# Patient Record
Sex: FEMALE | Race: WHITE | NOT HISPANIC OR LATINO
[De-identification: names, ages, dates, MRNs, and addresses within clinical notes are randomized per-mention and may not be internally consistent; named-entity substitution may affect disease eponyms.]

---

## 2017-01-03 ENCOUNTER — APPOINTMENT (OUTPATIENT)
Dept: UROGYNECOLOGY | Facility: CLINIC | Age: 77
End: 2017-01-03

## 2017-01-03 RX ORDER — LEVOTHYROXINE SODIUM 0.03 MG/1
25 TABLET ORAL
Qty: 30 | Refills: 0 | Status: ACTIVE | COMMUNITY
Start: 2016-08-19

## 2017-01-05 ENCOUNTER — APPOINTMENT (OUTPATIENT)
Dept: UROGYNECOLOGY | Facility: CLINIC | Age: 77
End: 2017-01-05

## 2017-01-06 ENCOUNTER — APPOINTMENT (OUTPATIENT)
Dept: UROGYNECOLOGY | Facility: CLINIC | Age: 77
End: 2017-01-06

## 2017-02-23 ENCOUNTER — APPOINTMENT (OUTPATIENT)
Dept: UROGYNECOLOGY | Facility: CLINIC | Age: 77
End: 2017-02-23

## 2017-02-23 LAB
BILIRUB UR QL STRIP: NORMAL
CLARITY UR: CLEAR
COLLECTION METHOD: NORMAL
GLUCOSE UR-MCNC: NORMAL
HCG UR QL: 0.2 EU/DL
HGB UR QL STRIP.AUTO: NORMAL
KETONES UR-MCNC: NORMAL
LEUKOCYTE ESTERASE UR QL STRIP: NORMAL
NITRITE UR QL STRIP: NORMAL
PH UR STRIP: 5.5
PROT UR STRIP-MCNC: NORMAL
SP GR UR STRIP: 1.01

## 2017-02-24 ENCOUNTER — RESULT REVIEW (OUTPATIENT)
Age: 77
End: 2017-02-24

## 2017-02-24 ENCOUNTER — MESSAGE (OUTPATIENT)
Age: 77
End: 2017-02-24

## 2017-02-27 ENCOUNTER — RESULT REVIEW (OUTPATIENT)
Age: 77
End: 2017-02-27

## 2017-02-27 LAB
APPEARANCE: CLEAR
BACTERIA UR CULT: ABNORMAL
BACTERIA: NEGATIVE
BILIRUBIN URINE: NEGATIVE
BLOOD URINE: NEGATIVE
COLOR: YELLOW
GLUCOSE QUALITATIVE U: NORMAL MG/DL
KETONES URINE: NEGATIVE
LEUKOCYTE ESTERASE URINE: ABNORMAL
MICROSCOPIC-UA: NORMAL
NITRITE URINE: NEGATIVE
PH URINE: 6
PROTEIN URINE: NEGATIVE MG/DL
RED BLOOD CELLS URINE: 0 /HPF
SPECIFIC GRAVITY URINE: 1.01
SQUAMOUS EPITHELIAL CELLS: 0 /HPF
UROBILINOGEN URINE: NORMAL MG/DL
WHITE BLOOD CELLS URINE: 3 /HPF

## 2017-03-06 ENCOUNTER — MEDICATION RENEWAL (OUTPATIENT)
Age: 77
End: 2017-03-06

## 2017-04-24 ENCOUNTER — APPOINTMENT (OUTPATIENT)
Dept: UROGYNECOLOGY | Facility: CLINIC | Age: 77
End: 2017-04-24

## 2017-05-01 ENCOUNTER — APPOINTMENT (OUTPATIENT)
Dept: UROGYNECOLOGY | Facility: CLINIC | Age: 77
End: 2017-05-01

## 2017-05-01 LAB
BILIRUB UR QL STRIP: NORMAL
CLARITY UR: NORMAL
COLLECTION METHOD: NORMAL
GLUCOSE UR-MCNC: NORMAL
HCG UR QL: 0.2 EU/DL
HGB UR QL STRIP.AUTO: NORMAL
KETONES UR-MCNC: NORMAL
LEUKOCYTE ESTERASE UR QL STRIP: NORMAL
NITRITE UR QL STRIP: NORMAL
PH UR STRIP: 5.5
PROT UR STRIP-MCNC: NORMAL
SP GR UR STRIP: 1.02

## 2017-05-03 ENCOUNTER — RESULT REVIEW (OUTPATIENT)
Age: 77
End: 2017-05-03

## 2017-05-03 LAB
APPEARANCE: CLEAR
BACTERIA UR CULT: NORMAL
BACTERIA: NEGATIVE
BILIRUBIN URINE: NEGATIVE
BLOOD URINE: NEGATIVE
COLOR: YELLOW
GLUCOSE QUALITATIVE U: NORMAL MG/DL
HYALINE CASTS: 0 /LPF
KETONES URINE: ABNORMAL
LEUKOCYTE ESTERASE URINE: NEGATIVE
MICROSCOPIC-UA: NORMAL
NITRITE URINE: NEGATIVE
PH URINE: 6
PROTEIN URINE: NEGATIVE MG/DL
RED BLOOD CELLS URINE: 4 /HPF
SPECIFIC GRAVITY URINE: 1.02
SQUAMOUS EPITHELIAL CELLS: 0 /HPF
UROBILINOGEN URINE: 1 MG/DL
WHITE BLOOD CELLS URINE: 1 /HPF

## 2017-05-09 ENCOUNTER — APPOINTMENT (OUTPATIENT)
Dept: UROGYNECOLOGY | Facility: CLINIC | Age: 77
End: 2017-05-09

## 2017-06-19 ENCOUNTER — EMERGENCY (EMERGENCY)
Facility: HOSPITAL | Age: 77
LOS: 1 days | Discharge: HOME | End: 2017-06-19
Attending: EMERGENCY MEDICINE | Admitting: EMERGENCY MEDICINE
Payer: MEDICARE

## 2017-06-19 ENCOUNTER — APPOINTMENT (OUTPATIENT)
Dept: UROGYNECOLOGY | Facility: CLINIC | Age: 77
End: 2017-06-19

## 2017-06-19 VITALS
HEART RATE: 64 BPM | SYSTOLIC BLOOD PRESSURE: 110 MMHG | DIASTOLIC BLOOD PRESSURE: 62 MMHG | OXYGEN SATURATION: 98 % | RESPIRATION RATE: 16 BRPM | TEMPERATURE: 98 F

## 2017-06-19 VITALS
DIASTOLIC BLOOD PRESSURE: 68 MMHG | TEMPERATURE: 98 F | OXYGEN SATURATION: 99 % | RESPIRATION RATE: 16 BRPM | HEART RATE: 71 BPM | SYSTOLIC BLOOD PRESSURE: 102 MMHG

## 2017-06-19 LAB
ALBUMIN SERPL ELPH-MCNC: 4.2 G/DL — SIGNIFICANT CHANGE UP (ref 3.3–5)
ALP SERPL-CCNC: 62 U/L — SIGNIFICANT CHANGE UP (ref 40–120)
ALT FLD-CCNC: 12 U/L RC — SIGNIFICANT CHANGE UP (ref 10–45)
ANION GAP SERPL CALC-SCNC: 15 MMOL/L — SIGNIFICANT CHANGE UP (ref 5–17)
APPEARANCE UR: ABNORMAL
APTT BLD: 30.7 SEC — SIGNIFICANT CHANGE UP (ref 27.5–37.4)
AST SERPL-CCNC: 18 U/L — SIGNIFICANT CHANGE UP (ref 10–40)
BASOPHILS # BLD AUTO: 0 K/UL — SIGNIFICANT CHANGE UP (ref 0–0.2)
BASOPHILS NFR BLD AUTO: 0.3 % — SIGNIFICANT CHANGE UP (ref 0–2)
BILIRUB SERPL-MCNC: 0.4 MG/DL — SIGNIFICANT CHANGE UP (ref 0.2–1.2)
BILIRUB UR-MCNC: NEGATIVE — SIGNIFICANT CHANGE UP
BUN SERPL-MCNC: 20 MG/DL — SIGNIFICANT CHANGE UP (ref 7–23)
CALCIUM SERPL-MCNC: 9.8 MG/DL — SIGNIFICANT CHANGE UP (ref 8.4–10.5)
CHLORIDE SERPL-SCNC: 96 MMOL/L — SIGNIFICANT CHANGE UP (ref 96–108)
CO2 SERPL-SCNC: 25 MMOL/L — SIGNIFICANT CHANGE UP (ref 22–31)
COLOR SPEC: ABNORMAL
CREAT SERPL-MCNC: 1.38 MG/DL — HIGH (ref 0.5–1.3)
DIFF PNL FLD: NEGATIVE — SIGNIFICANT CHANGE UP
EOSINOPHIL # BLD AUTO: 0.1 K/UL — SIGNIFICANT CHANGE UP (ref 0–0.5)
EOSINOPHIL NFR BLD AUTO: 1.3 % — SIGNIFICANT CHANGE UP (ref 0–6)
GAS PNL BLDV: SIGNIFICANT CHANGE UP
GLUCOSE SERPL-MCNC: 94 MG/DL — SIGNIFICANT CHANGE UP (ref 70–99)
GLUCOSE UR QL: NEGATIVE — SIGNIFICANT CHANGE UP
HCT VFR BLD CALC: 39 % — SIGNIFICANT CHANGE UP (ref 34.5–45)
HGB BLD-MCNC: 13 G/DL — SIGNIFICANT CHANGE UP (ref 11.5–15.5)
INR BLD: 1.02 RATIO — SIGNIFICANT CHANGE UP (ref 0.88–1.16)
KETONES UR-MCNC: ABNORMAL
LEUKOCYTE ESTERASE UR-ACNC: ABNORMAL
LYMPHOCYTES # BLD AUTO: 2.7 K/UL — SIGNIFICANT CHANGE UP (ref 1–3.3)
LYMPHOCYTES # BLD AUTO: 26.6 % — SIGNIFICANT CHANGE UP (ref 13–44)
MCHC RBC-ENTMCNC: 31.7 PG — SIGNIFICANT CHANGE UP (ref 27–34)
MCHC RBC-ENTMCNC: 33.3 GM/DL — SIGNIFICANT CHANGE UP (ref 32–36)
MCV RBC AUTO: 95.1 FL — SIGNIFICANT CHANGE UP (ref 80–100)
MONOCYTES # BLD AUTO: 0.8 K/UL — SIGNIFICANT CHANGE UP (ref 0–0.9)
MONOCYTES NFR BLD AUTO: 8.3 % — SIGNIFICANT CHANGE UP (ref 2–14)
NEUTROPHILS # BLD AUTO: 6.4 K/UL — SIGNIFICANT CHANGE UP (ref 1.8–7.4)
NEUTROPHILS NFR BLD AUTO: 63.5 % — SIGNIFICANT CHANGE UP (ref 43–77)
NITRITE UR-MCNC: NEGATIVE — SIGNIFICANT CHANGE UP
PH UR: 5.5 — SIGNIFICANT CHANGE UP (ref 5–8)
PLATELET # BLD AUTO: 254 K/UL — SIGNIFICANT CHANGE UP (ref 150–400)
POTASSIUM SERPL-MCNC: 4.2 MMOL/L — SIGNIFICANT CHANGE UP (ref 3.5–5.3)
POTASSIUM SERPL-SCNC: 4.2 MMOL/L — SIGNIFICANT CHANGE UP (ref 3.5–5.3)
PROT SERPL-MCNC: 7.3 G/DL — SIGNIFICANT CHANGE UP (ref 6–8.3)
PROT UR-MCNC: NEGATIVE — SIGNIFICANT CHANGE UP
PROTHROM AB SERPL-ACNC: 11 SEC — SIGNIFICANT CHANGE UP (ref 9.8–12.7)
RBC # BLD: 4.1 M/UL — SIGNIFICANT CHANGE UP (ref 3.8–5.2)
RBC # FLD: 11.5 % — SIGNIFICANT CHANGE UP (ref 10.3–14.5)
RBC CASTS # UR COMP ASSIST: SIGNIFICANT CHANGE UP /HPF (ref 0–2)
SODIUM SERPL-SCNC: 136 MMOL/L — SIGNIFICANT CHANGE UP (ref 135–145)
SP GR SPEC: 1.01 — SIGNIFICANT CHANGE UP (ref 1.01–1.02)
UROBILINOGEN FLD QL: NEGATIVE — SIGNIFICANT CHANGE UP
WBC # BLD: 10.1 K/UL — SIGNIFICANT CHANGE UP (ref 3.8–10.5)
WBC # FLD AUTO: 10.1 K/UL — SIGNIFICANT CHANGE UP (ref 3.8–10.5)
WBC UR QL: SIGNIFICANT CHANGE UP /HPF (ref 0–5)

## 2017-06-19 PROCEDURE — 82803 BLOOD GASES ANY COMBINATION: CPT

## 2017-06-19 PROCEDURE — 87086 URINE CULTURE/COLONY COUNT: CPT

## 2017-06-19 PROCEDURE — 80053 COMPREHEN METABOLIC PANEL: CPT

## 2017-06-19 PROCEDURE — 96374 THER/PROPH/DIAG INJ IV PUSH: CPT

## 2017-06-19 PROCEDURE — 85610 PROTHROMBIN TIME: CPT

## 2017-06-19 PROCEDURE — 82330 ASSAY OF CALCIUM: CPT

## 2017-06-19 PROCEDURE — 84132 ASSAY OF SERUM POTASSIUM: CPT

## 2017-06-19 PROCEDURE — 83605 ASSAY OF LACTIC ACID: CPT

## 2017-06-19 PROCEDURE — 82435 ASSAY OF BLOOD CHLORIDE: CPT

## 2017-06-19 PROCEDURE — 99284 EMERGENCY DEPT VISIT MOD MDM: CPT | Mod: GC

## 2017-06-19 PROCEDURE — 85014 HEMATOCRIT: CPT

## 2017-06-19 PROCEDURE — 84295 ASSAY OF SERUM SODIUM: CPT

## 2017-06-19 PROCEDURE — 81001 URINALYSIS AUTO W/SCOPE: CPT

## 2017-06-19 PROCEDURE — 85027 COMPLETE CBC AUTOMATED: CPT

## 2017-06-19 PROCEDURE — 74176 CT ABD & PELVIS W/O CONTRAST: CPT

## 2017-06-19 PROCEDURE — 99284 EMERGENCY DEPT VISIT MOD MDM: CPT | Mod: 25

## 2017-06-19 PROCEDURE — 74176 CT ABD & PELVIS W/O CONTRAST: CPT | Mod: 26

## 2017-06-19 PROCEDURE — 82947 ASSAY GLUCOSE BLOOD QUANT: CPT

## 2017-06-19 PROCEDURE — 85730 THROMBOPLASTIN TIME PARTIAL: CPT

## 2017-06-19 RX ORDER — LAMOTRIGINE 200 MG/1
200 TABLET ORAL
Qty: 90 | Refills: 0 | Status: DISCONTINUED | COMMUNITY
Start: 2016-03-07

## 2017-06-19 RX ORDER — SODIUM CHLORIDE 9 MG/ML
1000 INJECTION INTRAMUSCULAR; INTRAVENOUS; SUBCUTANEOUS ONCE
Qty: 0 | Refills: 0 | Status: COMPLETED | OUTPATIENT
Start: 2017-06-19 | End: 2017-06-19

## 2017-06-19 RX ORDER — CITALOPRAM HYDROBROMIDE 40 MG/1
40 TABLET, FILM COATED ORAL
Qty: 90 | Refills: 0 | Status: DISCONTINUED | COMMUNITY
Start: 2016-09-13

## 2017-06-19 RX ORDER — AZTREONAM 2 G
1 VIAL (EA) INJECTION
Qty: 20 | Refills: 0 | OUTPATIENT
Start: 2017-06-19 | End: 2017-06-29

## 2017-06-19 RX ORDER — ACETAMINOPHEN 500 MG
1000 TABLET ORAL ONCE
Qty: 0 | Refills: 0 | Status: COMPLETED | OUTPATIENT
Start: 2017-06-19 | End: 2017-06-19

## 2017-06-19 RX ORDER — MELOXICAM 15 MG/1
15 TABLET ORAL
Qty: 30 | Refills: 0 | Status: DISCONTINUED | COMMUNITY
Start: 2017-04-08

## 2017-06-19 RX ADMIN — Medication 1 TABLET(S): at 18:29

## 2017-06-19 RX ADMIN — Medication 1000 MILLIGRAM(S): at 17:40

## 2017-06-19 RX ADMIN — SODIUM CHLORIDE 1000 MILLILITER(S): 9 INJECTION INTRAMUSCULAR; INTRAVENOUS; SUBCUTANEOUS at 17:40

## 2017-06-19 RX ADMIN — Medication 400 MILLIGRAM(S): at 14:30

## 2017-06-19 NOTE — ED ADULT NURSE REASSESSMENT NOTE - NS ED NURSE REASSESS COMMENT FT1
Patient is resting comfortably in green. Patient states her pain is slightly relieved. Patient had to be straight cathed because she says she is "unable to urinate on command". Awaiting results, pending dispo.

## 2017-06-19 NOTE — ED PROVIDER NOTE - ATTENDING CONTRIBUTION TO CARE
****ATTENDING**** 76yo f hx UTI presents with R flank pain. States she was seen at urologist office today and found to have blood in her urine and sent in for eval for kidney stone. Pt denies any abd pain, n/v. No fever, chills. ON exam, Patient is awake,alert,oriented x 3. Patient is well appearing and in no acute distress. Patient's chest is clear to ausculation, +s1s2. Abdomen is soft nd/nt +BS. No cvat, no rash. Extremity with no swelling or calf tenderness.   Check Labs, UA, CT to eval for UTI and kidney stone.

## 2017-06-19 NOTE — ED ADULT NURSE NOTE - CHPI ED SYMPTOMS NEG
no nausea/no blood in stool/no abdominal distension/no vomiting/no hematuria/no chills/no fever/no diarrhea

## 2017-06-19 NOTE — ED PROVIDER NOTE - PLAN OF CARE
Stay well hydrated. Take antibiotic as prescribed. Stay well hydrated. Take antibiotic as prescribed. Follow-up with your urologist within 1-2 days. Return to an ER for worsening pain, persistent vomiting, fever or any other concerns.

## 2017-06-19 NOTE — ED PROVIDER NOTE - OBJECTIVE STATEMENT
77yF h/o UTI sent in from urologist office for possible kidney stone. As per patient, urine was tested in the office and showed signs of a stone. +urinary frequency and urgency. No prior kidney stone. Reports 4 days of R flank pain. Denies nausea/vomiting, fever, chills, hematuria.

## 2017-06-19 NOTE — ED ADULT NURSE NOTE - OBJECTIVE STATEMENT
77F comes to ED after seeing her urologist c/o urinary burning, frequency and right flank pain. Patient states she started experiencing pain on Weds. Patient states she has to urinate every 5-10 minutes. She has PMH pessary device for uterine prolapse, and depression. Patient denies SOB/chest pain/N/V/D/dizziness/fever/chills/abdominal pain/hematuria/edema. She states she has intermittent episodes of sweating. She is a&ox4 with patent airway and appears comfortable. On exam, patient has right CVA tenderness and no abdominal pain with palpation. Patient in bed with bedrails up. Will continue to monitor.

## 2017-06-19 NOTE — ED PROVIDER NOTE - MEDICAL DECISION MAKING DETAILS
****ATTENDING**** 78yo f hx UTI presents with R flank pain. States she was seen at urologist office today and found to have blood in her urine and sent in for eval for kidney stone. Pt denies any abd pain, n/v. No fever, chills. ON exam, Patient is awake,alert,oriented x 3. Patient is well appearing and in no acute distress. Patient's chest is clear to ausculation, +s1s2. Abdomen is soft nd/nt +BS. No cvat, no rash. Extremity with no swelling or calf tenderness.   Check Labs, UA, CT to eval for UTI and kidney stone.

## 2017-06-19 NOTE — ED PROVIDER NOTE - CARE PLAN
Principal Discharge DX:	Pyelonephritis  Instructions for follow-up, activity and diet:	Stay well hydrated. Take antibiotic as prescribed. Principal Discharge DX:	Pyelonephritis  Instructions for follow-up, activity and diet:	Stay well hydrated. Take antibiotic as prescribed. Follow-up with your urologist within 1-2 days. Return to an ER for worsening pain, persistent vomiting, fever or any other concerns.

## 2017-06-20 LAB
CULTURE RESULTS: NO GROWTH — SIGNIFICANT CHANGE UP
SPECIMEN SOURCE: SIGNIFICANT CHANGE UP

## 2017-06-21 ENCOUNTER — MESSAGE (OUTPATIENT)
Age: 77
End: 2017-06-21

## 2017-06-21 NOTE — ED POST DISCHARGE NOTE - ADDITIONAL DOCUMENTATION
pt called back, dw pt urine culture negative, can dc anbx and fu with pcp, pt agrees feeling well JOCELYN Estrada

## 2017-07-05 ENCOUNTER — APPOINTMENT (OUTPATIENT)
Dept: UROGYNECOLOGY | Facility: CLINIC | Age: 77
End: 2017-07-05

## 2017-09-13 ENCOUNTER — APPOINTMENT (OUTPATIENT)
Dept: UROGYNECOLOGY | Facility: CLINIC | Age: 77
End: 2017-09-13
Payer: MEDICARE

## 2017-09-13 LAB
BILIRUB UR QL STRIP: NORMAL
CLARITY UR: CLEAR
COLLECTION METHOD: NORMAL
GLUCOSE UR-MCNC: NORMAL
HCG UR QL: 0.2 EU/DL
HGB UR QL STRIP.AUTO: NORMAL
KETONES UR-MCNC: NORMAL
LEUKOCYTE ESTERASE UR QL STRIP: NORMAL
NITRITE UR QL STRIP: NORMAL
PH UR STRIP: 6
PROT UR STRIP-MCNC: NORMAL
SP GR UR STRIP: 1.01

## 2017-09-13 PROCEDURE — 81003 URINALYSIS AUTO W/O SCOPE: CPT | Mod: QW

## 2017-09-13 PROCEDURE — 51701 INSERT BLADDER CATHETER: CPT

## 2017-09-13 PROCEDURE — 99213 OFFICE O/P EST LOW 20 MIN: CPT | Mod: 25

## 2017-09-13 RX ORDER — AZITHROMYCIN 250 MG/1
250 TABLET, FILM COATED ORAL
Qty: 6 | Refills: 0 | Status: DISCONTINUED | COMMUNITY
Start: 2017-08-09

## 2017-09-14 ENCOUNTER — RESULT REVIEW (OUTPATIENT)
Age: 77
End: 2017-09-14

## 2017-09-14 LAB
APPEARANCE: CLEAR
BACTERIA: NEGATIVE
BILIRUBIN URINE: NEGATIVE
BLOOD URINE: NEGATIVE
COLOR: ABNORMAL
GLUCOSE QUALITATIVE U: NORMAL MG/DL
KETONES URINE: NEGATIVE
LEUKOCYTE ESTERASE URINE: NEGATIVE
MICROSCOPIC-UA: NORMAL
NITRITE URINE: NEGATIVE
PH URINE: 6
PROTEIN URINE: NEGATIVE MG/DL
RED BLOOD CELLS URINE: 0 /HPF
SPECIFIC GRAVITY URINE: 1.01
SQUAMOUS EPITHELIAL CELLS: 0 /HPF
UROBILINOGEN URINE: NORMAL MG/DL
WHITE BLOOD CELLS URINE: 0 /HPF

## 2017-09-15 ENCOUNTER — RESULT REVIEW (OUTPATIENT)
Age: 77
End: 2017-09-15

## 2017-09-15 LAB — BACTERIA UR CULT: NORMAL

## 2017-11-08 ENCOUNTER — APPOINTMENT (OUTPATIENT)
Dept: UROGYNECOLOGY | Facility: CLINIC | Age: 77
End: 2017-11-08
Payer: MEDICARE

## 2017-11-08 PROCEDURE — 99213 OFFICE O/P EST LOW 20 MIN: CPT

## 2017-12-18 ENCOUNTER — RESULT CHARGE (OUTPATIENT)
Age: 77
End: 2017-12-18

## 2017-12-18 ENCOUNTER — APPOINTMENT (OUTPATIENT)
Dept: UROGYNECOLOGY | Facility: CLINIC | Age: 77
End: 2017-12-18
Payer: MEDICARE

## 2017-12-18 LAB
BILIRUB UR QL STRIP: NORMAL
CLARITY UR: CLEAR
COLLECTION METHOD: NORMAL
GLUCOSE UR-MCNC: NORMAL
HCG UR QL: NORMAL EU/DL
HGB UR QL STRIP.AUTO: NORMAL
KETONES UR-MCNC: NORMAL
LEUKOCYTE ESTERASE UR QL STRIP: NORMAL
NITRITE UR QL STRIP: NORMAL
PH UR STRIP: 5.5
PROT UR STRIP-MCNC: NORMAL
SP GR UR STRIP: 1.01

## 2017-12-18 PROCEDURE — 99213 OFFICE O/P EST LOW 20 MIN: CPT

## 2017-12-18 RX ORDER — CLOTRIMAZOLE AND BETAMETHASONE DIPROPIONATE 10; .5 MG/G; MG/G
1-0.05 CREAM TOPICAL
Qty: 1 | Refills: 3 | Status: ACTIVE | COMMUNITY
Start: 2017-12-18 | End: 1900-01-01

## 2017-12-19 ENCOUNTER — RESULT REVIEW (OUTPATIENT)
Age: 77
End: 2017-12-19

## 2017-12-19 LAB
APPEARANCE: CLEAR
BACTERIA: NEGATIVE
BILIRUBIN URINE: NEGATIVE
BLOOD URINE: NEGATIVE
COLOR: ABNORMAL
GLUCOSE QUALITATIVE U: NEGATIVE
KETONES URINE: NEGATIVE
LEUKOCYTE ESTERASE URINE: NEGATIVE
MICROSCOPIC-UA: NORMAL
NITRITE URINE: NORMAL
PH URINE: 6
PROTEIN URINE: NEGATIVE
RED BLOOD CELLS URINE: 1 /HPF
SPECIFIC GRAVITY URINE: 1.01
SQUAMOUS EPITHELIAL CELLS: 0 /HPF
UROBILINOGEN URINE: NEGATIVE
WHITE BLOOD CELLS URINE: 0 /HPF

## 2017-12-20 ENCOUNTER — RESULT REVIEW (OUTPATIENT)
Age: 77
End: 2017-12-20

## 2017-12-20 LAB — BACTERIA UR CULT: NORMAL

## 2017-12-22 ENCOUNTER — APPOINTMENT (OUTPATIENT)
Dept: UROGYNECOLOGY | Facility: CLINIC | Age: 77
End: 2017-12-22
Payer: MEDICARE

## 2017-12-22 PROCEDURE — 99213 OFFICE O/P EST LOW 20 MIN: CPT

## 2018-02-07 ENCOUNTER — RESULT REVIEW (OUTPATIENT)
Age: 78
End: 2018-02-07

## 2018-02-08 ENCOUNTER — APPOINTMENT (OUTPATIENT)
Dept: UROGYNECOLOGY | Facility: CLINIC | Age: 78
End: 2018-02-08

## 2018-02-23 ENCOUNTER — APPOINTMENT (OUTPATIENT)
Dept: UROGYNECOLOGY | Facility: CLINIC | Age: 78
End: 2018-02-23
Payer: MEDICARE

## 2018-02-23 PROCEDURE — 99213 OFFICE O/P EST LOW 20 MIN: CPT

## 2018-04-11 ENCOUNTER — APPOINTMENT (OUTPATIENT)
Dept: UROGYNECOLOGY | Facility: CLINIC | Age: 78
End: 2018-04-11
Payer: MEDICARE

## 2018-04-11 PROCEDURE — 99213 OFFICE O/P EST LOW 20 MIN: CPT

## 2018-05-21 NOTE — ED ADULT TRIAGE NOTE - MEANS OF ARRIVAL
Bill For Surgical Tray: no Electrodesiccation And Curettage Text: The wound bed was treated with electrodesiccation and curettage after the biopsy was performed. Post-Care Instructions: I reviewed with the patient in detail post-care instructions. Patient is to keep the biopsy site dry overnight, and then apply bacitracin twice daily until healed. Patient may apply hydrogen peroxide soaks to remove any crusting. Dressing: bandage Notification Instructions: Patient will be notified of biopsy results. However, patient instructed to call the office if not contacted within 2 weeks. ambulatory Electrodesiccation Text: The wound bed was treated with electrodesiccation after the biopsy was performed. Hemostasis: Aluminum Chloride Detail Level: Detailed Wound Care: Bacitracin Biopsy Type: H and E Anesthesia Type: 1% lidocaine with epinephrine Billing Type: Third-Party Bill Biopsy Method: double edge Personna blade Type Of Destruction Used: Curettage Cryotherapy Text: The wound bed was treated with cryotherapy after the biopsy was performed. Additional Anesthesia Volume In Cc (Will Not Render If 0): 0 Anesthesia Volume In Cc: 0.5 Consent: Written consent was obtained and risks were reviewed including but not limited to scarring, infection, bleeding, scabbing, incomplete removal, nerve damage and allergy to anesthesia. Silver Nitrate Text: The wound bed was treated with silver nitrate after the biopsy was performed. Was A Bandage Applied: Yes Body Location Override (Optional - Billing Will Still Be Based On Selected Body Map Location If Applicable): left nasal supra tip Body Location Override (Optional - Billing Will Still Be Based On Selected Body Map Location If Applicable): right central malar cheek Size Of Lesion In Cm: 1 Size Of Lesion In Cm: 0.8 Body Location Override (Optional - Billing Will Still Be Based On Selected Body Map Location If Applicable): right superior lateral buccal cheek

## 2018-06-13 ENCOUNTER — APPOINTMENT (OUTPATIENT)
Dept: UROGYNECOLOGY | Facility: CLINIC | Age: 78
End: 2018-06-13
Payer: MEDICARE

## 2018-06-13 LAB
BILIRUB UR QL STRIP: NORMAL
CLARITY UR: NORMAL
COLLECTION METHOD: NORMAL
GLUCOSE UR-MCNC: NORMAL
HCG UR QL: 0.2 EU/DL
HGB UR QL STRIP.AUTO: NORMAL
KETONES UR-MCNC: NORMAL
LEUKOCYTE ESTERASE UR QL STRIP: NORMAL
NITRITE UR QL STRIP: POSITIVE
PH UR STRIP: 5.5
PROT UR STRIP-MCNC: NORMAL
SP GR UR STRIP: 1.01

## 2018-06-13 PROCEDURE — 81003 URINALYSIS AUTO W/O SCOPE: CPT | Mod: QW

## 2018-06-13 PROCEDURE — 99213 OFFICE O/P EST LOW 20 MIN: CPT | Mod: 25

## 2018-06-13 PROCEDURE — 51701 INSERT BLADDER CATHETER: CPT

## 2018-06-14 ENCOUNTER — RESULT REVIEW (OUTPATIENT)
Age: 78
End: 2018-06-14

## 2018-06-18 ENCOUNTER — RESULT REVIEW (OUTPATIENT)
Age: 78
End: 2018-06-18

## 2018-06-18 LAB
APPEARANCE: CLEAR
BACTERIA UR CULT: ABNORMAL
BACTERIA: ABNORMAL
BILIRUBIN URINE: NEGATIVE
BLOOD URINE: ABNORMAL
COLOR: YELLOW
GLUCOSE QUALITATIVE U: NEGATIVE MG/DL
KETONES URINE: NEGATIVE
LEUKOCYTE ESTERASE URINE: NEGATIVE
MICROSCOPIC-UA: NORMAL
NITRITE URINE: POSITIVE
PH URINE: 5.5
PROTEIN URINE: NEGATIVE MG/DL
RED BLOOD CELLS URINE: 1 /HPF
SPECIFIC GRAVITY URINE: 1.02
SQUAMOUS EPITHELIAL CELLS: 0 /HPF
UROBILINOGEN URINE: NEGATIVE MG/DL
WHITE BLOOD CELLS URINE: 2 /HPF

## 2018-07-27 ENCOUNTER — MEDICATION RENEWAL (OUTPATIENT)
Age: 78
End: 2018-07-27

## 2018-08-13 ENCOUNTER — APPOINTMENT (OUTPATIENT)
Dept: UROGYNECOLOGY | Facility: CLINIC | Age: 78
End: 2018-08-13
Payer: MEDICARE

## 2018-08-13 PROBLEM — N39.0 URINARY TRACT INFECTION, SITE NOT SPECIFIED: Chronic | Status: ACTIVE | Noted: 2017-06-19

## 2018-08-13 PROCEDURE — 99213 OFFICE O/P EST LOW 20 MIN: CPT

## 2018-09-04 ENCOUNTER — EMERGENCY (EMERGENCY)
Facility: HOSPITAL | Age: 78
LOS: 1 days | Discharge: HOME | End: 2018-09-04
Attending: EMERGENCY MEDICINE | Admitting: EMERGENCY MEDICINE
Payer: MEDICARE

## 2018-09-04 VITALS
DIASTOLIC BLOOD PRESSURE: 65 MMHG | HEART RATE: 72 BPM | WEIGHT: 115.08 LBS | HEIGHT: 64 IN | TEMPERATURE: 98 F | SYSTOLIC BLOOD PRESSURE: 114 MMHG | OXYGEN SATURATION: 96 % | RESPIRATION RATE: 16 BRPM

## 2018-09-04 VITALS
RESPIRATION RATE: 16 BRPM | HEART RATE: 65 BPM | OXYGEN SATURATION: 97 % | TEMPERATURE: 98 F | DIASTOLIC BLOOD PRESSURE: 69 MMHG | SYSTOLIC BLOOD PRESSURE: 113 MMHG

## 2018-09-04 PROCEDURE — 70450 CT HEAD/BRAIN W/O DYE: CPT

## 2018-09-04 PROCEDURE — 99284 EMERGENCY DEPT VISIT MOD MDM: CPT | Mod: 25

## 2018-09-04 PROCEDURE — 99285 EMERGENCY DEPT VISIT HI MDM: CPT

## 2018-09-04 PROCEDURE — 70450 CT HEAD/BRAIN W/O DYE: CPT | Mod: 26

## 2018-09-04 NOTE — ED ADULT NURSE NOTE - NSIMPLEMENTINTERV_GEN_ALL_ED
Implemented All Fall Risk Interventions:  [place] to call system. Call bell, personal items and telephone within reach. Instruct patient to call for assistance. Room bathroom lighting operational. Non-slip footwear when patient is off stretcher. Physically safe environment: no spills, clutter or unnecessary equipment. Stretcher in lowest position, wheels locked, appropriate side rails in place. Provide visual cue, wrist band, yellow gown, etc. Monitor gait and stability. Monitor for mental status changes and reorient to person, place, and time. Review medications for side effects contributing to fall risk. Reinforce activity limits and safety measures with patient and family.

## 2018-09-04 NOTE — ED PROVIDER NOTE - OBJECTIVE STATEMENT
77 y/o pt w/ PMHx depression presents to the ED c/o head injury s/p fall x yesterday. Pt states that when she fell she hit the R side of her head on the wooden floor, denies LOC. States she was able to get up on her own and ambulate normally. Also reports bruises to R hip and R knee, but denies pain to those areas. Pt endorses nausea and fatigue today. Pt denies blood thinner use, neck pain, vomiting or any other complaints at this time.

## 2018-10-15 ENCOUNTER — APPOINTMENT (OUTPATIENT)
Dept: UROGYNECOLOGY | Facility: CLINIC | Age: 78
End: 2018-10-15
Payer: MEDICARE

## 2018-10-15 PROBLEM — E03.9 HYPOTHYROIDISM, UNSPECIFIED: Chronic | Status: ACTIVE | Noted: 2018-09-04

## 2018-10-15 PROBLEM — F32.9 MAJOR DEPRESSIVE DISORDER, SINGLE EPISODE, UNSPECIFIED: Chronic | Status: ACTIVE | Noted: 2018-09-04

## 2018-10-15 PROCEDURE — 99213 OFFICE O/P EST LOW 20 MIN: CPT

## 2018-10-15 RX ORDER — TRIAMCINOLONE ACETONIDE 0.25 MG/G
0.03 OINTMENT TOPICAL
Qty: 15 | Refills: 0 | Status: DISCONTINUED | COMMUNITY
Start: 2018-10-09

## 2018-12-10 ENCOUNTER — APPOINTMENT (OUTPATIENT)
Dept: UROGYNECOLOGY | Facility: CLINIC | Age: 78
End: 2018-12-10
Payer: MEDICARE

## 2018-12-10 PROCEDURE — 99213 OFFICE O/P EST LOW 20 MIN: CPT

## 2019-01-18 ENCOUNTER — APPOINTMENT (OUTPATIENT)
Dept: UROGYNECOLOGY | Facility: CLINIC | Age: 79
End: 2019-01-18

## 2019-03-11 ENCOUNTER — APPOINTMENT (OUTPATIENT)
Dept: UROGYNECOLOGY | Facility: CLINIC | Age: 79
End: 2019-03-11

## 2019-03-13 ENCOUNTER — RX RENEWAL (OUTPATIENT)
Age: 79
End: 2019-03-13

## 2019-04-19 ENCOUNTER — APPOINTMENT (OUTPATIENT)
Dept: UROGYNECOLOGY | Facility: CLINIC | Age: 79
End: 2019-04-19
Payer: MEDICARE

## 2019-04-19 PROCEDURE — 99213 OFFICE O/P EST LOW 20 MIN: CPT

## 2019-04-19 RX ORDER — DONEPEZIL HYDROCHLORIDE 5 MG/1
5 TABLET ORAL
Qty: 30 | Refills: 0 | Status: ACTIVE | COMMUNITY
Start: 2019-02-04

## 2019-04-22 NOTE — DISCUSSION/SUMMARY
[FreeTextEntry1] : Pessary left out for healing.  F/U pelvic prolapse in 2 weeks for pessary reinsertion.  IF pt have any problems/concern to call office.  PT agrees.

## 2019-04-22 NOTE — PROCEDURE
[Good Fit] : fits well [Discharge] : there is vaginal discharge [Pessary Out] : removed [H2O] : H2O [Mild] : mild bleeding [Resolved w/pressure] : was resolved by applying pressure [Infection] : no evidence of infection [Erythema] : no erythema [FreeTextEntry1] : Cube # 4 [FreeTextEntry8] : Reinforced daily pericare.

## 2019-04-22 NOTE — PHYSICAL EXAM
[No Acute Distress] : in no acute distress [Well Nourished] : ~L well nourished [Oriented x3] : oriented to person, place, and time [Good Hygeine] : demonstrates good hygeine [Normal Mood/Affect] : mood and affect are normal [Soft, Nontender] : the abdomen was soft and nontender [Warm and Dry] : was warm and dry to touch [Vulvar Atrophy] : vulvar atrophy [Normal Gait] : gait was normal [Atrophy] : atrophy [Cystocele] : a cystocele [Discharge] : a  ~M vaginal discharge was present [Tess] : yellow [Scant] : scant [No Bleeding] : there was no active vaginal bleeding [de-identified] : Mild irritation on the right vaginal wall posteriorly rubbing from pessary.  Small bleeding with removal, able to stop with pressure.

## 2019-04-22 NOTE — HISTORY OF PRESENT ILLNESS
[FreeTextEntry1] : PT doing fine with pessary.  Denies any pain, discomfort.   Voiding and moving BM without problems.  Recent UTI and was in the [place] for bleeding, unsure if it was rectal.  PT was discharged home yesterday and took a dose of Monurol that was Rx from the hospital.  Noticed some staining possibly from pessary and was told to come to the office today.

## 2019-05-03 ENCOUNTER — APPOINTMENT (OUTPATIENT)
Dept: UROGYNECOLOGY | Facility: CLINIC | Age: 79
End: 2019-05-03
Payer: MEDICARE

## 2019-05-03 PROCEDURE — 51701 INSERT BLADDER CATHETER: CPT

## 2019-05-03 PROCEDURE — 99213 OFFICE O/P EST LOW 20 MIN: CPT | Mod: 25

## 2019-05-03 PROCEDURE — 81003 URINALYSIS AUTO W/O SCOPE: CPT | Mod: QW

## 2019-05-10 NOTE — PHYSICAL EXAM
[No Acute Distress] : in no acute distress [Oriented x3] : oriented to person, place, and time [Good Hygeine] : demonstrates good hygeine [Normal Mood/Affect] : mood and affect are normal [Soft, Nontender] : the abdomen was soft and nontender [Normal Gait] : gait was normal [Warm and Dry] : was warm and dry to touch [Vulvar Atrophy] : vulvar atrophy [Atrophy] : atrophy [Uterine Prolapse] : uterine prolapse [Cystocele] : a cystocele [Discharge] : a  ~M vaginal discharge was present [Scant] : scant [Clear] : clear [No Bleeding] : there was no active vaginal bleeding [FreeTextEntry4] : irritation site healed

## 2019-05-10 NOTE — DISCUSSION/SUMMARY
[FreeTextEntry1] : Urine dip negative.  Inserted Cube # 3.  F/U in 6-8 weeks for pelvic prolapse.  IF pt have any problems/concern to call office.  PT aware and agrees.

## 2019-05-10 NOTE — HISTORY OF PRESENT ILLNESS
[FreeTextEntry1] : PT had pessary left out since last visit due to irritation.  PT returns to the office to have pessary reinserted.  Urinating but have some frequency and some dysuria.  Denies any problem with BM.\par Denies any fever, chills, back pain, or blood in urine.

## 2019-06-03 ENCOUNTER — APPOINTMENT (OUTPATIENT)
Dept: UROGYNECOLOGY | Facility: CLINIC | Age: 79
End: 2019-06-03
Payer: MEDICARE

## 2019-06-03 LAB
BILIRUB UR QL STRIP: NORMAL
CLARITY UR: CLEAR
COLLECTION METHOD: NORMAL
GLUCOSE UR-MCNC: NORMAL
HCG UR QL: 0.2 EU/DL
HGB UR QL STRIP.AUTO: NORMAL
KETONES UR-MCNC: NORMAL
LEUKOCYTE ESTERASE UR QL STRIP: NORMAL
NITRITE UR QL STRIP: NORMAL
PH UR STRIP: 5.5
PROT UR STRIP-MCNC: NORMAL
SP GR UR STRIP: 1.02

## 2019-06-03 PROCEDURE — 51701 INSERT BLADDER CATHETER: CPT

## 2019-06-03 PROCEDURE — 99213 OFFICE O/P EST LOW 20 MIN: CPT | Mod: 25

## 2019-06-03 PROCEDURE — 81003 URINALYSIS AUTO W/O SCOPE: CPT | Mod: QW

## 2019-06-03 NOTE — PROCEDURE
[Straight Catheterization] : insertion of a straight catheter [Urinary Frequency] : urinary frequency [Patient] : the patient [___ Fr Straight Tip] : a [unfilled] in [place] straight tip catheter [Clear] : clear [No Complications] : no complications [Tolerated Well] : the patient tolerated the procedure well [Refit] : refit needed [Discharge] : there is vaginal discharge [Pessary Inserted] : inserted [Pessary Washed] : washed [H2O] : H2O [None] : no bleeding [Medication Review] : Medicaiton Review: Patient verbalizes understanding of risks and benefits [Erythema] : no erythema [Good Fit] : fit is not good [FreeTextEntry9] : Urethra caruncle noted.  Urethra cleared, catheter inserted.  No CVAT. [Infection] : no evidence of infection [de-identified] : Cube # 3 [de-identified] : Too big [FreeTextEntry1] : Had the Cube # 4 [FreeTextEntry8] : Reinforced daily pericare. [FreeTextEntry3] : vaginal lubrication - Continue use of Replens

## 2019-06-03 NOTE — DISCUSSION/SUMMARY
[FreeTextEntry1] : UA CS sent.   eRX Phenazopyridine sent.  Follow up in 2 months for pelvic prolapse.  IF pt have any problems/concern to call office.  PT aware and agrees.

## 2019-06-03 NOTE — PHYSICAL EXAM
[No Acute Distress] : in no acute distress [Good Hygeine] : demonstrates good hygeine [Oriented x3] : oriented to person, place, and time [Normal Mood/Affect] : mood and affect are normal [Soft, Nontender] : the abdomen was soft and nontender [Warm and Dry] : was warm and dry to touch [Normal Gait] : gait was normal [Vulvar Atrophy] : vulvar atrophy [Atrophy] : atrophy [Cystocele] : a cystocele [Uterine Prolapse] : uterine prolapse [Discharge] : a  ~M vaginal discharge was present [Scant] : scant [Clear] : clear [No Bleeding] : there was no active vaginal bleeding [FreeTextEntry4] : irritation site healed

## 2019-06-04 ENCOUNTER — RESULT REVIEW (OUTPATIENT)
Age: 79
End: 2019-06-04

## 2019-06-05 ENCOUNTER — RESULT REVIEW (OUTPATIENT)
Age: 79
End: 2019-06-05

## 2019-06-05 LAB
APPEARANCE: CLEAR
BACTERIA UR CULT: NORMAL
BACTERIA: NEGATIVE
BILIRUBIN URINE: NEGATIVE
BLOOD URINE: NEGATIVE
COLOR: YELLOW
GLUCOSE QUALITATIVE U: NEGATIVE
HYALINE CASTS: 0 /LPF
KETONES URINE: NEGATIVE
LEUKOCYTE ESTERASE URINE: NEGATIVE
MICROSCOPIC-UA: NORMAL
NITRITE URINE: NEGATIVE
PH URINE: 6
PROTEIN URINE: NORMAL
RED BLOOD CELLS URINE: 4 /HPF
SPECIFIC GRAVITY URINE: 1.03
SQUAMOUS EPITHELIAL CELLS: 0 /HPF
UROBILINOGEN URINE: NORMAL
WHITE BLOOD CELLS URINE: 0 /HPF

## 2019-07-18 ENCOUNTER — APPOINTMENT (OUTPATIENT)
Dept: UROGYNECOLOGY | Facility: CLINIC | Age: 79
End: 2019-07-18

## 2019-08-02 ENCOUNTER — APPOINTMENT (OUTPATIENT)
Dept: UROGYNECOLOGY | Facility: CLINIC | Age: 79
End: 2019-08-02
Payer: MEDICARE

## 2019-08-02 LAB
BILIRUB UR QL STRIP: NORMAL
CLARITY UR: CLEAR
COLLECTION METHOD: NORMAL
GLUCOSE UR-MCNC: NORMAL
HCG UR QL: 0.2 EU/DL
HGB UR QL STRIP.AUTO: NORMAL
KETONES UR-MCNC: NORMAL
LEUKOCYTE ESTERASE UR QL STRIP: NORMAL
NITRITE UR QL STRIP: POSITIVE
PH UR STRIP: 5
PROT UR STRIP-MCNC: NORMAL
SP GR UR STRIP: 1.01

## 2019-08-02 PROCEDURE — 51701 INSERT BLADDER CATHETER: CPT

## 2019-08-02 PROCEDURE — 81003 URINALYSIS AUTO W/O SCOPE: CPT | Mod: QW

## 2019-08-02 PROCEDURE — 99213 OFFICE O/P EST LOW 20 MIN: CPT | Mod: 25

## 2019-08-05 ENCOUNTER — MEDICATION RENEWAL (OUTPATIENT)
Age: 79
End: 2019-08-05

## 2019-08-05 LAB
APPEARANCE: CLEAR
BACTERIA UR CULT: ABNORMAL
BACTERIA: ABNORMAL
BILIRUBIN URINE: NEGATIVE
BLOOD URINE: ABNORMAL
COLOR: YELLOW
GLUCOSE QUALITATIVE U: NEGATIVE
HYALINE CASTS: 0 /LPF
KETONES URINE: NEGATIVE
LEUKOCYTE ESTERASE URINE: ABNORMAL
MICROSCOPIC-UA: NORMAL
NITRITE URINE: POSITIVE
PH URINE: 5.5
PROTEIN URINE: NEGATIVE
RED BLOOD CELLS URINE: 1 /HPF
SPECIFIC GRAVITY URINE: 1.02
SQUAMOUS EPITHELIAL CELLS: 1 /HPF
UROBILINOGEN URINE: NORMAL
WHITE BLOOD CELLS URINE: 6 /HPF

## 2019-08-07 ENCOUNTER — MEDICATION RENEWAL (OUTPATIENT)
Age: 79
End: 2019-08-07

## 2019-09-11 ENCOUNTER — MESSAGE (OUTPATIENT)
Age: 79
End: 2019-09-11

## 2019-09-11 ENCOUNTER — APPOINTMENT (OUTPATIENT)
Dept: UROGYNECOLOGY | Facility: CLINIC | Age: 79
End: 2019-09-11
Payer: MEDICARE

## 2019-09-11 PROCEDURE — 99213 OFFICE O/P EST LOW 20 MIN: CPT

## 2019-09-11 RX ORDER — AZITHROMYCIN 1 G/1
1 POWDER, FOR SUSPENSION ORAL
Qty: 1 | Refills: 0 | Status: DISCONTINUED | COMMUNITY
Start: 2019-08-08

## 2019-09-11 RX ORDER — METHENAMINE MANDELATE 1000 MG/1
1 TABLET, FILM COATED ORAL
Qty: 90 | Refills: 1 | Status: ACTIVE | COMMUNITY
Start: 2019-09-11 | End: 1900-01-01

## 2019-09-12 NOTE — PHYSICAL EXAM
[No Acute Distress] : in no acute distress [Good Hygeine] : demonstrates good hygeine [Oriented x3] : oriented to person, place, and time [Soft, Nontender] : the abdomen was soft and nontender [Normal Mood/Affect] : mood and affect are normal [Warm and Dry] : was warm and dry to touch [Normal Gait] : gait was normal [Vulvar Atrophy] : vulvar atrophy [Atrophy] : atrophy [Cystocele] : a cystocele [Uterine Prolapse] : uterine prolapse [Discharge] : a  ~M vaginal discharge was present [Scant] : scant [Clear] : clear [No Bleeding] : there was no active vaginal bleeding [FreeTextEntry4] : irritation site healed

## 2019-09-12 NOTE — DISCUSSION/SUMMARY
[FreeTextEntry1] : Unable to do urine dip in office as pt took Urelle.  CS sent. eRX Macrobid sent.  PT will also start Methenamine after treatment of abx. \par Follow up in 2 months for pelvic prolapse.  IF pt have any problems/concern to call office.  PT aware and agrees.

## 2019-09-12 NOTE — HISTORY OF PRESENT ILLNESS
[FreeTextEntry1] : Pt doing fine with pessary.  Denies any pain, discomfort, or vaginal bleeding. Urinating but have some frequency and some dysuria.  Denies any problem with BM.\par Denies any fever, chills, back pain, or blood in urine.  PT took Urelle.

## 2019-09-12 NOTE — PROCEDURE
[Straight Catheterization] : insertion of a straight catheter [Urinary Frequency] : urinary frequency [Patient] : the patient [___ Fr Straight Tip] : a [unfilled] in [place] straight tip catheter [Culture] : culture [Other: ___] : [unfilled] [No Complications] : no complications [Tolerated Well] : the patient tolerated the procedure well [Refit] : refit needed [Discharge] : there is vaginal discharge [Pessary Inserted] : inserted [Pessary Washed] : washed [H2O] : H2O [None] : no bleeding [Medication Review] : Medicaiton Review: Patient verbalizes understanding of risks and benefits [Good Fit] : fit is not good [Erythema] : no erythema [Infection] : no evidence of infection [FreeTextEntry9] : Urethra caruncle noted.  Urethra cleared, catheter inserted.  No CVAT. [FreeTextEntry1] : Had the Cube # 4 [de-identified] : Too big [de-identified] : Cube # 3 [FreeTextEntry3] : vaginal lubrication - Continue use of Replens [FreeTextEntry8] : Reinforced daily pericare.

## 2019-09-13 ENCOUNTER — RESULT REVIEW (OUTPATIENT)
Age: 79
End: 2019-09-13

## 2019-09-13 LAB — BACTERIA UR CULT: NORMAL

## 2019-09-23 ENCOUNTER — APPOINTMENT (OUTPATIENT)
Dept: UROGYNECOLOGY | Facility: CLINIC | Age: 79
End: 2019-09-23

## 2019-10-02 ENCOUNTER — MESSAGE (OUTPATIENT)
Age: 79
End: 2019-10-02

## 2019-10-04 ENCOUNTER — APPOINTMENT (OUTPATIENT)
Dept: UROGYNECOLOGY | Facility: CLINIC | Age: 79
End: 2019-10-04
Payer: MEDICARE

## 2019-10-04 ENCOUNTER — RESULT CHARGE (OUTPATIENT)
Age: 79
End: 2019-10-04

## 2019-10-04 LAB
BILIRUB UR QL STRIP: NORMAL
CLARITY UR: NORMAL
COLLECTION METHOD: NORMAL
GLUCOSE UR-MCNC: NORMAL
HCG UR QL: 0.2 EU/DL
HGB UR QL STRIP.AUTO: NORMAL
KETONES UR-MCNC: NORMAL
LEUKOCYTE ESTERASE UR QL STRIP: NORMAL
NITRITE UR QL STRIP: NORMAL
PH UR STRIP: 6
PROT UR STRIP-MCNC: NORMAL
SP GR UR STRIP: 1.25

## 2019-10-04 PROCEDURE — 81003 URINALYSIS AUTO W/O SCOPE: CPT | Mod: NC,QW

## 2019-10-04 PROCEDURE — 99213 OFFICE O/P EST LOW 20 MIN: CPT | Mod: 25

## 2019-10-04 PROCEDURE — 51701 INSERT BLADDER CATHETER: CPT

## 2019-10-05 NOTE — DISCUSSION/SUMMARY
[FreeTextEntry1] : UA CS sent.  PT stated she will wait for results before treatment.  She may take Phenazopyridine.  \par Pelvic US ordered.\par Follow up in 2 weeks for pelvic prolapse/pessary reinsertion.  IF pt have any problems/concern to call office.  PT aware and agrees.

## 2019-10-05 NOTE — PHYSICAL EXAM
[Good Hygeine] : demonstrates good hygeine [No Acute Distress] : in no acute distress [Oriented x3] : oriented to person, place, and time [Normal Mood/Affect] : mood and affect are normal [Soft, Nontender] : the abdomen was soft and nontender [Normal Gait] : gait was normal [Warm and Dry] : was warm and dry to touch [Vulvar Atrophy] : vulvar atrophy [Atrophy] : atrophy [Uterine Prolapse] : uterine prolapse [Cystocele] : a cystocele [Clear] : clear [Discharge] : a  ~M vaginal discharge was present [Blood-Tinged] : blood-tinged [Scant] : there was scant vaginal bleeding [de-identified] : Bleeding noted with removal of pessary posteriorly. [FreeTextEntry4] : Bleeding noted with removal of pessary.  Able to applied pressure to stop.

## 2019-10-05 NOTE — PROCEDURE
[Straight Catheterization] : insertion of a straight catheter [Urinary Frequency] : urinary frequency [Patient] : the patient [___ Fr Straight Tip] : a [unfilled] in [place] straight tip catheter [Urinalysis] : urinalysis [Culture] : culture [No Complications] : no complications [Tolerated Well] : the patient tolerated the procedure well [Discharge] : there is vaginal discharge [H2O] : H2O [Medication Review] : Medicaiton Review: Patient verbalizes understanding of risks and benefits [Other: ___] : [unfilled] [Cloudy] : cloudy [Pessary Out] : removed [Mild] : mild bleeding [Resolved w/pressure] : was resolved by applying pressure [Erythema] : no erythema [Infection] : no evidence of infection [FreeTextEntry9] : Urethra caruncle noted.  Urethra cleared, catheter inserted.  No CVAT. [de-identified] : PVR 50mL [FreeTextEntry3] : vaginal lubrication - Continue use of Replens [FreeTextEntry1] : Had the Cube # 3 [FreeTextEntry8] : Reinforced daily pericare.

## 2019-10-05 NOTE — HISTORY OF PRESENT ILLNESS
[FreeTextEntry1] : PT some bleeding with pessary intermittently.  Urinating but have some frequency and some dysuria.  Denies any problem with BM.\par Denies any fever, chills, back pain.  She is taking Uribel.

## 2019-10-07 LAB — BACTERIA UR CULT: NORMAL

## 2019-10-18 ENCOUNTER — RESULT REVIEW (OUTPATIENT)
Age: 79
End: 2019-10-18

## 2019-10-18 ENCOUNTER — APPOINTMENT (OUTPATIENT)
Dept: UROGYNECOLOGY | Facility: CLINIC | Age: 79
End: 2019-10-18
Payer: MEDICARE

## 2019-10-18 LAB
APPEARANCE: ABNORMAL
BACTERIA: NEGATIVE
BILIRUBIN URINE: NEGATIVE
BLOOD URINE: NEGATIVE
COLOR: ABNORMAL
GLUCOSE QUALITATIVE U: NEGATIVE
HYALINE CASTS: 0 /LPF
KETONES URINE: NEGATIVE
LEUKOCYTE ESTERASE URINE: NEGATIVE
MICROSCOPIC-UA: NORMAL
NITRITE URINE: NEGATIVE
PH URINE: 6
PROTEIN URINE: NEGATIVE
RED BLOOD CELLS URINE: 4 /HPF
SPECIFIC GRAVITY URINE: 1.02
SQUAMOUS EPITHELIAL CELLS: 0 /HPF
UROBILINOGEN URINE: NORMAL
WHITE BLOOD CELLS URINE: 0 /HPF

## 2019-10-18 PROCEDURE — 99213 OFFICE O/P EST LOW 20 MIN: CPT

## 2019-10-18 NOTE — PHYSICAL EXAM
[Good Hygeine] : demonstrates good hygeine [No Acute Distress] : in no acute distress [Oriented x3] : oriented to person, place, and time [Normal Mood/Affect] : mood and affect are normal [Soft, Nontender] : the abdomen was soft and nontender [Normal Gait] : gait was normal [Warm and Dry] : was warm and dry to touch [Vulvar Atrophy] : vulvar atrophy [Cystocele] : a cystocele [Atrophy] : atrophy [Discharge] : a  ~M vaginal discharge was present [Uterine Prolapse] : uterine prolapse [Clear] : clear [No Bleeding] : there was no active vaginal bleeding [Scant] : scant [FreeTextEntry4] : irritation site healed

## 2019-10-18 NOTE — HISTORY OF PRESENT ILLNESS
[FreeTextEntry1] : Pt had pessary left out.  Denies any pain, discomfort, or vaginal bleeding.  Urinating and moving BM without problems.\par

## 2019-10-18 NOTE — PROCEDURE
[Refit] : refit needed [Discharge] : there is vaginal discharge [Pessary Inserted] : inserted [Pessary Washed] : washed [None] : no bleeding [H2O] : H2O [Medication Review] : Medicaiton Review: Patient verbalizes understanding of risks and benefits [Good Fit] : fit is not good [Erythema] : no erythema [FreeTextEntry1] : Had the Cube # 3 [Infection] : no evidence of infection [de-identified] : Cube # 2 [de-identified] : Too big [FreeTextEntry3] : vaginal lubrication - Continue use of Replens [FreeTextEntry8] : Reinforced daily pericare.

## 2019-10-18 NOTE — DISCUSSION/SUMMARY
[FreeTextEntry1] : Fitted with Cube # 2.  Discussed MH w/u with pt. \par PT will make appt with Dr. Freeman for cysto.  She will have CTU and BUN/crea done.  \par Follow up in 2 months for pelvic prolapse.  IF pt have any problems/concern to call office.  PT aware and agrees.

## 2019-10-21 ENCOUNTER — APPOINTMENT (OUTPATIENT)
Dept: UROGYNECOLOGY | Facility: CLINIC | Age: 79
End: 2019-10-21
Payer: MEDICARE

## 2019-10-21 PROCEDURE — 99213 OFFICE O/P EST LOW 20 MIN: CPT

## 2019-10-21 NOTE — PHYSICAL EXAM
[No Acute Distress] : in no acute distress [Good Hygeine] : demonstrates good hygeine [Oriented x3] : oriented to person, place, and time [Normal Mood/Affect] : mood and affect are normal [Soft, Nontender] : the abdomen was soft and nontender [Warm and Dry] : was warm and dry to touch [Normal Gait] : gait was normal [Vulvar Atrophy] : vulvar atrophy [Atrophy] : atrophy [Cystocele] : a cystocele [Discharge] : a  ~M vaginal discharge was present [Uterine Prolapse] : uterine prolapse [Scant] : scant [Clear] : clear [No Bleeding] : there was no active vaginal bleeding [FreeTextEntry4] : irritation site healed

## 2019-10-21 NOTE — HISTORY OF PRESENT ILLNESS
[FreeTextEntry1] : Pt was fitted with cube # 2 a few days ago and it fell out as she was constipated.  Denies any pain, discomfort, or vaginal bleeding.  PT can feel the prolapse out again.  She would like to go back to the same original size - Cube # 3.\par

## 2019-10-21 NOTE — DISCUSSION/SUMMARY
[FreeTextEntry1] : Fitted back with Cube # 3.  Discussed MH w/u with pt. PT having her CTU done tomorrow.\par PT have appt with Dr. Freeman for cysto on 11/22/2019.  She will also get her BUN/crea done.  \par Follow up in 2 months for pelvic prolapse.  IF pt have any problems/concern to call office.  PT aware and agrees.

## 2019-10-21 NOTE — PROCEDURE
[Refit] : refit needed [Discharge] : there is vaginal discharge [Pessary Inserted] : inserted [Pessary Washed] : washed [H2O] : H2O [None] : no bleeding [Medication Review] : Medicaiton Review: Patient verbalizes understanding of risks and benefits [Good Fit] : fit is not good [FreeTextEntry1] : Had the Cube # 2 for only a few days [Infection] : no evidence of infection [Erythema] : no erythema [de-identified] : fell out [de-identified] : went back to Cube # 3 [FreeTextEntry8] : Reinforced daily pericare. [FreeTextEntry3] : vaginal lubrication - Continue use of Replens

## 2019-10-24 ENCOUNTER — MESSAGE (OUTPATIENT)
Age: 79
End: 2019-10-24

## 2019-10-29 ENCOUNTER — MESSAGE (OUTPATIENT)
Age: 79
End: 2019-10-29

## 2019-11-13 ENCOUNTER — RESULT REVIEW (OUTPATIENT)
Age: 79
End: 2019-11-13

## 2019-11-14 ENCOUNTER — APPOINTMENT (OUTPATIENT)
Dept: UROGYNECOLOGY | Facility: CLINIC | Age: 79
End: 2019-11-14

## 2019-11-22 ENCOUNTER — MESSAGE (OUTPATIENT)
Age: 79
End: 2019-11-22

## 2019-11-22 ENCOUNTER — APPOINTMENT (OUTPATIENT)
Dept: UROGYNECOLOGY | Facility: CLINIC | Age: 79
End: 2019-11-22

## 2019-12-06 ENCOUNTER — APPOINTMENT (OUTPATIENT)
Dept: UROGYNECOLOGY | Facility: CLINIC | Age: 79
End: 2019-12-06

## 2019-12-16 ENCOUNTER — APPOINTMENT (OUTPATIENT)
Dept: UROGYNECOLOGY | Facility: CLINIC | Age: 79
End: 2019-12-16
Payer: MEDICARE

## 2019-12-16 PROCEDURE — 81003 URINALYSIS AUTO W/O SCOPE: CPT | Mod: NC,QW

## 2019-12-16 PROCEDURE — 99213 OFFICE O/P EST LOW 20 MIN: CPT | Mod: 25

## 2019-12-16 NOTE — HISTORY OF PRESENT ILLNESS
[FreeTextEntry1] : PT had pessary left out since last visit.  PT feels the vaginal prolapse descend and wants to have pessary reinserted.  PT unsure if she have another UTI.  Wants to have urine check.  Denies any fever, chills, back pain, or blood in urine.\par

## 2019-12-16 NOTE — PHYSICAL EXAM
[No Acute Distress] : in no acute distress [Good Hygeine] : demonstrates good hygeine [Oriented x3] : oriented to person, place, and time [Normal Mood/Affect] : mood and affect are normal [Soft, Nontender] : the abdomen was soft and nontender [Warm and Dry] : was warm and dry to touch [Normal Gait] : gait was normal [Vulvar Atrophy] : vulvar atrophy [Atrophy] : atrophy [Cystocele] : a cystocele [Uterine Prolapse] : uterine prolapse [Discharge] : a  ~M vaginal discharge was present [Scant] : scant [Clear] : clear [No Bleeding] : there was no active vaginal bleeding [de-identified] : Vaginal bulge visible at the vaginal opening [FreeTextEntry4] : irritation site healed

## 2019-12-16 NOTE — DISCUSSION/SUMMARY
[FreeTextEntry1] : Fitted with Cube # 4.  Discussed MH w/u with pt and pt declined cysto.  She is aware of the risk and benefits and the possibility of cancer.  PT declined to finish up the MH w/u.\par Follow up in 2 months for pelvic prolapse.  IF pt have any problems/concern to call office.  PT aware and agrees.

## 2019-12-16 NOTE — PROCEDURE
[Urinary Frequency] : urinary frequency [Straight Catheterization] : insertion of a straight catheter [___ Fr Straight Tip] : a [unfilled] in [place] straight tip catheter [Patient] : the patient [No Complications] : no complications [Clear] : clear [Tolerated Well] : the patient tolerated the procedure well [Good Fit] : fits well [Discharge] : there is vaginal discharge [Pessary Inserted] : inserted [Pessary Washed] : washed [None] : no bleeding [H2O] : H2O [Medication Review] : Medicaiton Review: Patient verbalizes understanding of risks and benefits [Erythema] : no erythema [Infection] : no evidence of infection [de-identified] : fell out [FreeTextEntry1] : placed Cube # 4 [FreeTextEntry9] : Urethra cleared, catheter passed.  No CVAT. [FreeTextEntry3] : vaginal lubrication - Continue use of Replens [FreeTextEntry8] : Reinforced daily pericare.

## 2020-02-04 ENCOUNTER — APPOINTMENT (OUTPATIENT)
Dept: UROGYNECOLOGY | Facility: CLINIC | Age: 80
End: 2020-02-04
Payer: MEDICARE

## 2020-02-04 PROCEDURE — 99213 OFFICE O/P EST LOW 20 MIN: CPT

## 2020-02-04 NOTE — PHYSICAL EXAM
[No Acute Distress] : in no acute distress [Good Hygeine] : demonstrates good hygeine [Oriented x3] : oriented to person, place, and time [Soft, Nontender] : the abdomen was soft and nontender [Normal Mood/Affect] : mood and affect are normal [Warm and Dry] : was warm and dry to touch [Normal Gait] : gait was normal [Vulvar Atrophy] : vulvar atrophy [Atrophy] : atrophy [Cystocele] : a cystocele [Uterine Prolapse] : uterine prolapse [Discharge] : a  ~M vaginal discharge was present [Clear] : clear [Scant] : scant [No Bleeding] : there was no active vaginal bleeding [de-identified] : Vaginal bulge visible at the vaginal opening [FreeTextEntry4] : irritation site healed

## 2020-02-04 NOTE — PROCEDURE
[Good Fit] : fits well [Discharge] : there is vaginal discharge [Pessary Inserted] : inserted [Pessary Washed] : washed [H2O] : H2O [None] : no bleeding [Medication Review] : Medicaiton Review: Patient verbalizes understanding of risks and benefits [Erythema] : no erythema [Infection] : no evidence of infection [FreeTextEntry1] :  Cube # 4 [FreeTextEntry3] : vaginal lubrication - Continue use of Replens [FreeTextEntry8] : Reinforced daily pericare.

## 2020-02-04 NOTE — HISTORY OF PRESENT ILLNESS
[FreeTextEntry1] : PT happy pessary is supporting her prolapse.  Denies any pain, discomfort, or vaginal bleeding.  Voiding and moving BM without problems.

## 2020-02-04 NOTE — DISCUSSION/SUMMARY
[FreeTextEntry1] : Again, discussed MH w/u with pt and pt declined cysto.  She is aware of the risk and benefits and the possibility of cancer.  PT declined to finish up the MH w/u.  Dr. Freeman is aware.\par Follow up in 2 months for pelvic prolapse.  IF pt have any problems/concern to call office.  PT aware and agrees.

## 2020-03-23 ENCOUNTER — RX RENEWAL (OUTPATIENT)
Age: 80
End: 2020-03-23

## 2020-04-06 ENCOUNTER — APPOINTMENT (OUTPATIENT)
Dept: UROGYNECOLOGY | Facility: CLINIC | Age: 80
End: 2020-04-06

## 2020-05-12 ENCOUNTER — APPOINTMENT (OUTPATIENT)
Dept: UROGYNECOLOGY | Facility: CLINIC | Age: 80
End: 2020-05-12
Payer: MEDICARE

## 2020-05-12 VITALS — TEMPERATURE: 208.58 F

## 2020-05-12 LAB
BILIRUB UR QL STRIP: NORMAL
CLARITY UR: CLEAR
COLLECTION METHOD: NORMAL
GLUCOSE UR-MCNC: NORMAL
HCG UR QL: 0.2 EU/DL
HGB UR QL STRIP.AUTO: NORMAL
KETONES UR-MCNC: NORMAL
LEUKOCYTE ESTERASE UR QL STRIP: NORMAL
NITRITE UR QL STRIP: NORMAL
PH UR STRIP: 5.5
PROT UR STRIP-MCNC: NORMAL
SP GR UR STRIP: 1.03

## 2020-05-12 PROCEDURE — 51701 INSERT BLADDER CATHETER: CPT

## 2020-05-12 PROCEDURE — 99213 OFFICE O/P EST LOW 20 MIN: CPT | Mod: 25

## 2020-05-12 PROCEDURE — 81003 URINALYSIS AUTO W/O SCOPE: CPT | Mod: QW

## 2020-05-12 RX ORDER — AMOXICILLIN AND CLAVULANATE POTASSIUM 875; 125 MG/1; MG/1
875-125 TABLET, COATED ORAL
Qty: 14 | Refills: 0 | Status: DISCONTINUED | COMMUNITY
Start: 2019-11-15

## 2020-05-12 RX ORDER — ALBUTEROL SULFATE 90 UG/1
108 (90 BASE) AEROSOL, METERED RESPIRATORY (INHALATION)
Qty: 18 | Refills: 0 | Status: DISCONTINUED | COMMUNITY
Start: 2020-04-04

## 2020-05-12 RX ORDER — CIPROFLOXACIN HYDROCHLORIDE 250 MG/1
250 TABLET, FILM COATED ORAL
Qty: 14 | Refills: 0 | Status: DISCONTINUED | COMMUNITY
Start: 2020-02-25

## 2020-05-12 NOTE — PHYSICAL EXAM
[Well Nourished] : ~L well nourished [No Acute Distress] : in no acute distress [Oriented x3] : oriented to person, place, and time [Good Hygeine] : demonstrates good hygeine [Normal Mood/Affect] : mood and affect are normal [Soft, Nontender] : the abdomen was soft and nontender [Warm and Dry] : was warm and dry to touch [Normal Gait] : gait was normal [Atrophy] : atrophy [Vulvar Atrophy] : vulvar atrophy [Cystocele] : a cystocele [Discharge] : a  ~M vaginal discharge was present [Uterine Prolapse] : uterine prolapse [Tess] : yellow [Scant] : scant [No Bleeding] : there was no active vaginal bleeding [de-identified] : Vaginal staining with removal of pessary.  Stops with applied pressure.

## 2020-05-12 NOTE — HISTORY OF PRESENT ILLNESS
[FreeTextEntry1] : Pt presents to the office with c/o staining from pessary and some urinary frequency.  Denies any pain or discomfort.  Pt haven't been to the office since February due to Covid crisis.  Had recent UTI 4/3/2020 and treated with Bactrim DS via telephonically and been doing well except for the urinary frequency.  Denies any fever, chills, back pain, or blood in urine.

## 2020-05-12 NOTE — PROCEDURE
[Pessary Out] : removed [H2O] : H2O [Straight Catheterization] : insertion of a straight catheter [Urinary Frequency] : urinary frequency [___ Fr Straight Tip] : a [unfilled] in Turkish straight tip catheter [Patient] : the patient [No Complications] : no complications [Clear] : clear [Tolerated Well] : the patient tolerated the procedure well [de-identified] : PVR 90mL [FreeTextEntry9] : Urethra cleared, catheter passed.  No CVAT. [FreeTextEntry1] : Cube # 4 [FreeTextEntry8] : Reinforced daily pericare.

## 2020-05-12 NOTE — DISCUSSION/SUMMARY
[FreeTextEntry1] : Urine dip negative except for trace blood.  Pt had this in the past.  Last UA RBCs of 4.  Declined to have cysto done.  Had CT done back 10/2019 hx of renal cyst.  PT aware of the risk of not completing MH w/u.  PT continues to decline.\par Pessary left out for healing.  F/U in 2 weeks for reinsertion.  IF pt have any problems/concern to call office.  PT aware and agrees.

## 2020-05-29 ENCOUNTER — APPOINTMENT (OUTPATIENT)
Dept: UROGYNECOLOGY | Facility: CLINIC | Age: 80
End: 2020-05-29
Payer: MEDICARE

## 2020-05-29 PROCEDURE — 99213 OFFICE O/P EST LOW 20 MIN: CPT

## 2020-05-29 NOTE — PHYSICAL EXAM
[No Acute Distress] : in no acute distress [Good Hygeine] : demonstrates good hygeine [Well Nourished] : ~L well nourished [Normal Mood/Affect] : mood and affect are normal [Oriented x3] : oriented to person, place, and time [Warm and Dry] : was warm and dry to touch [Soft, Nontender] : the abdomen was soft and nontender [Normal Gait] : gait was normal [Vulvar Atrophy] : vulvar atrophy [Atrophy] : atrophy [Uterine Prolapse] : uterine prolapse [Cystocele] : a cystocele [Discharge] : a  ~M vaginal discharge was present [Scant] : scant [Tess] : yellow [No Bleeding] : there was no active vaginal bleeding [de-identified] : Vaginal staining with removal of pessary.  Stops with applied pressure.

## 2020-05-29 NOTE — HISTORY OF PRESENT ILLNESS
[FreeTextEntry1] : Pt had pessary left out.  PT was doing fine and now feels uncomfortable as prolapse have descended.  Pt would like to have pessary reinserted.  She was urinating and moving BM without problems.

## 2020-05-29 NOTE — DISCUSSION/SUMMARY
[FreeTextEntry1] : F/U in 1 month for pelvic prolapse/pessary check.  IF pt have any problems/concern to call office.  PT aware and agrees.

## 2020-05-29 NOTE — PROCEDURE
[Pessary Inserted] : inserted [Pessary Washed] : washed [H2O] : H2O [FreeTextEntry1] : Cube # 4 [FreeTextEntry8] : Reinforced daily pericare.

## 2020-06-16 ENCOUNTER — APPOINTMENT (OUTPATIENT)
Dept: UROGYNECOLOGY | Facility: CLINIC | Age: 80
End: 2020-06-16
Payer: MEDICARE

## 2020-06-16 PROCEDURE — 99213 OFFICE O/P EST LOW 20 MIN: CPT

## 2020-06-16 NOTE — DISCUSSION/SUMMARY
[FreeTextEntry1] : F/U in 2 month for pelvic prolapse/pessary check.  IF pt have any problems/concern to call office.  PT aware and agrees.

## 2020-06-16 NOTE — PHYSICAL EXAM
[No Acute Distress] : in no acute distress [Well Nourished] : ~L well nourished [Good Hygeine] : demonstrates good hygeine [Normal Mood/Affect] : mood and affect are normal [Oriented x3] : oriented to person, place, and time [Soft, Nontender] : the abdomen was soft and nontender [Warm and Dry] : was warm and dry to touch [Vulvar Atrophy] : vulvar atrophy [Normal Gait] : gait was normal [Atrophy] : atrophy [Cystocele] : a cystocele [Uterine Prolapse] : uterine prolapse [Discharge] : a  ~M vaginal discharge was present [Scant] : scant [Tess] : yellow [No Bleeding] : there was no active vaginal bleeding

## 2020-06-19 ENCOUNTER — EMERGENCY (EMERGENCY)
Facility: HOSPITAL | Age: 80
LOS: 1 days | Discharge: HOME | End: 2020-06-19
Attending: EMERGENCY MEDICINE | Admitting: EMERGENCY MEDICINE
Payer: MEDICARE

## 2020-06-19 VITALS
HEART RATE: 74 BPM | DIASTOLIC BLOOD PRESSURE: 74 MMHG | TEMPERATURE: 99 F | WEIGHT: 110.01 LBS | RESPIRATION RATE: 14 BRPM | SYSTOLIC BLOOD PRESSURE: 134 MMHG | OXYGEN SATURATION: 96 % | HEIGHT: 65 IN

## 2020-06-19 LAB
ALBUMIN SERPL ELPH-MCNC: 3.8 G/DL — SIGNIFICANT CHANGE UP (ref 3.3–5)
ALP SERPL-CCNC: 72 U/L — SIGNIFICANT CHANGE UP (ref 40–120)
ALT FLD-CCNC: 16 U/L — SIGNIFICANT CHANGE UP (ref 12–78)
ANION GAP SERPL CALC-SCNC: 7 MMOL/L — SIGNIFICANT CHANGE UP (ref 5–17)
APTT BLD: 31.4 SEC — SIGNIFICANT CHANGE UP (ref 28.5–37)
AST SERPL-CCNC: 18 U/L — SIGNIFICANT CHANGE UP (ref 15–37)
BASOPHILS # BLD AUTO: 0.03 K/UL — SIGNIFICANT CHANGE UP (ref 0–0.2)
BASOPHILS NFR BLD AUTO: 0.3 % — SIGNIFICANT CHANGE UP (ref 0–2)
BILIRUB SERPL-MCNC: 0.5 MG/DL — SIGNIFICANT CHANGE UP (ref 0.2–1.2)
BUN SERPL-MCNC: 17 MG/DL — SIGNIFICANT CHANGE UP (ref 7–23)
CALCIUM SERPL-MCNC: 8.9 MG/DL — SIGNIFICANT CHANGE UP (ref 8.5–10.1)
CHLORIDE SERPL-SCNC: 104 MMOL/L — SIGNIFICANT CHANGE UP (ref 96–108)
CO2 SERPL-SCNC: 27 MMOL/L — SIGNIFICANT CHANGE UP (ref 22–31)
CREAT SERPL-MCNC: 1.4 MG/DL — HIGH (ref 0.5–1.3)
EOSINOPHIL # BLD AUTO: 0.06 K/UL — SIGNIFICANT CHANGE UP (ref 0–0.5)
EOSINOPHIL NFR BLD AUTO: 0.5 % — SIGNIFICANT CHANGE UP (ref 0–6)
GLUCOSE SERPL-MCNC: 114 MG/DL — HIGH (ref 70–99)
HCT VFR BLD CALC: 35.2 % — SIGNIFICANT CHANGE UP (ref 34.5–45)
HGB BLD-MCNC: 11.4 G/DL — LOW (ref 11.5–15.5)
IMM GRANULOCYTES NFR BLD AUTO: 0.5 % — SIGNIFICANT CHANGE UP (ref 0–1.5)
INR BLD: 1.02 RATIO — SIGNIFICANT CHANGE UP (ref 0.88–1.16)
LYMPHOCYTES # BLD AUTO: 1.75 K/UL — SIGNIFICANT CHANGE UP (ref 1–3.3)
LYMPHOCYTES # BLD AUTO: 15.6 % — SIGNIFICANT CHANGE UP (ref 13–44)
MCHC RBC-ENTMCNC: 30.1 PG — SIGNIFICANT CHANGE UP (ref 27–34)
MCHC RBC-ENTMCNC: 32.4 GM/DL — SIGNIFICANT CHANGE UP (ref 32–36)
MCV RBC AUTO: 92.9 FL — SIGNIFICANT CHANGE UP (ref 80–100)
MONOCYTES # BLD AUTO: 0.82 K/UL — SIGNIFICANT CHANGE UP (ref 0–0.9)
MONOCYTES NFR BLD AUTO: 7.3 % — SIGNIFICANT CHANGE UP (ref 2–14)
NEUTROPHILS # BLD AUTO: 8.47 K/UL — HIGH (ref 1.8–7.4)
NEUTROPHILS NFR BLD AUTO: 75.8 % — SIGNIFICANT CHANGE UP (ref 43–77)
NRBC # BLD: 0 /100 WBCS — SIGNIFICANT CHANGE UP (ref 0–0)
PLATELET # BLD AUTO: 274 K/UL — SIGNIFICANT CHANGE UP (ref 150–400)
POTASSIUM SERPL-MCNC: 4.9 MMOL/L — SIGNIFICANT CHANGE UP (ref 3.5–5.3)
POTASSIUM SERPL-SCNC: 4.9 MMOL/L — SIGNIFICANT CHANGE UP (ref 3.5–5.3)
PROT SERPL-MCNC: 7.2 G/DL — SIGNIFICANT CHANGE UP (ref 6–8.3)
PROTHROM AB SERPL-ACNC: 11.4 SEC — SIGNIFICANT CHANGE UP (ref 10–12.9)
RBC # BLD: 3.79 M/UL — LOW (ref 3.8–5.2)
RBC # FLD: 13.6 % — SIGNIFICANT CHANGE UP (ref 10.3–14.5)
SODIUM SERPL-SCNC: 138 MMOL/L — SIGNIFICANT CHANGE UP (ref 135–145)
TROPONIN I SERPL-MCNC: <.015 NG/ML — SIGNIFICANT CHANGE UP (ref 0.01–0.04)
WBC # BLD: 11.19 K/UL — HIGH (ref 3.8–10.5)
WBC # FLD AUTO: 11.19 K/UL — HIGH (ref 3.8–10.5)

## 2020-06-19 PROCEDURE — 85027 COMPLETE CBC AUTOMATED: CPT

## 2020-06-19 PROCEDURE — 93005 ELECTROCARDIOGRAM TRACING: CPT

## 2020-06-19 PROCEDURE — 84484 ASSAY OF TROPONIN QUANT: CPT

## 2020-06-19 PROCEDURE — 93010 ELECTROCARDIOGRAM REPORT: CPT

## 2020-06-19 PROCEDURE — 71045 X-RAY EXAM CHEST 1 VIEW: CPT | Mod: 26

## 2020-06-19 PROCEDURE — 70450 CT HEAD/BRAIN W/O DYE: CPT

## 2020-06-19 PROCEDURE — 72125 CT NECK SPINE W/O DYE: CPT

## 2020-06-19 PROCEDURE — 70450 CT HEAD/BRAIN W/O DYE: CPT | Mod: 26

## 2020-06-19 PROCEDURE — 12001 RPR S/N/AX/GEN/TRNK 2.5CM/<: CPT

## 2020-06-19 PROCEDURE — 36415 COLL VENOUS BLD VENIPUNCTURE: CPT

## 2020-06-19 PROCEDURE — 73630 X-RAY EXAM OF FOOT: CPT

## 2020-06-19 PROCEDURE — 73630 X-RAY EXAM OF FOOT: CPT | Mod: 26,LT

## 2020-06-19 PROCEDURE — 99284 EMERGENCY DEPT VISIT MOD MDM: CPT | Mod: 25

## 2020-06-19 PROCEDURE — 85610 PROTHROMBIN TIME: CPT

## 2020-06-19 PROCEDURE — 71045 X-RAY EXAM CHEST 1 VIEW: CPT

## 2020-06-19 PROCEDURE — 72125 CT NECK SPINE W/O DYE: CPT | Mod: 26

## 2020-06-19 PROCEDURE — 85730 THROMBOPLASTIN TIME PARTIAL: CPT

## 2020-06-19 PROCEDURE — 80053 COMPREHEN METABOLIC PANEL: CPT

## 2020-06-19 NOTE — ED PROVIDER NOTE - PROGRESS NOTE DETAILS
pt notes improvement of symptoms. pt ambulating in ED. Work up WNL. pt advised to follow up with PCP and neuro acutely. one staple placed. advised removal in 1 week. pt given copy of results, results reviewed. discussed return precautions. pt notes improvement of symptoms. pt ambulating in ED. Pt notes having walker at home and feeling comfortable for discharge. pt was offered further observation, social consult. pt reports she would not like aid or discharge to rehab/facility. Work up WNL. pt advised to follow up with PCP and neuro acutely. one staple placed. advised removal in 1 week. pt given copy of results, results reviewed. discussed return precautions.

## 2020-06-19 NOTE — ED PROVIDER NOTE - CLINICAL SUMMARY MEDICAL DECISION MAKING FREE TEXT BOX
BIBA due to fall. c/o dizziness. hx of vertigo. (+) head injury. denies blood thinner use, cp, sob. plan includes CT head r/o intracranial bleed, EKG/troponin r/o CAD, CXr r/o pneumonia, ct cervical r/o fx, xray foot r/o fx, re-assess

## 2020-06-19 NOTE — ED ADULT NURSE REASSESSMENT NOTE - NS ED NURSE REASSESS COMMENT FT1
pt states she is feeling better - pt with able to ambulate with assistance - pt states she will ambulate with walker at home - pt assisted to waiting room via wheelchair in stable condition -

## 2020-06-19 NOTE — ED PROVIDER NOTE - ATTENDING CONTRIBUTION TO CARE
80 f who has hx of vertigo got up out of bed quickly became dizzy an fell to floor striking her left temopral area no loc no neck or back pain no cp no sob  sx better when laying still worse on standing so she came to er for evaluation  on eval:  elderly w female anxous worried about a concussion nad  heent small .5 cm lac to temporal area on left no bleeding eomi pupils each 4 mm no nystagmus mm dry no g f r  neck supple  cor rrr  chest cta  abd soft flat  ext normal arthritic hands knees no weakness  neuro nihss=0 gcs=15 anxious  skin see heent  plan ct labs ambulate orthostatic vs in elderly f treat sx if unable to amublate abnl ct or other willcontinue close wound

## 2020-06-19 NOTE — ED PROVIDER NOTE - PATIENT PORTAL LINK FT
You can access the FollowMyHealth Patient Portal offered by [facility] by registering at the following website: [email or web address]. By joining [facility]’s FollowMyHealth portal, you will also be able to view your health information using other applications (apps) compatible with our system.

## 2020-06-19 NOTE — ED PROVIDER NOTE - OBJECTIVE STATEMENT
Pt advocate notified for Solomon Nash from [place] notified and pt advocate to call back. 79 y/o female with PMHx Depression BIBA from home due to fall. pt reports she was sitting in which she became dizzy, explained as room spinning. pt reports hx of vertigo, uncertain of exact cause. pt notes she tried to stand up and grabbed the railing but fell back and hit her head. pt notes the dizziness improved, mild persistent dizziness currently but explained as lightheadedness, pt is a poor historian. pt c/o headache currently 7/10, no headache prior to head injury.  pt denies LOC, cp, sob, fever, chills, numbness/weakness, seizure, neck stiffness, photophobia, N/V, or any other complaints.

## 2020-06-19 NOTE — ED ADULT NURSE NOTE - NSIMPLEMENTINTERV_GEN_ALL_ED
Implemented All Fall Risk Interventions:  [place] to call system. Call bell, personal items and telephone within reach. Instruct patient to call for assistance. Room bathroom lighting operational. Non-slip footwear when patient is off stretcher. Physically safe environment: no spills, clutter or unnecessary equipment. Stretcher in lowest position, wheels locked, appropriate side rails in place. Provide visual cue, wrist band, yellow gown, etc. Monitor gait and stability. Monitor for mental status changes and reorient to person, place, and time. Review medications for side effects contributing to fall risk. Reinforce activity limits and safety measures with patient and family.

## 2020-06-19 NOTE — ED PROVIDER NOTE - PHYSICAL EXAMINATION
Constitutional: Awake, Alert, non-toxic. NAD. Well appearing, well nourished.   HEAD: Normocephalic, (+) small parietal hematoma  EYES: PERRL, EOM intact, conjunctiva and sclera are clear bilaterally. No raccoon eyes.   ENT: No walsh sign. No rhinorrhea, normal pharynx, patent, no tonsillar exudate or enlargement, mucous membranes pink/moist, no erythema, no drooling or stridor.   NECK: Supple, non-tender; no cervical LAD, no JVD, no goiter.  BACK: No midline or paraspinal TTP of cervical/thoracic/lumbar spine, FROM. No ecchymosis or hematomas.   CARDIOVASCULAR: Normal S1, S2; regular rate and rhythm.  RESPIRATORY: Normal respiratory effort; (+) right sided rales, no wheezes or rhonchi. Speaking in full sentences. No accessory muscle use.   ABDOMEN: Soft; non-tender, non-distended.   EXTREMITIES: Full passive and active ROM in all extremities; (+) left foot 5th digit TTP, no swelling or ecchymosis; distal pulses palpable and symmetric, no edema, no crepitus or step off  SKIN: Warm, dry; good skin turgor, no apparent lesions or rashes, no ecchymosis, brisk capillary refill.  NEURO: A&O x3. Sensory and motor functions are grossly intact. Speech is normal. Appearance and judgement seem appropriate for gender and age. No neurological deficits. Neurovascular sensation intact motor function 5/5 of upper and lower extremities, CN II-XII grossly intact, no ataxia, absent romberg and pronator drift, intact cerebellar function. Speech clear, without articulation or word-finding difficulties. Eyes- PERRL bilaterally. EOMs in tact. No nystagmus. No facial droop. (+) mild tremor b/l UE and LE Constitutional: Awake, Alert, non-toxic. NAD. Well appearing, well nourished.   HEAD: Normocephalic, (+) small parietal hematoma (+) left temporal 0.5 cm laceration, no active blood loss   EYES: PERRL, EOM intact, conjunctiva and sclera are clear bilaterally. No raccoon eyes.   ENT: No walsh sign. No rhinorrhea, normal pharynx, patent, no tonsillar exudate or enlargement, mucous membranes pink/moist, no erythema, no drooling or stridor.   NECK: Supple, non-tender; no cervical LAD, no JVD, no goiter.  BACK: No midline or paraspinal TTP of cervical/thoracic/lumbar spine, FROM. No ecchymosis or hematomas.   CARDIOVASCULAR: Normal S1, S2; regular rate and rhythm.  RESPIRATORY: Normal respiratory effort; (+) right sided rales, no wheezes or rhonchi. Speaking in full sentences. No accessory muscle use.   ABDOMEN: Soft; non-tender, non-distended.   EXTREMITIES: Full passive and active ROM in all extremities; (+) left foot 5th digit TTP, no swelling or ecchymosis; distal pulses palpable and symmetric, no edema, no crepitus or step off  SKIN: Warm, dry; good skin turgor, no apparent lesions or rashes, no ecchymosis, brisk capillary refill.  NEURO: A&O x3. Sensory and motor functions are grossly intact. Speech is normal. Appearance and judgement seem appropriate for gender and age. No neurological deficits. Neurovascular sensation intact motor function 5/5 of upper and lower extremities, CN II-XII grossly intact, no ataxia, absent romberg and pronator drift, intact cerebellar function. Speech clear, without articulation or word-finding difficulties. Eyes- PERRL bilaterally. EOMs in tact. No nystagmus. No facial droop. (+) mild tremor b/l UE and LE

## 2020-06-19 NOTE — ED ADULT NURSE NOTE - OBJECTIVE STATEMENT
pt BIBA from home - states she became extremely dizzy and fell over hitting her head against the desk next to window- denies LOC-

## 2020-06-19 NOTE — ED PROVIDER NOTE - EKG #1 DATE/TIME
Called and left patient a voicemail to call the office back to make a 3 month follow up with  or Ophelia (np)   19-Jun-2020 16:59

## 2020-06-19 NOTE — ED PROVIDER NOTE - CARE PROVIDER_API CALL
Angelina Venegas  NEUROLOGY  [address] [place] [address]  Phone: [phone]  Fax: [phone]  Follow Up Time: 1-3 Days

## 2020-06-23 NOTE — ED POST DISCHARGE NOTE - DETAILS
spoke to pt. pt advised on fx. pt advised she needs to return to ed for splint/ rx for boot or follow up with podiatrist. gave pt number for dr hughes. pt advised she "is going to do nothing" advised on risk of fx not healing. advised pt to get cam boot

## 2020-07-23 NOTE — ED POST DISCHARGE NOTE - REASON FOR FOLLOW-UP
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 Cultures now with Enterococcus, E cloacae, and GBS. Afebrile and with resolved leukocytosis which was likely a leukemoid reaction to steroids.   · Patient has tolerated cephalosporins in past. Recommend switching aztreonam to cefepime 2 gm IV every 12 hours as it is unclear when her surgical intervention will occur.    · Continue vancomycin.   · Patient likely to require IV antibiotics. Please discuss PICC vs subclavian catheter for IV antibiotics with nephrology.      Radiology Follow-up

## 2020-08-12 ENCOUNTER — INPATIENT (INPATIENT)
Facility: HOSPITAL | Age: 80
LOS: 5 days | Discharge: NURSING, REHAB OR LONG-TERM CARE | DRG: 86 | End: 2020-08-18
Attending: HOSPITALIST | Admitting: NEUROLOGICAL SURGERY
Payer: MEDICARE

## 2020-08-12 ENCOUNTER — EMERGENCY (EMERGENCY)
Facility: HOSPITAL | Age: 80
LOS: 1 days | End: 2020-08-12
Attending: EMERGENCY MEDICINE
Payer: MEDICARE

## 2020-08-12 VITALS
HEART RATE: 66 BPM | RESPIRATION RATE: 19 BRPM | HEIGHT: 64 IN | WEIGHT: 110.01 LBS | DIASTOLIC BLOOD PRESSURE: 64 MMHG | SYSTOLIC BLOOD PRESSURE: 112 MMHG | OXYGEN SATURATION: 97 % | TEMPERATURE: 98 F

## 2020-08-12 VITALS
SYSTOLIC BLOOD PRESSURE: 125 MMHG | HEART RATE: 66 BPM | TEMPERATURE: 99 F | OXYGEN SATURATION: 94 % | RESPIRATION RATE: 18 BRPM | DIASTOLIC BLOOD PRESSURE: 63 MMHG

## 2020-08-12 VITALS
OXYGEN SATURATION: 95 % | DIASTOLIC BLOOD PRESSURE: 72 MMHG | SYSTOLIC BLOOD PRESSURE: 125 MMHG | HEART RATE: 64 BPM | TEMPERATURE: 99 F | RESPIRATION RATE: 18 BRPM

## 2020-08-12 LAB
ALBUMIN SERPL ELPH-MCNC: 3.8 G/DL — SIGNIFICANT CHANGE UP (ref 3.3–5)
ALBUMIN SERPL ELPH-MCNC: 4.1 G/DL — SIGNIFICANT CHANGE UP (ref 3.3–5.2)
ALP SERPL-CCNC: 58 U/L — SIGNIFICANT CHANGE UP (ref 40–120)
ALP SERPL-CCNC: 60 U/L — SIGNIFICANT CHANGE UP (ref 40–120)
ALT FLD-CCNC: 13 U/L — SIGNIFICANT CHANGE UP (ref 12–78)
ALT FLD-CCNC: 8 U/L — SIGNIFICANT CHANGE UP
ANION GAP SERPL CALC-SCNC: 17 MMOL/L — SIGNIFICANT CHANGE UP (ref 5–17)
ANION GAP SERPL CALC-SCNC: 7 MMOL/L — SIGNIFICANT CHANGE UP (ref 5–17)
APPEARANCE UR: ABNORMAL
APTT BLD: 26.6 SEC — LOW (ref 27.5–35.5)
APTT BLD: 27.6 SEC — SIGNIFICANT CHANGE UP (ref 27.5–35.5)
AST SERPL-CCNC: 17 U/L — SIGNIFICANT CHANGE UP (ref 15–37)
AST SERPL-CCNC: 19 U/L — SIGNIFICANT CHANGE UP
BASOPHILS # BLD AUTO: 0.02 K/UL — SIGNIFICANT CHANGE UP (ref 0–0.2)
BASOPHILS # BLD AUTO: 0.02 K/UL — SIGNIFICANT CHANGE UP (ref 0–0.2)
BASOPHILS NFR BLD AUTO: 0.2 % — SIGNIFICANT CHANGE UP (ref 0–2)
BASOPHILS NFR BLD AUTO: 0.3 % — SIGNIFICANT CHANGE UP (ref 0–2)
BILIRUB SERPL-MCNC: 0.7 MG/DL — SIGNIFICANT CHANGE UP (ref 0.4–2)
BILIRUB SERPL-MCNC: 0.8 MG/DL — SIGNIFICANT CHANGE UP (ref 0.2–1.2)
BILIRUB UR-MCNC: SIGNIFICANT CHANGE UP
BUN SERPL-MCNC: 21 MG/DL — HIGH (ref 8–20)
BUN SERPL-MCNC: 22 MG/DL — SIGNIFICANT CHANGE UP (ref 7–23)
CALCIUM SERPL-MCNC: 8.8 MG/DL — SIGNIFICANT CHANGE UP (ref 8.5–10.1)
CALCIUM SERPL-MCNC: 8.8 MG/DL — SIGNIFICANT CHANGE UP (ref 8.6–10.2)
CHLORIDE SERPL-SCNC: 102 MMOL/L — SIGNIFICANT CHANGE UP (ref 98–107)
CHLORIDE SERPL-SCNC: 110 MMOL/L — HIGH (ref 96–108)
CK SERPL-CCNC: 173 U/L — SIGNIFICANT CHANGE UP (ref 26–192)
CO2 SERPL-SCNC: 22 MMOL/L — SIGNIFICANT CHANGE UP (ref 22–29)
CO2 SERPL-SCNC: 26 MMOL/L — SIGNIFICANT CHANGE UP (ref 22–31)
COLOR SPEC: SIGNIFICANT CHANGE UP
CREAT SERPL-MCNC: 1.12 MG/DL — SIGNIFICANT CHANGE UP (ref 0.5–1.3)
CREAT SERPL-MCNC: 1.3 MG/DL — SIGNIFICANT CHANGE UP (ref 0.5–1.3)
DIFF PNL FLD: SIGNIFICANT CHANGE UP
EOSINOPHIL # BLD AUTO: 0.02 K/UL — SIGNIFICANT CHANGE UP (ref 0–0.5)
EOSINOPHIL # BLD AUTO: 0.09 K/UL — SIGNIFICANT CHANGE UP (ref 0–0.5)
EOSINOPHIL NFR BLD AUTO: 0.3 % — SIGNIFICANT CHANGE UP (ref 0–6)
EOSINOPHIL NFR BLD AUTO: 0.7 % — SIGNIFICANT CHANGE UP (ref 0–6)
ETHANOL SERPL-MCNC: <10 MG/DL — SIGNIFICANT CHANGE UP
GLUCOSE SERPL-MCNC: 105 MG/DL — HIGH (ref 70–99)
GLUCOSE SERPL-MCNC: 125 MG/DL — HIGH (ref 70–99)
GLUCOSE UR QL: SIGNIFICANT CHANGE UP
HCT VFR BLD CALC: 33.7 % — LOW (ref 34.5–45)
HCT VFR BLD CALC: 34.2 % — LOW (ref 34.5–45)
HGB BLD-MCNC: 11 G/DL — LOW (ref 11.5–15.5)
HGB BLD-MCNC: 11.4 G/DL — LOW (ref 11.5–15.5)
IMM GRANULOCYTES NFR BLD AUTO: 0.4 % — SIGNIFICANT CHANGE UP (ref 0–1.5)
IMM GRANULOCYTES NFR BLD AUTO: 0.5 % — SIGNIFICANT CHANGE UP (ref 0–1.5)
INR BLD: 1.1 RATIO — SIGNIFICANT CHANGE UP (ref 0.88–1.16)
INR BLD: 1.1 RATIO — SIGNIFICANT CHANGE UP (ref 0.88–1.16)
KETONES UR-MCNC: SIGNIFICANT CHANGE UP
LEUKOCYTE ESTERASE UR-ACNC: SIGNIFICANT CHANGE UP
LIDOCAIN IGE QN: 13 U/L — LOW (ref 22–51)
LYMPHOCYTES # BLD AUTO: 0.83 K/UL — LOW (ref 1–3.3)
LYMPHOCYTES # BLD AUTO: 0.97 K/UL — LOW (ref 1–3.3)
LYMPHOCYTES # BLD AUTO: 10.8 % — LOW (ref 13–44)
LYMPHOCYTES # BLD AUTO: 7.4 % — LOW (ref 13–44)
MCHC RBC-ENTMCNC: 31.1 PG — SIGNIFICANT CHANGE UP (ref 27–34)
MCHC RBC-ENTMCNC: 31.1 PG — SIGNIFICANT CHANGE UP (ref 27–34)
MCHC RBC-ENTMCNC: 32.6 GM/DL — SIGNIFICANT CHANGE UP (ref 32–36)
MCHC RBC-ENTMCNC: 33.3 GM/DL — SIGNIFICANT CHANGE UP (ref 32–36)
MCV RBC AUTO: 93.4 FL — SIGNIFICANT CHANGE UP (ref 80–100)
MCV RBC AUTO: 95.2 FL — SIGNIFICANT CHANGE UP (ref 80–100)
MONOCYTES # BLD AUTO: 0.33 K/UL — SIGNIFICANT CHANGE UP (ref 0–0.9)
MONOCYTES # BLD AUTO: 0.79 K/UL — SIGNIFICANT CHANGE UP (ref 0–0.9)
MONOCYTES NFR BLD AUTO: 4.3 % — SIGNIFICANT CHANGE UP (ref 2–14)
MONOCYTES NFR BLD AUTO: 6 % — SIGNIFICANT CHANGE UP (ref 2–14)
NEUTROPHILS # BLD AUTO: 11.22 K/UL — HIGH (ref 1.8–7.4)
NEUTROPHILS # BLD AUTO: 6.45 K/UL — SIGNIFICANT CHANGE UP (ref 1.8–7.4)
NEUTROPHILS NFR BLD AUTO: 83.8 % — HIGH (ref 43–77)
NEUTROPHILS NFR BLD AUTO: 85.3 % — HIGH (ref 43–77)
NITRITE UR-MCNC: SIGNIFICANT CHANGE UP
NRBC # BLD: 0 /100 WBCS — SIGNIFICANT CHANGE UP (ref 0–0)
PH UR: SIGNIFICANT CHANGE UP (ref 5–8)
PLATELET # BLD AUTO: 246 K/UL — SIGNIFICANT CHANGE UP (ref 150–400)
PLATELET # BLD AUTO: 265 K/UL — SIGNIFICANT CHANGE UP (ref 150–400)
POTASSIUM SERPL-MCNC: 3.8 MMOL/L — SIGNIFICANT CHANGE UP (ref 3.5–5.3)
POTASSIUM SERPL-MCNC: 4.4 MMOL/L — SIGNIFICANT CHANGE UP (ref 3.5–5.3)
POTASSIUM SERPL-SCNC: 3.8 MMOL/L — SIGNIFICANT CHANGE UP (ref 3.5–5.3)
POTASSIUM SERPL-SCNC: 4.4 MMOL/L — SIGNIFICANT CHANGE UP (ref 3.5–5.3)
PROT SERPL-MCNC: 6.5 G/DL — LOW (ref 6.6–8.7)
PROT SERPL-MCNC: 6.8 G/DL — SIGNIFICANT CHANGE UP (ref 6–8.3)
PROT UR-MCNC: SIGNIFICANT CHANGE UP
PROTHROM AB SERPL-ACNC: 12.7 SEC — SIGNIFICANT CHANGE UP (ref 10.6–13.6)
PROTHROM AB SERPL-ACNC: 12.8 SEC — SIGNIFICANT CHANGE UP (ref 10.6–13.6)
RBC # BLD: 3.54 M/UL — LOW (ref 3.8–5.2)
RBC # BLD: 3.66 M/UL — LOW (ref 3.8–5.2)
RBC # FLD: 13 % — SIGNIFICANT CHANGE UP (ref 10.3–14.5)
RBC # FLD: 13.2 % — SIGNIFICANT CHANGE UP (ref 10.3–14.5)
SARS-COV-2 RNA SPEC QL NAA+PROBE: SIGNIFICANT CHANGE UP
SODIUM SERPL-SCNC: 141 MMOL/L — SIGNIFICANT CHANGE UP (ref 135–145)
SODIUM SERPL-SCNC: 143 MMOL/L — SIGNIFICANT CHANGE UP (ref 135–145)
SP GR SPEC: SIGNIFICANT CHANGE UP (ref 1.01–1.02)
TROPONIN I SERPL-MCNC: <.015 NG/ML — SIGNIFICANT CHANGE UP (ref 0.01–0.04)
UROBILINOGEN FLD QL: SIGNIFICANT CHANGE UP
WBC # BLD: 13.14 K/UL — HIGH (ref 3.8–10.5)
WBC # BLD: 7.69 K/UL — SIGNIFICANT CHANGE UP (ref 3.8–10.5)
WBC # FLD AUTO: 13.14 K/UL — HIGH (ref 3.8–10.5)
WBC # FLD AUTO: 7.69 K/UL — SIGNIFICANT CHANGE UP (ref 3.8–10.5)

## 2020-08-12 PROCEDURE — 71045 X-RAY EXAM CHEST 1 VIEW: CPT

## 2020-08-12 PROCEDURE — 71045 X-RAY EXAM CHEST 1 VIEW: CPT | Mod: 26

## 2020-08-12 PROCEDURE — 71045 X-RAY EXAM CHEST 1 VIEW: CPT | Mod: 26,77

## 2020-08-12 PROCEDURE — 84484 ASSAY OF TROPONIN QUANT: CPT

## 2020-08-12 PROCEDURE — 99291 CRITICAL CARE FIRST HOUR: CPT | Mod: 25

## 2020-08-12 PROCEDURE — 87635 SARS-COV-2 COVID-19 AMP PRB: CPT

## 2020-08-12 PROCEDURE — 80053 COMPREHEN METABOLIC PANEL: CPT

## 2020-08-12 PROCEDURE — 72170 X-RAY EXAM OF PELVIS: CPT | Mod: 26

## 2020-08-12 PROCEDURE — 82550 ASSAY OF CK (CPK): CPT

## 2020-08-12 PROCEDURE — 99291 CRITICAL CARE FIRST HOUR: CPT

## 2020-08-12 PROCEDURE — 70450 CT HEAD/BRAIN W/O DYE: CPT

## 2020-08-12 PROCEDURE — 72125 CT NECK SPINE W/O DYE: CPT

## 2020-08-12 PROCEDURE — 72125 CT NECK SPINE W/O DYE: CPT | Mod: 26

## 2020-08-12 PROCEDURE — 93010 ELECTROCARDIOGRAM REPORT: CPT

## 2020-08-12 PROCEDURE — 70450 CT HEAD/BRAIN W/O DYE: CPT | Mod: 26,77

## 2020-08-12 PROCEDURE — 36415 COLL VENOUS BLD VENIPUNCTURE: CPT

## 2020-08-12 PROCEDURE — 85027 COMPLETE CBC AUTOMATED: CPT

## 2020-08-12 PROCEDURE — 99285 EMERGENCY DEPT VISIT HI MDM: CPT | Mod: CS,GC

## 2020-08-12 PROCEDURE — 85730 THROMBOPLASTIN TIME PARTIAL: CPT

## 2020-08-12 PROCEDURE — 81001 URINALYSIS AUTO W/SCOPE: CPT

## 2020-08-12 PROCEDURE — 93005 ELECTROCARDIOGRAM TRACING: CPT

## 2020-08-12 PROCEDURE — 85610 PROTHROMBIN TIME: CPT

## 2020-08-12 RX ORDER — SODIUM CHLORIDE 9 MG/ML
1000 INJECTION INTRAMUSCULAR; INTRAVENOUS; SUBCUTANEOUS ONCE
Refills: 0 | Status: COMPLETED | OUTPATIENT
Start: 2020-08-12 | End: 2020-08-12

## 2020-08-12 RX ADMIN — SODIUM CHLORIDE 1000 MILLILITER(S): 9 INJECTION INTRAMUSCULAR; INTRAVENOUS; SUBCUTANEOUS at 18:00

## 2020-08-12 NOTE — ED PROVIDER NOTE - OBJECTIVE STATEMENT
81 y/o female with unknown PMHx presents to ED transfer from [facility]. As per EMS, patient was found to have a Subdural Hematoma s/p a fall at home today. Was feeling nauseous, and dizzy, got out of bed and brought herself down to the floor as per patient. Patient presented to [facility] ED and transferred to [facility] ED.     Denies LOC, HA, CP, Back pain, extremity pain, nausea, vomiting

## 2020-08-12 NOTE — ED ADULT NURSE NOTE - OBJECTIVE STATEMENT
pt BIBA transferred from [facility] for + head bleed. Code trauma B called, MD Carrillo at bedside, code team called to bedside. Neuro sx called to bedside for eval. pt AOx4, denies any pain, dizziness, nausea/vomiting at this time. pt admits to bilateral foot numbness. respirations even and unlabored. no slurred speech or facial droop noted. STRANGE x4 without difficulty. See trauma flowsheet.

## 2020-08-12 NOTE — ED PROVIDER NOTE - PHYSICAL EXAMINATION
Gen: Well appearing in NAD  Head: NC/AT  Neck: trachea midline  Cardiac: Regular rate and rhythm   Resp:  No distress  Ext: no deformities  Neuro:  A&O appears non focal  Skin:  Warm and dry as visualized  Psych:  Normal affect and mood

## 2020-08-12 NOTE — ED PROVIDER NOTE - CLINICAL SUMMARY MEDICAL DECISION MAKING FREE TEXT BOX
BIBA from home due to fall. pt reports she was retching this morning but did not vomit. pt reports she laid herself on the ground. reports being on the ground for about 30 minutes. Pt reports he son called the ambulance but uncertain as to who call her son. pt reports frequent falls. Plan includes labs, EKG/troponin r/o CAD, Ct head r/o CVA, Ct cervical r/o fx, CPK r/o rhabdo, CXR, COVID, ua r/o UTI, admission for frequent falls.

## 2020-08-12 NOTE — H&P ADULT - HISTORY OF PRESENT ILLNESS
80 year old female s/p ground level fall while at home with reported retching this AM, negative emesis. Per reports patient was down for about 30 mins before her son called the ambulance and the patient was taken to [facility]. Patient was transferred to [facility] as CT head was concerning for a small subdural collection along the left frontal lobe approximately 7mm with no midline shift nor mass effect. Upon arrival GCS 15, patient with no major complaints.    Primary Survey:    A: Protected, patient conversing  B: CTAB. Symmetrical chest rise  C: 2+ central (femoral) & peripheral pulses (Radial, DP)  D: GCS 15, MAEO, interacting. No nelson disability noted  E: No gross deformities on primary exposure      CXR: Negative for evidence of hemo/pneumothorax  Pelvic XR: no gross acute traumatic injuries

## 2020-08-12 NOTE — ED ADULT NURSE NOTE - ED STAT RN HANDOFF DETAILS
pt handed off to LORENZO Chairez in stable condition. Pt oriented to unit, plan of care explained. RN reeducated pt on call bell system. no apparent distress noted at this time.

## 2020-08-12 NOTE — ED PROVIDER NOTE - PROGRESS NOTE DETAILS
Ct noted subdural. Attempted to contact son with number patient provided [phone] but no answer or voicemail available. contacted transfer center, pending trauma call back. Discussed with Dr. Allred, accepting transfer to [place] ED. NO acute intervention requesting, notes will perform CT at [facility] if indicated.

## 2020-08-12 NOTE — H&P ADULT - NSHPREVIEWOFSYSTEMS_GEN_ALL_CORE
REVIEW OF SYSTEMS      General:	FREQUENT FALLS, DIZZINESS, BALANCE ISSUES.       	  Ophthalmologic: BL IMATURE CATARACTS  	  ENMT:	DENIES    Respiratory and Thorax: DENIES  	  Cardiovascular:	DENIES    Gastrointestinal:	DENIES    Genitourinary:	+ URINARY INCONTINENCE , USES PESSARY, PADS AND INCONTINENCE MEDS     Musculoskeletal:	DENIES    Neurological: FINE TREMOR FULL BODY, WAS TOLD BY PMD IT WAS NOT PARKINSON'S DISEASE 	    Psychiatric: CHRONIC DEPRESSION 	    Hematology/Lymphatics:	DENIES      Endocrine:   DENIES	    Allergic/Immunologic:	DENIES

## 2020-08-12 NOTE — ED PROVIDER NOTE - CARE PLAN
Principal Discharge DX:	Frequent falls  Secondary Diagnosis:	Inability to perform activities of daily living Principal Discharge DX:	Frequent falls  Secondary Diagnosis:	Inability to perform activities of daily living  Secondary Diagnosis:	Subdural hematoma

## 2020-08-12 NOTE — ED ADULT NURSE NOTE - DRUG PRE-SCREENING (DAST -1)
Statement Selected I personally performed the service described in the documentation recorded by the scribe in my presence, and it accurately and completely records my words and actions.

## 2020-08-12 NOTE — ED ADULT NURSE NOTE - NSIMPLEMENTINTERV_GEN_ALL_ED
Implemented All Universal Safety Interventions:  [place] to call system. Call bell, personal items and telephone within reach. Instruct patient to call for assistance. Room bathroom lighting operational. Non-slip footwear when patient is off stretcher. Physically safe environment: no spills, clutter or unnecessary equipment. Stretcher in lowest position, wheels locked, appropriate side rails in place.

## 2020-08-12 NOTE — ED PROVIDER NOTE - ATTENDING CONTRIBUTION TO CARE
81 yo F pmh Depression, Hypothyroidism, Vertigo BIBEMS for being found on floor for presumed fall (though pt states she laid on the floor herself and was down about 30 mins, unclear if pt is reliable vs dementia). pt has had frequent falls prior to this. appears confused, states she hit her head, unk if anticoags, unk downtime. lives alone. son lives in [place].  VSS  pale appearing; elderly/frail; dry MM.   Neck from, no midline ttp or step offs    consider ich, c spine fx, rhabdo, uti.  anticipate admission for frequent falls, inability to perform ADLs, unsafe discharge.

## 2020-08-12 NOTE — ED PROVIDER NOTE - PHYSICAL EXAMINATION
Constitutional: Awake, Alert, non-toxic. NAD. Well appearing, well nourished.   HEAD: Normocephalic, atraumatic.   EYES: PERRL, EOM intact, conjunctiva and sclera are clear bilaterally. No raccoon eyes.   ENT: No walsh sign. No rhinorrhea, normal pharynx, patent, no tonsillar exudate or enlargement, mucous membranes pink/moist, no erythema, no drooling or stridor.   NECK: Supple, non-tender;   BACK: No midline or paraspinal TTP of cervical/thoracic/lumbar spine, FROM. No ecchymosis or hematomas.   CARDIOVASCULAR: Normal S1, S2; regular rate and rhythm.  RESPIRATORY: Normal respiratory effort; breath sounds CTAB, no wheezes, rhonchi, or rales. Speaking in full sentences. No accessory muscle use.   ABDOMEN: Soft; non-tender, non-distended.   EXTREMITIES: Full passive and active ROM in all extremities; non-tender to palpation; distal pulses palpable and symmetric, no edema, no crepitus or step off  SKIN: Warm, dry; good skin turgor, no apparent lesions or rashes, no ecchymosis, brisk capillary refill.  NEURO: A&O x3. Sensory and motor functions are grossly intact. Speech is normal. Appearance and judgement seem appropriate for gender and age. No neurological deficits. Neurovascular sensation intact motor function 5/5 of upper and lower extremities, CN II-XII grossly intact, no limb ataxia, absent pronator drift, intact cerebellar function. Speech clear, without articulation or word-finding difficulties. Eyes- PERRL bilaterally. EOMs in tact. No nystagmus. No facial droop.

## 2020-08-12 NOTE — ED PROVIDER NOTE - OBJECTIVE STATEMENT
81 y/o male with PMHx Depression, Hypothyroidism, Vertigo BIBA from home due to fall. pt reports she was retching this morning but did not vomit. pt reports she laid herself on the ground. reports being on the ground for about 30 minutes. Pt reports he son called the ambulance but uncertain as to who call her son. pt reports frequent falls. pt denies head injury, LOC, hip pain, chest pain, SOB, fever, vomiting, dizziness, or any other complaints.

## 2020-08-12 NOTE — H&P ADULT - ASSESSMENT
80 year old female s/p ground level fall, transferred from [facility] for concerns of a Left-sided SDH.    CT head: small subdural collection along the left frontal lobe approximately 7mm with no midline shift nor mass effect  CT neck: chronic degenerative changes, no acute traumatic injuries  CXR: no pneumothorax, no gross rib fractures  Neurosurgery Evaluation  tertiary exam   pain management IMP: 80 year old female s/p ground level fall, transferred from [facility] for concerns of a Left-sided low density SDH.  f/u ct head w  Stable size and appearance of a left hemispheric subdural hematoma measuring up to 9 mm in thickness without significant mass effect or midline shift.     CT head: [facility] w small subdural collection along the left frontal lobe approximately 7mm with no midline shift no mass effect  CT neck: chronic degenerative changes, no acute traumatic injuries    Neuro exam intact all  baseline full body tremors   hx of frequent falls, states 4 x's last week, and today  has dizziness and balance difficulty    Plan: DISCUSSED WITH DR MOSLEY   ADMIT TO SDU W Q2H NEURO CKS  NO ANTI COAGS, NO ANTIPLATELETS  NO SEDATION, NO NARCOTICS  BED REST   FALL RISK  DISCUSSED WITH DR ROWAN HOSPITALIST  CT HEAD IN AM

## 2020-08-12 NOTE — H&P ADULT - NSHPPHYSICALEXAM_GEN_ALL_CORE
Secondary Survey:    Constitutional: Well-developed well nourished Female in no acute distress  HEENT: Head is normocephalic and atraumatic, maxillofacial structures stable, no blood or discharge from nares or oral cavity, no awlsh sign / racoon eyes, EOMI b/l, pupils 4 mm round and reactive to light b/l, no active drainage or redness  Neck: cervical collar in place, trachea midline  Respiratory: Breath sounds CTA b/l respirations are unlabored, no accessory muscle use, no conversational dyspnea  Cardiovascular: Regular rate & rhythm, +S1, S2  Chest: Chest wall is non-tender to palpation, no subQ emphysema or crepitus palpated  Gastrointestinal: Abdomen soft, non-tender, non-distended, no rebound tenderness / guarding, no ecchymosis or external signs of abdominal trauma  Extremities: moving all extremities spontaneously, no point tenderness or deformity noted to upper or lower extremities b/l  Pelvis: stable  Vascular: 2+ radial, femoral, and DP pulses b/l  Neurological: GCS: 15 (4/5/6). A&O x 3; no gross sensory / motor / coordination deficits  Musculoskeletal: 5/5 strength of upper and lower extremities b/l  Back: no C/T/LS spine tenderness to palpation, no step-offs or signs of external trauma to the back Secondary Survey:    Constitutional: Well-developed well nourished Female in no acute distress  HEENT: Head is normocephalic and atraumatic, maxillofacial structures stable, no blood or discharge from nares or oral cavity, no walsh sign / raccoon eyes, EOMI b/l, pupils 4 mm round and reactive to light b/l, no active drainage or redness  Neck: cervical collar in place, trachea midline  Respiratory: Breath sounds CTA b/l respirations are unlabored, no accessory muscle use, no conversational dyspnea  Cardiovascular: Regular rate & rhythm, +S1, S2  Chest: Chest wall is non-tender to palpation, no subQ emphysema or crepitus palpated, no rib tenderness  Spine: No C/T/L/S spine tenderness   Gastrointestinal: Abdomen soft, non-tender, non-distended, no rebound tenderness / guarding, no ecchymosis or external signs of abdominal trauma  Extremities: moving all extremities spontaneously, no point tenderness or deformity noted to upper or lower extremities b/l  Pelvis: stable  Vascular: 2+ radial, femoral, and DP pulses b/l  Neurological: GCS: 15 (4/5/6). A&O x 2 with partial date ;   per, eomi  cranial nerves 2-12 intact all  gross acuity and visual fields intact,   no  sensory defect    motor: 5/5 all   coordination and fine motor poor with fine tremors   Musculoskeletal: 5/5 strength of upper and lower extremities b/l

## 2020-08-12 NOTE — ED PROVIDER NOTE - CRITICAL CARE PROVIDED
consultation with other physicians/additional history taking/documentation/interpretation of diagnostic studies/telephone consultation with the patient's family/direct patient care (not related to procedure)

## 2020-08-12 NOTE — ED ADULT NURSE NOTE - NSIMPLEMENTINTERV_GEN_ALL_ED
Implemented All Fall Risk Interventions:  [place] to call system. Call bell, personal items and telephone within reach. Instruct patient to call for assistance. Room bathroom lighting operational. Non-slip footwear when patient is off stretcher. Physically safe environment: no spills, clutter or unnecessary equipment. Stretcher in lowest position, wheels locked, appropriate side rails in place. Provide visual cue, wrist band, yellow gown, etc. Monitor gait and stability. Monitor for mental status changes and reorient to person, place, and time. Review medications for side effects contributing to fall risk. Reinforce activity limits and safety measures with patient and family.

## 2020-08-12 NOTE — ED ADULT NURSE NOTE - OBJECTIVE STATEMENT
Received pt in bed alert.  C/O weakness.  Pt was found on floor unwitnessed.  Pt stated she did not fall but doesn't remember.  Pt has hx of vertigo.  Pt also states nausea, denies chest pain and SOB.  Ongoing nursing care and safety maintained.

## 2020-08-13 DIAGNOSIS — S06.5X9A TRAUMATIC SUBDURAL HEMORRHAGE WITH LOSS OF CONSCIOUSNESS OF UNSPECIFIED DURATION, INITIAL ENCOUNTER: ICD-10-CM

## 2020-08-13 DIAGNOSIS — E03.9 HYPOTHYROIDISM, UNSPECIFIED: ICD-10-CM

## 2020-08-13 DIAGNOSIS — F32.89 OTHER SPECIFIED DEPRESSIVE EPISODES: ICD-10-CM

## 2020-08-13 PROBLEM — R42 DIZZINESS AND GIDDINESS: Chronic | Status: ACTIVE | Noted: 2020-06-19

## 2020-08-13 LAB — SARS-COV-2 RNA SPEC QL NAA+PROBE: SIGNIFICANT CHANGE UP

## 2020-08-13 PROCEDURE — 99223 1ST HOSP IP/OBS HIGH 75: CPT

## 2020-08-13 PROCEDURE — 99222 1ST HOSP IP/OBS MODERATE 55: CPT

## 2020-08-13 PROCEDURE — 93010 ELECTROCARDIOGRAM REPORT: CPT

## 2020-08-13 PROCEDURE — 99233 SBSQ HOSP IP/OBS HIGH 50: CPT

## 2020-08-13 PROCEDURE — 70450 CT HEAD/BRAIN W/O DYE: CPT | Mod: 26

## 2020-08-13 RX ORDER — LEVOTHYROXINE SODIUM 125 MCG
25 TABLET ORAL DAILY
Refills: 0 | Status: DISCONTINUED | OUTPATIENT
Start: 2020-08-13 | End: 2020-08-18

## 2020-08-13 RX ORDER — MECLIZINE HCL 12.5 MG
12.5 TABLET ORAL EVERY 6 HOURS
Refills: 0 | Status: DISCONTINUED | OUTPATIENT
Start: 2020-08-13 | End: 2020-08-18

## 2020-08-13 RX ORDER — CITALOPRAM 10 MG/1
10 TABLET, FILM COATED ORAL DAILY
Refills: 0 | Status: DISCONTINUED | OUTPATIENT
Start: 2020-08-13 | End: 2020-08-18

## 2020-08-13 RX ORDER — LAMOTRIGINE 25 MG/1
25 TABLET, ORALLY DISINTEGRATING ORAL ONCE
Refills: 0 | Status: COMPLETED | OUTPATIENT
Start: 2020-08-13 | End: 2020-08-13

## 2020-08-13 RX ORDER — SACCHAROMYCES BOULARDII 250 MG
250 POWDER IN PACKET (EA) ORAL
Refills: 0 | Status: DISCONTINUED | OUTPATIENT
Start: 2020-08-13 | End: 2020-08-18

## 2020-08-13 RX ORDER — NITROFURANTOIN MACROCRYSTAL 50 MG
100 CAPSULE ORAL
Refills: 0 | Status: DISCONTINUED | OUTPATIENT
Start: 2020-08-13 | End: 2020-08-17

## 2020-08-13 RX ADMIN — LAMOTRIGINE 25 MILLIGRAM(S): 25 TABLET, ORALLY DISINTEGRATING ORAL at 01:23

## 2020-08-13 RX ADMIN — CITALOPRAM 10 MILLIGRAM(S): 10 TABLET, FILM COATED ORAL at 01:23

## 2020-08-13 RX ADMIN — Medication 25 MICROGRAM(S): at 01:22

## 2020-08-13 RX ADMIN — Medication 25 MICROGRAM(S): at 06:03

## 2020-08-13 RX ADMIN — Medication 100 MILLIGRAM(S): at 22:15

## 2020-08-13 NOTE — CHART NOTE - NSCHARTNOTEFT_GEN_A_CORE
Neurosurgery Note       80 y/o female transferred from [place] with small, stable left subacute subdural hygroma s/p multiple falls. CT brain repeated today demonstrates a stable scan, no change, no shift, no new acute blood.  Neuro exam unchanged, no decline in mental status.     CTB 8/13/20     FINDINGS:  Mixed attenuation subdural collection is noted along the left frontoparietal convexity measuring up to 9 mm in thickness, previously 9 mm. There is associated mass effect with mild sulcal effacement. There is no midline shift.    No acute transcortical infarction is identified. White matter hypoattenuating foci are noted, compatible with age-indeterminate small vessel disease.    Small hypodense extra axial collection is noted along the left cerebellar convexity, compatible with arachnoid cyst versus CSF hygroma.    Prominence of the ventricles, sulci, and cisterns, compatible with parenchymal volume loss.    The visualized intraorbital contents are normal. The imaged portions of the paranasal sinuses are clear. The mastoid air cells are clear.    IMPRESSION:    Stable left frontoparietal convexity mixed attenuation subdural hematoma, measuring up to 9 mm in thickness..          Plan:    As per discussion last night, patient would be transferred to medicine service today.   Avoid anticoagulation for minimum 2 wks  Avoid narcotics & or sedatives  No acute neurosurgical intervention needed at this time Neurosurgery Note       80 y/o female transferred from [place] with small, stable left subacute subdural hygroma s/p multiple falls. CT brain repeated today demonstrates a stable scan, no change, no shift, no new acute blood.  Neuro exam unchanged, no decline in mental status.     CTB 8/13/20     FINDINGS:  Mixed attenuation subdural collection is noted along the left frontoparietal convexity measuring up to 9 mm in thickness, previously 9 mm. There is associated mass effect with mild sulcal effacement. There is no midline shift.    No acute transcortical infarction is identified. White matter hypoattenuating foci are noted, compatible with age-indeterminate small vessel disease.    Small hypodense extra axial collection is noted along the left cerebellar convexity, compatible with arachnoid cyst versus CSF hygroma.    Prominence of the ventricles, sulci, and cisterns, compatible with parenchymal volume loss.    The visualized intraorbital contents are normal. The imaged portions of the paranasal sinuses are clear. The mastoid air cells are clear.    IMPRESSION:    Stable left frontoparietal convexity mixed attenuation subdural hematoma, measuring up to 9 mm in thickness..          Plan:    As per discussion last night, patient would be transferred to medicine service today. Spoke with Dr Bales, who will see the patient  Avoid anticoagulation for minimum 2 wks  Avoid narcotics & or sedatives  No acute neurosurgical intervention needed at this time Neurosurgery Note       80 y/o female transferred from [place] with small, stable left subacute subdural hygroma s/p multiple falls. CT brain repeated today demonstrates a stable scan, no change, no shift, no new acute blood.  Neuro exam unchanged, no decline in mental status.     CTB 8/13/20     FINDINGS:  Mixed attenuation subdural collection is noted along the left frontoparietal convexity measuring up to 9 mm in thickness, previously 9 mm. There is associated mass effect with mild sulcal effacement. There is no midline shift.    No acute transcortical infarction is identified. White matter hypoattenuating foci are noted, compatible with age-indeterminate small vessel disease.    Small hypodense extra axial collection is noted along the left cerebellar convexity, compatible with arachnoid cyst versus CSF hygroma.    Prominence of the ventricles, sulci, and cisterns, compatible with parenchymal volume loss.    The visualized intraorbital contents are normal. The imaged portions of the paranasal sinuses are clear. The mastoid air cells are clear.    IMPRESSION:    Stable left frontoparietal convexity mixed attenuation subdural hematoma, measuring up to 9 mm in thickness..          Plan:    As per discussion last night, patient would be transferred to medicine service today for syncopal workup.  Spoke with Dr Bales, who will see the patient  Avoid anticoagulation for minimum 2 wks  Avoid narcotics & or sedatives  No acute neurosurgical intervention needed at this time  Neurology consult recommended

## 2020-08-13 NOTE — PROGRESS NOTE ADULT - SUBJECTIVE AND OBJECTIVE BOX
CC: Fall with left SDH   HPI:  80 year old female s/p ground level fall while at home with reported retching this AM, negative emesis. Per reports patient was down for about 30 mins before her son called the ambulance and the patient was taken to [facility]. Patient was transferred to [facility] as CT head was concerning for a small subdural collection along the left frontal lobe approximately 7mm with no midline shift nor mass effect. Upon arrival GCS 15, patient with no major complaints.    Primary Survey:    A: Protected, patient conversing  B: CTAB. Symmetrical chest rise  C: 2+ central (femoral) & peripheral pulses (Radial, DP)  D: GCS 15, MAEO, interacting. No nelson disability noted  E: No gross deformities on primary exposure      CXR: Negative for evidence of hemo/pneumothorax  Pelvic XR: no gross acute traumatic injuries (12 Aug 2020 22:43)    REVIEW OF SYSTEMS:    Patient denied fever, chills, abdominal pain, nausea, vomiting, cough, shortness of breath, chest pain or palpitations    Vital Signs Last 24 Hrs  T(C): 37.1 (13 Aug 2020 11:33), Max: 37.3 (12 Aug 2020 23:15)  T(F): 98.7 (13 Aug 2020 11:33), Max: 99.1 (12 Aug 2020 23:15)  HR: 65 (13 Aug 2020 11:33) (62 - 72)  BP: 128/64 (13 Aug 2020 11:33) (112/64 - 135/73)  BP(mean): 87 (12 Aug 2020 21:07) (87 - 87)  RR: 18 (13 Aug 2020 11:33) (18 - 19)  SpO2: 94% (13 Aug 2020 11:33) (94% - 97%)I&O's Summary    PHYSICAL EXAM:  GENERAL: NAD, well-groomed  HEENT: PERRL, +EOMI, anicteric, no [place]  NECK: Supple, No JVD   CHEST/LUNG: CTA bilaterally; Normal effort  HEART: S1S2 Normal intensity, no murmurs, gallops or rubs noted  ABDOMEN: Soft, BS Normoactive, NT, ND, no HSM noted  EXTREMITIES:  2+ radial and DP pulses noted, no clubbing, cyanosis, or edema noted, FROM x 4  SKIN: No rashes or lesions noted  NEURO: A&Ox3, no focal deficits noted, CN II-XII intact  PSYCH: normal mood and affect; insight/judgement appropriate  LABS:                        11.0   13.14 )-----------( 246      ( 12 Aug 2020 22:47 )             33.7     08-12    141  |  102  |  21.0<H>  ----------------------------<  105<H>  3.8   |  22.0  |  1.12    Ca    8.8      12 Aug 2020 22:47    TPro  6.5<L>  /  Alb  4.1  /  TBili  0.7  /  DBili  x   /  AST  19  /  ALT  8   /  AlkPhos  58  08-12    PT/INR - ( 12 Aug 2020 22:47 )   PT: 12.7 sec;   INR: 1.10 ratio         PTT - ( 12 Aug 2020 22:47 )  PTT:27.6 sec  Urinalysis Basic - ( 12 Aug 2020 21:20 )    Color: Blue / Appearance: Slightly Turbid / SG: See Note / pH: x  Gluc: x / Ketone: See Note  / Bili: See Note / Urobili: See Note   Blood: x / Protein: See Note / Nitrite: See Note   Leuk Esterase: See Note / RBC: 0-2 /HPF / WBC >50   Sq Epi: x / Non Sq Epi: Few / Bacteria: Few      RADIOLOGY & ADDITIONAL TESTS:    MEDICATIONS:  MEDICATIONS  (STANDING):  citalopram 10 milliGRAM(s) Oral daily  levothyroxine 25 MICROGram(s) Oral daily    MEDICATIONS  (PRN):

## 2020-08-13 NOTE — PROGRESS NOTE ADULT - ASSESSMENT
80 yr female with history of frequent falls, transferred from [place] for fall with left frontoparietal cerebral convexity 9mm SDH.

## 2020-08-13 NOTE — PROGRESS NOTE ADULT - ASSESSMENT
80 year old female w/ pmh hypothyroidism, vertigo, ckd3, hx uti, depression s/p ground level fall while at home with reported retching  presenting as transfer for  for assessment and management of small subdural hematoma.    A/  post traumatic LT frontal lobe small subdural hematoma, subacute, POA, stable  gait instability complicated by frequent falls, poss sec to syncope, likely worsened by acute uti, stable  uti w/o hematuria, poa, w/o sepsis, stable  ckd3, stable    P/  9mm lesion w/o mass effect or midline shift on imaging  repeat cth stable, neuro exam stable  no fx on xr pelvis or chest  no antiPLT/ac/dvt ppx until cleared by neurosx - minimum 2wks  neuro checks  meclizine prn  tele, echo  carotid us  tsh in am  orthostatics  macrobid started 8/13 - last day 8/18  uc pending collection  baseline cr 1.3  cs appreciated: trauma sx. neurosx.   cs pending: PT, cardio. neuro    covid neg  dvt ppx, scd.    dispo, pending syncope w/u completion  outpt fu. pmd. neurosx. 80 year old female w/ pmh hypothyroidism, vertigo, ckd3, hx uti, depression s/p ground level fall while at home with reported retching  presenting as transfer for  for assessment and management of small subdural hematoma.    A/  post traumatic LT frontal lobe small subdural hematoma, subacute, POA, stable  bppv causing gait instability complicated by frequent falls, likely worsened by acute uti  uti w/o hematuria, poa, w/o sepsis, stable  ckd3, stable    P/  9mm lesion w/o mass effect or midline shift on imaging  repeat cth stable, neuro exam stable, no intervention planned  no fx on xr pelvis or chest  no antiPLT/ac/dvt ppx until cleared by neurosx - minimum 2wks  neuro checks  pt w/ hx of extensive outpatient w/u w/ pmd + neuro Dr PASCUAL Phan in [place] ([facility] system - [phone]) for dizziness, eventually dx w/ bppv, unable to  script for vestibular therapy from office  trial low dose meclizine prn, titrate dose to effect  lbbb on ekg, asymptomatic, no murmur on exam, if chronicity can be confirmed w/ outpatient providers no intervention if not check echo  tsh in am  orthostatics  macrobid started 8/13 - last day 8/18   pending collection  baseline cr 1.3  cs appreciated: trauma sx. neurosx.   cs pending: PT, cardio. neuro    covid neg  dvt ppx, scd.    dispo, verify w/u for dizziness w/ outpatient neuro and/or pmd - pt seems to be reliable historian, if w/u adequately completed then no indication to repeat inpatient  outpt fu. pmd. neurosx. PT for vestibular therapy

## 2020-08-13 NOTE — CHART NOTE - NSCHARTNOTEFT_GEN_A_CORE
Tertiary Trauma Survey (TTS)    Date of TTS: 08-13-20 @ 04:40                             Admit Date: 08-13-20 @ 00:54      Trauma Activation: B transfer from [place]  Admit GCS: E-4     V-5     M-6     Subjective / 24 hour events: Patient currently with no major complaints, denies any nausea nor emesis. Complaining of tiredness related to the events that have transpired during the day.     Vital Signs Last 24 Hrs  T(C): 37.3 (12 Aug 2020 23:15), Max: 37.3 (12 Aug 2020 23:15)  T(F): 99.1 (12 Aug 2020 23:15), Max: 99.1 (12 Aug 2020 23:15)  HR: 72 (13 Aug 2020 01:35) (66 - 72)  BP: 135/73 (13 Aug 2020 01:35) (125/63 - 135/73)  BP(mean): --  RR: 18 (13 Aug 2020 01:35) (18 - 18)  SpO2: 96% (13 Aug 2020 01:35) (94% - 96%)    Physical Exam:    Neuro:  non focal neurological exam   HEENT:  Normo-cephalic/atraumatic   Pulm/Chest:   CTA b/l  chest wall non tender   Cardiac:  S1S2, sinus rhythm   GI / Abdomen:  Soft, non-tender, non-distended   Musculoskeletal / Extremities: normal active ROM  Integumentary:  Skin intact Warm Dry  Vascular:  2+ palpable distal pulses                          11.0   13.14 )-----------( 246      ( 12 Aug 2020 22:47 )             33.7     08-12    141  |  102  |  21.0<H>  ----------------------------<  105<H>  3.8   |  22.0  |  1.12    Ca    8.8      12 Aug 2020 22:47    TPro  6.5<L>  /  Alb  4.1  /  TBili  0.7  /  DBili  x   /  AST  19  /  ALT  8   /  AlkPhos  58  08-12    PT/INR - ( 12 Aug 2020 22:47 )   PT: 12.7 sec;   INR: 1.10 ratio         PTT - ( 12 Aug 2020 22:47 )  PTT:27.6 sec      List Injuries Identified to Date:  1. small subdural collection along the left frontal lobe    List Operative and Interventional Radiological Procedures: None    Consults (Date):  [X] Neurosurgery 24 hours NeuroSx admission   [  ] Orthopedics  [  ] Plastics  [  ] Urology  [  ] PM&R  [  ] Social Work       80 year old female s/p ground level fall, transferred from [facility] for concerns of a Left-sided low density SDH.  -No additional acute traumatic injuries  -rest of care per primary team Tertiary Trauma Survey (TTS)    Date of TTS: 08-13-20 @ 04:40                             Admit Date: 08-13-20 @ 00:54      Trauma Activation: B transfer from [place]  Admit GCS: E-4     V-5     M-6     Subjective / 24 hour events: Patient currently with no major complaints, denies any nausea nor emesis. Complaining of tiredness related to the events that have transpired during the day.     Vital Signs Last 24 Hrs  T(C): 37.3 (12 Aug 2020 23:15), Max: 37.3 (12 Aug 2020 23:15)  T(F): 99.1 (12 Aug 2020 23:15), Max: 99.1 (12 Aug 2020 23:15)  HR: 72 (13 Aug 2020 01:35) (66 - 72)  BP: 135/73 (13 Aug 2020 01:35) (125/63 - 135/73)  BP(mean): --  RR: 18 (13 Aug 2020 01:35) (18 - 18)  SpO2: 96% (13 Aug 2020 01:35) (94% - 96%)    Physical Exam:    Neuro:  non focal neurological exam   HEENT:  Normo-cephalic/atraumatic   Pulm/Chest:   CTA b/l  chest wall non tender   Cardiac:  S1S2, sinus rhythm   GI / Abdomen:  Soft, non-tender, non-distended   Musculoskeletal / Extremities: normal active ROM  Integumentary:  Skin intact Warm Dry  Vascular:  2+ palpable distal pulses                          11.0   13.14 )-----------( 246      ( 12 Aug 2020 22:47 )             33.7     08-12    141  |  102  |  21.0<H>  ----------------------------<  105<H>  3.8   |  22.0  |  1.12    Ca    8.8      12 Aug 2020 22:47    TPro  6.5<L>  /  Alb  4.1  /  TBili  0.7  /  DBili  x   /  AST  19  /  ALT  8   /  AlkPhos  58  08-12    PT/INR - ( 12 Aug 2020 22:47 )   PT: 12.7 sec;   INR: 1.10 ratio         PTT - ( 12 Aug 2020 22:47 )  PTT:27.6 sec      List Injuries Identified to Date:  1. small subdural collection along the left frontal lobe    List Operative and Interventional Radiological Procedures: None    Consults (Date):  [X] Neurosurgery 24 hours NeuroSx admission   [  ] Orthopedics  [  ] Plastics  [  ] Urology  [  ] PM&R  [  ] Social Work       80 year old female s/p ground level fall, transferred from [facility] for concerns of a Left-sided low density SDH.  -No additional acute traumatic injuries  -rest of care per primary team        I HAVE SEEN AND EXAMINED THE PATIENT WITH THE PA AND RESIDENT.  I AGREE WITH THE PA'S/RESIDENTS  NOTE, ASSESSMENT, PLAN AND PE.     RAYMOND

## 2020-08-13 NOTE — PROGRESS NOTE ADULT - SUBJECTIVE AND OBJECTIVE BOX
***HOSPITALIST TRANSFER ACCEPTANCE NOTE FROM NEUROSURGERY SERVICE***    CC: sdh    Patient seen and examined at the bedside. No acute overnight events. admitted yesterday. Complaining of - today. Denies fever/chills, headache, lightheadedness, dizziness, chest pain, palpitations, shortness of breath, cough, abd pain, nausea/vomiting/diarrhea, muscle pain.      =========================================================================================  T(C): 37 (08-13-20 @ 15:16), Max: 37.3 (08-12-20 @ 23:15)  HR: 64 (08-13-20 @ 15:16) (62 - 72)  BP: 115/57 (08-13-20 @ 15:16) (115/57 - 135/73)  RR: 18 (08-13-20 @ 15:16) (18 - 18)  SpO2: 94% (08-13-20 @ 11:33) (94% - 96%)    PHYSICAL EXAM.    GEN - appears age appropriate. well nourished. pleasant. no distress.   HEENT - NCAT, EOMI, JESS  RESP - CTA BL, no wheeze/stridor/rhonchi/crackles. not on supplemental O2. able to speak in full sentences without distress. no accessory muscle use.  CARDIO - NS1S2, RRR.   ABD - Soft/Non tender/Non distended. Normal BS x4 quadrants. no guarding/rebound tenderness.  Ext - No EDGARDO.  MSK - BL 5/5 strength on upper and lower extremities.   Neuro - AAOx3. no gross neuro deficits noted  Psych - normal affect  Skin - c/d/i. no rashes/lesions      I&O's Summary    Daily     Daily     =========================================================================================  LABS.    08-12    141  |  102  |  21.0<H>  ----------------------------<  105<H>  3.8   |  22.0  |  1.12    Ca    8.8      12 Aug 2020 22:47    TPro  6.5<L>  /  Alb  4.1  /  TBili  0.7  /  DBili  x   /  AST  19  /  ALT  8   /  AlkPhos  58  08-12                          11.0   13.14 )-----------( 246      ( 12 Aug 2020 22:47 )             33.7     LIVER FUNCTIONS - ( 12 Aug 2020 22:47 )  Alb: 4.1 g/dL / Pro: 6.5 g/dL / ALK PHOS: 58 U/L / ALT: 8 U/L / AST: 19 U/L / GGT: x           PT/INR - ( 12 Aug 2020 22:47 )   PT: 12.7 sec;   INR: 1.10 ratio         PTT - ( 12 Aug 2020 22:47 )  PTT:27.6 sec  CARDIAC MARKERS ( 12 Aug 2020 19:14 )  <.015 ng/mL / x     / 173 U/L / x     / x          Urinalysis Basic - ( 12 Aug 2020 21:20 )    Color: Blue / Appearance: Slightly Turbid / SG: See Note / pH: x  Gluc: x / Ketone: See Note  / Bili: See Note / Urobili: See Note   Blood: x / Protein: See Note / Nitrite: See Note   Leuk Esterase: See Note / RBC: 0-2 /HPF / WBC >50   Sq Epi: x / Non Sq Epi: Few / Bacteria: Few        COVID-19 PCR: NotDetec (12 Aug 2020 23:17)    =========================================================================================  IMAGING.     < from: CT Head No Cont (08.13.20 @ 07:03) >  FINDINGS:  Mixed attenuation subdural collection is noted along the left frontoparietal convexity measuring up to 9 mm in thickness, previously 9 mm. There is associated mass effect with mild sulcal effacement. There is no midline shift.    No acute transcortical infarction is identified. White matter hypoattenuating foci are noted, compatible with age-indeterminate small vessel disease.    Small hypodense extra axial collection is noted along the left cerebellar convexity, compatible with arachnoid cyst versus CSF hygroma.    Prominence of the ventricles, sulci, and cisterns, compatible with parenchymal volume loss.    The visualized intraorbital contents are normal. The imaged portions of the paranasalsinuses are clear. The mastoid air cells are clear.    IMPRESSION:    Stable left frontoparietal convexity mixed attenuation subdural hematoma, measuring up to 9 mm in thickness..      < end of copied text >    =========================================================================================    DIET.    Diet, Regular (08-13-20 @ 01:14)      =========================================================================================    HOME MEDS.    Home Medications:  CeleXA: 1 tab(s) orally once a day (04 Sep 2018 13:03)  CeleXA 10 mg oral tablet: 1 tab(s) orally once a day (13 Aug 2020 08:43)  LaMICtal: 1 tab(s) orally once a day (04 Sep 2018 13:03)  LaMICtal 25 mg oral tablet: 1 tab(s) orally 2 times a day (13 Aug 2020 08:43)  levothyroxine: 1 tab(s) orally once a day (04 Sep 2018 13:03)  Synthroid 25 mcg (0.025 mg) oral tablet: 1 tab(s) orally once a day (13 Aug 2020 08:43)      =========================================================================================    HOSPITAL MEDS.    MEDICATIONS  (STANDING):  citalopram 10 milliGRAM(s) Oral daily  levothyroxine 25 MICROGram(s) Oral daily  nitrofurantoin monohydrate/macrocrystals (MACROBID) 100 milliGRAM(s) Oral two times a day with meals  saccharomyces boulardii 250 milliGRAM(s) Oral two times a day    MEDICATIONS  (PRN): ***HOSPITALIST TRANSFER ACCEPTANCE NOTE FROM NEUROSURGERY SERVICE***    CC: sdh    Patient seen and examined at the bedside. No acute overnight events. admitted yesterday. Complaining of mild persistent HA, LT side > RT side today. Denies fever/chills, lightheadedness, chest pain, palpitations, shortness of breath, cough, abd pain, nausea/vomiting/diarrhea, muscle pain.      =========================================================================================  T(C): 37 (08-13-20 @ 15:16), Max: 37.3 (08-12-20 @ 23:15)  HR: 64 (08-13-20 @ 15:16) (62 - 72)  BP: 115/57 (08-13-20 @ 15:16) (115/57 - 135/73)  RR: 18 (08-13-20 @ 15:16) (18 - 18)  SpO2: 94% (08-13-20 @ 11:33) (94% - 96%)    PHYSICAL EXAM.    GEN - appears age appropriate.  frail. pleasant. no distress.   HEENT - NCAT, EOMI, JESS  RESP - CTA BL, no wheeze/stridor/rhonchi/crackles. not on supplemental O2. able to speak in full sentences without distress. no accessory muscle use.  CARDIO - NS1S2, RRR.   ABD - Soft/Non tender/Non distended. Normal BS x4 quadrants. no guarding/rebound tenderness.  Ext - No EDGARDO.  MSK - BL 5/5 strength on upper and lower extremities.   Neuro - AAOx3. no gross neuro deficits noted  Psych - normal affect  Skin - c/d/i. no rashes/lesions      I&O's Summary    Daily     Daily     =========================================================================================  LABS.    08-12    141  |  102  |  21.0<H>  ----------------------------<  105<H>  3.8   |  22.0  |  1.12    Ca    8.8      12 Aug 2020 22:47    TPro  6.5<L>  /  Alb  4.1  /  TBili  0.7  /  DBili  x   /  AST  19  /  ALT  8   /  AlkPhos  58  08-12                          11.0   13.14 )-----------( 246      ( 12 Aug 2020 22:47 )             33.7     LIVER FUNCTIONS - ( 12 Aug 2020 22:47 )  Alb: 4.1 g/dL / Pro: 6.5 g/dL / ALK PHOS: 58 U/L / ALT: 8 U/L / AST: 19 U/L / GGT: x           PT/INR - ( 12 Aug 2020 22:47 )   PT: 12.7 sec;   INR: 1.10 ratio         PTT - ( 12 Aug 2020 22:47 )  PTT:27.6 sec  CARDIAC MARKERS ( 12 Aug 2020 19:14 )  <.015 ng/mL / x     / 173 U/L / x     / x          Urinalysis Basic - ( 12 Aug 2020 21:20 )    Color: Blue / Appearance: Slightly Turbid / SG: See Note / pH: x  Gluc: x / Ketone: See Note  / Bili: See Note / Urobili: See Note   Blood: x / Protein: See Note / Nitrite: See Note   Leuk Esterase: See Note / RBC: 0-2 /HPF / WBC >50   Sq Epi: x / Non Sq Epi: Few / Bacteria: Few        COVID-19 PCR: NotDetec (12 Aug 2020 23:17)    =========================================================================================  IMAGING.     < from: CT Head No Cont (08.13.20 @ 07:03) >  FINDINGS:  Mixed attenuation subdural collection is noted along the left frontoparietal convexity measuring up to 9 mm in thickness, previously 9 mm. There is associated mass effect with mild sulcal effacement. There is no midline shift.    No acute transcortical infarction is identified. White matter hypoattenuating foci are noted, compatible with age-indeterminate small vessel disease.    Small hypodense extra axial collection is noted along the left cerebellar convexity, compatible with arachnoid cyst versus CSF hygroma.    Prominence of the ventricles, sulci, and cisterns, compatible with parenchymal volume loss.    The visualized intraorbital contents are normal. The imaged portions of the paranasalsinuses are clear. The mastoid air cells are clear.    IMPRESSION:    Stable left frontoparietal convexity mixed attenuation subdural hematoma, measuring up to 9 mm in thickness..      < end of copied text >    =========================================================================================    DIET.    Diet, Regular (08-13-20 @ 01:14)      =========================================================================================    HOME MEDS.    Home Medications:  CeleXA: 1 tab(s) orally once a day (04 Sep 2018 13:03)  CeleXA 10 mg oral tablet: 1 tab(s) orally once a day (13 Aug 2020 08:43)  LaMICtal: 1 tab(s) orally once a day (04 Sep 2018 13:03)  LaMICtal 25 mg oral tablet: 1 tab(s) orally 2 times a day (13 Aug 2020 08:43)  levothyroxine: 1 tab(s) orally once a day (04 Sep 2018 13:03)  Synthroid 25 mcg (0.025 mg) oral tablet: 1 tab(s) orally once a day (13 Aug 2020 08:43)      =========================================================================================    HOSPITAL MEDS.    MEDICATIONS  (STANDING):  citalopram 10 milliGRAM(s) Oral daily  levothyroxine 25 MICROGram(s) Oral daily  nitrofurantoin monohydrate/macrocrystals (MACROBID) 100 milliGRAM(s) Oral two times a day with meals  saccharomyces boulardii 250 milliGRAM(s) Oral two times a day    MEDICATIONS  (PRN):

## 2020-08-14 LAB
ANION GAP SERPL CALC-SCNC: 14 MMOL/L — SIGNIFICANT CHANGE UP (ref 5–17)
BASOPHILS # BLD AUTO: 0.02 K/UL — SIGNIFICANT CHANGE UP (ref 0–0.2)
BASOPHILS NFR BLD AUTO: 0.2 % — SIGNIFICANT CHANGE UP (ref 0–2)
BUN SERPL-MCNC: 18 MG/DL — SIGNIFICANT CHANGE UP (ref 8–20)
CALCIUM SERPL-MCNC: 9.3 MG/DL — SIGNIFICANT CHANGE UP (ref 8.6–10.2)
CHLORIDE SERPL-SCNC: 101 MMOL/L — SIGNIFICANT CHANGE UP (ref 98–107)
CO2 SERPL-SCNC: 23 MMOL/L — SIGNIFICANT CHANGE UP (ref 22–29)
CREAT SERPL-MCNC: 1.07 MG/DL — SIGNIFICANT CHANGE UP (ref 0.5–1.3)
EOSINOPHIL # BLD AUTO: 0.14 K/UL — SIGNIFICANT CHANGE UP (ref 0–0.5)
EOSINOPHIL NFR BLD AUTO: 1.7 % — SIGNIFICANT CHANGE UP (ref 0–6)
GLUCOSE SERPL-MCNC: 100 MG/DL — HIGH (ref 70–99)
HCT VFR BLD CALC: 36.6 % — SIGNIFICANT CHANGE UP (ref 34.5–45)
HGB BLD-MCNC: 11.9 G/DL — SIGNIFICANT CHANGE UP (ref 11.5–15.5)
IMM GRANULOCYTES NFR BLD AUTO: 0.4 % — SIGNIFICANT CHANGE UP (ref 0–1.5)
LYMPHOCYTES # BLD AUTO: 2.12 K/UL — SIGNIFICANT CHANGE UP (ref 1–3.3)
LYMPHOCYTES # BLD AUTO: 25.1 % — SIGNIFICANT CHANGE UP (ref 13–44)
MCHC RBC-ENTMCNC: 31 PG — SIGNIFICANT CHANGE UP (ref 27–34)
MCHC RBC-ENTMCNC: 32.5 GM/DL — SIGNIFICANT CHANGE UP (ref 32–36)
MCV RBC AUTO: 95.3 FL — SIGNIFICANT CHANGE UP (ref 80–100)
MONOCYTES # BLD AUTO: 0.72 K/UL — SIGNIFICANT CHANGE UP (ref 0–0.9)
MONOCYTES NFR BLD AUTO: 8.5 % — SIGNIFICANT CHANGE UP (ref 2–14)
NEUTROPHILS # BLD AUTO: 5.4 K/UL — SIGNIFICANT CHANGE UP (ref 1.8–7.4)
NEUTROPHILS NFR BLD AUTO: 64.1 % — SIGNIFICANT CHANGE UP (ref 43–77)
PLATELET # BLD AUTO: 256 K/UL — SIGNIFICANT CHANGE UP (ref 150–400)
POTASSIUM SERPL-MCNC: 3.7 MMOL/L — SIGNIFICANT CHANGE UP (ref 3.5–5.3)
POTASSIUM SERPL-SCNC: 3.7 MMOL/L — SIGNIFICANT CHANGE UP (ref 3.5–5.3)
RBC # BLD: 3.84 M/UL — SIGNIFICANT CHANGE UP (ref 3.8–5.2)
RBC # FLD: 13.2 % — SIGNIFICANT CHANGE UP (ref 10.3–14.5)
SODIUM SERPL-SCNC: 138 MMOL/L — SIGNIFICANT CHANGE UP (ref 135–145)
TSH SERPL-MCNC: 1.23 UIU/ML — SIGNIFICANT CHANGE UP (ref 0.27–4.2)
WBC # BLD: 8.43 K/UL — SIGNIFICANT CHANGE UP (ref 3.8–10.5)
WBC # FLD AUTO: 8.43 K/UL — SIGNIFICANT CHANGE UP (ref 3.8–10.5)

## 2020-08-14 PROCEDURE — 99233 SBSQ HOSP IP/OBS HIGH 50: CPT

## 2020-08-14 RX ORDER — ACETAMINOPHEN 500 MG
650 TABLET ORAL EVERY 6 HOURS
Refills: 0 | Status: DISCONTINUED | OUTPATIENT
Start: 2020-08-14 | End: 2020-08-18

## 2020-08-14 RX ORDER — ACETAMINOPHEN 500 MG
650 TABLET ORAL ONCE
Refills: 0 | Status: COMPLETED | OUTPATIENT
Start: 2020-08-14 | End: 2020-08-14

## 2020-08-14 RX ADMIN — Medication 650 MILLIGRAM(S): at 00:23

## 2020-08-14 RX ADMIN — Medication 250 MILLIGRAM(S): at 05:15

## 2020-08-14 RX ADMIN — Medication 100 MILLIGRAM(S): at 17:09

## 2020-08-14 RX ADMIN — Medication 650 MILLIGRAM(S): at 01:08

## 2020-08-14 RX ADMIN — CITALOPRAM 10 MILLIGRAM(S): 10 TABLET, FILM COATED ORAL at 10:28

## 2020-08-14 RX ADMIN — Medication 100 MILLIGRAM(S): at 08:33

## 2020-08-14 RX ADMIN — Medication 250 MILLIGRAM(S): at 17:09

## 2020-08-14 NOTE — PHYSICAL THERAPY INITIAL EVALUATION ADULT - LEVEL OF INDEPENDENCE: STAIR NEGOTIATION, REHAB EVAL
unable to perform/pt reports feeling nauseous and demonstrated decreased motor control while ambulating.

## 2020-08-14 NOTE — PHYSICAL THERAPY INITIAL EVALUATION ADULT - IMPAIRMENTS FOUND, PT EVAL
poor safety awareness/aerobic capacity/endurance/gross motor/cognitive impairment/gait, locomotion, and balance/muscle strength/posture

## 2020-08-14 NOTE — PROGRESS NOTE ADULT - SUBJECTIVE AND OBJECTIVE BOX
CC: Fall with SDH   HPI:  80 year old female s/p ground level fall while at home with reported retching this AM, negative emesis. Per reports patient was down for about 30 mins before her son called the ambulance and the patient was taken to [facility]. Patient was transferred to [facility] as CT head was concerning for a small subdural collection along the left frontal lobe approximately 7mm with no midline shift nor mass effect. Upon arrival GCS 15, patient with no major complaints.    Primary Survey:    A: Protected, patient conversing  B: CTAB. Symmetrical chest rise  C: 2+ central (femoral) & peripheral pulses (Radial, DP)  D: GCS 15, MAEO, interacting. No nelson disability noted  E: No gross deformities on primary exposure      CXR: Negative for evidence of hemo/pneumothorax  Pelvic XR: no gross acute traumatic injuries (12 Aug 2020 22:43)    REVIEW OF SYSTEMS:    Patient denied fever, chills, abdominal pain, nausea, vomiting, cough, shortness of breath, chest pain or palpitations    Vital Signs Last 24 Hrs  T(C): 36.6 (14 Aug 2020 15:28), Max: 37 (13 Aug 2020 23:53)  T(F): 97.8 (14 Aug 2020 15:28), Max: 98.6 (13 Aug 2020 23:53)  HR: 56 (14 Aug 2020 15:28) (53 - 56)  BP: 142/72 (14 Aug 2020 15:28) (142/72 - 158/72)  BP(mean): --  RR: 16 (14 Aug 2020 15:28) (16 - 17)  SpO2: 93% (14 Aug 2020 15:28) (93% - 95%)I&O's Summary    PHYSICAL EXAM:  GENERAL: NAD, well-groomed  HEENT: PERRL, +EOMI, anicteric, no Alutiiq  NECK: Supple, No JVD   CHEST/LUNG: CTA bilaterally; Normal effort  HEART: S1S2 Normal intensity, no murmurs, gallops or rubs noted  ABDOMEN: Soft, BS Normoactive, NT, ND, no HSM noted  EXTREMITIES:  2+ radial and DP pulses noted, no clubbing, cyanosis, or edema noted, FROM x 4  SKIN: No rashes or lesions noted  NEURO: A&Ox3, no focal deficits noted, CN II-XII intact  PSYCH: normal mood and affect; insight/judgement appropriate  LABS:                        11.9   8.43  )-----------( 256      ( 14 Aug 2020 09:43 )             36.6     08-14    138  |  101  |  18.0  ----------------------------<  100<H>  3.7   |  23.0  |  1.07    Ca    9.3      14 Aug 2020 09:43    TPro  6.5<L>  /  Alb  4.1  /  TBili  0.7  /  DBili  x   /  AST  19  /  ALT  8   /  AlkPhos  58  08-12    PT/INR - ( 12 Aug 2020 22:47 )   PT: 12.7 sec;   INR: 1.10 ratio         PTT - ( 12 Aug 2020 22:47 )  PTT:27.6 sec  Urinalysis Basic - ( 12 Aug 2020 21:20 )    Color: Blue / Appearance: Slightly Turbid / SG: See Note / pH: x  Gluc: x / Ketone: See Note  / Bili: See Note / Urobili: See Note   Blood: x / Protein: See Note / Nitrite: See Note   Leuk Esterase: See Note / RBC: 0-2 /HPF / WBC >50   Sq Epi: x / Non Sq Epi: Few / Bacteria: Few      RADIOLOGY & ADDITIONAL TESTS:    MEDICATIONS:  MEDICATIONS  (STANDING):  citalopram 10 milliGRAM(s) Oral daily  levothyroxine 25 MICROGram(s) Oral daily  nitrofurantoin monohydrate/macrocrystals (MACROBID) 100 milliGRAM(s) Oral two times a day with meals  saccharomyces boulardii 250 milliGRAM(s) Oral two times a day    MEDICATIONS  (PRN):  acetaminophen   Tablet .. 650 milliGRAM(s) Oral every 6 hours PRN Temp greater or equal to 38C (100.4F), Mild Pain (1 - 3)  meclizine 12.5 milliGRAM(s) Oral every 6 hours PRN Dizziness

## 2020-08-14 NOTE — PHYSICAL THERAPY INITIAL EVALUATION ADULT - ADDITIONAL COMMENTS
pt reports she lives alone in an apartment with 10 steps to enter and one handrail. pt reports she has grab bars in bathroom, RW and 3 canes throughout the apartment. Patient reports she was able to drive and independence with all ADLs. pt has one son, who lives in [place].

## 2020-08-14 NOTE — CHART NOTE - NSCHARTNOTEFT_GEN_A_CORE
THAO NOTE: PER [facility] MALI, PT REQUESTING TO SPEAK TO SW DUE TO NEEDING CLOTHS AND SHOES UPON D/C. PT IS ADMITTED. SWER MET WITH PT WHO IS A RETIRED  AND PRESENTED TO BE A&OX4. SW EDUCATED PT ON SW ROLE AND D/C PLANNING ROLE. PT STATED SHE LIVES ALONE IN A RENTAL APT AND VERY INDEPENDENT. PT EXPRESSED TO SWER NEED FOR A SIZE SMALL CLOTH AND A SIZE 7 SHOE SIZE PRIOR TO BEING D/C'D.  SWER INFORMED PT, CLOTHS AND SHOE WILL BE AVAILABLE TO HER PRIOR TO BEING D/C'D. PT STATED UNDERSTANDING! PT NARRATED TO SWER NOT TO CALL SON FOR ANY REASON BECAUSE SHE CAN MAKE NEED KNOWN. PRIMARY SWER WILL FOLLLOWING IN PROVIDING CLOTH AND SHOE WHEN PT IS MEDICALLY STABLE TO BE D/C. SW TO FOLLOW

## 2020-08-14 NOTE — PHYSICAL THERAPY INITIAL EVALUATION ADULT - LEVEL OF INDEPENDENCE: GAIT, REHAB EVAL
moderate assist (50% patients effort)/pt required verbal cues to improve safety awareness to navigate around obstacles and maintain RW close to body, pt veers R and demonstrated decreased postural and motor control. pt reports increase nausea symptoms while ambulating.

## 2020-08-14 NOTE — PHYSICAL THERAPY INITIAL EVALUATION ADULT - BALANCE DISTURBANCE, IDENTIFIED IMPAIRMENT CONTRIBUTE, REHAB EVAL
decreased strength/impaired motor control/decreased sensation/impaired sensory feedback/impaired coordination

## 2020-08-14 NOTE — PROGRESS NOTE ADULT - ASSESSMENT
80 year old female w/ pmh hypothyroidism, vertigo, ckd3, hx uti, depression s/p ground level fall while at home with reported retching  presenting as transfer for  for assessment and management of small subdural hematoma.  post traumatic LT frontal lobe small subdural hematoma, subacute, POA, stable  gait instability complicated by frequent falls, likely worsened by acute uti  uti w/o hematuria, poa, w/o sepsis, stable  ckd3, stable    SDH  9mm lesion w/o mass effect or midline shift on imaging  repeat cth stable, neuro exam stable, no intervention planned  no fx on xr pelvis or chest  no antiPLT/ac/dvt ppx until cleared by neurosx -  trial low dose meclizine prn, titrate dose to effect  LBBB ekg, asymptomatic, no murmur on exam, Will obtain Echo, TSH     UTI  macrobid started 8/13 - last day 8/18 for uti    Depression   Citalopram    Disp: Patient will like to move down to [place] to be with adult children.    PT. Patient refuse rehab and insist on going to home and from home to [place].

## 2020-08-14 NOTE — PHYSICAL THERAPY INITIAL EVALUATION ADULT - TRANSFER SAFETY CONCERNS NOTED: SIT/STAND, REHAB EVAL
decreased step length/decreased balance during turns/decreased proprioception/decreased safety awareness/decreased weight-shifting ability

## 2020-08-14 NOTE — PHYSICAL THERAPY INITIAL EVALUATION ADULT - DIAGNOSIS, PT EVAL
pt demonstrates decreased functional mobility, due to impaired balanced, strength, endurance, motor control and decreased safety awareness

## 2020-08-15 ENCOUNTER — TRANSCRIPTION ENCOUNTER (OUTPATIENT)
Age: 80
End: 2020-08-15

## 2020-08-15 LAB
SARS-COV-2 IGG SERPL QL IA: NEGATIVE — SIGNIFICANT CHANGE UP
SARS-COV-2 IGM SERPL IA-ACNC: 0.1 INDEX — SIGNIFICANT CHANGE UP

## 2020-08-15 PROCEDURE — 99232 SBSQ HOSP IP/OBS MODERATE 35: CPT

## 2020-08-15 RX ORDER — LEVOTHYROXINE SODIUM 125 MCG
1 TABLET ORAL
Qty: 0 | Refills: 0 | DISCHARGE

## 2020-08-15 RX ORDER — CITALOPRAM 10 MG/1
1 TABLET, FILM COATED ORAL
Qty: 0 | Refills: 0 | DISCHARGE

## 2020-08-15 RX ORDER — LAMOTRIGINE 25 MG/1
1 TABLET, ORALLY DISINTEGRATING ORAL
Qty: 0 | Refills: 0 | DISCHARGE

## 2020-08-15 RX ORDER — NITROFURANTOIN MACROCRYSTAL 50 MG
1 CAPSULE ORAL
Qty: 4 | Refills: 0
Start: 2020-08-15 | End: 2020-08-16

## 2020-08-15 RX ADMIN — Medication 25 MICROGRAM(S): at 05:30

## 2020-08-15 RX ADMIN — CITALOPRAM 10 MILLIGRAM(S): 10 TABLET, FILM COATED ORAL at 13:08

## 2020-08-15 RX ADMIN — Medication 100 MILLIGRAM(S): at 05:30

## 2020-08-15 RX ADMIN — Medication 250 MILLIGRAM(S): at 17:37

## 2020-08-15 RX ADMIN — Medication 250 MILLIGRAM(S): at 05:30

## 2020-08-15 RX ADMIN — Medication 100 MILLIGRAM(S): at 17:37

## 2020-08-15 NOTE — DISCHARGE NOTE PROVIDER - NSDCCPCAREPLAN_GEN_ALL_CORE_FT
PRINCIPAL DISCHARGE DIAGNOSIS  Diagnosis: Subdural hematoma  Assessment and Plan of Treatment: Follow up with Neurosurgery for further management and repeat imaging.      SECONDARY DISCHARGE DIAGNOSES  Diagnosis: Urinary tract infection  Assessment and Plan of Treatment: Complete the course of antibiotics.    Diagnosis: Depression  Assessment and Plan of Treatment: Continue on your home medications.    Diagnosis: Hypothyroidism  Assessment and Plan of Treatment: Continue on levothyroxine.

## 2020-08-15 NOTE — PROGRESS NOTE ADULT - SUBJECTIVE AND OBJECTIVE BOX
LINN WHITE  ----------------------------------------  The patient was seen and evaluated for subdural hematoma. Offers no complaints.    Vital Signs Last 24 Hrs  T(C): 36.6 (15 Aug 2020 08:20), Max: 36.8 (14 Aug 2020 21:30)  T(F): 97.8 (15 Aug 2020 08:20), Max: 98.2 (14 Aug 2020 21:30)  HR: 66 (15 Aug 2020 08:20) (56 - 66)  BP: 172/92 (15 Aug 2020 08:20) (133/72 - 172/92)  BP(mean): --  RR: 20 (15 Aug 2020 08:20) (16 - 20)  SpO2: 95% (15 Aug 2020 08:20) (93% - 99%)    PHYSICAL EXAMINATION:  ----------------------------------------  General appearance: No acute distress, Awake, Alert  HEENT: Normocephalic, Atraumatic, Conjunctiva clear, EOMI  Neck: Supple, No JVD, No tenderness  Lungs: Breath sound equal bilaterally, No wheezes, No rales  Cardiovascular: S1S2, Regular rhythm  Abdomen: Soft, Nontender, Nondistended, No guarding/rebound, Positive bowel sounds  Extremities: No clubbing, No cyanosis, No edema, No calf tenderness  Neuro: Strength equal bilaterally, No tremors  Psychiatric: Appropriate mood, Normal affect    LABORATORY STUDIES:  ----------------------------------------             11.9   8.43  )-----------( 256      ( 14 Aug 2020 09:43 )             36.6     08-14    138  |  101  |  18.0  ----------------------------<  100<H>  3.7   |  23.0  |  1.07    Ca    9.3      14 Aug 2020 09:43    MEDICATIONS  (STANDING):  citalopram 10 milliGRAM(s) Oral daily  levothyroxine 25 MICROGram(s) Oral daily  nitrofurantoin monohydrate/macrocrystals (MACROBID) 100 milliGRAM(s) Oral two times a day with meals  saccharomyces boulardii 250 milliGRAM(s) Oral two times a day    MEDICATIONS  (PRN):  acetaminophen   Tablet .. 650 milliGRAM(s) Oral every 6 hours PRN Temp greater or equal to 38C (100.4F), Mild Pain (1 - 3)  meclizine 12.5 milliGRAM(s) Oral every 6 hours PRN Dizziness      ASSESSMENT / PLAN:  ----------------------------------------  80 year old female w/ pmh hypothyroidism, vertigo, ckd3, hx uti, depression s/p ground level fall while at home with reported retching  presenting as transfer for  for assessment and management of small subdural hematoma.  post traumatic LT frontal lobe small subdural hematoma, subacute, POA, stable  gait instability complicated by frequent falls, likely worsened by acute uti  uti w/o hematuria, poa, w/o sepsis, stable  ckd3, stable    Subdural hematoma - Neurosurgery consultation noted, no surgical intervention recommended at the time. Serial CT of the head was without significant change. Neurological examinations were stable.    Urinary tract infection - On nitrofurantoin. Afebrile with resolved leukocytosis.    Depression - On citalopram.    Physical Therapy evaluation noted with recommendations to pursue further treatment at a rehabilitation facility. Prior conversations with the patient were noted and she did not wish to pursue transfer to a rehabilitation facility. This was discussed again today and the patient remained adamant about returning home. She did not wish for this to be discussed with her son. She stated that she would be able to manage in her current condition and that she would personally  pursue outpatient physical therapy or home aides after she discussed with her primary care physician and friends. Her ultimate plan was to then move to [place] to be with her son. The patient did not wish to initiate any services while in the hospital. LINN WHITE  ----------------------------------------  The patient was seen and evaluated for subdural hematoma. Offers no complaints.    Vital Signs Last 24 Hrs  T(C): 36.6 (15 Aug 2020 08:20), Max: 36.8 (14 Aug 2020 21:30)  T(F): 97.8 (15 Aug 2020 08:20), Max: 98.2 (14 Aug 2020 21:30)  HR: 66 (15 Aug 2020 08:20) (56 - 66)  BP: 172/92 (15 Aug 2020 08:20) (133/72 - 172/92)  BP(mean): --  RR: 20 (15 Aug 2020 08:20) (16 - 20)  SpO2: 95% (15 Aug 2020 08:20) (93% - 99%)    PHYSICAL EXAMINATION:  ----------------------------------------  General appearance: No acute distress, Awake, Alert  HEENT: Normocephalic, Atraumatic, Conjunctiva clear, EOMI  Neck: Supple, No JVD, No tenderness  Lungs: Breath sound equal bilaterally, No wheezes, No rales  Cardiovascular: S1S2, Regular rhythm  Abdomen: Soft, Nontender, Nondistended, No guarding/rebound, Positive bowel sounds  Extremities: No clubbing, No cyanosis, No edema, No calf tenderness  Neuro: Strength equal bilaterally, No tremors  Psychiatric: Appropriate mood, Normal affect    LABORATORY STUDIES:  ----------------------------------------             11.9   8.43  )-----------( 256      ( 14 Aug 2020 09:43 )             36.6     08-14    138  |  101  |  18.0  ----------------------------<  100<H>  3.7   |  23.0  |  1.07    Ca    9.3      14 Aug 2020 09:43    MEDICATIONS  (STANDING):  citalopram 10 milliGRAM(s) Oral daily  levothyroxine 25 MICROGram(s) Oral daily  nitrofurantoin monohydrate/macrocrystals (MACROBID) 100 milliGRAM(s) Oral two times a day with meals  saccharomyces boulardii 250 milliGRAM(s) Oral two times a day    MEDICATIONS  (PRN):  acetaminophen   Tablet .. 650 milliGRAM(s) Oral every 6 hours PRN Temp greater or equal to 38C (100.4F), Mild Pain (1 - 3)  meclizine 12.5 milliGRAM(s) Oral every 6 hours PRN Dizziness      ASSESSMENT / PLAN:  ----------------------------------------  80 year old female w/ pmh hypothyroidism, vertigo, ckd3, hx uti, depression s/p ground level fall while at home with reported retching  presenting as transfer for  for assessment and management of small subdural hematoma.  post traumatic LT frontal lobe small subdural hematoma, subacute, POA, stable  gait instability complicated by frequent falls, likely worsened by acute uti  uti w/o hematuria, poa, w/o sepsis, stable  ckd3, stable    Subdural hematoma - Neurosurgery consultation noted, no surgical intervention recommended at the time. Serial CT of the head was without significant change. Neurological examinations were stable.    Urinary tract infection - On nitrofurantoin. Afebrile with resolved leukocytosis.    Depression - On citalopram.    Physical Therapy evaluation noted with recommendations to pursue further treatment at a rehabilitation facility. Prior conversations with the patient were noted and she did not wish to pursue transfer to a rehabilitation facility. This was discussed again today with the patient in detail. The recommendations by Physical Therapy were reviewed. The patient expressed understanding but remained adamant about returning home. She did not wish for this to be discussed with her son. She stated that she would be able to manage in her current condition and that she would personally  pursue outpatient physical therapy or home aides after she discussed with her primary care physician and friends. Her ultimate plan was to then move to [place] to be with her son. The patient did not wish to initiate any services while in the hospital. LINN WHITE  ----------------------------------------  The patient was seen and evaluated for subdural hematoma. Offers no complaints.    Vital Signs Last 24 Hrs  T(C): 36.6 (15 Aug 2020 08:20), Max: 36.8 (14 Aug 2020 21:30)  T(F): 97.8 (15 Aug 2020 08:20), Max: 98.2 (14 Aug 2020 21:30)  HR: 66 (15 Aug 2020 08:20) (56 - 66)  BP: 172/92 (15 Aug 2020 08:20) (133/72 - 172/92)  BP(mean): --  RR: 20 (15 Aug 2020 08:20) (16 - 20)  SpO2: 95% (15 Aug 2020 08:20) (93% - 99%)    PHYSICAL EXAMINATION:  ----------------------------------------  General appearance: No acute distress, Awake, Alert  HEENT: Normocephalic, Atraumatic, Conjunctiva clear, EOMI  Neck: Supple, No JVD, No tenderness  Lungs: Breath sound equal bilaterally, No wheezes, No rales  Cardiovascular: S1S2, Regular rhythm  Abdomen: Soft, Nontender, Nondistended, No guarding/rebound, Positive bowel sounds  Extremities: No clubbing, No cyanosis, No edema, No calf tenderness  Neuro: Strength equal bilaterally, No tremors  Psychiatric: Appropriate mood, Normal affect    LABORATORY STUDIES:  ----------------------------------------             11.9   8.43  )-----------( 256      ( 14 Aug 2020 09:43 )             36.6     08-14    138  |  101  |  18.0  ----------------------------<  100<H>  3.7   |  23.0  |  1.07    Ca    9.3      14 Aug 2020 09:43    MEDICATIONS  (STANDING):  citalopram 10 milliGRAM(s) Oral daily  levothyroxine 25 MICROGram(s) Oral daily  nitrofurantoin monohydrate/macrocrystals (MACROBID) 100 milliGRAM(s) Oral two times a day with meals  saccharomyces boulardii 250 milliGRAM(s) Oral two times a day    MEDICATIONS  (PRN):  acetaminophen   Tablet .. 650 milliGRAM(s) Oral every 6 hours PRN Temp greater or equal to 38C (100.4F), Mild Pain (1 - 3)  meclizine 12.5 milliGRAM(s) Oral every 6 hours PRN Dizziness      ASSESSMENT / PLAN:  ----------------------------------------  80 year old female w/ pmh hypothyroidism, vertigo, ckd3, hx uti, depression s/p ground level fall while at home with reported retching  presenting as transfer for  for assessment and management of small subdural hematoma.  post traumatic LT frontal lobe small subdural hematoma, subacute, POA, stable  gait instability complicated by frequent falls, likely worsened by acute uti  uti w/o hematuria, poa, w/o sepsis, stable  ckd3, stable    Subdural hematoma - Neurosurgery consultation noted, no surgical intervention recommended at the time. Serial CT of the head was without significant change. Neurological examinations were stable.    Urinary tract infection - On nitrofurantoin. Afebrile with resolved leukocytosis.    Depression - On citalopram.    Hypothyroidism - On levothyroxine.    Physical Therapy evaluation noted with recommendations to pursue further treatment at a rehabilitation facility. Prior conversations with the patient were noted and she did not wish to pursue transfer to a rehabilitation facility. This was discussed again today with the patient in detail. The recommendations by Physical Therapy were reviewed. The patient expressed understanding but remained adamant about returning home. She did not wish for this to be discussed with her son. She stated that she would be able to manage in her current condition and that she would personally  pursue outpatient physical therapy or home aides after she discussed with her primary care physician and friends. Her ultimate plan was to then move to [place] to be with her son. The patient did not wish to initiate any services while in the hospital.

## 2020-08-15 NOTE — DISCHARGE NOTE PROVIDER - CARE PROVIDER_API CALL
Damien Palacios  NEUROSURGERY  [facility] [address]  [place] [address]  Phone: [phone]  Fax: [phone]  Follow Up Time:     Raffaele Salcedo  Internal Medicine  [address]  [place] [address]  Phone: [phone]  Fax: [phone]  Follow Up Time:     Cydney Phan  NEUROLOGY  [address]  [place] [address]  Phone: [phone]  Fax: [phone]  Follow Up Time:

## 2020-08-15 NOTE — DISCHARGE NOTE PROVIDER - HOSPITAL COURSE
80F who presented to [facility] with a fall at home and nausea. CT of the head noted chronic atrophic changes, no acute infarct, intraparenchymal bleed, fracture, or destructive lesion, noted chronic ischemic changes and a small subdural collection along the left hemisphere measuring approximately 7mm in diameter. CT of the cervical spine noted chronic degenerative changes without acute fracture. The patient was then transferred to [facility] for further management. Xray of the pelvis noted no acute osseous pathology. Repeat CT of the head noted stable size and appearance of a left hemispheric subdural hematoma measuring up to 9 mm in thickness without significant mass effect or midline shift. The patient was admitted to the Neurosurgery service. The patient was monitored and without significant change in neurological status. Repeat CT of the head noted mixed attenuation subdural collection along the left frontoparietal convexity measuring up to 9 mm in thickness, associated mass effect with mild sulcal effacement, no midline shift. The patient was transferred to the Medical service for further management. Urinalysis was noted to be abnormal and antibiotics were initiated for a urinary tract infection. The patient was evaluated by Physical Therapy and thought to benefit from further treatment at a rehabilitation facility upon discharge from the hospital. The patient did not wish to pursue transfer to a rehabilitation facility. The patient was discharged home with instructions to follow up with her neurologist for further management.             35 minutes total time 80F who presented to [facility] with a fall at home and nausea. CT of the head noted chronic atrophic changes, no acute infarct, intraparenchymal bleed, fracture, or destructive lesion, noted chronic ischemic changes and a small subdural collection along the left hemisphere measuring approximately 7mm in diameter. CT of the cervical spine noted chronic degenerative changes without acute fracture. The patient was then transferred to [facility] for further management. Xray of the pelvis noted no acute osseous pathology. Repeat CT of the head noted stable size and appearance of a left hemispheric subdural hematoma measuring up to 9 mm in thickness without significant mass effect or midline shift. The patient was admitted to the Neurosurgery service. The patient was monitored and without significant change in neurological status. Repeat CT of the head noted mixed attenuation subdural collection along the left frontoparietal convexity measuring up to 9 mm in thickness, associated mass effect with mild sulcal effacement, no midline shift. The patient was transferred to the Medical service for further management. Urinalysis was noted to be abnormal and antibiotics were initiated for a urinary tract infection. The patient was evaluated by Physical Therapy and thought to benefit from further treatment at a rehabilitation facility upon discharge from the hospital. The patient did not wish to pursue transfer to a rehabilitation facility but later changed her mind and was agreeable to transfer to a subacute rehabilitation facility. The patient was discharged with instructions to follow up with her primary care physician, neurologist, and Neurosurgery for further management.             38 minutes total time 80F who presented to [facility] with a fall at home and nausea. CT of the head noted chronic atrophic changes, no acute infarct, intraparenchymal bleed, fracture, or destructive lesion, noted chronic ischemic changes and a small subdural collection along the left hemisphere measuring approximately 7mm in diameter. CT of the cervical spine noted chronic degenerative changes without acute fracture. The patient was then transferred to [facility] for further management. Xray of the pelvis noted no acute osseous pathology. Repeat CT of the head noted stable size and appearance of a left hemispheric subdural hematoma measuring up to 9 mm in thickness without significant mass effect or midline shift. The patient was admitted to the Neurosurgery service. The patient was monitored and without significant change in neurological status. Repeat CT of the head noted mixed attenuation subdural collection along the left frontoparietal convexity measuring up to 9 mm in thickness, associated mass effect with mild sulcal effacement, no midline shift. The patient was transferred to the Medical service for further management. Urinalysis was noted to be abnormal and antibiotics were initiated for a urinary tract infection. The patient was evaluated by Physical Therapy and thought to benefit from further treatment at a rehabilitation facility upon discharge from the hospital. The patient did not wish to pursue transfer to a rehabilitation facility but later changed her mind and was agreeable to transfer to a subacute rehabilitation facility. The patient was discharged with instructions to follow up with her primary care physician, neurologist, and Neurosurgery for further management.                 38 minutes total time

## 2020-08-15 NOTE — DISCHARGE NOTE PROVIDER - CARE PROVIDERS DIRECT ADDRESSES
[email or web address],DirectAddress_Unknown,DirectAddress_Unknown
good, to achieve stated therapy goals

## 2020-08-15 NOTE — DISCHARGE NOTE PROVIDER - NSDCFUSCHEDAPPT_GEN_ALL_CORE_FT
LINN WHITE ; 08/20/2020 ; [facility] [address] LINN HWITE ; 08/20/2020 ; [facility] [address]

## 2020-08-15 NOTE — DISCHARGE NOTE PROVIDER - NSDCMRMEDTOKEN_GEN_ALL_CORE_FT
CeleXA 10 mg oral tablet: 1 tab(s) orally once a day  LaMICtal 25 mg oral tablet: 1 tab(s) orally 2 times a day  nitrofurantoin macrocrystals-monohydrate 100 mg oral capsule: 1 cap(s) orally 2 times a day (with meals)  Synthroid 25 mcg (0.025 mg) oral tablet: 1 tab(s) orally once a day CeleXA 10 mg oral tablet: 1 tab(s) orally once a day  Cipro 250 mg oral tablet: 1 tab(s) orally every 12 hours through 8/19/20  LaMICtal 25 mg oral tablet: 1 tab(s) orally 2 times a day  Synthroid 25 mcg (0.025 mg) oral tablet: 1 tab(s) orally once a day amLODIPine 5 mg oral tablet: 1 tab(s) orally once a day  CeleXA 10 mg oral tablet: 1 tab(s) orally once a day  Cipro 250 mg oral tablet: 1 tab(s) orally every 12 hours through 8/19/20  LaMICtal 25 mg oral tablet: 1 tab(s) orally 2 times a day  Synthroid 25 mcg (0.025 mg) oral tablet: 1 tab(s) orally once a day amLODIPine 5 mg oral tablet: 1 tab(s) orally once a day  CeleXA 10 mg oral tablet: 1 tab(s) orally once a day  Cipro 250 mg oral tablet: 1 tab(s) orally every 12 hours through 8/19/20  LaMICtal 25 mg oral tablet: 1 tab(s) orally 2 times a day  meclizine 12.5 mg oral tablet: 1 tab(s) orally every 6 hours, As needed, Dizziness  Synthroid 25 mcg (0.025 mg) oral tablet: 1 tab(s) orally once a day

## 2020-08-16 LAB
-  AMIKACIN: SIGNIFICANT CHANGE UP
-  AZTREONAM: SIGNIFICANT CHANGE UP
-  CEFEPIME: SIGNIFICANT CHANGE UP
-  CEFTAZIDIME: SIGNIFICANT CHANGE UP
-  CIPROFLOXACIN: SIGNIFICANT CHANGE UP
-  GENTAMICIN: SIGNIFICANT CHANGE UP
-  LEVOFLOXACIN: SIGNIFICANT CHANGE UP
-  MEROPENEM: SIGNIFICANT CHANGE UP
-  PIPERACILLIN/TAZOBACTAM: SIGNIFICANT CHANGE UP
-  TOBRAMYCIN: SIGNIFICANT CHANGE UP
CULTURE RESULTS: SIGNIFICANT CHANGE UP
METHOD TYPE: SIGNIFICANT CHANGE UP
ORGANISM # SPEC MICROSCOPIC CNT: SIGNIFICANT CHANGE UP
ORGANISM # SPEC MICROSCOPIC CNT: SIGNIFICANT CHANGE UP
SPECIMEN SOURCE: SIGNIFICANT CHANGE UP

## 2020-08-16 PROCEDURE — 99232 SBSQ HOSP IP/OBS MODERATE 35: CPT

## 2020-08-16 RX ORDER — LAMOTRIGINE 25 MG/1
25 TABLET, ORALLY DISINTEGRATING ORAL
Refills: 0 | Status: DISCONTINUED | OUTPATIENT
Start: 2020-08-16 | End: 2020-08-18

## 2020-08-16 RX ORDER — LAMOTRIGINE 25 MG/1
25 TABLET, ORALLY DISINTEGRATING ORAL ONCE
Refills: 0 | Status: COMPLETED | OUTPATIENT
Start: 2020-08-16 | End: 2020-08-16

## 2020-08-16 RX ADMIN — Medication 250 MILLIGRAM(S): at 17:43

## 2020-08-16 RX ADMIN — Medication 250 MILLIGRAM(S): at 05:15

## 2020-08-16 RX ADMIN — Medication 100 MILLIGRAM(S): at 09:20

## 2020-08-16 RX ADMIN — Medication 25 MICROGRAM(S): at 05:15

## 2020-08-16 RX ADMIN — LAMOTRIGINE 25 MILLIGRAM(S): 25 TABLET, ORALLY DISINTEGRATING ORAL at 17:43

## 2020-08-16 RX ADMIN — CITALOPRAM 10 MILLIGRAM(S): 10 TABLET, FILM COATED ORAL at 22:02

## 2020-08-16 RX ADMIN — Medication 100 MILLIGRAM(S): at 17:43

## 2020-08-16 NOTE — PROGRESS NOTE ADULT - SUBJECTIVE AND OBJECTIVE BOX
LINN WHITE  ----------------------------------------  The patient was seen and evaluated for subdural hematoma. Reported dizziness when trying to ambulate yesterday.    Vital Signs Last 24 Hrs  T(C): 36.7 (16 Aug 2020 08:30), Max: 36.7 (15 Aug 2020 21:51)  T(F): 98 (16 Aug 2020 08:30), Max: 98 (15 Aug 2020 21:51)  HR: 69 (16 Aug 2020 08:30) (64 - 69)  BP: 102/80 (16 Aug 2020 08:30) (102/80 - 125/76)  BP(mean): --  RR: 18 (16 Aug 2020 08:30) (18 - 18)  SpO2: 97% (16 Aug 2020 08:30) (97% - 97%)    PHYSICAL EXAMINATION:  ----------------------------------------  General appearance: No acute distress, Awake, Alert  HEENT: Normocephalic, Atraumatic, Conjunctiva clear, EOMI  Neck: Supple, No JVD, No tenderness  Lungs: Breath sound equal bilaterally, No wheezes, No rales  Cardiovascular: S1S2, Regular rhythm  Abdomen: Soft, Nontender, Nondistended, No guarding/rebound, Positive bowel sounds  Extremities: No clubbing, No cyanosis, No edema, No calf tenderness  Neuro: Strength equal bilaterally, No tremors  Psychiatric: Appropriate mood, Normal affect    MEDICATIONS  (STANDING):  citalopram 10 milliGRAM(s) Oral daily  lamoTRIgine 25 milliGRAM(s) Oral two times a day  levothyroxine 25 MICROGram(s) Oral daily  nitrofurantoin monohydrate/macrocrystals (MACROBID) 100 milliGRAM(s) Oral two times a day with meals  saccharomyces boulardii 250 milliGRAM(s) Oral two times a day    MEDICATIONS  (PRN):  acetaminophen   Tablet .. 650 milliGRAM(s) Oral every 6 hours PRN Temp greater or equal to 38C (100.4F), Mild Pain (1 - 3)  meclizine 12.5 milliGRAM(s) Oral every 6 hours PRN Dizziness      ASSESSMENT / PLAN:  ----------------------------------------  80 year old female w/ pmh hypothyroidism, vertigo, ckd3, hx uti, depression s/p ground level fall while at home with reported retching  presenting as transfer for  for assessment and management of small subdural hematoma.  post traumatic LT frontal lobe small subdural hematoma, subacute, POA, stable  gait instability complicated by frequent falls, likely worsened by acute uti  uti w/o hematuria, poa, w/o sepsis, stable  ckd3, stable    Subdural hematoma - No surgical intervention recommended by Neurosurgery. Serial CT of the head was without significant change. Neurological examinations were stable.    Urinary tract infection - On nitrofurantoin. Afebrile with resolved leukocytosis.    Depression - On citalopram and lamotrigine.    Hypothyroidism - On levothyroxine.    The patient previously did not wish to pursue transfer to a rehabilitation facility but is now accepting of transfer to a subacute rehabilitation facility and list of facilities was provided yesterday. To continue with physical therapy. Meclizine as needed for dizziness.

## 2020-08-17 PROCEDURE — 99232 SBSQ HOSP IP/OBS MODERATE 35: CPT

## 2020-08-17 RX ORDER — AMLODIPINE BESYLATE 2.5 MG/1
1 TABLET ORAL
Qty: 0 | Refills: 0 | DISCHARGE
Start: 2020-08-17

## 2020-08-17 RX ORDER — AMLODIPINE BESYLATE 2.5 MG/1
5 TABLET ORAL DAILY
Refills: 0 | Status: DISCONTINUED | OUTPATIENT
Start: 2020-08-17 | End: 2020-08-18

## 2020-08-17 RX ORDER — HYDRALAZINE HCL 50 MG
10 TABLET ORAL EVERY 6 HOURS
Refills: 0 | Status: DISCONTINUED | OUTPATIENT
Start: 2020-08-17 | End: 2020-08-18

## 2020-08-17 RX ORDER — CIPROFLOXACIN LACTATE 400MG/40ML
250 VIAL (ML) INTRAVENOUS
Refills: 0 | Status: DISCONTINUED | OUTPATIENT
Start: 2020-08-17 | End: 2020-08-18

## 2020-08-17 RX ADMIN — LAMOTRIGINE 25 MILLIGRAM(S): 25 TABLET, ORALLY DISINTEGRATING ORAL at 18:13

## 2020-08-17 RX ADMIN — CITALOPRAM 10 MILLIGRAM(S): 10 TABLET, FILM COATED ORAL at 08:38

## 2020-08-17 RX ADMIN — Medication 250 MILLIGRAM(S): at 18:13

## 2020-08-17 RX ADMIN — Medication 25 MICROGRAM(S): at 05:29

## 2020-08-17 RX ADMIN — LAMOTRIGINE 25 MILLIGRAM(S): 25 TABLET, ORALLY DISINTEGRATING ORAL at 05:29

## 2020-08-17 RX ADMIN — AMLODIPINE BESYLATE 5 MILLIGRAM(S): 2.5 TABLET ORAL at 08:38

## 2020-08-17 RX ADMIN — Medication 250 MILLIGRAM(S): at 05:28

## 2020-08-17 RX ADMIN — Medication 100 MILLIGRAM(S): at 08:38

## 2020-08-17 NOTE — PROGRESS NOTE ADULT - SUBJECTIVE AND OBJECTIVE BOX
LINN WHITE  ----------------------------------------  The patient was seen and evaluated for subdural hematoma. Offers no complaints. Felt anxious earlier this morning but now feels better.    Vital Signs Last 24 Hrs  T(C): 37.1 (17 Aug 2020 08:15), Max: 37.1 (17 Aug 2020 08:15)  T(F): 98.7 (17 Aug 2020 08:15), Max: 98.7 (17 Aug 2020 08:15)  HR: 80 (17 Aug 2020 08:15) (71 - 80)  BP: 170/86 (17 Aug 2020 08:15) (149/82 - 170/86)  BP(mean): --  RR: 18 (17 Aug 2020 08:15) (18 - 18)  SpO2: 97% (17 Aug 2020 08:15) (95% - 97%)    PHYSICAL EXAMINATION:  ----------------------------------------  General appearance: No acute distress, Awake, Alert  HEENT: Normocephalic, Atraumatic, Conjunctiva clear, EOMI  Neck: Supple, No JVD, No tenderness  Lungs: Breath sound equal bilaterally, No wheezes, No rales  Cardiovascular: S1S2, Regular rhythm  Abdomen: Soft, Nontender, Nondistended, No guarding/rebound, Positive bowel sounds  Extremities: No clubbing, No cyanosis, No edema, No calf tenderness  Neuro: Strength equal bilaterally, No tremors  Psychiatric: Appropriate mood, Normal affect    MEDICATIONS  (STANDING):  amLODIPine   Tablet 5 milliGRAM(s) Oral daily  citalopram 10 milliGRAM(s) Oral daily  lamoTRIgine 25 milliGRAM(s) Oral two times a day  levothyroxine 25 MICROGram(s) Oral daily  nitrofurantoin monohydrate/macrocrystals (MACROBID) 100 milliGRAM(s) Oral two times a day with meals  saccharomyces boulardii 250 milliGRAM(s) Oral two times a day    MEDICATIONS  (PRN):  acetaminophen   Tablet .. 650 milliGRAM(s) Oral every 6 hours PRN Temp greater or equal to 38C (100.4F), Mild Pain (1 - 3)  hydrALAZINE Injectable 10 milliGRAM(s) IV Push every 6 hours PRN sbp > 160  meclizine 12.5 milliGRAM(s) Oral every 6 hours PRN Dizziness      ASSESSMENT / PLAN:  ----------------------------------------  80 year old female w/ pmh hypothyroidism, vertigo, ckd3, hx uti, depression s/p ground level fall while at home with reported retching  presenting as transfer for  for assessment and management of small subdural hematoma.  post traumatic LT frontal lobe small subdural hematoma, subacute, POA, stable  gait instability complicated by frequent falls, likely worsened by acute uti  uti w/o hematuria, poa, w/o sepsis, stable  ckd3, stable    Subdural hematoma - No surgical intervention recommended by Neurosurgery. Serial CT of the head was without significant change. Neurological examinations were stable.    Urinary tract infection - On nitrofurantoin. Afebrile with resolved leukocytosis.    Depression - On citalopram and lamotrigine.    Hypothyroidism - On levothyroxine.    The patient previously did not wish to pursue transfer to a rehabilitation facility but is now accepting of transfer to a subacute rehabilitation facility and list of facilities was provided yesterday. To continue with physical therapy. Meclizine as needed for dizziness. LINN WHITE  ----------------------------------------  The patient was seen and evaluated for subdural hematoma. Offers no complaints. Felt anxious earlier this morning but now feels better.    Vital Signs Last 24 Hrs  T(C): 37.1 (17 Aug 2020 08:15), Max: 37.1 (17 Aug 2020 08:15)  T(F): 98.7 (17 Aug 2020 08:15), Max: 98.7 (17 Aug 2020 08:15)  HR: 80 (17 Aug 2020 08:15) (71 - 80)  BP: 170/86 (17 Aug 2020 08:15) (149/82 - 170/86)  BP(mean): --  RR: 18 (17 Aug 2020 08:15) (18 - 18)  SpO2: 97% (17 Aug 2020 08:15) (95% - 97%)    PHYSICAL EXAMINATION:  ----------------------------------------  General appearance: No acute distress, Awake, Alert  HEENT: Normocephalic, Atraumatic, Conjunctiva clear, EOMI  Neck: Supple, No JVD, No tenderness  Lungs: Breath sound equal bilaterally, No wheezes, No rales  Cardiovascular: S1S2, Regular rhythm  Abdomen: Soft, Nontender, Nondistended, No guarding/rebound, Positive bowel sounds  Extremities: No clubbing, No cyanosis, No edema, No calf tenderness  Neuro: Strength equal bilaterally, No tremors  Psychiatric: Appropriate mood, Normal affect    MEDICATIONS  (STANDING):  amLODIPine   Tablet 5 milliGRAM(s) Oral daily  citalopram 10 milliGRAM(s) Oral daily  lamoTRIgine 25 milliGRAM(s) Oral two times a day  levothyroxine 25 MICROGram(s) Oral daily  nitrofurantoin monohydrate/macrocrystals (MACROBID) 100 milliGRAM(s) Oral two times a day with meals  saccharomyces boulardii 250 milliGRAM(s) Oral two times a day    MEDICATIONS  (PRN):  acetaminophen   Tablet .. 650 milliGRAM(s) Oral every 6 hours PRN Temp greater or equal to 38C (100.4F), Mild Pain (1 - 3)  hydrALAZINE Injectable 10 milliGRAM(s) IV Push every 6 hours PRN sbp > 160  meclizine 12.5 milliGRAM(s) Oral every 6 hours PRN Dizziness      ASSESSMENT / PLAN:  ----------------------------------------  80 year old female w/ pmh hypothyroidism, vertigo, ckd3, hx uti, depression s/p ground level fall while at home with reported retching  presenting as transfer for  for assessment and management of small subdural hematoma.  post traumatic LT frontal lobe small subdural hematoma, subacute, POA, stable  gait instability complicated by frequent falls, likely worsened by acute uti  uti w/o hematuria, poa, w/o sepsis, stable  ckd3, stable    Subdural hematoma - No surgical intervention recommended by Neurosurgery. Serial CT of the head was without significant change. Neurological examinations were stable.    Urinary tract infection - Leukocytosis resolved. Afebrile today. Urine culture grew Pseudomonas, antibiotics adjusted.    Depression - On citalopram and lamotrigine.    Hypothyroidism - On levothyroxine.

## 2020-08-18 ENCOUNTER — TRANSCRIPTION ENCOUNTER (OUTPATIENT)
Age: 80
End: 2020-08-18

## 2020-08-18 VITALS
RESPIRATION RATE: 18 BRPM | TEMPERATURE: 98 F | OXYGEN SATURATION: 95 % | HEART RATE: 60 BPM | DIASTOLIC BLOOD PRESSURE: 66 MMHG | SYSTOLIC BLOOD PRESSURE: 117 MMHG

## 2020-08-18 LAB — SARS-COV-2 RNA SPEC QL NAA+PROBE: SIGNIFICANT CHANGE UP

## 2020-08-18 PROCEDURE — 84443 ASSAY THYROID STIM HORMONE: CPT

## 2020-08-18 PROCEDURE — 72170 X-RAY EXAM OF PELVIS: CPT

## 2020-08-18 PROCEDURE — 87635 SARS-COV-2 COVID-19 AMP PRB: CPT

## 2020-08-18 PROCEDURE — 87086 URINE CULTURE/COLONY COUNT: CPT

## 2020-08-18 PROCEDURE — 70450 CT HEAD/BRAIN W/O DYE: CPT

## 2020-08-18 PROCEDURE — 83690 ASSAY OF LIPASE: CPT

## 2020-08-18 PROCEDURE — 87186 SC STD MICRODIL/AGAR DIL: CPT

## 2020-08-18 PROCEDURE — 80048 BASIC METABOLIC PNL TOTAL CA: CPT

## 2020-08-18 PROCEDURE — 80307 DRUG TEST PRSMV CHEM ANLYZR: CPT

## 2020-08-18 PROCEDURE — 71045 X-RAY EXAM CHEST 1 VIEW: CPT

## 2020-08-18 PROCEDURE — 86769 SARS-COV-2 COVID-19 ANTIBODY: CPT

## 2020-08-18 PROCEDURE — 93005 ELECTROCARDIOGRAM TRACING: CPT

## 2020-08-18 PROCEDURE — 97163 PT EVAL HIGH COMPLEX 45 MIN: CPT

## 2020-08-18 PROCEDURE — 99285 EMERGENCY DEPT VISIT HI MDM: CPT

## 2020-08-18 PROCEDURE — 85610 PROTHROMBIN TIME: CPT

## 2020-08-18 PROCEDURE — 36415 COLL VENOUS BLD VENIPUNCTURE: CPT

## 2020-08-18 PROCEDURE — U0003: CPT

## 2020-08-18 PROCEDURE — 80053 COMPREHEN METABOLIC PANEL: CPT

## 2020-08-18 PROCEDURE — 99239 HOSP IP/OBS DSCHRG MGMT >30: CPT

## 2020-08-18 PROCEDURE — 85730 THROMBOPLASTIN TIME PARTIAL: CPT

## 2020-08-18 PROCEDURE — 85027 COMPLETE CBC AUTOMATED: CPT

## 2020-08-18 PROCEDURE — 83605 ASSAY OF LACTIC ACID: CPT

## 2020-08-18 RX ORDER — MECLIZINE HCL 12.5 MG
1 TABLET ORAL
Qty: 0 | Refills: 0 | DISCHARGE
Start: 2020-08-18

## 2020-08-18 RX ADMIN — Medication 25 MICROGRAM(S): at 05:19

## 2020-08-18 RX ADMIN — AMLODIPINE BESYLATE 5 MILLIGRAM(S): 2.5 TABLET ORAL at 05:19

## 2020-08-18 RX ADMIN — Medication 250 MILLIGRAM(S): at 05:19

## 2020-08-18 RX ADMIN — LAMOTRIGINE 25 MILLIGRAM(S): 25 TABLET, ORALLY DISINTEGRATING ORAL at 05:19

## 2020-08-18 RX ADMIN — CITALOPRAM 10 MILLIGRAM(S): 10 TABLET, FILM COATED ORAL at 12:48

## 2020-08-18 NOTE — PROGRESS NOTE ADULT - REASON FOR ADMISSION
FREQUENT FALLS, FALL AGAIN TODAY , SUBACUTE APPEARING SDH

## 2020-08-18 NOTE — DISCHARGE NOTE NURSING/CASE MANAGEMENT/SOCIAL WORK - PATIENT PORTAL LINK FT
You can access the FollowMyHealth Patient Portal offered by [facility] by registering at the following website: [email or web address]. By joining [employer]’s FollowMyHealth portal, you will also be able to view your health information using other applications (apps) compatible with our system.

## 2020-08-18 NOTE — PROGRESS NOTE ADULT - SUBJECTIVE AND OBJECTIVE BOX
LINN WHITE  ----------------------------------------  The patient was seen and evaluated for subdural hematoma. Offers no complaints.    Vital Signs Last 24 Hrs  T(C): 36.6 (18 Aug 2020 08:16), Max: 37.2 (17 Aug 2020 22:06)  T(F): 97.9 (18 Aug 2020 08:16), Max: 98.9 (17 Aug 2020 22:06)  HR: 60 (18 Aug 2020 08:16) (60 - 78)  BP: 117/66 (18 Aug 2020 08:16) (109/63 - 141/65)  BP(mean): --  RR: 18 (18 Aug 2020 08:16) (18 - 20)  SpO2: 95% (18 Aug 2020 08:16) (95% - 98%)    PHYSICAL EXAMINATION:  ----------------------------------------  General appearance: No acute distress, Awake, Alert  HEENT: Normocephalic, Atraumatic, Conjunctiva clear, EOMI  Neck: Supple, No JVD, No tenderness  Lungs: Breath sound equal bilaterally, No wheezes, No rales  Cardiovascular: S1S2, Regular rhythm  Abdomen: Soft, Nontender, Nondistended, No guarding/rebound, Positive bowel sounds  Extremities: No clubbing, No cyanosis, No edema, No calf tenderness  Neuro: Strength equal bilaterally, No tremors  Psychiatric: Appropriate mood, Normal affect    MEDICATIONS  (STANDING):  amLODIPine   Tablet 5 milliGRAM(s) Oral daily  ciprofloxacin     Tablet 250 milliGRAM(s) Oral two times a day  citalopram 10 milliGRAM(s) Oral daily  lamoTRIgine 25 milliGRAM(s) Oral two times a day  levothyroxine 25 MICROGram(s) Oral daily  saccharomyces boulardii 250 milliGRAM(s) Oral two times a day    MEDICATIONS  (PRN):  acetaminophen   Tablet .. 650 milliGRAM(s) Oral every 6 hours PRN Temp greater or equal to 38C (100.4F), Mild Pain (1 - 3)  hydrALAZINE Injectable 10 milliGRAM(s) IV Push every 6 hours PRN sbp > 160  meclizine 12.5 milliGRAM(s) Oral every 6 hours PRN Dizziness      ASSESSMENT / PLAN:  ----------------------------------------  80 year old female w/ pmh hypothyroidism, vertigo, ckd3, hx uti, depression s/p ground level fall while at home with reported retching  presenting as transfer for  for assessment and management of small subdural hematoma.  post traumatic LT frontal lobe small subdural hematoma, subacute, POA, stable  gait instability complicated by frequent falls, likely worsened by acute uti  uti w/o hematuria, poa, w/o sepsis, stable  ckd3, stable    Subdural hematoma - Neurological status stable. No surgical intervention recommended by Neurosurgery. Serial CT of the head was without significant change.     Urinary tract infection - Leukocytosis resolved. Afebrile today. Urine culture grew Pseudomonas, antibiotics adjusted.    Depression - On citalopram and lamotrigine.    Hypothyroidism - On levothyroxine.

## 2020-08-20 ENCOUNTER — APPOINTMENT (OUTPATIENT)
Dept: UROGYNECOLOGY | Facility: CLINIC | Age: 80
End: 2020-08-20

## 2020-09-21 PROBLEM — R42 DIZZINESS AND GIDDINESS: Chronic | Status: ACTIVE | Noted: 2020-08-12

## 2020-09-21 PROBLEM — E03.9 HYPOTHYROIDISM, UNSPECIFIED: Chronic | Status: ACTIVE | Noted: 2020-08-12

## 2020-09-21 PROBLEM — F32.9 MAJOR DEPRESSIVE DISORDER, SINGLE EPISODE, UNSPECIFIED: Chronic | Status: ACTIVE | Noted: 2020-08-12

## 2020-09-23 ENCOUNTER — APPOINTMENT (OUTPATIENT)
Dept: UROGYNECOLOGY | Facility: CLINIC | Age: 80
End: 2020-09-23
Payer: MEDICARE

## 2020-09-23 VITALS — TEMPERATURE: 206.78 F

## 2020-09-23 LAB
BILIRUB UR QL STRIP: NORMAL
CLARITY UR: CLEAR
COLLECTION METHOD: NORMAL
GLUCOSE UR-MCNC: NORMAL
HCG UR QL: 0.2 EU/DL
HGB UR QL STRIP.AUTO: NORMAL
KETONES UR-MCNC: NORMAL
LEUKOCYTE ESTERASE UR QL STRIP: NORMAL
NITRITE UR QL STRIP: NORMAL
PH UR STRIP: 6.5
PROT UR STRIP-MCNC: NORMAL
SP GR UR STRIP: 1.02

## 2020-09-23 PROCEDURE — 81003 URINALYSIS AUTO W/O SCOPE: CPT | Mod: QW

## 2020-09-23 PROCEDURE — 99213 OFFICE O/P EST LOW 20 MIN: CPT | Mod: 25

## 2020-09-23 NOTE — HISTORY OF PRESENT ILLNESS
[FreeTextEntry1] : Pt doing fine with pessary.  Denies any pain, discomfort, or vaginal bleeding.  Voiding and moving BM without problems.\par PT c/o urinary urgency.  Denies any fever, chills, back pain, or blood in urine.\par Recent hospitalization for s/p fall and had bleeding in the head.  She was admitted to Rehab and now home with a hired help.  She may be moving to [place] to live with son.

## 2020-09-23 NOTE — PROCEDURE
[Straight Catheterization] : insertion of a straight catheter [Other ___] : [unfilled] [Patient] : the patient [___ Fr Straight Tip] : a [unfilled] in [place] straight tip catheter [Clear] : clear [Culture] : culture [Urinalysis] : urinalysis [No Complications] : no complications [Tolerated Well] : the patient tolerated the procedure well [Pessary Inserted] : inserted [Pessary Washed] : washed [H2O] : H2O [de-identified] : 80mL [FreeTextEntry9] : Urethra cleared, catheter passed.  No CVAT. [FreeTextEntry1] : Cube # 4 [FreeTextEntry8] : Reinforced daily pericare.

## 2020-09-23 NOTE — DISCUSSION/SUMMARY
[FreeTextEntry1] : UA CS sent.  PT will wait for results if need to be treated.\par MH, discussed in depth of doing MH w/u for RBCs in blood.  PT aware of the R/B and declined to do MH w/u.\par F/U in 2 month for pelvic prolapse/pessary check.  IF pt have any problems/concern to call office.  PT aware and agrees.

## 2020-09-23 NOTE — PHYSICAL EXAM
[No Acute Distress] : in no acute distress [Well Nourished] : ~L well nourished [Good Hygeine] : demonstrates good hygeine [Oriented x3] : oriented to person, place, and time [Normal Mood/Affect] : mood and affect are normal [Soft, Nontender] : the abdomen was soft and nontender [Warm and Dry] : was warm and dry to touch [Normal Gait] : gait was normal [Vulvar Atrophy] : vulvar atrophy [Atrophy] : atrophy [Cystocele] : a cystocele [Uterine Prolapse] : uterine prolapse [Discharge] : a  ~M vaginal discharge was present [Scant] : scant [Tess] : yellow [No Bleeding] : there was no active vaginal bleeding

## 2020-09-24 LAB
APPEARANCE: CLEAR
BACTERIA: NEGATIVE
BILIRUBIN URINE: NEGATIVE
BLOOD URINE: NEGATIVE
COLOR: NORMAL
GLUCOSE QUALITATIVE U: NEGATIVE
HYALINE CASTS: 0 /LPF
KETONES URINE: NEGATIVE
LEUKOCYTE ESTERASE URINE: NEGATIVE
MICROSCOPIC-UA: NORMAL
NITRITE URINE: NEGATIVE
PH URINE: 6.5
PROTEIN URINE: NEGATIVE
RED BLOOD CELLS URINE: 2 /HPF
SPECIFIC GRAVITY URINE: 1.02
SQUAMOUS EPITHELIAL CELLS: 0 /HPF
UROBILINOGEN URINE: NORMAL
WHITE BLOOD CELLS URINE: 0 /HPF

## 2020-09-25 LAB — BACTERIA UR CULT: NORMAL

## 2020-11-27 ENCOUNTER — APPOINTMENT (OUTPATIENT)
Dept: UROGYNECOLOGY | Facility: CLINIC | Age: 80
End: 2020-11-27

## 2020-12-02 ENCOUNTER — EMERGENCY (EMERGENCY)
Facility: HOSPITAL | Age: 80
LOS: 1 days | Discharge: HOME | End: 2020-12-02
Attending: EMERGENCY MEDICINE | Admitting: EMERGENCY MEDICINE
Payer: MEDICARE

## 2020-12-02 VITALS
OXYGEN SATURATION: 96 % | SYSTOLIC BLOOD PRESSURE: 113 MMHG | DIASTOLIC BLOOD PRESSURE: 66 MMHG | TEMPERATURE: 99 F | RESPIRATION RATE: 18 BRPM | HEART RATE: 54 BPM

## 2020-12-02 VITALS
DIASTOLIC BLOOD PRESSURE: 56 MMHG | SYSTOLIC BLOOD PRESSURE: 118 MMHG | HEIGHT: 65 IN | TEMPERATURE: 101 F | HEART RATE: 65 BPM | RESPIRATION RATE: 20 BRPM | OXYGEN SATURATION: 95 % | WEIGHT: 115.08 LBS

## 2020-12-02 LAB
ALBUMIN SERPL ELPH-MCNC: 3.5 G/DL — SIGNIFICANT CHANGE UP (ref 3.3–5)
ALP SERPL-CCNC: 66 U/L — SIGNIFICANT CHANGE UP (ref 30–120)
ALT FLD-CCNC: 22 U/L DA — SIGNIFICANT CHANGE UP (ref 10–60)
ANION GAP SERPL CALC-SCNC: 7 MMOL/L — SIGNIFICANT CHANGE UP (ref 5–17)
AST SERPL-CCNC: 25 U/L — SIGNIFICANT CHANGE UP (ref 10–40)
BASOPHILS # BLD AUTO: 0 K/UL — SIGNIFICANT CHANGE UP (ref 0–0.2)
BASOPHILS NFR BLD AUTO: 0 % — SIGNIFICANT CHANGE UP (ref 0–2)
BILIRUB SERPL-MCNC: 0.3 MG/DL — SIGNIFICANT CHANGE UP (ref 0.2–1.2)
BUN SERPL-MCNC: 25 MG/DL — HIGH (ref 7–23)
CALCIUM SERPL-MCNC: 8.5 MG/DL — SIGNIFICANT CHANGE UP (ref 8.4–10.5)
CHLORIDE SERPL-SCNC: 97 MMOL/L — SIGNIFICANT CHANGE UP (ref 96–108)
CO2 SERPL-SCNC: 26 MMOL/L — SIGNIFICANT CHANGE UP (ref 22–31)
CREAT SERPL-MCNC: 1.3 MG/DL — SIGNIFICANT CHANGE UP (ref 0.5–1.3)
CRP SERPL-MCNC: 0.37 MG/DL — SIGNIFICANT CHANGE UP (ref 0–0.4)
EOSINOPHIL # BLD AUTO: 0 K/UL — SIGNIFICANT CHANGE UP (ref 0–0.5)
EOSINOPHIL NFR BLD AUTO: 0 % — SIGNIFICANT CHANGE UP (ref 0–6)
GLUCOSE SERPL-MCNC: 93 MG/DL — SIGNIFICANT CHANGE UP (ref 70–99)
HCT VFR BLD CALC: 36.5 % — SIGNIFICANT CHANGE UP (ref 34.5–45)
HGB BLD-MCNC: 12.1 G/DL — SIGNIFICANT CHANGE UP (ref 11.5–15.5)
IMM GRANULOCYTES NFR BLD AUTO: 0.4 % — SIGNIFICANT CHANGE UP (ref 0–1.5)
LACTATE SERPL-SCNC: 1 MMOL/L — SIGNIFICANT CHANGE UP (ref 0.7–2)
LYMPHOCYTES # BLD AUTO: 0.95 K/UL — LOW (ref 1–3.3)
LYMPHOCYTES # BLD AUTO: 19.9 % — SIGNIFICANT CHANGE UP (ref 13–44)
MCHC RBC-ENTMCNC: 30 PG — SIGNIFICANT CHANGE UP (ref 27–34)
MCHC RBC-ENTMCNC: 33.2 GM/DL — SIGNIFICANT CHANGE UP (ref 32–36)
MCV RBC AUTO: 90.6 FL — SIGNIFICANT CHANGE UP (ref 80–100)
MONOCYTES # BLD AUTO: 0.73 K/UL — SIGNIFICANT CHANGE UP (ref 0–0.9)
MONOCYTES NFR BLD AUTO: 15.3 % — HIGH (ref 2–14)
NEUTROPHILS # BLD AUTO: 3.08 K/UL — SIGNIFICANT CHANGE UP (ref 1.8–7.4)
NEUTROPHILS NFR BLD AUTO: 64.4 % — SIGNIFICANT CHANGE UP (ref 43–77)
NRBC # BLD: 0 /100 WBCS — SIGNIFICANT CHANGE UP (ref 0–0)
PLATELET # BLD AUTO: 181 K/UL — SIGNIFICANT CHANGE UP (ref 150–400)
POTASSIUM SERPL-MCNC: 4.3 MMOL/L — SIGNIFICANT CHANGE UP (ref 3.5–5.3)
POTASSIUM SERPL-SCNC: 4.3 MMOL/L — SIGNIFICANT CHANGE UP (ref 3.5–5.3)
PROT SERPL-MCNC: 7.1 G/DL — SIGNIFICANT CHANGE UP (ref 6–8.3)
RBC # BLD: 4.03 M/UL — SIGNIFICANT CHANGE UP (ref 3.8–5.2)
RBC # FLD: 12.7 % — SIGNIFICANT CHANGE UP (ref 10.3–14.5)
SARS-COV-2 RNA SPEC QL NAA+PROBE: DETECTED
SODIUM SERPL-SCNC: 130 MMOL/L — LOW (ref 135–145)
WBC # BLD: 4.78 K/UL — SIGNIFICANT CHANGE UP (ref 3.8–10.5)
WBC # FLD AUTO: 4.78 K/UL — SIGNIFICANT CHANGE UP (ref 3.8–10.5)

## 2020-12-02 PROCEDURE — 80053 COMPREHEN METABOLIC PANEL: CPT

## 2020-12-02 PROCEDURE — 87040 BLOOD CULTURE FOR BACTERIA: CPT

## 2020-12-02 PROCEDURE — 93005 ELECTROCARDIOGRAM TRACING: CPT

## 2020-12-02 PROCEDURE — 82728 ASSAY OF FERRITIN: CPT

## 2020-12-02 PROCEDURE — 83605 ASSAY OF LACTIC ACID: CPT

## 2020-12-02 PROCEDURE — 36415 COLL VENOUS BLD VENIPUNCTURE: CPT

## 2020-12-02 PROCEDURE — 85025 COMPLETE CBC W/AUTO DIFF WBC: CPT

## 2020-12-02 PROCEDURE — U0003: CPT

## 2020-12-02 PROCEDURE — 99284 EMERGENCY DEPT VISIT MOD MDM: CPT | Mod: 25

## 2020-12-02 PROCEDURE — 71045 X-RAY EXAM CHEST 1 VIEW: CPT

## 2020-12-02 PROCEDURE — 99284 EMERGENCY DEPT VISIT MOD MDM: CPT | Mod: CS

## 2020-12-02 PROCEDURE — 93010 ELECTROCARDIOGRAM REPORT: CPT

## 2020-12-02 PROCEDURE — 86140 C-REACTIVE PROTEIN: CPT

## 2020-12-02 PROCEDURE — 71045 X-RAY EXAM CHEST 1 VIEW: CPT | Mod: 26

## 2020-12-02 RX ORDER — ACETAMINOPHEN 500 MG
650 TABLET ORAL ONCE
Refills: 0 | Status: COMPLETED | OUTPATIENT
Start: 2020-12-02 | End: 2020-12-02

## 2020-12-02 RX ORDER — SODIUM CHLORIDE 9 MG/ML
1000 INJECTION INTRAMUSCULAR; INTRAVENOUS; SUBCUTANEOUS ONCE
Refills: 0 | Status: COMPLETED | OUTPATIENT
Start: 2020-12-02 | End: 2020-12-02

## 2020-12-02 RX ADMIN — Medication 650 MILLIGRAM(S): at 13:09

## 2020-12-02 RX ADMIN — Medication 650 MILLIGRAM(S): at 13:40

## 2020-12-02 RX ADMIN — SODIUM CHLORIDE 1000 MILLILITER(S): 9 INJECTION INTRAMUSCULAR; INTRAVENOUS; SUBCUTANEOUS at 13:09

## 2020-12-02 RX ADMIN — SODIUM CHLORIDE 1000 MILLILITER(S): 9 INJECTION INTRAMUSCULAR; INTRAVENOUS; SUBCUTANEOUS at 15:00

## 2020-12-02 NOTE — ED PROVIDER NOTE - NSFOLLOWUPINSTRUCTIONS_ED_ALL_ED_FT
1. FOLLOW UP WITH YOUR PRIMARY DOCTOR IN 24-48 HOURS.   2. FOLLOW UP WITH ALL SPECIALIST DISCUSSED DURING YOUR VISIT.   3. TAKE ALL MEDICATIONS PRESCRIBED IN THE ER IF ANY ARE PRESCRIBED. CONTINUE YOUR HOME MEDICATIONS UNLESS OTHERWISE ADVISED DIFFERENTLY.   4. RETURN FOR WORSENING SYMPTOMS OR CONCERNS INCLUDING BUT NOT LIMITED TO FEVER, CHEST PAIN, OR TROUBLE BREATHING OR ANY OTHER CONCERNS  take tylenol 650mg every 6 hours for fever.  quarantine for 14 days.            PULSE OXIMETRY - AfterCare(R) Instructions(ER/ED)           Pulse Oximetry    WHAT YOU NEED TO KNOW:    Pulse oximetry measures the percentage of oxygen in your blood. It gives the closest measurement without having to draw your blood. A pulse oximeter (pulse ox) can be used continuously or periodically.     DISCHARGE INSTRUCTIONS:    Importance of blood oxygen levels: Every cell in your body needs oxygen to work properly. You may need extra oxygen if your measurement is low. The pulse ox helps your healthcare provider decide if you need extra oxygen. It can also help show how much extra oxygen you need, and when you need to use it. You may only need extra oxygen when you are asleep. You may need more oxygen with activity.     How a pulse ox works:   •The pulse ox may be placed on your finger, toe, or earlobe. Light is passed from the pulse ox through your blood. The pulse ox calculates the percentage of blood that is carrying oxygen. At least 89% of your blood should be carrying oxygen. It also measures your heart rate and gives you a reading of both the percentage and heart rate.      •You will get the best measurements when your hand is warm, relaxed, and at the level of your heart. Make sure all nail polish is removed. Do not smoke, because your percentage will not be correct. The device does not know the difference between carbon monoxide from your smoke and oxygen. Ask your healthcare provider what your percentage should be if you smoke. If you smoke, it is never too late to quit.       Your own pulse ox: Your healthcare provider will prescribe a pulse ox if you need one. Ask him what percentage is good for you. The following are times you may need to monitor your levels:   •You are prescribed oxygen.      •You are exercising or have just finished.      •You are flying or visiting high altitude places.         © Copyright [employer] 2020           back to top                          © Copyright [employer] 2020

## 2020-12-02 NOTE — ED ADULT NURSE NOTE - OBJECTIVE STATEMENT
patient is from home, c/o sob and cough for about 2 weeks, patient got feeling worsen and went to urgent care center, heard covid + but wasn't sure, past 4 days, patient staying in bed and feeling weak, Pt is in no acute distress. IV accessed and tolerating. Labs drawn & sent results pending. Pt is in no acute distress. will continue to monitor.

## 2020-12-02 NOTE — ED ADULT TRIAGE NOTE - CHIEF COMPLAINT QUOTE
" Urgent Care sent me here, tested +Covid, I have had a cough x 1 week, now I feel short of breath benton on exertion "

## 2020-12-02 NOTE — ED PROVIDER NOTE - PMH
Depression    Depression    Hypothyroidism    Hypothyroidism    UTI (urinary tract infection)    Vertigo    Vertigo

## 2020-12-02 NOTE — ED PROVIDER NOTE - PSH
No significant past surgical history  RIGHT KNEE ARTHROSCOPY  No significant past surgical history

## 2020-12-02 NOTE — ED PROVIDER NOTE - CARE PROVIDER_API CALL
Raffaele Salcedo  Internal Medicine  [address]  [place] [address]  Phone: [phone]  Fax: [phone]  Follow Up Time: 7-10 Days

## 2020-12-02 NOTE — ED PROVIDER NOTE - CLINICAL SUMMARY MEDICAL DECISION MAKING FREE TEXT BOX
pt is a 79yo female with pmhx of hypothyroid, depression, subdural hematoma 8/2020 2/2 fall presents with cough x 1 week. pt reports nonproductive cough with sob. pt went to [facility] who dx pt with covid + on rapid test and advised pt to come to ed for eval. pt did not take anything for symptoms. pt unsure of covid exposure. pt poor historian. will do labs, covid swab, cxr, ekg, ivf, tylenol, re-eval. pt unsure if she can take asa due to subdural.

## 2020-12-02 NOTE — ED PROVIDER NOTE - PROGRESS NOTE DETAILS
pt pulse ox 97% on ambulation pt improved. advised quarantine and pmd follow up. will give pulse ox and covid instructions. All imaging and labs reviewed. all results reviewed with pt including abnormal results. pt given a copy of results. pt advised to follow up with pmd regarding abnormal results. All questions answered and concerns addressed. pt verbalized understanding and agreement with plan and dx. pt advised on next step and when/where to follow up. pt advised on all take home and otc medications. pt advised to follow up with PMD. pt advised to return to ed for worsenng symptoms including fever, cp, sob. will dc.

## 2020-12-02 NOTE — ED PROVIDER NOTE - OBJECTIVE STATEMENT
pt is a 81yo female with pmhx of hypothyroid, depression, subdural hematoma 8/2020 2/2 fall presents with cough x 1 week. pt reports nonproductive cough with sob. pt went to [facility] who dx pt with covid + on rapid test and advised pt to come to ed for eval. pt did not take anything for symptoms. pt unsure of covid exposure. pt poor historian. pt denies fever, cp, n/v/d, abd pain, rash.

## 2020-12-02 NOTE — ED PROVIDER NOTE - PATIENT PORTAL LINK FT
You can access the FollowMyHealth Patient Portal offered by [facility] by registering at the following website: [email or web address]. By joining [employer]’s FollowMyHealth portal, you will also be able to view your health information using other applications (apps) compatible with our system.

## 2020-12-02 NOTE — ED PROVIDER NOTE - ATTENDING CONTRIBUTION TO CARE
79 yo F with cough and sob, for several days, seen at  today and had rapid COVID test positive. Brought to ER by ambulance for further eval. Denies sputum, NV, diarrhea or other symptom. PCP [facility]. PE Temp 100.8, NAD, pulse ox 98% on RA. Lungs clear, heart S1S2 RR, abd soft, nontender. Ext no edema. Imp COVID positive, RO pneumonia, may be able to DC home with strict COVID quarantine, close follow up.    I performed a history and physical exam of the patient and discussed their management with the advanced care provider. I reviewed the advanced care provider's note and agree with the documented findings and plan of care. My medical decision making and objective findings are found above.

## 2020-12-03 LAB — FERRITIN SERPL-MCNC: 149 NG/ML — SIGNIFICANT CHANGE UP (ref 15–150)

## 2020-12-04 ENCOUNTER — INPATIENT (INPATIENT)
Facility: HOSPITAL | Age: 80
LOS: 23 days | Discharge: HOME | DRG: 177 | End: 2020-12-28
Attending: INTERNAL MEDICINE | Admitting: HOSPITALIST
Payer: MEDICARE

## 2020-12-04 ENCOUNTER — TRANSCRIPTION ENCOUNTER (OUTPATIENT)
Age: 80
End: 2020-12-04

## 2020-12-04 ENCOUNTER — EMERGENCY (EMERGENCY)
Facility: HOSPITAL | Age: 80
LOS: 1 days | Discharge: ANOTHER HOSPITAL | End: 2020-12-04
Attending: EMERGENCY MEDICINE | Admitting: EMERGENCY MEDICINE
Payer: MEDICARE

## 2020-12-04 VITALS
OXYGEN SATURATION: 94 % | HEART RATE: 62 BPM | SYSTOLIC BLOOD PRESSURE: 107 MMHG | WEIGHT: 115.08 LBS | RESPIRATION RATE: 20 BRPM | DIASTOLIC BLOOD PRESSURE: 55 MMHG | TEMPERATURE: 101 F | HEIGHT: 65 IN

## 2020-12-04 VITALS
HEART RATE: 59 BPM | RESPIRATION RATE: 30 BRPM | DIASTOLIC BLOOD PRESSURE: 64 MMHG | SYSTOLIC BLOOD PRESSURE: 124 MMHG | OXYGEN SATURATION: 96 % | TEMPERATURE: 98 F

## 2020-12-04 VITALS
TEMPERATURE: 98 F | HEIGHT: 65 IN | SYSTOLIC BLOOD PRESSURE: 102 MMHG | DIASTOLIC BLOOD PRESSURE: 55 MMHG | HEART RATE: 88 BPM | OXYGEN SATURATION: 94 % | RESPIRATION RATE: 18 BRPM

## 2020-12-04 DIAGNOSIS — U07.1 COVID-19: ICD-10-CM

## 2020-12-04 LAB
ALBUMIN SERPL ELPH-MCNC: 2.9 G/DL — LOW (ref 3.3–5)
ALBUMIN SERPL ELPH-MCNC: 3.3 G/DL — SIGNIFICANT CHANGE UP (ref 3.3–5)
ALP SERPL-CCNC: 60 U/L — SIGNIFICANT CHANGE UP (ref 30–120)
ALP SERPL-CCNC: 61 U/L — SIGNIFICANT CHANGE UP (ref 40–120)
ALT FLD-CCNC: 18 U/L — SIGNIFICANT CHANGE UP (ref 12–78)
ALT FLD-CCNC: 23 U/L DA — SIGNIFICANT CHANGE UP (ref 10–60)
ANION GAP SERPL CALC-SCNC: 10 MMOL/L — SIGNIFICANT CHANGE UP (ref 5–17)
AST SERPL-CCNC: 29 U/L — SIGNIFICANT CHANGE UP (ref 15–37)
AST SERPL-CCNC: 36 U/L — SIGNIFICANT CHANGE UP (ref 10–40)
BASOPHILS # BLD AUTO: 0 K/UL — SIGNIFICANT CHANGE UP (ref 0–0.2)
BASOPHILS NFR BLD AUTO: 0 % — SIGNIFICANT CHANGE UP (ref 0–2)
BILIRUB DIRECT SERPL-MCNC: 0.2 MG/DL — SIGNIFICANT CHANGE UP (ref 0.05–0.2)
BILIRUB INDIRECT FLD-MCNC: 0.2 MG/DL — SIGNIFICANT CHANGE UP (ref 0.2–1)
BILIRUB SERPL-MCNC: 0.4 MG/DL — SIGNIFICANT CHANGE UP (ref 0.2–1.2)
BILIRUB SERPL-MCNC: 0.4 MG/DL — SIGNIFICANT CHANGE UP (ref 0.2–1.2)
BUN SERPL-MCNC: 18 MG/DL — SIGNIFICANT CHANGE UP (ref 7–23)
CALCIUM SERPL-MCNC: 8.4 MG/DL — SIGNIFICANT CHANGE UP (ref 8.4–10.5)
CHLORIDE SERPL-SCNC: 94 MMOL/L — LOW (ref 96–108)
CO2 SERPL-SCNC: 26 MMOL/L — SIGNIFICANT CHANGE UP (ref 22–31)
CREAT SERPL-MCNC: 1.2 MG/DL — SIGNIFICANT CHANGE UP (ref 0.5–1.3)
CREAT SERPL-MCNC: 1.27 MG/DL — SIGNIFICANT CHANGE UP (ref 0.5–1.3)
D DIMER BLD IA.RAPID-MCNC: 279 NG/ML DDU — HIGH
EOSINOPHIL # BLD AUTO: 0 K/UL — SIGNIFICANT CHANGE UP (ref 0–0.5)
EOSINOPHIL NFR BLD AUTO: 0 % — SIGNIFICANT CHANGE UP (ref 0–6)
FERRITIN SERPL-MCNC: 255 NG/ML — HIGH (ref 15–150)
GLUCOSE SERPL-MCNC: 98 MG/DL — SIGNIFICANT CHANGE UP (ref 70–99)
HCT VFR BLD CALC: 35.1 % — SIGNIFICANT CHANGE UP (ref 34.5–45)
HGB BLD-MCNC: 11.6 G/DL — SIGNIFICANT CHANGE UP (ref 11.5–15.5)
IMM GRANULOCYTES NFR BLD AUTO: 0.6 % — SIGNIFICANT CHANGE UP (ref 0–1.5)
LYMPHOCYTES # BLD AUTO: 0.98 K/UL — LOW (ref 1–3.3)
LYMPHOCYTES # BLD AUTO: 20.2 % — SIGNIFICANT CHANGE UP (ref 13–44)
MCHC RBC-ENTMCNC: 30.1 PG — SIGNIFICANT CHANGE UP (ref 27–34)
MCHC RBC-ENTMCNC: 33 GM/DL — SIGNIFICANT CHANGE UP (ref 32–36)
MCV RBC AUTO: 90.9 FL — SIGNIFICANT CHANGE UP (ref 80–100)
MONOCYTES # BLD AUTO: 0.64 K/UL — SIGNIFICANT CHANGE UP (ref 0–0.9)
MONOCYTES NFR BLD AUTO: 13.2 % — SIGNIFICANT CHANGE UP (ref 2–14)
NEUTROPHILS # BLD AUTO: 3.2 K/UL — SIGNIFICANT CHANGE UP (ref 1.8–7.4)
NEUTROPHILS NFR BLD AUTO: 66 % — SIGNIFICANT CHANGE UP (ref 43–77)
NRBC # BLD: 0 /100 WBCS — SIGNIFICANT CHANGE UP (ref 0–0)
PLATELET # BLD AUTO: 199 K/UL — SIGNIFICANT CHANGE UP (ref 150–400)
POTASSIUM SERPL-MCNC: 4.4 MMOL/L — SIGNIFICANT CHANGE UP (ref 3.5–5.3)
POTASSIUM SERPL-SCNC: 4.4 MMOL/L — SIGNIFICANT CHANGE UP (ref 3.5–5.3)
PROT SERPL-MCNC: 6.2 G/DL — SIGNIFICANT CHANGE UP (ref 6–8.3)
PROT SERPL-MCNC: 7 G/DL — SIGNIFICANT CHANGE UP (ref 6–8.3)
RBC # BLD: 3.86 M/UL — SIGNIFICANT CHANGE UP (ref 3.8–5.2)
RBC # FLD: 12.8 % — SIGNIFICANT CHANGE UP (ref 10.3–14.5)
SARS-COV-2 RNA SPEC QL NAA+PROBE: DETECTED
SODIUM SERPL-SCNC: 130 MMOL/L — LOW (ref 135–145)
WBC # BLD: 4.85 K/UL — SIGNIFICANT CHANGE UP (ref 3.8–10.5)
WBC # FLD AUTO: 4.85 K/UL — SIGNIFICANT CHANGE UP (ref 3.8–10.5)

## 2020-12-04 PROCEDURE — 86140 C-REACTIVE PROTEIN: CPT

## 2020-12-04 PROCEDURE — 99223 1ST HOSP IP/OBS HIGH 75: CPT | Mod: CS

## 2020-12-04 PROCEDURE — 84145 PROCALCITONIN (PCT): CPT

## 2020-12-04 PROCEDURE — 99285 EMERGENCY DEPT VISIT HI MDM: CPT | Mod: 25

## 2020-12-04 PROCEDURE — 85025 COMPLETE CBC W/AUTO DIFF WBC: CPT

## 2020-12-04 PROCEDURE — 80053 COMPREHEN METABOLIC PANEL: CPT

## 2020-12-04 PROCEDURE — 82728 ASSAY OF FERRITIN: CPT

## 2020-12-04 PROCEDURE — 93005 ELECTROCARDIOGRAM TRACING: CPT

## 2020-12-04 PROCEDURE — 93010 ELECTROCARDIOGRAM REPORT: CPT

## 2020-12-04 PROCEDURE — 99285 EMERGENCY DEPT VISIT HI MDM: CPT | Mod: CS

## 2020-12-04 PROCEDURE — 85379 FIBRIN DEGRADATION QUANT: CPT

## 2020-12-04 PROCEDURE — 70450 CT HEAD/BRAIN W/O DYE: CPT | Mod: 26

## 2020-12-04 PROCEDURE — 99285 EMERGENCY DEPT VISIT HI MDM: CPT | Mod: CS,25

## 2020-12-04 PROCEDURE — 36415 COLL VENOUS BLD VENIPUNCTURE: CPT

## 2020-12-04 PROCEDURE — 71045 X-RAY EXAM CHEST 1 VIEW: CPT | Mod: 26

## 2020-12-04 RX ORDER — AMLODIPINE BESYLATE 2.5 MG/1
5 TABLET ORAL DAILY
Refills: 0 | Status: DISCONTINUED | OUTPATIENT
Start: 2020-12-04 | End: 2020-12-28

## 2020-12-04 RX ORDER — LEVOTHYROXINE SODIUM 125 MCG
25 TABLET ORAL DAILY
Refills: 0 | Status: DISCONTINUED | OUTPATIENT
Start: 2020-12-04 | End: 2020-12-28

## 2020-12-04 RX ORDER — LAMOTRIGINE 25 MG/1
25 TABLET, ORALLY DISINTEGRATING ORAL
Refills: 0 | Status: DISCONTINUED | OUTPATIENT
Start: 2020-12-04 | End: 2020-12-28

## 2020-12-04 RX ORDER — SODIUM CHLORIDE 9 MG/ML
1000 INJECTION INTRAMUSCULAR; INTRAVENOUS; SUBCUTANEOUS
Refills: 0 | Status: DISCONTINUED | OUTPATIENT
Start: 2020-12-04 | End: 2020-12-08

## 2020-12-04 RX ORDER — ACETAMINOPHEN 500 MG
650 TABLET ORAL ONCE
Refills: 0 | Status: COMPLETED | OUTPATIENT
Start: 2020-12-04 | End: 2020-12-04

## 2020-12-04 RX ORDER — ALBUTEROL 90 UG/1
2 AEROSOL, METERED ORAL EVERY 6 HOURS
Refills: 0 | Status: DISCONTINUED | OUTPATIENT
Start: 2020-12-04 | End: 2020-12-28

## 2020-12-04 RX ORDER — REMDESIVIR 5 MG/ML
200 INJECTION INTRAVENOUS EVERY 24 HOURS
Refills: 0 | Status: COMPLETED | OUTPATIENT
Start: 2020-12-04 | End: 2020-12-04

## 2020-12-04 RX ORDER — REMDESIVIR 5 MG/ML
INJECTION INTRAVENOUS
Refills: 0 | Status: COMPLETED | OUTPATIENT
Start: 2020-12-04 | End: 2020-12-08

## 2020-12-04 RX ORDER — CITALOPRAM 10 MG/1
10 TABLET, FILM COATED ORAL DAILY
Refills: 0 | Status: DISCONTINUED | OUTPATIENT
Start: 2020-12-04 | End: 2020-12-28

## 2020-12-04 RX ORDER — DEXAMETHASONE 0.5 MG/5ML
6 ELIXIR ORAL DAILY
Refills: 0 | Status: COMPLETED | OUTPATIENT
Start: 2020-12-04 | End: 2020-12-13

## 2020-12-04 RX ORDER — REMDESIVIR 5 MG/ML
100 INJECTION INTRAVENOUS EVERY 24 HOURS
Refills: 0 | Status: DISCONTINUED | OUTPATIENT
Start: 2020-12-04 | End: 2020-12-04

## 2020-12-04 RX ORDER — REMDESIVIR 5 MG/ML
100 INJECTION INTRAVENOUS EVERY 24 HOURS
Refills: 0 | Status: COMPLETED | OUTPATIENT
Start: 2020-12-05 | End: 2020-12-08

## 2020-12-04 RX ADMIN — Medication 650 MILLIGRAM(S): at 15:25

## 2020-12-04 RX ADMIN — Medication 6 MILLIGRAM(S): at 18:30

## 2020-12-04 RX ADMIN — Medication 650 MILLIGRAM(S): at 16:50

## 2020-12-04 RX ADMIN — SODIUM CHLORIDE 150 MILLILITER(S): 9 INJECTION INTRAMUSCULAR; INTRAVENOUS; SUBCUTANEOUS at 16:46

## 2020-12-04 RX ADMIN — REMDESIVIR 200 MILLIGRAM(S): 5 INJECTION INTRAVENOUS at 19:33

## 2020-12-04 NOTE — H&P ADULT - NSHPLABSRESULTS_GEN_ALL_CORE
Labs:                          11.6   4.85  )-----------( 199      ( 04 Dec 2020 15:55 )             35.1     12-04    130<L>  |  94<L>  |  18  ----------------------------<  98  4.4   |  26  |  1.27    Ca    8.4      04 Dec 2020 15:55    TPro  7.0  /  Alb  3.3  /  TBili  0.4  /  DBili  x   /  AST  36  /  ALT  23  /  AlkPhos  60  12-04    LIVER FUNCTIONS - ( 04 Dec 2020 15:55 )  Alb: 3.3 g/dL / Pro: 7.0 g/dL / ALK PHOS: 60 U/L / ALT: 23 U/L DA / AST: 36 U/L / GGT: x                 Active Medications  MEDICATIONS  (STANDING):  dexAMETHasone  Injectable 6 milliGRAM(s) IV Push daily  remdesivir  IVPB 100 milliGRAM(s) IV Intermittent every 24 hours    MEDICATIONS  (PRN):  ALBUTerol    90 MICROgram(s) HFA Inhaler 2 Puff(s) Inhalation every 6 hours PRN Bronchospasm

## 2020-12-04 NOTE — ED ADULT NURSE NOTE - CHIEF COMPLAINT QUOTE
pt sent from [facility] with COVID 19 for admission for hypoxia, pt in no resp distress at this time,

## 2020-12-04 NOTE — ED ADULT NURSE NOTE - OBJECTIVE STATEMENT
Received pt in bed alert and oriented x4.  Pt BIBA from [place].  Pt was complaining of cough.  C/O diff breathing.  pt sent from [facility] with COVID 19 for admission for hypoxia, pt in no resp distress at this time.  [facility]  Ongoing nursing care and safety maintained. Received pt in bed alert and oriented x4.  Pt BIBA from [place].  Pt was complaining of cough and SOB. Also c/o diff breathing.  pt sent from [facility] with COVID 19 for admission for hypoxia, pt in no resp distress at this time.  Pt on monitor.  Ongoing nursing care and safety maintained. Received pt in bed alert and oriented x4.  Pt BIBA from [place].  Pt went to  Tuesday and was dx with covid. Pt was complaining of cough and SOB. Also c/o diff breathing.  pt sent from [facility] with COVID 19 for admission for hypoxia, pt in no resp distress at this time. Pt on 2 L o2 sat 100%. Pt denies chest pain.  Pt on monitor.  Ongoing nursing care and safety maintained.

## 2020-12-04 NOTE — CONSULT NOTE ADULT - ASSESSMENT
81yo female with pmhx of hypothyroid, depression, subdural hematoma 8/2020 2/2 fall adm with COVID+ suggested day of onset 11/25  COVID-19-high risk for decompensation EARLY INFLAMMATORY PHASE (7-14 days post symptom onset),    REMDESIVIR-5 day course consider within 10 days of symptom onset and prior to need for high flow oxygen or mechanical ventilation, Criteria for use include:• SpO2 < 94% on room air, or requiring supplemental oxygen • eGFR > 30 mL/min • ALT and AST < 5X ULN -weak evidence supporting minimal benefit  STEROIDs recommended AFTER 1st week of symptom onset for any patients that are hypoxic  and  with dexamethasone 6mg  Q-day x 10 days (equivalent to solumedrol 32mg IV or Prednisone 40mg)-higher doses to be considered in more severe disease,   ANTICOAGULATION- prophylactic dosing universally recommended LMWH 0.5mg/kg SQ BID (adj for low GFR) and then full dose to be considered in patients with increased risk for thromboembolic complications if bleeding risks are considered acceptable - track efficacy and periodic D-dimers from day of increased oxygen requirement and for high risk patients consider discharge on oral anticoagulation with rivaroxaban (Xarelto) 10mg PO QD or Eliquis (apixaban) 2.5-5mg PO BID  x 6 weeks, ASA in some contexts.   TOCILIZUMAB role still under investigation (limited benefit without pretreatment with steroids) but may be considered for patients progressing after start of steroids (criteria per Lucy: Ferritin>700, D-dimer >1,000, CRP increase by >30%) w some evidence to suggest reduced progression to mechanical ventilation and shortening of hospital stay, caution with AST/ALT >1.5 ULN, would avoid without steroids    -daily, BMP, CBC w diff to follow NLR and in some contexts daily or periodic D-dimer levels, -other labs to risk stratify or with any decompensation  Procalcitonin,  Ferritin,  CRP, ESR, PT/PTT but limit excessive testing -antibiotics only if there is concern for a bacterial process (noted to be an issue in only a minority on presentation,  (consider proning and tolerating lower oxygen saturation to avoid intubation).      12/4 NLR ~3, sats<94% on RA, rec REMDESIVIR, STEROIDs, caution with anticoagulation with prior subdural

## 2020-12-04 NOTE — ED PROVIDER NOTE - CLINICAL SUMMARY MEDICAL DECISION MAKING FREE TEXT BOX
Positive COVID with SOB and nonproductive cough for 9 days. Pt is a candidate for Bamlanivimab treatment.

## 2020-12-04 NOTE — ED PROVIDER NOTE - NS_ ATTENDINGSCRIBEDETAILS _ED_A_ED_FT
Rip Colby MD - The scribe's documentation has been prepared under my direction and personally reviewed by me in its entirety. I confirm that the note above accurately reflects all work, treatment, procedures, and medical decision making performed by me.

## 2020-12-04 NOTE — ED PROVIDER NOTE - OBJECTIVE STATEMENT
79 yo F p/w known COVID - pt was recently diag sp sx x past ~ 1 week. no abd pain. no n/v/d. no recent travel / immob. now with worsening dyspnea x past ~ 2 days. no abd pain. no n/v/d. NO recent travel. no agg/allev factors. No other inj or co

## 2020-12-04 NOTE — ED PROVIDER NOTE - OBJECTIVE STATEMENT
80 year old female with a PMHx of Depression, Hypothyroidism presents to the ED c/o cough, SOB. Pt was here two days ago for SOB and nonproductive cough for week with fevers, was seen here and diagnosed with covid. Pt came back because she is still SOB and cough with covid. No other complaints. 80 year old female with a PMHx of Depression, Hypothyroidism presents to the ED c/o cough, SOB. Pt was here two days ago for SOB and nonproductive cough for week with fevers, was seen here and diagnosed with covid. Pt came back because she is still SOB and cough with covid. SOB has been getting worse in the past two days. Pt was called today and told that she has COVID. No other complaints.

## 2020-12-04 NOTE — ED ADULT NURSE NOTE - OBJECTIVE STATEMENT
79 yo female presents to the ED with complaints of increasing  SOB and non productive cough with fever, reports being tested positive for covid two days, denies any chest congestion ,chills ,  headache, hemoptysis and difficulty breathing ,does complain of loss of appetite, malaise, Pt O2 sat is 94% on room air, temp 100.5. Will administer Tylenol as per MD orders and continue to monitor.

## 2020-12-04 NOTE — H&P ADULT - NSHPREVIEWOFSYSTEMS_GEN_ALL_CORE
Review of Systems:  General:denies fever chills, headache, weakness  HEENT: denies blurry vision,diffculty swallowing, difficulty hearing, tinnitus  Cardiovascular: denies chest pain  ,palpitations  Pulmonary: +++shortness of breath, +++cough, wheezing, hemoptysis  Gastrointestinal: denies abdominal pain, constipation, diarrhea,nausea , vomiting, hematochezia  : denies hematuria, dysuria, or incontinence  Neurological: denies weakness, numbness , tingling, dizziness, tremors  MSK: denies muscle pain, difficulty ambulating, swelling, back pain  skin: denies skin rash, itching, burning, or  skin lesions  Psychiatrical: denies mood disturbances, anxierty, feeling depressed, depression , or difficulty sleeping

## 2020-12-04 NOTE — ED CLERICAL - CLERICAL COMMENTS
called for transport to [facility] at 1632.  will  within the hour called for transport to [facility] at [phone].  will  within the hour. called back transport to give update @ [phone]

## 2020-12-04 NOTE — ED ADULT TRIAGE NOTE - CHIEF COMPLAINT QUOTE
patient states she called for her COVID results and she was told positive. She informed the people she did not feel good  cough and fever

## 2020-12-04 NOTE — ED ADULT TRIAGE NOTE - CHIEF COMPLAINT QUOTE
pt sent from [facility] with COVID 19 for admission for hypoxia, pt in no resp distress at this time,

## 2020-12-04 NOTE — H&P ADULT - NSICDXPASTMEDICALHX_GEN_ALL_CORE_FT
PAST MEDICAL HISTORY:  Depression     Depression     Hypothyroidism     Hypothyroidism     UTI (urinary tract infection)     Vertigo     Vertigo

## 2020-12-04 NOTE — ED ADULT NURSE NOTE - NSIMPLEMENTINTERV_GEN_ALL_ED
Implemented All Universal Safety Interventions:  [place] to call system. Call bell, personal items and telephone within reach. Instruct patient to call for assistance. Room bathroom lighting operational. Non-slip footwear when patient is off stretcher. Physically safe environment: no spills, clutter or unnecessary equipment. Stretcher in lowest position, wheels locked, appropriate side rails in place.

## 2020-12-04 NOTE — H&P ADULT - ASSESSMENT
81yo female with pmhx of hypothyroid, depression, subdural hematoma 8/2020 2/2 fall presents with cough x 1 week. Transferred from [facility] to [facility] for COVID pna 19    Acute hypoxemic respiratory failure secondary to  COVID 19 pna  check CXR,Ct head  dimer 289  start remdesvir and dexamethasone prootocol now   79yo female with pmhx of hypothyroid, depression, subdural hematoma 8/2020 2/2 fall presents with cough x 1 week. Transferred from [facility] to [facility] for COVID pna 19    Acute hypoxemic respiratory failure secondary to  COVID 19 pna  check CXR,Ct head (may need lovenox)  dimer 289  start remdesvir and dexamethasone prootocol now  start MDI, mucinex  trend q48h ferritin/crp/ldh/ dimer  ID /pulm consult    Hx of Subdural Hemtoma  c/w lamictal    Hx of Depression  c/w home dose of celexa  Hx of Hypothyroidism: c/w synthroid    hx of Htn: C/w home dose of norvasc     81yo female with pmhx of hypothyroid, depression, subdural hematoma 8/2020 2/2 fall presents with cough x 1 week. Transferred from [facility] to [facility] for COVID pna 19    Acute hypoxemic respiratory failure secondary to  COVID 19 pna  check CXR,Ct head (may need lovenox)  dimer 289  start remdesvir and dexamethasone prootocol now  start MDI, mucinex  trend q48h ferritin/crp/ldh/ dimer  ID /pulm consult    Hx of Subdural Hemtoma  c/w lamictal    Hx of Depression  c/w home dose of celexa    Hx of Hypothyroidism: c/w synthroid    hx of Htn: C/w home dose of norvasc

## 2020-12-04 NOTE — ED ADULT NURSE NOTE - NSIMPLEMENTINTERV_GEN_ALL_ED
Implemented All Fall with Harm Risk Interventions:  [place] to call system. Call bell, personal items and telephone within reach. Instruct patient to call for assistance. Room bathroom lighting operational. Non-slip footwear when patient is off stretcher. Physically safe environment: no spills, clutter or unnecessary equipment. Stretcher in lowest position, wheels locked, appropriate side rails in place. Provide visual cue, wrist band, yellow gown, etc. Monitor gait and stability. Monitor for mental status changes and reorient to person, place, and time. Review medications for side effects contributing to fall risk. Reinforce activity limits and safety measures with patient and family. Provide visual clues: red socks.

## 2020-12-04 NOTE — H&P ADULT - NSHPPHYSICALEXAM_GEN_ALL_CORE
Objective:    Vitals:  T(C): 36.9 (12-04-20 @ 18:06), Max: 38.1 (12-04-20 @ 14:02)  HR: 88 (12-04-20 @ 18:06) (59 - 88)  BP: 102/55 (12-04-20 @ 18:06) (102/55 - 127/60)  RR: 18 (12-04-20 @ 18:06) (18 - 30)  SpO2: 94% (12-04-20 @ 18:06) (89% - 99%)    Physical Exam:  General: comfortable, no acute distress, well nourished  HEENT: Atraumatic, no LAD, trachea midline, PERRLA  Cardiovascular: normal s1s2, no murmurs, gallops or fricition rubs  Pulmonary: clear to ausculation Bilaterally, no wheezing , rhonchi  Gastrointestinal: soft non tender non distended, no masses felt, no organomegally  Muscloskeletal: no lower extremity edema, intact bilateral lower extremity pulses  Neurological: CN II-12 intact. No focal weakness  Psychiatrical: normal mood, cooperative  SKIN: no rash, lesions or ulcers Objective:    Vitals:  T(C): 36.9 (12-04-20 @ 18:06), Max: 38.1 (12-04-20 @ 14:02)  HR: 88 (12-04-20 @ 18:06) (59 - 88)  BP: 102/55 (12-04-20 @ 18:06) (102/55 - 127/60)  RR: 18 (12-04-20 @ 18:06) (18 - 30)  SpO2: 94% (12-04-20 @ 18:06) (89% - 99%)    Physical Exam:  General: comfortable, no acute distress, well nourished  HEENT: Atraumatic, no LAD, trachea midline, PERRLA  Cardiovascular: normal s1s2, no murmurs, gallops or fricition rubs  Pulmonary: decreased, no wheezing , rhonchi  Gastrointestinal: soft non tender non distended, no masses felt, no organomegally  Muscloskeletal: no lower extremity edema, intact bilateral lower extremity pulses  Neurological: CN II-12 intact. No focal weakness  Psychiatrical: normal mood, cooperative  SKIN: no rash, lesions or ulcers

## 2020-12-04 NOTE — ED PROVIDER NOTE - PROGRESS NOTE DETAILS
case dw KIP Dennis.  Increased sob x 2 days, O2 sat 89%-91% at RA.  Left message on [employer] voicemail.  pmd Raffaele Salcedo

## 2020-12-04 NOTE — CONSULT NOTE ADULT - SUBJECTIVE AND OBJECTIVE BOX
[facility] DIVISION of  INFECTIOUS DISEASE  Salvatore Dennis MD PhD, Suzanne Hughes MD, Elvira Valdez MD, Fidel Rollins MD  and providing coverage with Mary Ann Lucas MD and Loren Fritz MD  Providing Infectious Disease Consultations at [facility], [facility]    Office# [phone] to schedule follow up appointments  Answering Service for urgent calls or New Consults [phone]  Cell# to text for urgent issues Salvatore Dennis [phone]     HPI:  81yo female with pmhx of hypothyroid, depression, subdural hematoma 8/2020 2/2 fall presents with cough x 1 week. pt reports nonproductive cough with sob. pt went to [facility] [place] [place] who dx pt with covid + on rapid test and advised pt to come to ed for eval. pt did not take anything for symptoms.Pt unsure of covid exposure. Patient initially seen in [facility]. hypoxemic to 81 on room air. improved to 96 on 2 liters. Discussed case with Dr Colby and agreed on admission to         PAST MEDICAL & SURGICAL HISTORY:  Vertigo  Depression  Hypothyroidism  Vertigo  Hypothyroidism  Depression  UTI (urinary tract infection)    No significant past surgical history  RIGHT KNEE ARTHROSCOPY    No significant past surgical history        Antimicrobials  remdesivir  IVPB 100 milliGRAM(s) IV Intermittent every 24 hours      Immunological      Other  ALBUTerol    90 MICROgram(s) HFA Inhaler 2 Puff(s) Inhalation every 6 hours PRN  dexAMETHasone  Injectable 6 milliGRAM(s) IV Push daily      Allergies    cephalexin (Hives)  cephalexin (Unknown)    Intolerances    No seafood (Unknown)      SOCIAL HISTORY:  not oxic habits reported      FAMILY HISTORY: noncontrib      ROS:    limited    Vital Signs Last 24 Hrs  T(C): 36.9 (04 Dec 2020 18:06), Max: 38.1 (04 Dec 2020 14:02)  T(F): 98.5 (04 Dec 2020 18:06), Max: 100.5 (04 Dec 2020 14:02)  HR: 88 (04 Dec 2020 18:06) (59 - 88)  BP: 102/55 (04 Dec 2020 18:06) (102/55 - 127/60)  BP(mean): --  RR: 18 (04 Dec 2020 18:06) (18 - 30)  SpO2: 94% (04 Dec 2020 18:06) (89% - 99%)    PE:    HEENT:  NC, atraumatic  Lungs:  No accessory muscle use, breathing comfortably  Cor:  distant  Abd:  Symmetric, appears normal,   Extrem:  No cyanosis      LABS:                        11.6   4.85  )-----------( 199      ( 04 Dec 2020 15:55 )             35.1       WBC Count: 4.85 K/uL (12-04-20 @ 15:55)  WBC Count: 4.78 K/uL (12-02-20 @ 12:50)      12-04    130<L>  |  94<L>  |  18  ----------------------------<  98  4.4   |  26  |  1.27    Ca    8.4      04 Dec 2020 15:55    TPro  7.0  /  Alb  3.3  /  TBili  0.4  /  DBili  x   /  AST  36  /  ALT  23  /  AlkPhos  60  12-04      Creatinine, Serum: 1.27 mg/dL (12-04-20 @ 15:55)  Creatinine, Serum: 1.30 mg/dL (12-02-20 @ 12:50)              COVID RISK SCORE:  Auto Neutrophil #: 3.20 K/uL (12-04-20 @ 15:55)  Auto Lymphocyte #: 0.98 K/uL (12-04-20 @ 15:55)  Auto Lymphocyte #: 0.95 K/uL (12-02-20 @ 12:50)  Auto Neutrophil #: 3.08 K/uL (12-02-20 @ 12:50)    Lactate, Blood: 1.0 mmol/L (12-02-20 @ 12:50)    Auto Eosinophil #: 0.00 K/uL (12-04-20 @ 15:55)  Auto Eosinophil #: 0.00 K/uL (12-02-20 @ 12:50)                  Ferritin, Serum: 149 ng/mL (12-03-20 @ 01:57)          D-Dimer Assay, Quantitative: 279 ng/mL DDU (12-04-20 @ 15:55)        MICROBIOLOGY:    COVID-19 PCR . (12.02.20 @ 14:04)    COVID-19 PCR: Detected:         RADIOLOGY & ADDITIONAL STUDIES:    --< from: Xray Chest 1 View-PORTABLE IMMEDIATE (12.02.20 @ 12:38) >    EXAM:  XR CHEST PORTABLE IMMED 1V                                  PROCEDURE DATE:  12/02/2020          INTERPRETATION:  CLINICAL STATEMENT: Chest pain.    TECHNIQUE: AP view of the chest.    COMPARISON: 8/12/2020    FINDINGS/  IMPRESSION:  No consolidation or pleural effusion. Atelectasis right lung base.    Heart size within normal limits.

## 2020-12-04 NOTE — H&P ADULT - NSICDXPASTSURGICALHX_GEN_ALL_CORE_FT
PAST SURGICAL HISTORY:  No significant past surgical history     No significant past surgical history RIGHT KNEE ARTHROSCOPY

## 2020-12-05 ENCOUNTER — TRANSCRIPTION ENCOUNTER (OUTPATIENT)
Age: 80
End: 2020-12-05

## 2020-12-05 DIAGNOSIS — U07.1 COVID-19: ICD-10-CM

## 2020-12-05 LAB
ALBUMIN SERPL ELPH-MCNC: 3.2 G/DL — LOW (ref 3.3–5)
ALP SERPL-CCNC: 71 U/L — SIGNIFICANT CHANGE UP (ref 40–120)
ALT FLD-CCNC: 21 U/L — SIGNIFICANT CHANGE UP (ref 12–78)
ANION GAP SERPL CALC-SCNC: 8 MMOL/L — SIGNIFICANT CHANGE UP (ref 5–17)
AST SERPL-CCNC: 29 U/L — SIGNIFICANT CHANGE UP (ref 15–37)
BASOPHILS # BLD AUTO: 0 K/UL — SIGNIFICANT CHANGE UP (ref 0–0.2)
BASOPHILS NFR BLD AUTO: 0 % — SIGNIFICANT CHANGE UP (ref 0–2)
BILIRUB SERPL-MCNC: 0.3 MG/DL — SIGNIFICANT CHANGE UP (ref 0.2–1.2)
BUN SERPL-MCNC: 22 MG/DL — SIGNIFICANT CHANGE UP (ref 7–23)
CALCIUM SERPL-MCNC: 8.1 MG/DL — LOW (ref 8.5–10.1)
CHLORIDE SERPL-SCNC: 102 MMOL/L — SIGNIFICANT CHANGE UP (ref 96–108)
CK MB BLD-MCNC: 2.4 % — SIGNIFICANT CHANGE UP (ref 0–3.5)
CK MB CFR SERPL CALC: 4.1 NG/ML — HIGH (ref 0–3.6)
CK SERPL-CCNC: 174 U/L — SIGNIFICANT CHANGE UP (ref 26–192)
CO2 SERPL-SCNC: 25 MMOL/L — SIGNIFICANT CHANGE UP (ref 22–31)
CREAT SERPL-MCNC: 1.1 MG/DL — SIGNIFICANT CHANGE UP (ref 0.5–1.3)
CRP SERPL-MCNC: 1.28 MG/DL — HIGH (ref 0–0.4)
CRP SERPL-MCNC: 2.19 MG/DL — HIGH (ref 0–0.4)
D DIMER BLD IA.RAPID-MCNC: 247 NG/ML DDU — HIGH
EOSINOPHIL # BLD AUTO: 0 K/UL — SIGNIFICANT CHANGE UP (ref 0–0.5)
EOSINOPHIL NFR BLD AUTO: 0 % — SIGNIFICANT CHANGE UP (ref 0–6)
FERRITIN SERPL-MCNC: 241 NG/ML — HIGH (ref 15–150)
GLUCOSE SERPL-MCNC: 133 MG/DL — HIGH (ref 70–99)
HCT VFR BLD CALC: 36.2 % — SIGNIFICANT CHANGE UP (ref 34.5–45)
HGB BLD-MCNC: 12.1 G/DL — SIGNIFICANT CHANGE UP (ref 11.5–15.5)
LDH SERPL L TO P-CCNC: 277 U/L — HIGH (ref 50–242)
LYMPHOCYTES # BLD AUTO: 0.72 K/UL — LOW (ref 1–3.3)
LYMPHOCYTES # BLD AUTO: 27 % — SIGNIFICANT CHANGE UP (ref 13–44)
MAGNESIUM SERPL-MCNC: 2.3 MG/DL — SIGNIFICANT CHANGE UP (ref 1.6–2.6)
MCHC RBC-ENTMCNC: 30.4 PG — SIGNIFICANT CHANGE UP (ref 27–34)
MCHC RBC-ENTMCNC: 33.4 GM/DL — SIGNIFICANT CHANGE UP (ref 32–36)
MCV RBC AUTO: 91 FL — SIGNIFICANT CHANGE UP (ref 80–100)
MONOCYTES # BLD AUTO: 0.34 K/UL — SIGNIFICANT CHANGE UP (ref 0–0.9)
MONOCYTES NFR BLD AUTO: 13 % — SIGNIFICANT CHANGE UP (ref 2–14)
NEUTROPHILS # BLD AUTO: 1.46 K/UL — LOW (ref 1.8–7.4)
NEUTROPHILS NFR BLD AUTO: 48 % — SIGNIFICANT CHANGE UP (ref 43–77)
NRBC # BLD: SIGNIFICANT CHANGE UP /100 WBCS (ref 0–0)
PHOSPHATE SERPL-MCNC: 3.6 MG/DL — SIGNIFICANT CHANGE UP (ref 2.5–4.5)
PLATELET # BLD AUTO: 207 K/UL — SIGNIFICANT CHANGE UP (ref 150–400)
POTASSIUM SERPL-MCNC: 4.4 MMOL/L — SIGNIFICANT CHANGE UP (ref 3.5–5.3)
POTASSIUM SERPL-SCNC: 4.4 MMOL/L — SIGNIFICANT CHANGE UP (ref 3.5–5.3)
PROCALCITONIN SERPL-MCNC: 0.07 NG/ML — SIGNIFICANT CHANGE UP (ref 0.02–0.1)
PROT SERPL-MCNC: 7.1 G/DL — SIGNIFICANT CHANGE UP (ref 6–8.3)
RBC # BLD: 3.98 M/UL — SIGNIFICANT CHANGE UP (ref 3.8–5.2)
RBC # FLD: 12.8 % — SIGNIFICANT CHANGE UP (ref 10.3–14.5)
SARS-COV-2 IGG SERPL QL IA: NEGATIVE — SIGNIFICANT CHANGE UP
SARS-COV-2 IGM SERPL IA-ACNC: 0.56 INDEX — SIGNIFICANT CHANGE UP
SODIUM SERPL-SCNC: 135 MMOL/L — SIGNIFICANT CHANGE UP (ref 135–145)
TROPONIN I SERPL-MCNC: <.015 NG/ML — SIGNIFICANT CHANGE UP (ref 0.01–0.04)
WBC # BLD: 2.65 K/UL — LOW (ref 3.8–10.5)
WBC # FLD AUTO: 2.65 K/UL — LOW (ref 3.8–10.5)

## 2020-12-05 PROCEDURE — 93010 ELECTROCARDIOGRAM REPORT: CPT

## 2020-12-05 PROCEDURE — 99233 SBSQ HOSP IP/OBS HIGH 50: CPT | Mod: CS

## 2020-12-05 RX ORDER — ENOXAPARIN SODIUM 100 MG/ML
40 INJECTION SUBCUTANEOUS DAILY
Refills: 0 | Status: DISCONTINUED | OUTPATIENT
Start: 2020-12-05 | End: 2020-12-28

## 2020-12-05 RX ORDER — ONDANSETRON 8 MG/1
4 TABLET, FILM COATED ORAL EVERY 6 HOURS
Refills: 0 | Status: DISCONTINUED | OUTPATIENT
Start: 2020-12-05 | End: 2020-12-28

## 2020-12-05 RX ORDER — PANTOPRAZOLE SODIUM 20 MG/1
40 TABLET, DELAYED RELEASE ORAL
Refills: 0 | Status: DISCONTINUED | OUTPATIENT
Start: 2020-12-05 | End: 2020-12-28

## 2020-12-05 RX ORDER — PANTOPRAZOLE SODIUM 20 MG/1
40 TABLET, DELAYED RELEASE ORAL ONCE
Refills: 0 | Status: COMPLETED | OUTPATIENT
Start: 2020-12-05 | End: 2020-12-05

## 2020-12-05 RX ORDER — LAMOTRIGINE 25 MG/1
1 TABLET, ORALLY DISINTEGRATING ORAL
Qty: 0 | Refills: 0 | DISCHARGE

## 2020-12-05 RX ORDER — MORPHINE SULFATE 50 MG/1
2 CAPSULE, EXTENDED RELEASE ORAL ONCE
Refills: 0 | Status: DISCONTINUED | OUTPATIENT
Start: 2020-12-05 | End: 2020-12-05

## 2020-12-05 RX ORDER — CIPROFLOXACIN LACTATE 400MG/40ML
1 VIAL (ML) INTRAVENOUS
Qty: 0 | Refills: 0 | DISCHARGE

## 2020-12-05 RX ORDER — CITALOPRAM 10 MG/1
1 TABLET, FILM COATED ORAL
Qty: 0 | Refills: 0 | DISCHARGE

## 2020-12-05 RX ADMIN — Medication 25 MICROGRAM(S): at 06:29

## 2020-12-05 RX ADMIN — PANTOPRAZOLE SODIUM 40 MILLIGRAM(S): 20 TABLET, DELAYED RELEASE ORAL at 14:02

## 2020-12-05 RX ADMIN — REMDESIVIR 500 MILLIGRAM(S): 5 INJECTION INTRAVENOUS at 18:46

## 2020-12-05 RX ADMIN — Medication 600 MILLIGRAM(S): at 06:28

## 2020-12-05 RX ADMIN — Medication 30 MILLILITER(S): at 13:34

## 2020-12-05 RX ADMIN — AMLODIPINE BESYLATE 5 MILLIGRAM(S): 2.5 TABLET ORAL at 06:29

## 2020-12-05 RX ADMIN — MORPHINE SULFATE 2 MILLIGRAM(S): 50 CAPSULE, EXTENDED RELEASE ORAL at 13:59

## 2020-12-05 RX ADMIN — CITALOPRAM 10 MILLIGRAM(S): 10 TABLET, FILM COATED ORAL at 21:15

## 2020-12-05 RX ADMIN — MORPHINE SULFATE 2 MILLIGRAM(S): 50 CAPSULE, EXTENDED RELEASE ORAL at 14:15

## 2020-12-05 RX ADMIN — ENOXAPARIN SODIUM 40 MILLIGRAM(S): 100 INJECTION SUBCUTANEOUS at 11:43

## 2020-12-05 RX ADMIN — Medication 600 MILLIGRAM(S): at 18:03

## 2020-12-05 RX ADMIN — LAMOTRIGINE 25 MILLIGRAM(S): 25 TABLET, ORALLY DISINTEGRATING ORAL at 06:29

## 2020-12-05 RX ADMIN — Medication 6 MILLIGRAM(S): at 06:27

## 2020-12-05 NOTE — DISCHARGE NOTE PROVIDER - CARE PROVIDER_API CALL
Raffaele Salcedo  Internal Medicine  [address]  [place] [address]  Phone: [phone]  Fax: [phone]  Follow Up Time:

## 2020-12-05 NOTE — PROGRESS NOTE ADULT - ASSESSMENT
79yo female with pmhx of hypothyroid, depression, subdural hematoma 8/2020 2/2 fall adm with COVID+ suggested day of onset 11/25 12/4 NLR ~3, sats<94% on RA, rec REMDESIVIR, STEROIDs, caution with anticoagulation with prior subdural  12/5 NLR <3, NC-2L pt appears stable and comfortable

## 2020-12-05 NOTE — DISCHARGE NOTE PROVIDER - NSDCCPCAREPLAN_GEN_ALL_CORE_FT
PRINCIPAL DISCHARGE DIAGNOSIS  Diagnosis: COVID-19  Assessment and Plan of Treatment: You had COVID-19 pneumonia. You were treated with Remdesivir and dexamethasone and have greatly improved. You now have negative PCR swabs and are not considered infectious. You are not requiring oxygen at this time. Follow up with your PMD in a week.   People who recover from COVID still have a higher risk of blood clots. Please take the provided Xarelto once daily with dinner starting tomorrow 12/29 for four more weeks. If you notice leg swelling or shortness of breath, call your doctor or return to the ER.      SECONDARY DISCHARGE DIAGNOSES  Diagnosis: MDD (major depressive disorder)  Assessment and Plan of Treatment: Early in the admission you had endorsed feeling of suicidality. You were evaluated by a psychiatrist. You were continued on home celexa and lamotrigine and improved greatly. The psychiatrist felt you are safe to be discharged from the hospital. Please follow up with your personal psychiatrist within a week. Continue your lamotrigine and Celexa. If you feel you wish to hurt yourself or others, call an ambulance immediately.     PRINCIPAL DISCHARGE DIAGNOSIS  Diagnosis: COVID-19  Assessment and Plan of Treatment: You had COVID-19 pneumonia. You were treated with Remdesivir and dexamethasone and have greatly improved. You now have negative PCR swabs and are not considered infectious. You are not requiring oxygen at this time. Follow up with your PMD in a week.   People who recover from COVID still have a higher risk of blood clots. Please take the provided aspirin 325mg once daily starting tomorrow 12/29/20 for four more weeks. If you notice leg swelling or shortness of breath, call your doctor or return to the ER.      SECONDARY DISCHARGE DIAGNOSES  Diagnosis: MDD (major depressive disorder)  Assessment and Plan of Treatment: Early in the admission you had endorsed feeling of suicidality. You were evaluated by a psychiatrist. You were continued on home celexa and lamotrigine and improved greatly. The psychiatrist felt you are safe to be discharged from the hospital. Please follow up with your personal psychiatrist within a week. Continue your lamotrigine and Celexa. If you feel you wish to hurt yourself or others, call an ambulance immediately.

## 2020-12-05 NOTE — CONSULT NOTE ADULT - SUBJECTIVE AND OBJECTIVE BOX
Date/Time Patient Seen:  		  Referring MD:   Data Reviewed	       Patient is a 80y old  Female who presents with a chief complaint of COVID 19 Pneumonia (04 Dec 2020 18:18)      Subjective/HPI  in bed  seen and examined  vs and meds reviewed  labs reviewed  h and p reviewed  er provider note reviewed  cxr clear     History of Present Illness:  Reason for Admission: COVID 19 Pneumonia  History of Present Illness:   79yo female with pmhx of hypothyroid, depression, subdural hematoma 8/2020 2/2 fall presents to [facility] with C/C of cough, low grade temp.  History obtained by patient. Patient reports her symptoms started less than one week ago. She reports productive cough, sore throat, low grade temps. Patient reported visiting [facility] whom dx pt with covid + on rapid test and advised pt to come to [facility] on December 2 for evaluation.   Patient was  seen in [facility] on December 2 and ultimately discharged home. Since initial discharge, patient shortness of breath worsened, therefore she returned to [facility] ED. Patient reports she did not take medication for her symptoms. Patient lives alone with home health aid ,although unsure of covid exposure.  on ROS: patient denies fever, cp, n/v/d, abd pain, rash.    ER course:  Patient initially seen in [facility]. hypoxemic to 81 on room air. improved to 96 on 2 liters  labs: benign  ddimer 290  PAST MEDICAL & SURGICAL HISTORY:  Vertigo    Depression    Hypothyroidism    Vertigo    Hypothyroidism    Depression    UTI (urinary tract infection)    No significant past surgical history  RIGHT KNEE ARTHROSCOPY    No significant past surgical history          Medication list         MEDICATIONS  (STANDING):  amLODIPine   Tablet 5 milliGRAM(s) Oral daily  citalopram 10 milliGRAM(s) Oral daily  dexAMETHasone  Injectable 6 milliGRAM(s) IV Push daily  guaiFENesin  milliGRAM(s) Oral every 12 hours  lamoTRIgine 25 milliGRAM(s) Oral two times a day  levothyroxine 25 MICROGram(s) Oral daily  remdesivir  IVPB   IV Intermittent   remdesivir  IVPB 100 milliGRAM(s) IV Intermittent every 24 hours    MEDICATIONS  (PRN):  ALBUTerol    90 MICROgram(s) HFA Inhaler 2 Puff(s) Inhalation every 6 hours PRN Bronchospasm         Vitals log        ICU Vital Signs Last 24 Hrs  T(C): 36.6 (05 Dec 2020 07:36), Max: 38.1 (04 Dec 2020 14:02)  T(F): 97.8 (05 Dec 2020 07:36), Max: 100.5 (04 Dec 2020 14:02)  HR: 50 (05 Dec 2020 07:36) (50 - 88)  BP: 147/78 (05 Dec 2020 07:36) (102/55 - 147/78)  BP(mean): --  ABP: --  ABP(mean): --  RR: 16 (05 Dec 2020 07:36) (16 - 30)  SpO2: 97% (05 Dec 2020 07:36) (89% - 100%)           Input and Output:  I&O's Detail      Lab Data                        12.1   2.65  )-----------( 207      ( 05 Dec 2020 06:43 )             36.2     12-05    135  |  102  |  22  ----------------------------<  133<H>  4.4   |  25  |  1.10    Ca    8.1<L>      05 Dec 2020 06:43  Phos  3.6     12-05  Mg     2.3     12-05    TPro  7.1  /  Alb  3.2<L>  /  TBili  0.3  /  DBili  x   /  AST  29  /  ALT  21  /  AlkPhos  71  12-05      PAST SURGICAL HISTORY:  No significant past surgical history RIGHT KNEE ARTHROSCOPY.     Social History:  Social History (marital status, living situation, occupation, tobacco use, alcohol and drug use, and sexual history): NO SMOKE, ETOH OR DRUG HX    	LIVES AT HOME ALONE     Tobacco Screening:  · Core Measure Site	No  · Has the patient used tobacco in the past 30 days?	No        Review of Systems	  sob  moore  weakness      Objective     Physical Examination    heart s1s2  lung dec BS  abd soft      Pertinent Lab findings & Imaging      Orozco:  NO   Adequate UO     I&O's Detail           Discussed with:     Cultures:	        Radiology      EXAM:  XR CHEST PORTABLE IMMED 1V                            PROCEDURE DATE:  12/04/2020          INTERPRETATION:  Portable chest radiograph    CLINICAL INFORMATION: Positive Covid 19.    TECHNIQUE:  Portable  AP view of the chest was obtained.    COMPARISON: 12/2/2020 available for review.    FINDINGS:  The lungs are clear of airspace consolidations or effusions. No pneumothorax.    The heart and mediastinum are within normal limits.    Visualized osseous structures are intact.        IMPRESSION:   No evidence of active chest disease.

## 2020-12-05 NOTE — DISCHARGE NOTE PROVIDER - NSDCMRMEDTOKEN_GEN_ALL_CORE_FT
CeleXA 10 mg oral tablet: 1 tab(s) orally once a day (at bedtime)  LaMICtal 25 mg oral tablet:   Synthroid 25 mcg (0.025 mg) oral tablet: 1 tab(s) orally once a day   CeleXA 10 mg oral tablet: 1 tab(s) orally once a day (at bedtime)  LaMICtal 25 mg oral tablet:   Synthroid 25 mcg (0.025 mg) oral tablet: 1 tab(s) orally once a day  Xarelto 10 mg oral tablet: 1 tab(s) orally once a day with dinner for 4 weeks starting tomorrow 12/29/20   aspirin 325 mg oral tablet: 1 tab(s) orally once a day for 28 days starting tomorrow 12/29/20  CeleXA 10 mg oral tablet: 1 tab(s) orally once a day (at bedtime)  LaMICtal 25 mg oral tablet:   Synthroid 25 mcg (0.025 mg) oral tablet: 1 tab(s) orally once a day

## 2020-12-05 NOTE — ED ADULT NURSE REASSESSMENT NOTE - COMFORT CARE
ginger ale given/assisted to bedside commode/po fluids offered
given egg sandwich/ ginger ale/po fluids offered
assisted to bathroom

## 2020-12-05 NOTE — CHART NOTE - NSCHARTNOTEFT_GEN_A_CORE
Called by RN for Pt c/o pain under left arm. Patient seen and examined at bedside. States that she believes it is muscle pain from coughing and from being moved around. She states the pain is different from the burning sensation she felt this morning. Admits to some shortness of breath, not worsening at this time.     T(C): 36.8 (12-05-20 @ 15:16), Max: 36.9 (12-04-20 @ 19:35)  HR: 54 (12-05-20 @ 15:16) (50 - 66)  BP: 127/65 (12-05-20 @ 15:16) (102/59 - 182/80)  RR: 17 (12-05-20 @ 15:16) (16 - 18)  SpO2: 93% (12-05-20 @ 15:16) (93% - 100%)  Wt(kg): --    Physical Exam:  Gen: well appearing, NAD  HEENT: NCAT, PEERLA b/l, EOMI b/l, no conjunctival erythema  Cardio: regular rate and rhythm, +s1s2, no murmurs, rubs, or gallops  Pulm: CTA b/l, no wheezes, rales or rhonchi  Neuro: awake and alert  Skin: warm and dry    Assessment/Plan   80y year old Female with a past medical history of subdural hematoma, HTN, hypothyroidism, depression admitted for COVID-19. s/p RRT for chest pain and vomiting.     Left axilla pain:  -likely MSK as it is tender to palpation  -patient not in any distress, does not want any pain medication at this time  -pt refusing repeat EKG, unlikely cardiac in nature  -will continue to monitor, if pain worsening will get CXR and repeat EKG

## 2020-12-05 NOTE — CHART NOTE - NSCHARTNOTEFT_GEN_A_CORE
Rapid Response Note      Patient is a 80y old  Female admitted for COVID-19.  Rapid response team called because patient sudden onset of CP, SOB and vomiting. Patient was seen and examined at the bedside by the rapid response team. Patient started developing new onset chest pain while she was eating. States this has happened before and it went away on its own.     Allergies  cephalexin (Hives)  cephalexin (Unknown)        PAST MEDICAL & SURGICAL HISTORY:  Vertigo  Depression  Hypothyroidism  Vertigo  Hypothyroidism  Depression  UTI (urinary tract infection)  No significant past surgical history  RIGHT KNEE ARTHROSCOPY    No significant past surgical history        Vital Signs   /80 --> 153/74  HR 65  T 98.1  SpO2 95% on 2L NC  Glucose: 137        PHYSICAL EXAM:  Gen: groaning in pain, retching  HEENT: NCAT, PERRL  Cards: RRR, no murmur  Resp: CTA, no wheezing rhonchi rales  GI: soft  Ext: no edema, cyanosis or clubbing  Skin: warm, dry                              12.1   2.65  )-----------( 207      ( 05 Dec 2020 06:43 )             36.2     12-05    135  |  102  |  22  ----------------------------<  133<H>  4.4   |  25  |  1.10    Ca    8.1<L>      05 Dec 2020 06:43  Phos  3.6     12-05  Mg     2.3     12-05    TPro  7.1  /  Alb  3.2<L>  /  TBili  0.3  /  DBili  x   /  AST  29  /  ALT  21  /  AlkPhos  71  12-05         LIVER FUNCTIONS - ( 05 Dec 2020 06:43 )  Alb: 3.2 g/dL / Pro: 7.1 g/dL / ALK PHOS: 71 U/L / ALT: 21 U/L / AST: 29 U/L / GGT: x                    Assessment & Plan:  Rapid Response called for 80y year old Female with a past medical history of subdural hematoma, HTN, hypothyroidism, depression admitted for COVID-19 now with new onset CP, SOB, vomiting.  -STAT EKG unchanged from prior, no signs of ischemia  -F/u STAT cardiac enzymes  -symptoms likely 2/2 GERD, IV Protonix 40mg IVP x1  -morphine 2mg IVP x1  -Patient very anxious, crying in room stating she can't breath, SpO2 95% on 2L NC, consider starting PRN Xanax Rapid Response Note      Patient is a 80y old  Female admitted for COVID-19.  Rapid response team called because patient sudden onset of CP, SOB and vomiting. Patient was seen and examined at the bedside by the rapid response team. Patient started developing new onset chest pain while she was eating. States this has happened before and it went away on its own. Usually associated with spicy food. Prior to arrival primary team ordered ekg and cardiac enzymes.     Allergies  cephalexin (Hives)  cephalexin (Unknown)        PAST MEDICAL & SURGICAL HISTORY:  Vertigo  Depression  Hypothyroidism  Vertigo  Hypothyroidism  Depression  UTI (urinary tract infection)  No significant past surgical history  RIGHT KNEE ARTHROSCOPY    No significant past surgical history        Vital Signs   /80 --> 153/74  HR 65  T 98.1  SpO2 95% on 2L NC  Glucose: 137        PHYSICAL EXAM:  Gen: groaning in pain, retching  HEENT: NCAT, PERRL  Cards: RRR, no murmur  Resp: CTA, no wheezing rhonchi rales  GI: soft  Ext: no edema, cyanosis or clubbing  Skin: warm, dry                              12.1   2.65  )-----------( 207      ( 05 Dec 2020 06:43 )             36.2     12-05    135  |  102  |  22  ----------------------------<  133<H>  4.4   |  25  |  1.10    Ca    8.1<L>      05 Dec 2020 06:43  Phos  3.6     12-05  Mg     2.3     12-05    TPro  7.1  /  Alb  3.2<L>  /  TBili  0.3  /  DBili  x   /  AST  29  /  ALT  21  /  AlkPhos  71  12-05         LIVER FUNCTIONS - ( 05 Dec 2020 06:43 )  Alb: 3.2 g/dL / Pro: 7.1 g/dL / ALK PHOS: 71 U/L / ALT: 21 U/L / AST: 29 U/L / GGT: x                    Assessment & Plan:  Rapid Response called for 80y year old Female with a past medical history of subdural hematoma, HTN, hypothyroidism, depression admitted for COVID-19 now with new onset CP, SOB, vomiting.  -pain improved after morphine   -STAT EKG unchanged from prior, no signs of ischemia  -F/u STAT cardiac enzymes  -symptoms likely 2/2 GERD, IV Protonix 40mg IVP x1  -morphine 2mg IVP x1  -Patient very anxious, crying in room, SpO2 95% on 2L NC, consider starting PRN Xanax  -Dr. Khan made aware and agrees with plan  -will follow, RN to call with any changes. Rapid Response Note      Patient is a 80y old  Female admitted for COVID-19.  Rapid response team called because patient sudden onset of CP, SOB and vomiting. Patient was seen and examined at the bedside by the rapid response team. Patient started developing new onset chest pain while she was eating. States this has happened before and it went away on its own. Usually associated with spicy food. Prior to arrival primary team ordered ekg and cardiac enzymes.     Allergies  cephalexin (Hives)  cephalexin (Unknown)        PAST MEDICAL & SURGICAL HISTORY:  Vertigo  Depression  Hypothyroidism  Vertigo  Hypothyroidism  Depression  UTI (urinary tract infection)  No significant past surgical history  RIGHT KNEE ARTHROSCOPY    No significant past surgical history        Vital Signs   /80 --> 153/74  HR 65  T 98.1  SpO2 95% on 2L NC  Glucose: 137        PHYSICAL EXAM:  Gen: groaning in pain, retching  HEENT: NCAT, PERRL  Cards: RRR, no murmur  Resp: CTA, no wheezing rhonchi rales  GI: soft  Ext: no edema, cyanosis or clubbing  Skin: warm, dry                              12.1   2.65  )-----------( 207      ( 05 Dec 2020 06:43 )             36.2     12-05    135  |  102  |  22  ----------------------------<  133<H>  4.4   |  25  |  1.10    Ca    8.1<L>      05 Dec 2020 06:43  Phos  3.6     12-05  Mg     2.3     12-05    TPro  7.1  /  Alb  3.2<L>  /  TBili  0.3  /  DBili  x   /  AST  29  /  ALT  21  /  AlkPhos  71  12-05         LIVER FUNCTIONS - ( 05 Dec 2020 06:43 )  Alb: 3.2 g/dL / Pro: 7.1 g/dL / ALK PHOS: 71 U/L / ALT: 21 U/L / AST: 29 U/L / GGT: x                    Assessment & Plan:  Rapid Response called for 80y year old Female with a past medical history of subdural hematoma, HTN, hypothyroidism, depression admitted for COVID-19 now with new onset CP, SOB, vomiting.  -pain improved after morphine   -STAT EKG unchanged from prior, no signs of ischemia  -F/u STAT cardiac enzymes  -symptoms likely 2/2 GERD, IV Protonix 40mg IVP x1  -morphine 2mg IVP x1  -Patient very anxious, crying in room, SpO2 95% on 2L NC, consider starting PRN Xanax  -Dr. Khan made aware and agrees with plan  -will follow, RN to call with any changes.    Addendum:  Pt seen for rapid response follow up doing well, CP improved, no N/V/SOB. Cardiac enzymes pending collection, called lab they are sending stat for collection. Rapid Response Note      Patient is a 80y old  Female admitted for COVID-19.  Rapid response team called because patient sudden onset of CP, SOB and vomiting. Patient was seen and examined at the bedside by the rapid response team. Patient started developing new onset chest pain while she was eating. States this has happened before and it went away on its own. Usually associated with spicy food. Prior to arrival primary team ordered ekg and cardiac enzymes.     Allergies  cephalexin (Hives)  cephalexin (Unknown)        PAST MEDICAL & SURGICAL HISTORY:  Vertigo  Depression  Hypothyroidism  Vertigo  Hypothyroidism  Depression  UTI (urinary tract infection)  No significant past surgical history  RIGHT KNEE ARTHROSCOPY    No significant past surgical history        Vital Signs   /80 --> 153/74  HR 65  T 98.1  SpO2 95% on 2L NC  Glucose: 137        PHYSICAL EXAM:  Gen: groaning in pain, retching  HEENT: NCAT, PERRL  Cards: RRR, no murmur  Resp: CTA, no wheezing rhonchi rales  GI: soft  Ext: no edema, cyanosis or clubbing  Skin: warm, dry                              12.1   2.65  )-----------( 207      ( 05 Dec 2020 06:43 )             36.2     12-05    135  |  102  |  22  ----------------------------<  133<H>  4.4   |  25  |  1.10    Ca    8.1<L>      05 Dec 2020 06:43  Phos  3.6     12-05  Mg     2.3     12-05    TPro  7.1  /  Alb  3.2<L>  /  TBili  0.3  /  DBili  x   /  AST  29  /  ALT  21  /  AlkPhos  71  12-05         LIVER FUNCTIONS - ( 05 Dec 2020 06:43 )  Alb: 3.2 g/dL / Pro: 7.1 g/dL / ALK PHOS: 71 U/L / ALT: 21 U/L / AST: 29 U/L / GGT: x                    Assessment & Plan:  Rapid Response called for 80y year old Female with a past medical history of subdural hematoma, HTN, hypothyroidism, depression admitted for COVID-19 now with new onset CP, SOB, vomiting.  -pain improved after morphine   -STAT EKG unchanged from prior, no signs of ischemia  -F/u STAT cardiac enzymes  -symptoms likely 2/2 GERD, IV Protonix 40mg IVP x1  -morphine 2mg IVP x1  -Patient very anxious, crying in room, SpO2 95% on 2L NC, consider starting PRN Xanax  -Dr. Khan made aware and agrees with plan  -will follow, RN to call with any changes.    Addendum:  Pt seen for rapid response follow up doing well, CP improved, no N/V/SOB. Cardiac enzymes collected trops x 1 negative.

## 2020-12-05 NOTE — CONSULT NOTE ADULT - PROBLEM SELECTOR RECOMMENDATION 9
79yo female with pmhx of hypothyroid, depression, subdural hematoma 8/2020 2/2 fall presents with cough x 1 week. Transferred from [place] [facility] to [facility] for COVID pna 19  needs DVT p   on remdesivir and decadron  on o2 support - keep sat > 88 pct - fio2 support titration  oral and skin care  old records reviewed  tylenol PRN for pain - aches - fever -   nutrition  isolation precs  ct head reviewed - cxr reviewed -
as above  Thank you for consulting us and involving us in the management of this most interesting and challenging case. I certainly appreciate the challenges, stress and sacrifice during these difficult and challenging times.  Please call us at [phone] or text me directly on my cell# [phone] with any further questions or changes in clinical status for which ID input would be helpful

## 2020-12-05 NOTE — DISCHARGE NOTE PROVIDER - HOSPITAL COURSE
81yo female with pmhx of hypothyroid, depression, subdural hematoma 8/2020 2/2 fall presents to [facility] with C/C of cough, low grade temp.  History obtained by patient. Patient reports her symptoms started less than one week ago. She reports productive cough, sore throat, low grade temps. [facility] whom dx pt with covid + on rapid test and advised pt to come to [place] [facility] on December 2 for evaluation.     Patient was  seen in [place] on December 2 and ultimatel dced home. Since initial discharge, patient shortness of breath worsened, therefore she returned to [facility] ED. Patient reports she did not take medication for her symptoms. Patient lives alone with home health aid ,although unsure of covid exposure.  on ROS: patient denies fever, cp, n/v/d, abd pain, rash.    ER course:  Patient initially seen in [facility]. hypoxemic to 81 on room air. improved to 96 on 2 liters  labs: benign  ddimer 290  CT head No acute intracranial hemorrhage.    Pt. was then transferred and admitted to [facility].  She was started on Remdesivir and Decadron.  Continued on O2 support via nasal canula.     79yo female with pmhx of hypothyroid, depression, subdural hematoma 8/2020 2/2 fall presents to [facility] with C/C of cough, low grade temp.  History obtained by patient. Patient reports her symptoms started less than one week ago. She reports productive cough, sore throat, low grade temps. Patient reported visiting [facility] whom dx pt with covid + on rapid test and advised pt to come to [place] [facility] on December 2 for evaluation.     Patient was  seen in [place] on December 2 and ultimatel dced home. Since initial discharge, patient shortness of breath worsened, therefore she returned to [place] [facility]. Patient reports she did not take medication for her symptoms. Patient lives alone with home health aid ,although unsure of covid exposure.  on ROS: patient denies fever, cp, n/v/d, abd pain, rash.    ER course:  Patient initially seen in [facility]. hypoxemic to 81 on room air. improved to 96 on 2 liters  labs: benign  ddimer 290  CT head No acute intracranial hemorrhage.    Pt. was then transferred and admitted to [facility].  She was started on Remdesivir and Decadron.  Continued on O2 support via nasal canula.  She clinically improved and was able to be tritrated off O2 support.  She has been able to maintain SpO2 in 90th% on room air.  Blood cultures on 12/2 showed no growth. 81yo female with pmhx of hypothyroid, depression, subdural hematoma 8/2020 2/2 fall presents to [facility] with C/C of cough, low grade temp.  History obtained by patient. Patient reports her symptoms started less than one week ago. She reports productive cough, sore throat, low grade temps. Patient reported visiting [facility] whom dx pt with covid + on rapid test and advised pt to come to [place] [facility] on December 2 for evaluation.     Patient was  seen in [place] on December 2 and ultimatel dced home. Since initial discharge, patient shortness of breath worsened, therefore she returned to [place] ED. Patient reports she did not take medication for her symptoms. Patient lives alone with home health aid ,although unsure of covid exposure.  on ROS: patient denies fever, cp, n/v/d, abd pain, rash.    ER course:  Patient initially seen in [facility]. hypoxemic to 81 on room air. improved to 96 on 2 liters  labs: benign  ddimer 290  CT head No acute intracranial hemorrhage.    Pt. was then transferred and admitted to [facility].  She was started on Remdesivir and Decadron.  Continued on O2 support via nasal canula.  She clinically improved and was able to be tritrated off O2 support.  She has been able to maintain SpO2 in 90th% on room air.  Blood cultures on 12/2 showed no growth.  Pt. had reported having dysuria during the hospitalization and UA/urine cx was ordered. 79yo female with pmhx of hypothyroid, depression, subdural hematoma 8/2020 2/2 fall presents to [facility] with C/C of cough, low grade temp.  History obtained by patient. Patient reports her symptoms started less than one week ago. She reports productive cough, sore throat, low grade temps. Patient [place] [facility] [place] with covid + on rapid test and advised pt to come to [place] [facility] on December 2 for evaluation.     Patient was  seen in [place] on December 2 and ultimatel dced home. Since initial discharge, patient shortness of breath worsened, therefore she returned to [place] [facility]. Patient reports she did not take medication for her symptoms. Patient lives alone with home health aid ,although unsure of covid exposure.  on ROS: patient denies fever, cp, n/v/d, abd pain, rash.    ER course:  Patient initially seen in [facility]. hypoxemic to 81 on room air. improved to 96 on 2 liters  labs: benign  ddimer 290  CT head No acute intracranial hemorrhage.    Pt. was then transferred and admitted to [facility].  She was started on Remdesivir and Decadron.  Continued on O2 support via nasal canula.  She clinically improved and was able to be tritrated off O2 support.  She has been able to maintain SpO2 in 90th% on room air.  Blood cultures on 12/2 showed no growth.  Pt. had reported having dysuria during the hospitalization and UA/urine cx was ordered.  UA not suggestive of infection and urine culture with likely contamination. Patient with multiple episodes of catatonia after finding out she was about to be discharged. Psych called, stated patient concerned to go home as she is worried about her COVID diagnosis. Tried multiple times to reassure patient regarding her COVID diagnosis and how well she was recovering- patient was on room air, saturating well, no SOB, however patient not willing to listen. Patient expressing thoughts to harm self- Psych reconsulted. 81yo female with pmhx of hypothyroid, depression, subdural hematoma 8/2020 2/2 fall presents to [facility] with C/C of cough, low grade temp.  History obtained by patient. Patient reports her symptoms started less than one week ago. She reports productive cough, sore throat, low grade temps. Patient reported visiting [facility] whom dx pt with covid + on rapid test and advised pt to come to [place] ED on December 2 for evaluation.     Patient was  seen in [place] on December 2 and ultimatel dced home. Since initial discharge, patient shortness of breath worsened, therefore she returned to [place] ED. Patient reports she did not take medication for her symptoms. Patient lives alone with home health aid ,although unsure of covid exposure.  on ROS: patient denies fever, cp, n/v/d, abd pain, rash.    ER course:  Patient initially seen in [facility]. hypoxemic to 81 on room air. improved to 96 on 2 liters  labs: benign  ddimer 290  CT head No acute intracranial hemorrhage.    Pt. was then transferred and admitted to [facility].  She completed courses of Remdesivir and Decadron.  Continued on O2 support via nasal canula.  She clinically improved and was able to be tritrated off O2 support.  She has been able to maintain SpO2 in 90% on room air for weeks.  Blood cultures on 12/2 showed no growth.  Pt. had reported having dysuria during the hospitalization and UA/urine cx was ordered.  UA not suggestive of infection and urine culture with likely contamination. Patient with multiple episodes of catatonia after finding out she was about to be discharged. Psych called, stated patient concerned to go home as she is worried about her COVID diagnosis. Tried multiple times to reassure patient regarding her COVID diagnosis and how well she was recovering- patient was on room air, saturating well, no SOB, however patient not willing to listen. Patient expressing thoughts to harm self- Psych reconsulted and had recommended inpatient psych. Patient remained in the hospital awaiting negative COVID PCR. During this time, pt improved psychiatrically significantly. Pt had two consecutive days of negative COVID PCR. Psych re-evaluated and felt there was no further need for inpatient psychiatric admission and cleared for d/c to home. Pt reported having no suicidal thoughts and was upbeat about her improvement. Pt plans to move to [place] with her son in the near future and he would be coming up to [place] soon. Pt and her son felt comfortable with her discharge to home. Pt will f/up with her outpt psychiatrist after discharge and will call an ambulance if she has suicidal ideation in the future.   Pt felt well and was discharged to home with close outpt f/up.     Time spent: 40min 81yo female with pmhx of hypothyroid, depression, subdural hematoma 8/2020 2/2 fall presents to [facility] with C/C of cough, low grade temp.  History obtained by patient. Patient reports her symptoms started less than one week ago. She reports productive cough, sore throat, low grade temps. Patient [place] [facility] [place] with covid + on rapid test and advised pt to come to [place] ED on December 2 for evaluation.     Patient was  seen in [place] on December 2 and ultimatel dced home. Since initial discharge, patient shortness of breath worsened, therefore she returned to [place] ED. Patient reports she did not take medication for her symptoms. Patient lives alone with home health aid ,although unsure of covid exposure.  on ROS: patient denies fever, cp, n/v/d, abd pain, rash.    ER course:  Patient initially seen in [facility]. hypoxemic to 81 on room air. improved to 96 on 2 liters  labs: benign  ddimer 290  CT head No acute intracranial hemorrhage.    Pt. was then transferred and admitted to [facility].  She completed courses of Remdesivir and Decadron.  Continued on O2 support via nasal canula.  She clinically improved and was able to be tritrated off O2 support.  She has been able to maintain SpO2 in 90% on room air for weeks.  Blood cultures on 12/2 showed no growth.  Pt. had reported having dysuria during the hospitalization and UA/urine cx was ordered.  UA not suggestive of infection and urine culture with likely contamination. Patient with multiple episodes of catatonia after finding out she was about to be discharged. Psych called, stated patient concerned to go home as she is worried about her COVID diagnosis. Tried multiple times to reassure patient regarding her COVID diagnosis and how well she was recovering- patient was on room air, saturating well, no SOB, however patient not willing to listen. Patient expressing thoughts to harm self- Psych reconsulted and had recommended inpatient psych. Patient remained in the hospital awaiting negative COVID PCR. During this time, pt improved psychiatrically significantly. Pt had two consecutive days of negative COVID PCR. Psych re-evaluated and felt there was no further need for inpatient psychiatric admission and cleared for d/c to home. Pt reported having no suicidal thoughts and was upbeat about her improvement. Pt plans to move to [place] with her son in the near future and he would be coming up to [place] soon. Pt and her son felt comfortable with her discharge to home. Pt will f/up with her outpt psychiatrist after discharge and will call an ambulance if she has suicidal ideation in the future.   Pt felt well and was discharged to home with close outpt f/up.     On day of discharge:  ROS: pt denies SOB, CP, palpitations, fever, chills, abd pain    Vital Signs Last 24 Hrs  T(C): 36.6 (28 Dec 2020 07:32), Max: 36.9 (27 Dec 2020 16:24)  T(F): 97.9 (28 Dec 2020 07:32), Max: 98.4 (27 Dec 2020 16:24)  HR: 69 (28 Dec 2020 07:32) (69 - 82)  BP: 102/66 (28 Dec 2020 07:32) (102/66 - 106/66)  BP(mean): --  RR: 19 (28 Dec 2020 07:32) (17 - 19)  SpO2: 95% (28 Dec 2020 07:32) (95% - 95%)    PHYSICAL EXAM:  GENERAL: NAD at rest  HEENT:  anicteric, moist mucous membranes  CHEST/LUNG:  CTA b/l   HEART:  RRR, S1, S2  ABDOMEN:  BS+, soft, nontender, nondistended  EXTREMITIES: no edema, cyanosis, or calf tenderness  NERVOUS SYSTEM: answers questions and follows commands appropriately  PSYCH: denies SI/HI    Time spent: 40min

## 2020-12-05 NOTE — PROGRESS NOTE ADULT - SUBJECTIVE AND OBJECTIVE BOX
CHIEF COMPLAINT/INTERVAL HISTORY:  Pt. seen and evaluated for acute hypoxic respiratory failure 2/2 COVID 19 pneumonia.  Pt. reports mild SOB.  Currrently on 2L NC with SpO2 in upper 90th%.  Tolerating Remdesivir and Decadron.      REVIEW OF SYSTEMS:  +fever.  No CP or abdominal pain.     Vital Signs Last 24 Hrs  T(C): 36.6 (05 Dec 2020 07:36), Max: 38.1 (04 Dec 2020 14:02)  T(F): 97.8 (05 Dec 2020 07:36), Max: 100.5 (04 Dec 2020 14:02)  HR: 50 (05 Dec 2020 07:36) (50 - 88)  BP: 147/78 (05 Dec 2020 07:36) (102/55 - 147/78)  BP(mean): --  RR: 16 (05 Dec 2020 07:36) (16 - 30)  SpO2: 97% (05 Dec 2020 07:36) (89% - 100%)    PHYSICAL EXAM:  GENERAL: NAD  HEENT: EOMI, hearing normal, conjunctiva and sclera clear  Chest: Diminished BS on right, no wheezing  CV: S1S2, RRR,   GI: soft, +BS, NT/ND  Musculoskeletal: no edema  Psychiatric: affect nL, mood nL  Skin: warm and dry    LABS:                        12.1   2.65  )-----------( 207      ( 05 Dec 2020 06:43 )             36.2     12-05    135  |  102  |  22  ----------------------------<  133<H>  4.4   |  25  |  1.10    Ca    8.1<L>      05 Dec 2020 06:43  Phos  3.6     12-05  Mg     2.3     12-05    TPro  7.1  /  Alb  3.2<L>  /  TBili  0.3  /  DBili  x   /  AST  29  /  ALT  21  /  AlkPhos  71  12-05          Assessment and Plan:  -Acute hypoxic respiratory failure 2/2 COVID 19 pneumonia:  continue Remdesivir and Decadron 6mg IV daily.  Albuterol inh Q6h PRN and Guaifenesin ER 600mg PO Q12h.  O2 support via nasal canula.  ID and Pulmonary f/u  -Hx of subdural hematoma:  continue Lamictal 25mg PO BID  -HTN:  continue Norvasc 5mg PO daily  -Hypothyroidism:  continue Synthroid 25mcg PO daily  -Depression:  continue Celexa 10mg PO daily  -VTE ppx:  Lovenox 40mg SQ daily

## 2020-12-05 NOTE — PROGRESS NOTE ADULT - SUBJECTIVE AND OBJECTIVE BOX
[facility] DIVISION of INFECTIOUS DISEASE  Salvatore Dennis MD PhD, Suzanne Hughes MD, Elvira Valdez MD, Fidel Rollins MD  and providing coverage with Mary Ann Lucas MD and Loren Fritz MD  Providing Infectious Disease Consultations at [facility], [facility], [facility]'s      Office# [phone] to schedule follow up appointments  Answering Service for urgent calls or New Consults [phone]  Cell# to text for urgent issues Salvatore Dennis [phone]     Infectious diseases progress note:    LINN WHITE is a 80y y. o. Female patient    COVID Patient    Allergies    cephalexin (Hives)  cephalexin (Unknown)    Intolerances    No seafood (Unknown)      ANTIBIOTICS/RELEVANT:  antimicrobials  remdesivir  IVPB   IV Intermittent   remdesivir  IVPB 100 milliGRAM(s) IV Intermittent every 24 hours    immunologic:    OTHER:  ALBUTerol    90 MICROgram(s) HFA Inhaler 2 Puff(s) Inhalation every 6 hours PRN  amLODIPine   Tablet 5 milliGRAM(s) Oral daily  citalopram 10 milliGRAM(s) Oral daily  dexAMETHasone  Injectable 6 milliGRAM(s) IV Push daily  enoxaparin Injectable 40 milliGRAM(s) SubCutaneous daily  guaiFENesin  milliGRAM(s) Oral every 12 hours  lamoTRIgine 25 milliGRAM(s) Oral two times a day  levothyroxine 25 MICROGram(s) Oral daily      Objective:  Vital Signs Last 24 Hrs  T(C): 36.4 (05 Dec 2020 09:00), Max: 38.1 (04 Dec 2020 14:02)  T(F): 97.6 (05 Dec 2020 09:00), Max: 100.5 (04 Dec 2020 14:02)  HR: 53 (05 Dec 2020 09:00) (50 - 88)  BP: 144/72 (05 Dec 2020 09:00) (102/55 - 147/78)  BP(mean): --  RR: 16 (05 Dec 2020 09:00) (16 - 30)  SpO2: 96% (05 Dec 2020 09:00) (89% - 100%)    T(C): 36.4 (12-05-20 @ 09:00), Max: 38.1 (12-04-20 @ 14:02)  T(C): 36.4 (12-05-20 @ 09:00), Max: 38.1 (12-04-20 @ 14:02)  T(C): 36.4 (12-05-20 @ 09:00), Max: 38.2 (12-02-20 @ 11:51)    PHYSICAL EXAM:  HEENT: NC atraumatic  Neck: supple  Respiratory: no accessory muscle use, breathing comfortably  Cardiovascular: distant  Gastrointestinal: normal appearing, nondistended  Extremities: no clubbing, no cyanosis,      LABS:                          12.1   2.65  )-----------( 207      ( 05 Dec 2020 06:43 )             36.2       2.65 12-05 @ 06:43  4.85 12-04 @ 15:55  4.78 12-02 @ 12:50      12-05    135  |  102  |  22  ----------------------------<  133<H>  4.4   |  25  |  1.10    Ca    8.1<L>      05 Dec 2020 06:43  Phos  3.6     12-05  Mg     2.3     12-05    TPro  7.1  /  Alb  3.2<L>  /  TBili  0.3  /  DBili  x   /  AST  29  /  ALT  21  /  AlkPhos  71  12-05      Creatinine, Serum: 1.10 mg/dL (12-05-20 @ 06:43)  Creatinine, Serum: 1.20 mg/dL (12-04-20 @ 19:55)  Creatinine, Serum: 1.27 mg/dL (12-04-20 @ 15:55)  Creatinine, Serum: 1.30 mg/dL (12-02-20 @ 12:50)                COVID RISK SCORE  Auto Neutrophil #: 1.46 K/uL (12-05-20 @ 06:43)  Auto Lymphocyte #: 0.72 K/uL (12-05-20 @ 06:43)  Auto Neutrophil #: 3.20 K/uL (12-04-20 @ 15:55)  Auto Lymphocyte #: 0.98 K/uL (12-04-20 @ 15:55)  Auto Lymphocyte #: 0.95 K/uL (12-02-20 @ 12:50)  Auto Neutrophil #: 3.08 K/uL (12-02-20 @ 12:50)    Lactate, Blood: 1.0 mmol/L (12-02-20 @ 12:50)    Auto Eosinophil #: 0.00 K/uL (12-05-20 @ 06:43)  Auto Eosinophil #: 0.00 K/uL (12-04-20 @ 15:55)  Auto Eosinophil #: 0.00 K/uL (12-02-20 @ 12:50)        Procalcitonin, Serum: 0.07 ng/mL (12-04-20 @ 21:38)            Ferritin, Serum: 255 ng/mL (12-04-20 @ 22:27)  Ferritin, Serum: 149 ng/mL (12-03-20 @ 01:57)          D-Dimer Assay, Quantitative: 247 ng/mL DDU (12-05-20 @ 06:43)  D-Dimer Assay, Quantitative: 279 ng/mL DDU (12-04-20 @ 15:55)        MICROBIOLOGY:              RADIOLOGY & ADDITIONAL STUDIES:

## 2020-12-05 NOTE — PATIENT PROFILE ADULT - HAS THE PATIENT RECEIVED THE INFLUENZA VACCINE THIS SEASON?
no... 26 Y.O. F with biliary dyskinesia and liver mass for possible IR guided Liver biopsy 5/31/18 followed by Laparoscopic Cholecystectomy on 6/1.

## 2020-12-06 LAB
ALBUMIN SERPL ELPH-MCNC: 3.2 G/DL — LOW (ref 3.3–5)
ALBUMIN SERPL ELPH-MCNC: 3.2 G/DL — LOW (ref 3.3–5)
ALP SERPL-CCNC: 63 U/L — SIGNIFICANT CHANGE UP (ref 40–120)
ALP SERPL-CCNC: 67 U/L — SIGNIFICANT CHANGE UP (ref 40–120)
ALT FLD-CCNC: 20 U/L — SIGNIFICANT CHANGE UP (ref 12–78)
ALT FLD-CCNC: 20 U/L — SIGNIFICANT CHANGE UP (ref 12–78)
ANION GAP SERPL CALC-SCNC: 11 MMOL/L — SIGNIFICANT CHANGE UP (ref 5–17)
AST SERPL-CCNC: 26 U/L — SIGNIFICANT CHANGE UP (ref 15–37)
AST SERPL-CCNC: 27 U/L — SIGNIFICANT CHANGE UP (ref 15–37)
BASOPHILS # BLD AUTO: 0 K/UL — SIGNIFICANT CHANGE UP (ref 0–0.2)
BASOPHILS NFR BLD AUTO: 0 % — SIGNIFICANT CHANGE UP (ref 0–2)
BILIRUB DIRECT SERPL-MCNC: 0.2 MG/DL — SIGNIFICANT CHANGE UP (ref 0.05–0.2)
BILIRUB INDIRECT FLD-MCNC: 0.1 MG/DL — LOW (ref 0.2–1)
BILIRUB SERPL-MCNC: 0.3 MG/DL — SIGNIFICANT CHANGE UP (ref 0.2–1.2)
BILIRUB SERPL-MCNC: 0.3 MG/DL — SIGNIFICANT CHANGE UP (ref 0.2–1.2)
BUN SERPL-MCNC: 21 MG/DL — SIGNIFICANT CHANGE UP (ref 7–23)
CALCIUM SERPL-MCNC: 8.2 MG/DL — LOW (ref 8.5–10.1)
CHLORIDE SERPL-SCNC: 105 MMOL/L — SIGNIFICANT CHANGE UP (ref 96–108)
CO2 SERPL-SCNC: 24 MMOL/L — SIGNIFICANT CHANGE UP (ref 22–31)
CREAT SERPL-MCNC: 0.99 MG/DL — SIGNIFICANT CHANGE UP (ref 0.5–1.3)
D DIMER BLD IA.RAPID-MCNC: 267 NG/ML DDU — HIGH
EOSINOPHIL # BLD AUTO: 0 K/UL — SIGNIFICANT CHANGE UP (ref 0–0.5)
EOSINOPHIL NFR BLD AUTO: 0 % — SIGNIFICANT CHANGE UP (ref 0–6)
ERYTHROCYTE [SEDIMENTATION RATE] IN BLOOD: 44 MM/HR — HIGH (ref 0–20)
GLUCOSE SERPL-MCNC: 87 MG/DL — SIGNIFICANT CHANGE UP (ref 70–99)
HCT VFR BLD CALC: 35.8 % — SIGNIFICANT CHANGE UP (ref 34.5–45)
HGB BLD-MCNC: 12.2 G/DL — SIGNIFICANT CHANGE UP (ref 11.5–15.5)
IMM GRANULOCYTES NFR BLD AUTO: 0.8 % — SIGNIFICANT CHANGE UP (ref 0–1.5)
LYMPHOCYTES # BLD AUTO: 1.6 K/UL — SIGNIFICANT CHANGE UP (ref 1–3.3)
LYMPHOCYTES # BLD AUTO: 20.1 % — SIGNIFICANT CHANGE UP (ref 13–44)
MAGNESIUM SERPL-MCNC: 2.3 MG/DL — SIGNIFICANT CHANGE UP (ref 1.6–2.6)
MCHC RBC-ENTMCNC: 31 PG — SIGNIFICANT CHANGE UP (ref 27–34)
MCHC RBC-ENTMCNC: 34.1 GM/DL — SIGNIFICANT CHANGE UP (ref 32–36)
MCV RBC AUTO: 90.9 FL — SIGNIFICANT CHANGE UP (ref 80–100)
MONOCYTES # BLD AUTO: 1.02 K/UL — HIGH (ref 0–0.9)
MONOCYTES NFR BLD AUTO: 12.8 % — SIGNIFICANT CHANGE UP (ref 2–14)
NEUTROPHILS # BLD AUTO: 5.28 K/UL — SIGNIFICANT CHANGE UP (ref 1.8–7.4)
NEUTROPHILS NFR BLD AUTO: 66.3 % — SIGNIFICANT CHANGE UP (ref 43–77)
NRBC # BLD: 0 /100 WBCS — SIGNIFICANT CHANGE UP (ref 0–0)
PLATELET # BLD AUTO: 276 K/UL — SIGNIFICANT CHANGE UP (ref 150–400)
POTASSIUM SERPL-MCNC: 3.8 MMOL/L — SIGNIFICANT CHANGE UP (ref 3.5–5.3)
POTASSIUM SERPL-SCNC: 3.8 MMOL/L — SIGNIFICANT CHANGE UP (ref 3.5–5.3)
PROT SERPL-MCNC: 7 G/DL — SIGNIFICANT CHANGE UP (ref 6–8.3)
PROT SERPL-MCNC: 7.1 G/DL — SIGNIFICANT CHANGE UP (ref 6–8.3)
RBC # BLD: 3.94 M/UL — SIGNIFICANT CHANGE UP (ref 3.8–5.2)
RBC # FLD: 12.8 % — SIGNIFICANT CHANGE UP (ref 10.3–14.5)
SODIUM SERPL-SCNC: 140 MMOL/L — SIGNIFICANT CHANGE UP (ref 135–145)
WBC # BLD: 7.96 K/UL — SIGNIFICANT CHANGE UP (ref 3.8–10.5)
WBC # FLD AUTO: 7.96 K/UL — SIGNIFICANT CHANGE UP (ref 3.8–10.5)

## 2020-12-06 PROCEDURE — 99233 SBSQ HOSP IP/OBS HIGH 50: CPT | Mod: CS

## 2020-12-06 RX ADMIN — Medication 600 MILLIGRAM(S): at 05:47

## 2020-12-06 RX ADMIN — Medication 6 MILLIGRAM(S): at 05:48

## 2020-12-06 RX ADMIN — PANTOPRAZOLE SODIUM 40 MILLIGRAM(S): 20 TABLET, DELAYED RELEASE ORAL at 06:00

## 2020-12-06 RX ADMIN — REMDESIVIR 500 MILLIGRAM(S): 5 INJECTION INTRAVENOUS at 17:19

## 2020-12-06 RX ADMIN — Medication 600 MILLIGRAM(S): at 17:18

## 2020-12-06 RX ADMIN — AMLODIPINE BESYLATE 5 MILLIGRAM(S): 2.5 TABLET ORAL at 05:47

## 2020-12-06 RX ADMIN — LAMOTRIGINE 25 MILLIGRAM(S): 25 TABLET, ORALLY DISINTEGRATING ORAL at 17:18

## 2020-12-06 RX ADMIN — Medication 25 MICROGRAM(S): at 05:49

## 2020-12-06 RX ADMIN — LAMOTRIGINE 25 MILLIGRAM(S): 25 TABLET, ORALLY DISINTEGRATING ORAL at 05:47

## 2020-12-06 RX ADMIN — CITALOPRAM 10 MILLIGRAM(S): 10 TABLET, FILM COATED ORAL at 21:33

## 2020-12-06 RX ADMIN — ENOXAPARIN SODIUM 40 MILLIGRAM(S): 100 INJECTION SUBCUTANEOUS at 11:19

## 2020-12-06 NOTE — PROGRESS NOTE ADULT - SUBJECTIVE AND OBJECTIVE BOX
[facility] DIVISION of INFECTIOUS DISEASE  Salvatore Dennis MD PhD, Suzanne Hughes MD, Elvira Valdez MD, Fidel Rollins MD  and providing coverage with Mary Ann Lucas MD and Loren Fritz MD  Providing Infectious Disease Consultations at [facility], [facility], [facility]'s      Office# [phone] to schedule follow up appointments  Answering Service for urgent calls or New Consults [phone]  Cell# to text for urgent issues Salvatore Dennis [phone]     Infectious diseases progress note:    LINN WHITE is a 80y y. o. Female patient    COVID Patient    Allergies    cephalexin (Hives)  cephalexin (Unknown)    Intolerances    No seafood (Unknown)      ANTIBIOTICS/RELEVANT:  antimicrobials  remdesivir  IVPB   IV Intermittent   remdesivir  IVPB 100 milliGRAM(s) IV Intermittent every 24 hours    immunologic:    OTHER:  ALBUTerol    90 MICROgram(s) HFA Inhaler 2 Puff(s) Inhalation every 6 hours PRN  aluminum hydroxide/magnesium hydroxide/simethicone Suspension 30 milliLiter(s) Oral every 6 hours PRN  amLODIPine   Tablet 5 milliGRAM(s) Oral daily  citalopram 10 milliGRAM(s) Oral daily  dexAMETHasone  Injectable 6 milliGRAM(s) IV Push daily  enoxaparin Injectable 40 milliGRAM(s) SubCutaneous daily  guaiFENesin  milliGRAM(s) Oral every 12 hours  lamoTRIgine 25 milliGRAM(s) Oral two times a day  levothyroxine 25 MICROGram(s) Oral daily  ondansetron Injectable 4 milliGRAM(s) IV Push every 6 hours PRN  pantoprazole    Tablet 40 milliGRAM(s) Oral before breakfast      Objective:  Vital Signs Last 24 Hrs  T(C): 36.8 (06 Dec 2020 12:21), Max: 36.8 (05 Dec 2020 15:16)  T(F): 98.2 (06 Dec 2020 12:21), Max: 98.3 (05 Dec 2020 15:16)  HR: 67 (06 Dec 2020 12:21) (51 - 73)  BP: 134/72 (06 Dec 2020 12:21) (112/61 - 182/80)  BP(mean): --  RR: 18 (06 Dec 2020 12:21) (17 - 18)  SpO2: 99% (06 Dec 2020 12:21) (92% - 99%)    T(C): 36.8 (12-06-20 @ 12:21), Max: 38.1 (12-04-20 @ 14:02)  T(C): 36.8 (12-06-20 @ 12:21), Max: 38.1 (12-04-20 @ 14:02)  T(C): 36.8 (12-06-20 @ 12:21), Max: 38.1 (12-04-20 @ 14:02)    PHYSICAL EXAM:  HEENT: NC atraumatic  Neck: supple  Respiratory: no accessory muscle use, breathing comfortably  Cardiovascular: distant  Gastrointestinal: normal appearing, nondistended  Extremities: no clubbing, no cyanosis,      LABS:                          12.2   7.96  )-----------( 276      ( 06 Dec 2020 07:46 )             35.8       7.96 12-06 @ 07:46  2.65 12-05 @ 06:43  4.85 12-04 @ 15:55  4.78 12-02 @ 12:50      12-06    140  |  105  |  21  ----------------------------<  87  3.8   |  24  |  0.99    Ca    8.2<L>      06 Dec 2020 07:46  Phos  3.6     12-05  Mg     2.3     12-06    TPro  7.1  /  Alb  3.2<L>  /  TBili  0.3  /  DBili  .20  /  AST  26  /  ALT  20  /  AlkPhos  63  12-06      Creatinine, Serum: 0.99 mg/dL (12-06-20 @ 07:46)  Creatinine, Serum: 1.10 mg/dL (12-05-20 @ 06:43)  Creatinine, Serum: 1.20 mg/dL (12-04-20 @ 19:55)  Creatinine, Serum: 1.27 mg/dL (12-04-20 @ 15:55)  Creatinine, Serum: 1.30 mg/dL (12-02-20 @ 12:50)                COVID RISK SCORE  Auto Neutrophil #: 5.28 K/uL (12-06-20 @ 07:46)  Auto Lymphocyte #: 1.60 K/uL (12-06-20 @ 07:46)  Auto Neutrophil #: 1.46 K/uL (12-05-20 @ 06:43)  Auto Lymphocyte #: 0.72 K/uL (12-05-20 @ 06:43)  Auto Neutrophil #: 3.20 K/uL (12-04-20 @ 15:55)  Auto Lymphocyte #: 0.98 K/uL (12-04-20 @ 15:55)  Auto Lymphocyte #: 0.95 K/uL (12-02-20 @ 12:50)  Auto Neutrophil #: 3.08 K/uL (12-02-20 @ 12:50)    Lactate, Blood: 1.0 mmol/L (12-02-20 @ 12:50)    Auto Eosinophil #: 0.00 K/uL (12-06-20 @ 07:46)  Auto Eosinophil #: 0.00 K/uL (12-05-20 @ 06:43)  Auto Eosinophil #: 0.00 K/uL (12-04-20 @ 15:55)  Auto Eosinophil #: 0.00 K/uL (12-02-20 @ 12:50)    Lactate Dehydrogenase, Serum: 277 U/L (12-05-20 @ 12:26)    Sedimentation Rate, Erythrocyte: 44 mm/hr (12-06-20 @ 07:46)    Procalcitonin, Serum: 0.07 ng/mL (12-04-20 @ 21:38)    Troponin I, Serum: <.015 ng/mL (12-05-20 @ 16:45)    Creatine Kinase, Serum: 174 U/L (12-05-20 @ 16:45)        Ferritin, Serum: 241 ng/mL (12-05-20 @ 12:26)  Ferritin, Serum: 255 ng/mL (12-04-20 @ 22:27)  Ferritin, Serum: 149 ng/mL (12-03-20 @ 01:57)          D-Dimer Assay, Quantitative: 267 ng/mL DDU (12-06-20 @ 07:46)  D-Dimer Assay, Quantitative: 247 ng/mL DDU (12-05-20 @ 06:43)  D-Dimer Assay, Quantitative: 279 ng/mL DDU (12-04-20 @ 15:55)        MICROBIOLOGY:              RADIOLOGY & ADDITIONAL STUDIES:

## 2020-12-06 NOTE — PROGRESS NOTE ADULT - SUBJECTIVE AND OBJECTIVE BOX
Date/Time Patient Seen:  		  Referring MD:   Data Reviewed	       Patient is a 80y old  Female who presents with a chief complaint of COVID 19 Pneumonia (05 Dec 2020 15:40)      Subjective/HPI     PAST MEDICAL & SURGICAL HISTORY:  Vertigo    Depression    Hypothyroidism    Vertigo    Hypothyroidism    Depression    UTI (urinary tract infection)    No significant past surgical history  RIGHT KNEE ARTHROSCOPY    No significant past surgical history          Medication list         MEDICATIONS  (STANDING):  amLODIPine   Tablet 5 milliGRAM(s) Oral daily  citalopram 10 milliGRAM(s) Oral daily  dexAMETHasone  Injectable 6 milliGRAM(s) IV Push daily  enoxaparin Injectable 40 milliGRAM(s) SubCutaneous daily  guaiFENesin  milliGRAM(s) Oral every 12 hours  lamoTRIgine 25 milliGRAM(s) Oral two times a day  levothyroxine 25 MICROGram(s) Oral daily  pantoprazole    Tablet 40 milliGRAM(s) Oral before breakfast  remdesivir  IVPB   IV Intermittent   remdesivir  IVPB 100 milliGRAM(s) IV Intermittent every 24 hours    MEDICATIONS  (PRN):  ALBUTerol    90 MICROgram(s) HFA Inhaler 2 Puff(s) Inhalation every 6 hours PRN Bronchospasm  aluminum hydroxide/magnesium hydroxide/simethicone Suspension 30 milliLiter(s) Oral every 6 hours PRN Dyspepsia  ondansetron Injectable 4 milliGRAM(s) IV Push every 6 hours PRN Nausea and/or Vomiting         Vitals log        ICU Vital Signs Last 24 Hrs  T(C): 36.7 (06 Dec 2020 05:40), Max: 36.8 (05 Dec 2020 15:16)  T(F): 98.1 (06 Dec 2020 05:40), Max: 98.3 (05 Dec 2020 15:16)  HR: 59 (06 Dec 2020 05:40) (51 - 66)  BP: 112/61 (06 Dec 2020 05:40) (112/61 - 182/80)  BP(mean): --  ABP: --  ABP(mean): --  RR: 18 (06 Dec 2020 05:40) (16 - 18)  SpO2: 96% (06 Dec 2020 05:40) (92% - 96%)           Input and Output:  I&O's Detail    05 Dec 2020 07:01  -  06 Dec 2020 07:00  --------------------------------------------------------  IN:    Oral Fluid: 240 mL  Total IN: 240 mL    OUT:    Voided (mL): 350 mL  Total OUT: 350 mL    Total NET: -110 mL          Lab Data                        12.2   7.96  )-----------( 276      ( 06 Dec 2020 07:46 )             35.8     12-05    135  |  102  |  22  ----------------------------<  133<H>  4.4   |  25  |  1.10    Ca    8.1<L>      05 Dec 2020 06:43  Phos  3.6     12-05  Mg     2.3     12-05    TPro  7.1  /  Alb  3.2<L>  /  TBili  0.3  /  DBili  x   /  AST  29  /  ALT  21  /  AlkPhos  71  12-05      CARDIAC MARKERS ( 05 Dec 2020 16:45 )  <.015 ng/mL / x     / 174 U/L / x     / 4.1 ng/mL        Review of Systems	      Objective     Physical Examination    heart s1s2  lung dec BS  abd soft  head nc  head at      Pertinent Lab findings & Imaging      Pam:  NO   Adequate UO     I&O's Detail    05 Dec 2020 07:01  -  06 Dec 2020 07:00  --------------------------------------------------------  IN:    Oral Fluid: 240 mL  Total IN: 240 mL    OUT:    Voided (mL): 350 mL  Total OUT: 350 mL    Total NET: -110 mL               Discussed with:     Cultures:	        Radiology

## 2020-12-06 NOTE — DIETITIAN INITIAL EVALUATION ADULT. - OTHER INFO
As per chart pt is a 80 year old female with a PMH of hypothyroid, depression, subdural hematoma 8/2020 2/2 fall adm with COVID-19.     Attempted to call pt's son. Spoke to RN who states pt with small appetite and PO intake, states that the pt reports that her appetite is always small due to depression. Attempted to call pt on phone however pt was eating and RN asked not to disturb. Pt's admission weight 187.6lbs, which is likely inaccurate. Weight per [place] .1lbs which is more accurate. No GI distress noted at this time, no BM since admission. Pt noted seafood intolerance.     No pressure injuries noted at this time.

## 2020-12-06 NOTE — DIETITIAN INITIAL EVALUATION ADULT. - ETIOLOGY
related to decreased PO intake likely secondary to viral illness related to increased needs in setting of viral illness

## 2020-12-06 NOTE — PROGRESS NOTE ADULT - SUBJECTIVE AND OBJECTIVE BOX
CHIEF COMPLAINT/INTERVAL HISTORY:  Pt. seen and evaluated for acute hypoxic respiratory failure 2/2 COVID 19 pneumonia.  Pt. is in no distress.  On 2L nasal canula with SpO2 mid 90th%.  Tolerating Remdesivir and Decadron.      REVIEW OF SYSTEMS:  No fever, CP,or abdominal pain    Vital Signs Last 24 Hrs  T(C): 36.6 (06 Dec 2020 07:56), Max: 36.8 (05 Dec 2020 15:16)  T(F): 97.9 (06 Dec 2020 07:56), Max: 98.3 (05 Dec 2020 15:16)  HR: 73 (06 Dec 2020 07:56) (51 - 73)  BP: 129/67 (06 Dec 2020 07:56) (112/61 - 182/80)  BP(mean): --  RR: 18 (06 Dec 2020 07:56) (16 - 18)  SpO2: 95% (06 Dec 2020 07:56) (92% - 96%)    PHYSICAL EXAM:  GENERAL: NAD  HEENT: EOMI, hearing normal, conjunctiva and sclera clear  Chest: Diminished BS at bases, no wheezing  CV: S1S2, RRR,   GI: soft, +BS, NT/ND  Musculoskeletal: no edema  Psychiatric: affect nL, mood nL  Skin: warm and dry    LABS:                        12.2   7.96  )-----------( 276      ( 06 Dec 2020 07:46 )             35.8     12-06    140  |  105  |  21  ----------------------------<  87  3.8   |  24  |  0.99    Ca    8.2<L>      06 Dec 2020 07:46  Phos  3.6     12-05  Mg     2.3     12-06    TPro  7.1  /  Alb  3.2<L>  /  TBili  0.3  /  DBili  .20  /  AST  26  /  ALT  20  /  AlkPhos  63  12-06      Assessment and Plan:  -Acute hypoxic respiratory failure 2/2 COVID 19 pneumonia:  continue Remdesivir and Decadron 6mg IV daily.  Albuterol inh Q6h PRN and Guaifenesin ER 600mg PO Q12h.  O2 support via nasal canula.  ID and Pulmonary f/u  -Hx of subdural hematoma:  continue Lamictal 25mg PO BID  -HTN:  continue Norvasc 5mg PO daily  -Hypothyroidism:  continue Synthroid 25mcg PO daily  -Depression:  continue Celexa 10mg PO daily  -VTE ppx:  Lovenox 40mg SQ daily

## 2020-12-06 NOTE — PROGRESS NOTE ADULT - ASSESSMENT
81yo female with pmhx of hypothyroid, depression, subdural hematoma 8/2020 2/2 fall adm with COVID+ suggested day of onset 11/25    RECOMMENDATIONS  12/4 NLR ~3, sats<94% on RA, rec REMDESIVIR, STEROIDs, caution with anticoagulation with prior subdural  12/5 NLR <3, NC-2L pt appears stable and comfortable  12/6 NLR <3 btwn NC and RA-cont current Rx

## 2020-12-07 LAB
ALBUMIN SERPL ELPH-MCNC: 3 G/DL — LOW (ref 3.3–5)
ALBUMIN SERPL ELPH-MCNC: 3 G/DL — LOW (ref 3.3–5)
ALP SERPL-CCNC: 65 U/L — SIGNIFICANT CHANGE UP (ref 40–120)
ALP SERPL-CCNC: 65 U/L — SIGNIFICANT CHANGE UP (ref 40–120)
ALT FLD-CCNC: 19 U/L — SIGNIFICANT CHANGE UP (ref 12–78)
ALT FLD-CCNC: 20 U/L — SIGNIFICANT CHANGE UP (ref 12–78)
ANION GAP SERPL CALC-SCNC: 8 MMOL/L — SIGNIFICANT CHANGE UP (ref 5–17)
AST SERPL-CCNC: 24 U/L — SIGNIFICANT CHANGE UP (ref 15–37)
AST SERPL-CCNC: 25 U/L — SIGNIFICANT CHANGE UP (ref 15–37)
BASOPHILS # BLD AUTO: 0.01 K/UL — SIGNIFICANT CHANGE UP (ref 0–0.2)
BASOPHILS NFR BLD AUTO: 0.1 % — SIGNIFICANT CHANGE UP (ref 0–2)
BILIRUB DIRECT SERPL-MCNC: 0.1 MG/DL — SIGNIFICANT CHANGE UP (ref 0.05–0.2)
BILIRUB INDIRECT FLD-MCNC: 0.2 MG/DL — SIGNIFICANT CHANGE UP (ref 0.2–1)
BILIRUB SERPL-MCNC: 0.3 MG/DL — SIGNIFICANT CHANGE UP (ref 0.2–1.2)
BILIRUB SERPL-MCNC: 0.4 MG/DL — SIGNIFICANT CHANGE UP (ref 0.2–1.2)
BUN SERPL-MCNC: 23 MG/DL — SIGNIFICANT CHANGE UP (ref 7–23)
CALCIUM SERPL-MCNC: 8.5 MG/DL — SIGNIFICANT CHANGE UP (ref 8.5–10.1)
CHLORIDE SERPL-SCNC: 106 MMOL/L — SIGNIFICANT CHANGE UP (ref 96–108)
CO2 SERPL-SCNC: 26 MMOL/L — SIGNIFICANT CHANGE UP (ref 22–31)
CREAT SERPL-MCNC: 0.97 MG/DL — SIGNIFICANT CHANGE UP (ref 0.5–1.3)
CULTURE RESULTS: SIGNIFICANT CHANGE UP
CULTURE RESULTS: SIGNIFICANT CHANGE UP
EOSINOPHIL # BLD AUTO: 0 K/UL — SIGNIFICANT CHANGE UP (ref 0–0.5)
EOSINOPHIL NFR BLD AUTO: 0 % — SIGNIFICANT CHANGE UP (ref 0–6)
ERYTHROCYTE [SEDIMENTATION RATE] IN BLOOD: 45 MM/HR — HIGH (ref 0–20)
GLUCOSE SERPL-MCNC: 81 MG/DL — SIGNIFICANT CHANGE UP (ref 70–99)
HCT VFR BLD CALC: 33.7 % — LOW (ref 34.5–45)
HGB BLD-MCNC: 11.6 G/DL — SIGNIFICANT CHANGE UP (ref 11.5–15.5)
IMM GRANULOCYTES NFR BLD AUTO: 1.1 % — SIGNIFICANT CHANGE UP (ref 0–1.5)
LYMPHOCYTES # BLD AUTO: 2.33 K/UL — SIGNIFICANT CHANGE UP (ref 1–3.3)
LYMPHOCYTES # BLD AUTO: 29.7 % — SIGNIFICANT CHANGE UP (ref 13–44)
MAGNESIUM SERPL-MCNC: 2.2 MG/DL — SIGNIFICANT CHANGE UP (ref 1.6–2.6)
MCHC RBC-ENTMCNC: 30.9 PG — SIGNIFICANT CHANGE UP (ref 27–34)
MCHC RBC-ENTMCNC: 34.4 GM/DL — SIGNIFICANT CHANGE UP (ref 32–36)
MCV RBC AUTO: 89.6 FL — SIGNIFICANT CHANGE UP (ref 80–100)
MONOCYTES # BLD AUTO: 1.08 K/UL — HIGH (ref 0–0.9)
MONOCYTES NFR BLD AUTO: 13.8 % — SIGNIFICANT CHANGE UP (ref 2–14)
NEUTROPHILS # BLD AUTO: 4.33 K/UL — SIGNIFICANT CHANGE UP (ref 1.8–7.4)
NEUTROPHILS NFR BLD AUTO: 55.3 % — SIGNIFICANT CHANGE UP (ref 43–77)
NRBC # BLD: 0 /100 WBCS — SIGNIFICANT CHANGE UP (ref 0–0)
PLATELET # BLD AUTO: 310 K/UL — SIGNIFICANT CHANGE UP (ref 150–400)
POTASSIUM SERPL-MCNC: 4.5 MMOL/L — SIGNIFICANT CHANGE UP (ref 3.5–5.3)
POTASSIUM SERPL-SCNC: 4.5 MMOL/L — SIGNIFICANT CHANGE UP (ref 3.5–5.3)
PROT SERPL-MCNC: 6.8 G/DL — SIGNIFICANT CHANGE UP (ref 6–8.3)
PROT SERPL-MCNC: 6.8 G/DL — SIGNIFICANT CHANGE UP (ref 6–8.3)
RBC # BLD: 3.76 M/UL — LOW (ref 3.8–5.2)
RBC # FLD: 12.9 % — SIGNIFICANT CHANGE UP (ref 10.3–14.5)
SODIUM SERPL-SCNC: 140 MMOL/L — SIGNIFICANT CHANGE UP (ref 135–145)
SPECIMEN SOURCE: SIGNIFICANT CHANGE UP
SPECIMEN SOURCE: SIGNIFICANT CHANGE UP
WBC # BLD: 7.84 K/UL — SIGNIFICANT CHANGE UP (ref 3.8–10.5)
WBC # FLD AUTO: 7.84 K/UL — SIGNIFICANT CHANGE UP (ref 3.8–10.5)

## 2020-12-07 PROCEDURE — 99233 SBSQ HOSP IP/OBS HIGH 50: CPT | Mod: CS

## 2020-12-07 RX ORDER — TRAMADOL HYDROCHLORIDE 50 MG/1
25 TABLET ORAL EVERY 6 HOURS
Refills: 0 | Status: DISCONTINUED | OUTPATIENT
Start: 2020-12-07 | End: 2020-12-07

## 2020-12-07 RX ORDER — ACETAMINOPHEN 500 MG
650 TABLET ORAL EVERY 6 HOURS
Refills: 0 | Status: DISCONTINUED | OUTPATIENT
Start: 2020-12-07 | End: 2020-12-28

## 2020-12-07 RX ADMIN — ENOXAPARIN SODIUM 40 MILLIGRAM(S): 100 INJECTION SUBCUTANEOUS at 11:38

## 2020-12-07 RX ADMIN — Medication 25 MICROGRAM(S): at 06:38

## 2020-12-07 RX ADMIN — REMDESIVIR 500 MILLIGRAM(S): 5 INJECTION INTRAVENOUS at 17:57

## 2020-12-07 RX ADMIN — Medication 6 MILLIGRAM(S): at 06:38

## 2020-12-07 RX ADMIN — CITALOPRAM 10 MILLIGRAM(S): 10 TABLET, FILM COATED ORAL at 21:12

## 2020-12-07 RX ADMIN — AMLODIPINE BESYLATE 5 MILLIGRAM(S): 2.5 TABLET ORAL at 06:38

## 2020-12-07 RX ADMIN — LAMOTRIGINE 25 MILLIGRAM(S): 25 TABLET, ORALLY DISINTEGRATING ORAL at 17:34

## 2020-12-07 RX ADMIN — Medication 600 MILLIGRAM(S): at 17:34

## 2020-12-07 RX ADMIN — Medication 600 MILLIGRAM(S): at 06:38

## 2020-12-07 RX ADMIN — PANTOPRAZOLE SODIUM 40 MILLIGRAM(S): 20 TABLET, DELAYED RELEASE ORAL at 06:38

## 2020-12-07 RX ADMIN — LAMOTRIGINE 25 MILLIGRAM(S): 25 TABLET, ORALLY DISINTEGRATING ORAL at 06:38

## 2020-12-07 NOTE — PROGRESS NOTE ADULT - ASSESSMENT
81yo female with pmhx of hypothyroid, depression, subdural hematoma 8/2020 2/2 fall adm with COVID+ suggested day of onset 11/25    RECOMMENDATIONS  12/4 NLR ~3, sats<94% on RA, rec REMDESIVIR, STEROIDs, caution with anticoagulation with prior subdural  12/5 NLR <3, NC-2L pt appears stable and comfortable  12/6 NLR <3 btwn NC and RA-cont current Rx  12/7 on RA-continue Rx, 12/8 last day of remdesivir and may be able to consider dc after Rx

## 2020-12-07 NOTE — PROGRESS NOTE ADULT - SUBJECTIVE AND OBJECTIVE BOX
[facility] DIVISION of INFECTIOUS DISEASE  Salvatore Dennis MD PhD, Suzanne Hughes MD, Elvira Valdez MD, Fidel Rollins MD  and providing coverage with Mary Ann Lucas MD and Loren Fritz MD  Providing Infectious Disease Consultations at [facility], [facility], [facility]'s      Office# [phone] to schedule follow up appointments  Answering Service for urgent calls or New Consults [phone]  Cell# to text for urgent issues Salvatore Dennis [phone]     Infectious diseases progress note:    LINN WHITE is a 80y y. o. Female patient    COVID Patient    Allergies    cephalexin (Hives)  cephalexin (Unknown)    Intolerances    No seafood (Unknown)      ANTIBIOTICS/RELEVANT:  antimicrobials  remdesivir  IVPB   IV Intermittent   remdesivir  IVPB 100 milliGRAM(s) IV Intermittent every 24 hours    immunologic:    OTHER:  ALBUTerol    90 MICROgram(s) HFA Inhaler 2 Puff(s) Inhalation every 6 hours PRN  aluminum hydroxide/magnesium hydroxide/simethicone Suspension 30 milliLiter(s) Oral every 6 hours PRN  amLODIPine   Tablet 5 milliGRAM(s) Oral daily  citalopram 10 milliGRAM(s) Oral daily  dexAMETHasone  Injectable 6 milliGRAM(s) IV Push daily  enoxaparin Injectable 40 milliGRAM(s) SubCutaneous daily  guaiFENesin  milliGRAM(s) Oral every 12 hours  lamoTRIgine 25 milliGRAM(s) Oral two times a day  levothyroxine 25 MICROGram(s) Oral daily  ondansetron Injectable 4 milliGRAM(s) IV Push every 6 hours PRN  pantoprazole    Tablet 40 milliGRAM(s) Oral before breakfast      Objective:  Vital Signs Last 24 Hrs  T(C): 37.1 (07 Dec 2020 04:38), Max: 37.1 (07 Dec 2020 04:38)  T(F): 98.7 (07 Dec 2020 04:38), Max: 98.7 (07 Dec 2020 04:38)  HR: 71 (07 Dec 2020 04:38) (57 - 71)  BP: 118/66 (07 Dec 2020 04:38) (115/60 - 135/74)  BP(mean): --  RR: 18 (07 Dec 2020 04:38) (18 - 18)  SpO2: 90% (07 Dec 2020 04:38) (90% - 99%)    T(C): 37.1 (12-07-20 @ 04:38), Max: 37.1 (12-07-20 @ 04:38)  T(C): 37.1 (12-07-20 @ 04:38), Max: 38.1 (12-04-20 @ 14:02)  T(C): 37.1 (12-07-20 @ 04:38), Max: 38.1 (12-04-20 @ 14:02)    PHYSICAL EXAM:  HEENT: NC atraumatic  Neck: supple  Respiratory: no accessory muscle use, breathing comfortably  Cardiovascular: distant  Gastrointestinal: normal appearing, nondistended  Extremities: no clubbing, no cyanosis,      LABS:                          11.6   7.84  )-----------( 310      ( 07 Dec 2020 07:41 )             33.7       7.84 12-07 @ 07:41  7.96 12-06 @ 07:46  2.65 12-05 @ 06:43  4.85 12-04 @ 15:55  4.78 12-02 @ 12:50      12-07    140  |  106  |  23  ----------------------------<  81  4.5   |  26  |  0.97    Ca    8.5      07 Dec 2020 07:41  Mg     2.2     12-07    TPro  6.8  /  Alb  3.0<L>  /  TBili  0.4  /  DBili  .10  /  AST  25  /  ALT  20  /  AlkPhos  65  12-07      Creatinine, Serum: 0.97 mg/dL (12-07-20 @ 07:41)  Creatinine, Serum: 0.99 mg/dL (12-06-20 @ 07:46)  Creatinine, Serum: 1.10 mg/dL (12-05-20 @ 06:43)  Creatinine, Serum: 1.20 mg/dL (12-04-20 @ 19:55)  Creatinine, Serum: 1.27 mg/dL (12-04-20 @ 15:55)  Creatinine, Serum: 1.30 mg/dL (12-02-20 @ 12:50)                COVID RISK SCORE  Auto Neutrophil #: 5.28 K/uL (12-06-20 @ 07:46)  Auto Lymphocyte #: 1.60 K/uL (12-06-20 @ 07:46)  Auto Neutrophil #: 1.46 K/uL (12-05-20 @ 06:43)  Auto Lymphocyte #: 0.72 K/uL (12-05-20 @ 06:43)  Auto Neutrophil #: 3.20 K/uL (12-04-20 @ 15:55)  Auto Lymphocyte #: 0.98 K/uL (12-04-20 @ 15:55)  Auto Lymphocyte #: 0.95 K/uL (12-02-20 @ 12:50)  Auto Neutrophil #: 3.08 K/uL (12-02-20 @ 12:50)    Lactate, Blood: 1.0 mmol/L (12-02-20 @ 12:50)    Auto Eosinophil #: 0.00 K/uL (12-06-20 @ 07:46)  Auto Eosinophil #: 0.00 K/uL (12-05-20 @ 06:43)  Auto Eosinophil #: 0.00 K/uL (12-04-20 @ 15:55)  Auto Eosinophil #: 0.00 K/uL (12-02-20 @ 12:50)    Lactate Dehydrogenase, Serum: 277 U/L (12-05-20 @ 12:26)    Sedimentation Rate, Erythrocyte: 44 mm/hr (12-06-20 @ 07:46)    Procalcitonin, Serum: 0.07 ng/mL (12-04-20 @ 21:38)    Troponin I, Serum: <.015 ng/mL (12-05-20 @ 16:45)    Creatine Kinase, Serum: 174 U/L (12-05-20 @ 16:45)        Ferritin, Serum: 241 ng/mL (12-05-20 @ 12:26)  Ferritin, Serum: 255 ng/mL (12-04-20 @ 22:27)  Ferritin, Serum: 149 ng/mL (12-03-20 @ 01:57)          D-Dimer Assay, Quantitative: 267 ng/mL DDU (12-06-20 @ 07:46)  D-Dimer Assay, Quantitative: 247 ng/mL DDU (12-05-20 @ 06:43)  D-Dimer Assay, Quantitative: 279 ng/mL DDU (12-04-20 @ 15:55)        MICROBIOLOGY:              RADIOLOGY & ADDITIONAL STUDIES:

## 2020-12-07 NOTE — PROGRESS NOTE ADULT - SUBJECTIVE AND OBJECTIVE BOX
Date/Time Patient Seen:  		  Referring MD:   Data Reviewed	       Patient is a 80y old  Female who presents with a chief complaint of COVID 19 Pneumonia (07 Dec 2020 08:30)      Subjective/HPI     PAST MEDICAL & SURGICAL HISTORY:  Vertigo    Depression    Hypothyroidism    Vertigo    Hypothyroidism    Depression    UTI (urinary tract infection)    No significant past surgical history  RIGHT KNEE ARTHROSCOPY    No significant past surgical history          Medication list         MEDICATIONS  (STANDING):  amLODIPine   Tablet 5 milliGRAM(s) Oral daily  citalopram 10 milliGRAM(s) Oral daily  dexAMETHasone  Injectable 6 milliGRAM(s) IV Push daily  enoxaparin Injectable 40 milliGRAM(s) SubCutaneous daily  guaiFENesin  milliGRAM(s) Oral every 12 hours  lamoTRIgine 25 milliGRAM(s) Oral two times a day  levothyroxine 25 MICROGram(s) Oral daily  pantoprazole    Tablet 40 milliGRAM(s) Oral before breakfast  remdesivir  IVPB   IV Intermittent   remdesivir  IVPB 100 milliGRAM(s) IV Intermittent every 24 hours    MEDICATIONS  (PRN):  ALBUTerol    90 MICROgram(s) HFA Inhaler 2 Puff(s) Inhalation every 6 hours PRN Bronchospasm  aluminum hydroxide/magnesium hydroxide/simethicone Suspension 30 milliLiter(s) Oral every 6 hours PRN Dyspepsia  ondansetron Injectable 4 milliGRAM(s) IV Push every 6 hours PRN Nausea and/or Vomiting         Vitals log        ICU Vital Signs Last 24 Hrs  T(C): 37.1 (07 Dec 2020 04:38), Max: 37.1 (07 Dec 2020 04:38)  T(F): 98.7 (07 Dec 2020 04:38), Max: 98.7 (07 Dec 2020 04:38)  HR: 71 (07 Dec 2020 04:38) (57 - 71)  BP: 118/66 (07 Dec 2020 04:38) (115/60 - 135/74)  BP(mean): --  ABP: --  ABP(mean): --  RR: 18 (07 Dec 2020 04:38) (18 - 18)  SpO2: 90% (07 Dec 2020 04:38) (90% - 99%)           Input and Output:  I&O's Detail    06 Dec 2020 07:01  -  07 Dec 2020 07:00  --------------------------------------------------------  IN:    Oral Fluid: 240 mL  Total IN: 240 mL    OUT:    Voided (mL): 500 mL  Total OUT: 500 mL    Total NET: -260 mL          Lab Data                        11.6   7.84  )-----------( 310      ( 07 Dec 2020 07:41 )             33.7     12-07    140  |  106  |  23  ----------------------------<  81  4.5   |  26  |  0.97    Ca    8.5      07 Dec 2020 07:41  Mg     2.3     12-06    TPro  6.8  /  Alb  3.0<L>  /  TBili  0.4  /  DBili  x   /  AST  25  /  ALT  20  /  AlkPhos  65  12-07      CARDIAC MARKERS ( 05 Dec 2020 16:45 )  <.015 ng/mL / x     / 174 U/L / x     / 4.1 ng/mL        Review of Systems	      Objective     Physical Examination    heart s1s2  lung dc BS  abd soft  head nc  head at      Pertinent Lab findings & Imaging      Pam:  NO   Adequate UO     I&O's Detail    06 Dec 2020 07:01  -  07 Dec 2020 07:00  --------------------------------------------------------  IN:    Oral Fluid: 240 mL  Total IN: 240 mL    OUT:    Voided (mL): 500 mL  Total OUT: 500 mL    Total NET: -260 mL               Discussed with:     Cultures:	        Radiology

## 2020-12-07 NOTE — PROGRESS NOTE ADULT - SUBJECTIVE AND OBJECTIVE BOX
CHIEF COMPLAINT/INTERVAL HISTORY:  Pt. seen and evaluated for acute hypoxic respiratory failure 2/2 COVID 19 pneumonia.  Pt. is in no distress.  Denies SOB.  On room air with SpO2 in low 90th%.  Tolerating Remdesivir and Decadron.      REVIEW OF SYSTEMS:  No fever, CP, or abdominal pain.     Vital Signs Last 24 Hrs  T(C): 37.1 (07 Dec 2020 04:38), Max: 37.1 (07 Dec 2020 04:38)  T(F): 98.7 (07 Dec 2020 04:38), Max: 98.7 (07 Dec 2020 04:38)  HR: 71 (07 Dec 2020 04:38) (57 - 71)  BP: 118/66 (07 Dec 2020 04:38) (115/60 - 135/74)  BP(mean): --  RR: 18 (07 Dec 2020 04:38) (18 - 18)  SpO2: 90% (07 Dec 2020 04:38) (90% - 99%)    PHYSICAL EXAM:  GENERAL: NAD  HEENT: EOMI, hearing normal, conjunctiva and sclera clear  Chest: Diminished BS at bases, no wheezing  CV: S1S2, RRR,   GI: soft, +BS, NT/ND  Musculoskeletal: no edema  Psychiatric: affect nL, mood nL  Skin: warm and dry    LABS:                        11.6   7.84  )-----------( 310      ( 07 Dec 2020 07:41 )             33.7     12-07    140  |  106  |  x   ----------------------------<  x   4.5   |  x   |  x     Ca    8.2<L>      06 Dec 2020 07:46  Mg     2.3     12-06    TPro  7.1  /  Alb  3.2<L>  /  TBili  0.3  /  DBili  .20  /  AST  26  /  ALT  20  /  AlkPhos  63  12-06          Assessment and Plan:  -Acute hypoxic respiratory failure 2/2 COVID 19 pneumonia:  continue Remdesivir and Decadron 6mg IV daily.  Albuterol inh Q6h PRN and Guaifenesin ER 600mg PO Q12h.  Monitor SpO2 on room air.  ID and Pulmonary f/u  -Hx of subdural hematoma:  continue Lamictal 25mg PO BID  -HTN:  continue Norvasc 5mg PO daily  -Hypothyroidism:  continue Synthroid 25mcg PO daily  -Depression:  continue Celexa 10mg PO daily  -VTE ppx:  Lovenox 40mg SQ daily

## 2020-12-08 LAB
ALBUMIN SERPL ELPH-MCNC: 2.8 G/DL — LOW (ref 3.3–5)
ALBUMIN SERPL ELPH-MCNC: 2.9 G/DL — LOW (ref 3.3–5)
ALP SERPL-CCNC: 66 U/L — SIGNIFICANT CHANGE UP (ref 40–120)
ALP SERPL-CCNC: 69 U/L — SIGNIFICANT CHANGE UP (ref 40–120)
ALT FLD-CCNC: 20 U/L — SIGNIFICANT CHANGE UP (ref 12–78)
ALT FLD-CCNC: 21 U/L — SIGNIFICANT CHANGE UP (ref 12–78)
ANION GAP SERPL CALC-SCNC: 9 MMOL/L — SIGNIFICANT CHANGE UP (ref 5–17)
APPEARANCE UR: CLEAR — SIGNIFICANT CHANGE UP
AST SERPL-CCNC: 17 U/L — SIGNIFICANT CHANGE UP (ref 15–37)
AST SERPL-CCNC: 17 U/L — SIGNIFICANT CHANGE UP (ref 15–37)
BACTERIA # UR AUTO: ABNORMAL
BASOPHILS # BLD AUTO: 0.01 K/UL — SIGNIFICANT CHANGE UP (ref 0–0.2)
BASOPHILS NFR BLD AUTO: 0.1 % — SIGNIFICANT CHANGE UP (ref 0–2)
BILIRUB DIRECT SERPL-MCNC: 0.1 MG/DL — SIGNIFICANT CHANGE UP (ref 0.05–0.2)
BILIRUB INDIRECT FLD-MCNC: 0.2 MG/DL — SIGNIFICANT CHANGE UP (ref 0.2–1)
BILIRUB SERPL-MCNC: 0.3 MG/DL — SIGNIFICANT CHANGE UP (ref 0.2–1.2)
BILIRUB SERPL-MCNC: 0.3 MG/DL — SIGNIFICANT CHANGE UP (ref 0.2–1.2)
BILIRUB UR-MCNC: NEGATIVE — SIGNIFICANT CHANGE UP
BUN SERPL-MCNC: 18 MG/DL — SIGNIFICANT CHANGE UP (ref 7–23)
CALCIUM SERPL-MCNC: 8.7 MG/DL — SIGNIFICANT CHANGE UP (ref 8.5–10.1)
CHLORIDE SERPL-SCNC: 104 MMOL/L — SIGNIFICANT CHANGE UP (ref 96–108)
CO2 SERPL-SCNC: 26 MMOL/L — SIGNIFICANT CHANGE UP (ref 22–31)
COLOR SPEC: YELLOW — SIGNIFICANT CHANGE UP
CREAT SERPL-MCNC: 0.88 MG/DL — SIGNIFICANT CHANGE UP (ref 0.5–1.3)
DIFF PNL FLD: ABNORMAL
EOSINOPHIL # BLD AUTO: 0.01 K/UL — SIGNIFICANT CHANGE UP (ref 0–0.5)
EOSINOPHIL NFR BLD AUTO: 0.1 % — SIGNIFICANT CHANGE UP (ref 0–6)
EPI CELLS # UR: SIGNIFICANT CHANGE UP
ERYTHROCYTE [SEDIMENTATION RATE] IN BLOOD: 41 MM/HR — HIGH (ref 0–20)
GLUCOSE SERPL-MCNC: 94 MG/DL — SIGNIFICANT CHANGE UP (ref 70–99)
GLUCOSE UR QL: NEGATIVE — SIGNIFICANT CHANGE UP
HCT VFR BLD CALC: 32.6 % — LOW (ref 34.5–45)
HGB BLD-MCNC: 11 G/DL — LOW (ref 11.5–15.5)
IMM GRANULOCYTES NFR BLD AUTO: 2 % — HIGH (ref 0–1.5)
KETONES UR-MCNC: NEGATIVE — SIGNIFICANT CHANGE UP
LEUKOCYTE ESTERASE UR-ACNC: ABNORMAL
LYMPHOCYTES # BLD AUTO: 1.96 K/UL — SIGNIFICANT CHANGE UP (ref 1–3.3)
LYMPHOCYTES # BLD AUTO: 27.9 % — SIGNIFICANT CHANGE UP (ref 13–44)
MAGNESIUM SERPL-MCNC: 2.2 MG/DL — SIGNIFICANT CHANGE UP (ref 1.6–2.6)
MCHC RBC-ENTMCNC: 30.5 PG — SIGNIFICANT CHANGE UP (ref 27–34)
MCHC RBC-ENTMCNC: 33.7 GM/DL — SIGNIFICANT CHANGE UP (ref 32–36)
MCV RBC AUTO: 90.3 FL — SIGNIFICANT CHANGE UP (ref 80–100)
MONOCYTES # BLD AUTO: 0.98 K/UL — HIGH (ref 0–0.9)
MONOCYTES NFR BLD AUTO: 13.9 % — SIGNIFICANT CHANGE UP (ref 2–14)
NEUTROPHILS # BLD AUTO: 3.93 K/UL — SIGNIFICANT CHANGE UP (ref 1.8–7.4)
NEUTROPHILS NFR BLD AUTO: 56 % — SIGNIFICANT CHANGE UP (ref 43–77)
NITRITE UR-MCNC: NEGATIVE — SIGNIFICANT CHANGE UP
NRBC # BLD: 0 /100 WBCS — SIGNIFICANT CHANGE UP (ref 0–0)
PH UR: 5 — SIGNIFICANT CHANGE UP (ref 5–8)
PLATELET # BLD AUTO: 327 K/UL — SIGNIFICANT CHANGE UP (ref 150–400)
POTASSIUM SERPL-MCNC: 3.9 MMOL/L — SIGNIFICANT CHANGE UP (ref 3.5–5.3)
POTASSIUM SERPL-SCNC: 3.9 MMOL/L — SIGNIFICANT CHANGE UP (ref 3.5–5.3)
PROT SERPL-MCNC: 6.5 G/DL — SIGNIFICANT CHANGE UP (ref 6–8.3)
PROT SERPL-MCNC: 6.5 G/DL — SIGNIFICANT CHANGE UP (ref 6–8.3)
PROT UR-MCNC: 15
RBC # BLD: 3.61 M/UL — LOW (ref 3.8–5.2)
RBC # FLD: 12.7 % — SIGNIFICANT CHANGE UP (ref 10.3–14.5)
RBC CASTS # UR COMP ASSIST: ABNORMAL /HPF (ref 0–4)
SARS-COV-2 RNA SPEC QL NAA+PROBE: DETECTED
SODIUM SERPL-SCNC: 139 MMOL/L — SIGNIFICANT CHANGE UP (ref 135–145)
SP GR SPEC: 1.01 — SIGNIFICANT CHANGE UP (ref 1.01–1.02)
UROBILINOGEN FLD QL: NEGATIVE — SIGNIFICANT CHANGE UP
WBC # BLD: 7.03 K/UL — SIGNIFICANT CHANGE UP (ref 3.8–10.5)
WBC # FLD AUTO: 7.03 K/UL — SIGNIFICANT CHANGE UP (ref 3.8–10.5)
WBC UR QL: SIGNIFICANT CHANGE UP

## 2020-12-08 PROCEDURE — 99233 SBSQ HOSP IP/OBS HIGH 50: CPT | Mod: CS

## 2020-12-08 RX ADMIN — LAMOTRIGINE 25 MILLIGRAM(S): 25 TABLET, ORALLY DISINTEGRATING ORAL at 05:17

## 2020-12-08 RX ADMIN — Medication 25 MICROGRAM(S): at 05:17

## 2020-12-08 RX ADMIN — Medication 600 MILLIGRAM(S): at 05:17

## 2020-12-08 RX ADMIN — Medication 600 MILLIGRAM(S): at 17:34

## 2020-12-08 RX ADMIN — CITALOPRAM 10 MILLIGRAM(S): 10 TABLET, FILM COATED ORAL at 22:11

## 2020-12-08 RX ADMIN — Medication 6 MILLIGRAM(S): at 05:16

## 2020-12-08 RX ADMIN — PANTOPRAZOLE SODIUM 40 MILLIGRAM(S): 20 TABLET, DELAYED RELEASE ORAL at 05:17

## 2020-12-08 RX ADMIN — ENOXAPARIN SODIUM 40 MILLIGRAM(S): 100 INJECTION SUBCUTANEOUS at 11:52

## 2020-12-08 RX ADMIN — REMDESIVIR 500 MILLIGRAM(S): 5 INJECTION INTRAVENOUS at 17:34

## 2020-12-08 RX ADMIN — AMLODIPINE BESYLATE 5 MILLIGRAM(S): 2.5 TABLET ORAL at 05:17

## 2020-12-08 RX ADMIN — LAMOTRIGINE 25 MILLIGRAM(S): 25 TABLET, ORALLY DISINTEGRATING ORAL at 17:34

## 2020-12-08 NOTE — PROGRESS NOTE ADULT - SUBJECTIVE AND OBJECTIVE BOX
[facility] DIVISION of INFECTIOUS DISEASE  Salvatore Dennis MD PhD, Suzanne Hughes MD, Elvira Valdez MD, Fidel Rollins MD  and providing coverage with Mary Ann Lucas MD and Loren Fritz MD  Providing Infectious Disease Consultations at [facility], [facility], [facility]'s      Office# [phone] to schedule follow up appointments  Answering Service for urgent calls or New Consults [phone]  Cell# to text for urgent issues Salvatore Dennis [phone]     Infectious diseases progress note:    LINN WHITE is a 80y y. o. Female patient    COVID Patient    Allergies    cephalexin (Hives)  cephalexin (Unknown)    Intolerances    No seafood (Unknown)      ANTIBIOTICS/RELEVANT:  antimicrobials  remdesivir  IVPB   IV Intermittent   remdesivir  IVPB 100 milliGRAM(s) IV Intermittent every 24 hours    immunologic:    OTHER:  acetaminophen   Tablet .. 650 milliGRAM(s) Oral every 6 hours PRN  ALBUTerol    90 MICROgram(s) HFA Inhaler 2 Puff(s) Inhalation every 6 hours PRN  aluminum hydroxide/magnesium hydroxide/simethicone Suspension 30 milliLiter(s) Oral every 6 hours PRN  amLODIPine   Tablet 5 milliGRAM(s) Oral daily  citalopram 10 milliGRAM(s) Oral daily  dexAMETHasone  Injectable 6 milliGRAM(s) IV Push daily  enoxaparin Injectable 40 milliGRAM(s) SubCutaneous daily  guaiFENesin  milliGRAM(s) Oral every 12 hours  lamoTRIgine 25 milliGRAM(s) Oral two times a day  levothyroxine 25 MICROGram(s) Oral daily  ondansetron Injectable 4 milliGRAM(s) IV Push every 6 hours PRN  pantoprazole    Tablet 40 milliGRAM(s) Oral before breakfast  traMADol 25 milliGRAM(s) Oral every 6 hours PRN      Objective:  Vital Signs Last 24 Hrs  T(C): 36.8 (08 Dec 2020 07:30), Max: 36.8 (07 Dec 2020 12:37)  T(F): 98.3 (08 Dec 2020 07:30), Max: 98.3 (07 Dec 2020 12:37)  HR: 67 (08 Dec 2020 07:30) (59 - 67)  BP: 129/77 (08 Dec 2020 07:30) (102/61 - 133/77)  BP(mean): --  RR: 19 (08 Dec 2020 07:30) (16 - 19)  SpO2: 92% (08 Dec 2020 07:30) (90% - 95%)    T(C): 36.8 (12-08-20 @ 07:30), Max: 37.1 (12-07-20 @ 04:38)  T(C): 36.8 (12-08-20 @ 07:30), Max: 37.1 (12-07-20 @ 04:38)  T(C): 36.8 (12-08-20 @ 07:30), Max: 38.1 (12-04-20 @ 14:02)    PHYSICAL EXAM:  HEENT: NC atraumatic  Neck: supple  Respiratory: no accessory muscle use, breathing comfortably  Cardiovascular: distant  Gastrointestinal: normal appearing, nondistended  Extremities: no clubbing, no cyanosis,      LABS:                          11.0   7.03  )-----------( 327      ( 08 Dec 2020 07:01 )             32.6       7.03 12-08 @ 07:01  7.84 12-07 @ 07:41  7.96 12-06 @ 07:46  2.65 12-05 @ 06:43  4.85 12-04 @ 15:55  4.78 12-02 @ 12:50      12-08    139  |  104  |  18  ----------------------------<  94  3.9   |  26  |  0.88    Ca    8.7      08 Dec 2020 07:01  Mg     2.2     12-08    TPro  6.5  /  Alb  2.8<L>  /  TBili  0.3  /  DBili  .10  /  AST  17  /  ALT  20  /  AlkPhos  66  12-08      Creatinine, Serum: 0.88 mg/dL (12-08-20 @ 07:01)  Creatinine, Serum: 0.97 mg/dL (12-07-20 @ 07:41)  Creatinine, Serum: 0.99 mg/dL (12-06-20 @ 07:46)  Creatinine, Serum: 1.10 mg/dL (12-05-20 @ 06:43)  Creatinine, Serum: 1.20 mg/dL (12-04-20 @ 19:55)  Creatinine, Serum: 1.27 mg/dL (12-04-20 @ 15:55)  Creatinine, Serum: 1.30 mg/dL (12-02-20 @ 12:50)                COVID RISK SCORE  Auto Neutrophil #: 3.93 K/uL (12-08-20 @ 07:01)  Auto Lymphocyte #: 1.96 K/uL (12-08-20 @ 07:01)  Auto Neutrophil #: 4.33 K/uL (12-07-20 @ 07:41)  Auto Lymphocyte #: 2.33 K/uL (12-07-20 @ 07:41)  Auto Neutrophil #: 5.28 K/uL (12-06-20 @ 07:46)  Auto Lymphocyte #: 1.60 K/uL (12-06-20 @ 07:46)  Auto Neutrophil #: 1.46 K/uL (12-05-20 @ 06:43)  Auto Lymphocyte #: 0.72 K/uL (12-05-20 @ 06:43)  Auto Neutrophil #: 3.20 K/uL (12-04-20 @ 15:55)  Auto Lymphocyte #: 0.98 K/uL (12-04-20 @ 15:55)  Auto Lymphocyte #: 0.95 K/uL (12-02-20 @ 12:50)  Auto Neutrophil #: 3.08 K/uL (12-02-20 @ 12:50)    Lactate, Blood: 1.0 mmol/L (12-02-20 @ 12:50)    Auto Eosinophil #: 0.01 K/uL (12-08-20 @ 07:01)  Auto Eosinophil #: 0.00 K/uL (12-07-20 @ 07:41)  Auto Eosinophil #: 0.00 K/uL (12-06-20 @ 07:46)  Auto Eosinophil #: 0.00 K/uL (12-05-20 @ 06:43)  Auto Eosinophil #: 0.00 K/uL (12-04-20 @ 15:55)  Auto Eosinophil #: 0.00 K/uL (12-02-20 @ 12:50)    Lactate Dehydrogenase, Serum: 277 U/L (12-05-20 @ 12:26)    Sedimentation Rate, Erythrocyte: 41 mm/hr (12-08-20 @ 07:01)  Sedimentation Rate, Erythrocyte: 45 mm/hr (12-07-20 @ 07:41)  Sedimentation Rate, Erythrocyte: 44 mm/hr (12-06-20 @ 07:46)    Procalcitonin, Serum: 0.07 ng/mL (12-04-20 @ 21:38)    Troponin I, Serum: <.015 ng/mL (12-05-20 @ 16:45)    Creatine Kinase, Serum: 174 U/L (12-05-20 @ 16:45)        Ferritin, Serum: 241 ng/mL (12-05-20 @ 12:26)  Ferritin, Serum: 255 ng/mL (12-04-20 @ 22:27)  Ferritin, Serum: 149 ng/mL (12-03-20 @ 01:57)          D-Dimer Assay, Quantitative: 267 ng/mL DDU (12-06-20 @ 07:46)  D-Dimer Assay, Quantitative: 247 ng/mL DDU (12-05-20 @ 06:43)  D-Dimer Assay, Quantitative: 279 ng/mL DDU (12-04-20 @ 15:55)        MICROBIOLOGY:              RADIOLOGY & ADDITIONAL STUDIES:   Show Asc Variables: Yes

## 2020-12-08 NOTE — PROGRESS NOTE ADULT - ASSESSMENT
81yo female with pmhx of hypothyroid, depression, subdural hematoma 8/2020 2/2 fall adm with COVID+ suggested day of onset 11/25    RECOMMENDATIONS  12/4 NLR ~3, sats<94% on RA, rec REMDESIVIR, STEROIDs, caution with anticoagulation with prior subdural  12/5 NLR <3, NC-2L pt appears stable and comfortable  12/6 NLR <3 btwn NC and RA-cont current Rx  12/7 on RA-continue Rx, 12/8 last day of remdesivir and may be able to consider dc after Rx  12/8 btwn RA and 2L NC, last day of remdesivir, suggested day 13 of illness and can look at potential dc

## 2020-12-08 NOTE — PROGRESS NOTE ADULT - SUBJECTIVE AND OBJECTIVE BOX
Date/Time Patient Seen:  		  Referring MD:   Data Reviewed	       Patient is a 80y old  Female who presents with a chief complaint of COVID 19 Pneumonia (08 Dec 2020 08:27)      Subjective/HPI     PAST MEDICAL & SURGICAL HISTORY:  Vertigo    Depression    Hypothyroidism    Vertigo    Hypothyroidism    Depression    UTI (urinary tract infection)    No significant past surgical history  RIGHT KNEE ARTHROSCOPY    No significant past surgical history          Medication list         MEDICATIONS  (STANDING):  amLODIPine   Tablet 5 milliGRAM(s) Oral daily  citalopram 10 milliGRAM(s) Oral daily  dexAMETHasone  Injectable 6 milliGRAM(s) IV Push daily  enoxaparin Injectable 40 milliGRAM(s) SubCutaneous daily  guaiFENesin  milliGRAM(s) Oral every 12 hours  lamoTRIgine 25 milliGRAM(s) Oral two times a day  levothyroxine 25 MICROGram(s) Oral daily  pantoprazole    Tablet 40 milliGRAM(s) Oral before breakfast  remdesivir  IVPB   IV Intermittent   remdesivir  IVPB 100 milliGRAM(s) IV Intermittent every 24 hours    MEDICATIONS  (PRN):  acetaminophen   Tablet .. 650 milliGRAM(s) Oral every 6 hours PRN Temp greater or equal to 38C (100.4F), Mild Pain (1 - 3)  ALBUTerol    90 MICROgram(s) HFA Inhaler 2 Puff(s) Inhalation every 6 hours PRN Bronchospasm  aluminum hydroxide/magnesium hydroxide/simethicone Suspension 30 milliLiter(s) Oral every 6 hours PRN Dyspepsia  ondansetron Injectable 4 milliGRAM(s) IV Push every 6 hours PRN Nausea and/or Vomiting  traMADol 25 milliGRAM(s) Oral every 6 hours PRN Moderate Pain (4 - 6)         Vitals log        ICU Vital Signs Last 24 Hrs  T(C): 36.8 (08 Dec 2020 07:30), Max: 36.8 (07 Dec 2020 12:37)  T(F): 98.3 (08 Dec 2020 07:30), Max: 98.3 (07 Dec 2020 12:37)  HR: 67 (08 Dec 2020 07:30) (59 - 67)  BP: 129/77 (08 Dec 2020 07:30) (102/61 - 133/77)  BP(mean): --  ABP: --  ABP(mean): --  RR: 19 (08 Dec 2020 07:30) (16 - 19)  SpO2: 92% (08 Dec 2020 07:30) (90% - 95%)           Input and Output:  I&O's Detail      Lab Data                        11.0   7.03  )-----------( 327      ( 08 Dec 2020 07:01 )             32.6     12-08    139  |  104  |  18  ----------------------------<  94  3.9   |  26  |  0.88    Ca    8.7      08 Dec 2020 07:01  Mg     2.2     12-08    TPro  6.5  /  Alb  2.8<L>  /  TBili  0.3  /  DBili  .10  /  AST  17  /  ALT  20  /  AlkPhos  66  12-08            Review of Systems	      Objective     Physical Examination    heart s1s2  lung dec BS  abd soft      Pertinent Lab findings & Imaging      Pam:  NO   Adequate UO     I&O's Detail           Discussed with:     Cultures:	        Radiology

## 2020-12-08 NOTE — PROGRESS NOTE ADULT - SUBJECTIVE AND OBJECTIVE BOX
CHIEF COMPLAINT/INTERVAL HISTORY:  Pt. seen and evaluated for acute hypoxic respiratory failure 2/2 COVID 19 pneumonia.  Pt. is in no distress.  Denies SOB.  Maintaining SpO2 in low 90th% on 2L nasal canula.  Tolerating Remdesivir and Decadron.  Pt. reports having dysuria and urinary frequency.     REVIEW OF SYSTEMS:  No fever, CP, SOB, or abdominal pain.     Vital Signs Last 24 Hrs  T(C): 36.8 (08 Dec 2020 07:30), Max: 36.8 (07 Dec 2020 12:37)  T(F): 98.3 (08 Dec 2020 07:30), Max: 98.3 (07 Dec 2020 12:37)  HR: 67 (08 Dec 2020 07:30) (59 - 67)  BP: 129/77 (08 Dec 2020 07:30) (102/61 - 133/77)  BP(mean): --  RR: 19 (08 Dec 2020 07:30) (16 - 19)  SpO2: 92% (08 Dec 2020 07:30) (90% - 95%)    PHYSICAL EXAM:  GENERAL: NAD  HEENT: EOMI, hearing normal, conjunctiva and sclera clear  Chest: Diminished BS at bases, no wheezing  CV: S1S2, RRR,   GI: soft, +BS, NT/ND  Musculoskeletal: no edema  Psychiatric: affect nL, mood nL  Skin: warm and dry    LABS:                        11.0   7.03  )-----------( 327      ( 08 Dec 2020 07:01 )             32.6     12-08    139  |  104  |  18  ----------------------------<  94  3.9   |  26  |  0.88    Ca    8.7      08 Dec 2020 07:01  Mg     2.2     12-08    TPro  6.5  /  Alb  2.8<L>  /  TBili  0.3  /  DBili  .10  /  AST  17  /  ALT  20  /  AlkPhos  66  12-08          Assessment and Plan:  -Acute hypoxic respiratory failure 2/2 COVID 19 pneumonia:  continue Remdesivir (last dose today) and Decadron 6mg IV daily.  Albuterol inh Q6h PRN and Guaifenesin ER 600mg PO Q12h.  Titrate down O2 support.  Maintain SPO2>88%.  ID and Pulmonary f/u  -Dysuria:  Will check UA and urine cx.    -Hx of subdural hematoma:  continue Lamictal 25mg PO BID  -HTN:  continue Norvasc 5mg PO daily  -Hypothyroidism:  continue Synthroid 25mcg PO daily  -Depression:  continue Celexa 10mg PO daily  -VTE ppx:  Lovenox 40mg SQ daily

## 2020-12-09 DIAGNOSIS — F33.42 MAJOR DEPRESSIVE DISORDER, RECURRENT, IN FULL REMISSION: ICD-10-CM

## 2020-12-09 LAB
ALBUMIN SERPL ELPH-MCNC: 2.9 G/DL — LOW (ref 3.3–5)
ALBUMIN SERPL ELPH-MCNC: 3 G/DL — LOW (ref 3.3–5)
ALP SERPL-CCNC: 74 U/L — SIGNIFICANT CHANGE UP (ref 40–120)
ALP SERPL-CCNC: 83 U/L — SIGNIFICANT CHANGE UP (ref 40–120)
ALT FLD-CCNC: 23 U/L — SIGNIFICANT CHANGE UP (ref 12–78)
ALT FLD-CCNC: 24 U/L — SIGNIFICANT CHANGE UP (ref 12–78)
ANION GAP SERPL CALC-SCNC: 9 MMOL/L — SIGNIFICANT CHANGE UP (ref 5–17)
AST SERPL-CCNC: 16 U/L — SIGNIFICANT CHANGE UP (ref 15–37)
AST SERPL-CCNC: 19 U/L — SIGNIFICANT CHANGE UP (ref 15–37)
BASOPHILS # BLD AUTO: 0 K/UL — SIGNIFICANT CHANGE UP (ref 0–0.2)
BASOPHILS NFR BLD AUTO: 0 % — SIGNIFICANT CHANGE UP (ref 0–2)
BILIRUB DIRECT SERPL-MCNC: <.1 MG/DL — SIGNIFICANT CHANGE UP (ref 0.05–0.2)
BILIRUB INDIRECT FLD-MCNC: >0.1 MG/DL — LOW (ref 0.2–1)
BILIRUB SERPL-MCNC: 0.2 MG/DL — SIGNIFICANT CHANGE UP (ref 0.2–1.2)
BILIRUB SERPL-MCNC: 0.2 MG/DL — SIGNIFICANT CHANGE UP (ref 0.2–1.2)
BUN SERPL-MCNC: 29 MG/DL — HIGH (ref 7–23)
CALCIUM SERPL-MCNC: 8.6 MG/DL — SIGNIFICANT CHANGE UP (ref 8.5–10.1)
CHLORIDE SERPL-SCNC: 105 MMOL/L — SIGNIFICANT CHANGE UP (ref 96–108)
CO2 SERPL-SCNC: 26 MMOL/L — SIGNIFICANT CHANGE UP (ref 22–31)
CREAT SERPL-MCNC: 1.1 MG/DL — SIGNIFICANT CHANGE UP (ref 0.5–1.3)
CULTURE RESULTS: SIGNIFICANT CHANGE UP
D DIMER BLD IA.RAPID-MCNC: 199 NG/ML DDU — SIGNIFICANT CHANGE UP
EOSINOPHIL # BLD AUTO: 0 K/UL — SIGNIFICANT CHANGE UP (ref 0–0.5)
EOSINOPHIL NFR BLD AUTO: 0 % — SIGNIFICANT CHANGE UP (ref 0–6)
ERYTHROCYTE [SEDIMENTATION RATE] IN BLOOD: 38 MM/HR — HIGH (ref 0–20)
GLUCOSE SERPL-MCNC: 94 MG/DL — SIGNIFICANT CHANGE UP (ref 70–99)
HCT VFR BLD CALC: 34.4 % — LOW (ref 34.5–45)
HGB BLD-MCNC: 11.7 G/DL — SIGNIFICANT CHANGE UP (ref 11.5–15.5)
LYMPHOCYTES # BLD AUTO: 1.83 K/UL — SIGNIFICANT CHANGE UP (ref 1–3.3)
LYMPHOCYTES # BLD AUTO: 20 % — SIGNIFICANT CHANGE UP (ref 13–44)
MAGNESIUM SERPL-MCNC: 2 MG/DL — SIGNIFICANT CHANGE UP (ref 1.6–2.6)
MCHC RBC-ENTMCNC: 30.6 PG — SIGNIFICANT CHANGE UP (ref 27–34)
MCHC RBC-ENTMCNC: 34 GM/DL — SIGNIFICANT CHANGE UP (ref 32–36)
MCV RBC AUTO: 90.1 FL — SIGNIFICANT CHANGE UP (ref 80–100)
MONOCYTES # BLD AUTO: 1.28 K/UL — HIGH (ref 0–0.9)
MONOCYTES NFR BLD AUTO: 14 % — SIGNIFICANT CHANGE UP (ref 2–14)
NEUTROPHILS # BLD AUTO: 5.58 K/UL — SIGNIFICANT CHANGE UP (ref 1.8–7.4)
NEUTROPHILS NFR BLD AUTO: 61 % — SIGNIFICANT CHANGE UP (ref 43–77)
NRBC # BLD: SIGNIFICANT CHANGE UP /100 WBCS (ref 0–0)
PLATELET # BLD AUTO: 386 K/UL — SIGNIFICANT CHANGE UP (ref 150–400)
POTASSIUM SERPL-MCNC: 4 MMOL/L — SIGNIFICANT CHANGE UP (ref 3.5–5.3)
POTASSIUM SERPL-SCNC: 4 MMOL/L — SIGNIFICANT CHANGE UP (ref 3.5–5.3)
PROT SERPL-MCNC: 6.8 G/DL — SIGNIFICANT CHANGE UP (ref 6–8.3)
PROT SERPL-MCNC: 6.8 G/DL — SIGNIFICANT CHANGE UP (ref 6–8.3)
RBC # BLD: 3.82 M/UL — SIGNIFICANT CHANGE UP (ref 3.8–5.2)
RBC # FLD: 12.8 % — SIGNIFICANT CHANGE UP (ref 10.3–14.5)
SODIUM SERPL-SCNC: 140 MMOL/L — SIGNIFICANT CHANGE UP (ref 135–145)
SPECIMEN SOURCE: SIGNIFICANT CHANGE UP
WBC # BLD: 9.15 K/UL — SIGNIFICANT CHANGE UP (ref 3.8–10.5)
WBC # FLD AUTO: 9.15 K/UL — SIGNIFICANT CHANGE UP (ref 3.8–10.5)

## 2020-12-09 PROCEDURE — 99232 SBSQ HOSP IP/OBS MODERATE 35: CPT | Mod: CS

## 2020-12-09 PROCEDURE — 99233 SBSQ HOSP IP/OBS HIGH 50: CPT | Mod: CS

## 2020-12-09 RX ORDER — ALPRAZOLAM 0.25 MG
0.25 TABLET ORAL ONCE
Refills: 0 | Status: DISCONTINUED | OUTPATIENT
Start: 2020-12-09 | End: 2020-12-09

## 2020-12-09 RX ADMIN — AMLODIPINE BESYLATE 5 MILLIGRAM(S): 2.5 TABLET ORAL at 05:32

## 2020-12-09 RX ADMIN — Medication 0.25 MILLIGRAM(S): at 12:27

## 2020-12-09 RX ADMIN — Medication 6 MILLIGRAM(S): at 05:32

## 2020-12-09 RX ADMIN — LAMOTRIGINE 25 MILLIGRAM(S): 25 TABLET, ORALLY DISINTEGRATING ORAL at 05:32

## 2020-12-09 RX ADMIN — Medication 25 MICROGRAM(S): at 05:32

## 2020-12-09 RX ADMIN — CITALOPRAM 10 MILLIGRAM(S): 10 TABLET, FILM COATED ORAL at 21:33

## 2020-12-09 RX ADMIN — PANTOPRAZOLE SODIUM 40 MILLIGRAM(S): 20 TABLET, DELAYED RELEASE ORAL at 05:32

## 2020-12-09 RX ADMIN — ENOXAPARIN SODIUM 40 MILLIGRAM(S): 100 INJECTION SUBCUTANEOUS at 12:27

## 2020-12-09 RX ADMIN — Medication 600 MILLIGRAM(S): at 18:22

## 2020-12-09 RX ADMIN — Medication 600 MILLIGRAM(S): at 05:32

## 2020-12-09 NOTE — PROGRESS NOTE ADULT - SUBJECTIVE AND OBJECTIVE BOX
[facility] DIVISION of INFECTIOUS DISEASE  Salvatore Dennis MD PhD, Suzanne Hughes MD, Elvira Valdez MD, Fidel Rollins MD  and providing coverage with Mary Ann Lucas MD and Loren Fritz MD  Providing Infectious Disease Consultations at [facility], [facility], [facility]'s      Office# [phone] to schedule follow up appointments  Answering Service for urgent calls or New Consults [phone]  Cell# to text for urgent issues Salvatore Dennis [phone]     Infectious diseases progress note:    LINN WHITE is a 80y y. o. Female patient    COVID Patient    Allergies    cephalexin (Hives)  cephalexin (Unknown)    Intolerances    No seafood (Unknown)      ANTIBIOTICS/RELEVANT:  antimicrobials    immunologic:    OTHER:  acetaminophen   Tablet .. 650 milliGRAM(s) Oral every 6 hours PRN  ALBUTerol    90 MICROgram(s) HFA Inhaler 2 Puff(s) Inhalation every 6 hours PRN  aluminum hydroxide/magnesium hydroxide/simethicone Suspension 30 milliLiter(s) Oral every 6 hours PRN  amLODIPine   Tablet 5 milliGRAM(s) Oral daily  citalopram 10 milliGRAM(s) Oral daily  dexAMETHasone  Injectable 6 milliGRAM(s) IV Push daily  enoxaparin Injectable 40 milliGRAM(s) SubCutaneous daily  guaiFENesin  milliGRAM(s) Oral every 12 hours  lamoTRIgine 25 milliGRAM(s) Oral two times a day  levothyroxine 25 MICROGram(s) Oral daily  ondansetron Injectable 4 milliGRAM(s) IV Push every 6 hours PRN  pantoprazole    Tablet 40 milliGRAM(s) Oral before breakfast  traMADol 25 milliGRAM(s) Oral every 6 hours PRN      Objective:  Vital Signs Last 24 Hrs  T(C): 36.9 (09 Dec 2020 08:11), Max: 36.9 (09 Dec 2020 00:36)  T(F): 98.5 (09 Dec 2020 08:11), Max: 98.5 (09 Dec 2020 00:36)  HR: 98 (09 Dec 2020 11:06) (64 - 98)  BP: 122/72 (09 Dec 2020 11:06) (100/62 - 132/68)  BP(mean): --  RR: 18 (09 Dec 2020 11:06) (16 - 18)  SpO2: 97% (09 Dec 2020 11:06) (93% - 97%)    T(C): 36.9 (20 @ 08:11), Max: 36.9 (20 @ 00:36)  T(C): 36.9 (20 @ 08:11), Max: 37.1 (20 @ 04:38)  T(C): 36.9 (20 @ 08:11), Max: 37.1 (20 @ 04:38)    PHYSICAL EXAM:  HEENT: NC atraumatic  Neck: supple  Respiratory: no accessory muscle use, breathing comfortably  Cardiovascular: distant  Gastrointestinal: normal appearing, nondistended  Extremities: no clubbing, no cyanosis,      LABS:                          11.7   9.15  )-----------( 386      ( 09 Dec 2020 06:40 )             34.4       9.15  06:40  7.03  @ 07:01  7.84  07:41  7.96  @ 07:46  2.65  @ 06:43  4.85  @ 15:55  4.78  @ 12:50          140  |  105  |  29<H>  ----------------------------<  94  4.0   |  26  |  1.10    Ca    8.6      09 Dec 2020 06:40  Mg     2.0         TPro  6.8  /  Alb  3.0<L>  /  TBili  0.2  /  DBili  <.10  /  AST  19  /  ALT  23  /  AlkPhos  74        Creatinine, Serum: 1.10 mg/dL (20 @ 06:40)  Creatinine, Serum: 0.88 mg/dL (20 @ 07:01)  Creatinine, Serum: 0.97 mg/dL (20 @ 07:41)  Creatinine, Serum: 0.99 mg/dL (20 @ 07:46)  Creatinine, Serum: 1.10 mg/dL (20 @ 06:43)  Creatinine, Serum: 1.20 mg/dL (20 @ 19:55)  Creatinine, Serum: 1.27 mg/dL (20 @ 15:55)  Creatinine, Serum: 1.30 mg/dL (20 @ 12:50)        Urinalysis Basic - ( 08 Dec 2020 16:56 )    Color: Yellow / Appearance: Clear / S.015 / pH: x  Gluc: x / Ketone: Negative  / Bili: Negative / Urobili: Negative   Blood: x / Protein: 15 / Nitrite: Negative   Leuk Esterase: Moderate / RBC: 3-5 /HPF / WBC 3-5   Sq Epi: x / Non Sq Epi: Few / Bacteria: Few            COVID RISK SCORE  Auto Neutrophil #: 5.58 K/uL (20 @ 06:40)  Auto Lymphocyte #: 1.83 K/uL (20 @ 06:40)  Auto Neutrophil #: 3.93 K/uL (20 @ 07:01)  Auto Lymphocyte #: 1.96 K/uL (20 @ 07:01)  Auto Neutrophil #: 4.33 K/uL (20 @ 07:41)  Auto Lymphocyte #: 2.33 K/uL (20 @ 07:41)  Auto Neutrophil #: 5.28 K/uL (20 @ 07:46)  Auto Lymphocyte #: 1.60 K/uL (20 @ 07:46)  Auto Neutrophil #: 1.46 K/uL (20 @ 06:43)  Auto Lymphocyte #: 0.72 K/uL (20 @ 06:43)  Auto Neutrophil #: 3.20 K/uL (20 @ 15:55)  Auto Lymphocyte #: 0.98 K/uL (20 @ 15:55)  Auto Lymphocyte #: 0.95 K/uL (20 @ 12:50)  Auto Neutrophil #: 3.08 K/uL (20 @ 12:50)    Lactate, Blood: 1.0 mmol/L (20 @ 12:50)    Auto Eosinophil #: 0.00 K/uL (20 @ 06:40)  Auto Eosinophil #: 0.01 K/uL (20 @ 07:01)  Auto Eosinophil #: 0.00 K/uL (20 @ 07:41)  Auto Eosinophil #: 0.00 K/uL (20 @ 07:46)  Auto Eosinophil #: 0.00 K/uL (20 @ 06:43)  Auto Eosinophil #: 0.00 K/uL (20 @ 15:55)  Auto Eosinophil #: 0.00 K/uL (20 @ 12:50)    Lactate Dehydrogenase, Serum: 277 U/L (20 @ 12:26)    Sedimentation Rate, Erythrocyte: 38 mm/hr (20 @ 06:40)  Sedimentation Rate, Erythrocyte: 41 mm/hr (20 @ 07:01)  Sedimentation Rate, Erythrocyte: 45 mm/hr (20 @ 07:41)  Sedimentation Rate, Erythrocyte: 44 mm/hr (20 @ 07:46)    Procalcitonin, Serum: 0.07 ng/mL (20 @ 21:38)    Troponin I, Serum: <.015 ng/mL (20 @ 16:45)    Creatine Kinase, Serum: 174 U/L (20 @ 16:45)        Ferritin, Serum: 241 ng/mL (20 @ 12:26)  Ferritin, Serum: 255 ng/mL (20 @ 22:27)  Ferritin, Serum: 149 ng/mL (20 @ 01:57)          D-Dimer Assay, Quantitative: 199 ng/mL DDU (20 @ 06:40)  D-Dimer Assay, Quantitative: 267 ng/mL DDU (20 @ 07:46)  D-Dimer Assay, Quantitative: 247 ng/mL DDU (20 @ 06:43)  D-Dimer Assay, Quantitative: 279 ng/mL DDU (20 @ 15:55)        MICROBIOLOGY:              RADIOLOGY & ADDITIONAL STUDIES:

## 2020-12-09 NOTE — PROGRESS NOTE ADULT - ASSESSMENT
Assessment and Plan:  -Acute hypoxic respiratory failure 2/2 COVID 19 pneumonia:  completed Remdesivir, and Decadron 6mg IV daily.  Albuterol inh Q6h PRN and Guaifenesin ER 600mg PO Q12h.  Titrate down O2 support- now on RA.  Maintain SPO2>88%.  ID and Pulmonary f/u  -Dysuria:  Will check UA and urine cx.    -Hx of subdural hematoma:  continue Lamictal 25mg PO BID  -HTN:  continue Norvasc 5mg PO daily  -Hypothyroidism:  continue Synthroid 25mcg PO daily  -Depression:  continue Celexa 10mg PO daily, psych consulted, no objection to d/c  -VTE ppx:  Lovenox 40mg SQ daily

## 2020-12-09 NOTE — BEHAVIORAL HEALTH ASSESSMENT NOTE - NSBHCHARTREVIEWINVESTIGATE_PSY_A_CORE FT
< from: 12 Lead ECG (12.05.20 @ 14:06) >    Ventricular Rate 62 BPM    Atrial Rate 62 BPM    P-R Interval 140 ms    QRS Duration 96 ms    Q-T Interval 436 ms    QTC Calculation(Bazett) 442 ms    P Axis 77 degrees    R Axis -42 degrees    T Axis 26 degrees    Diagnosis Line Normal sinus rhythm  Left axis deviation  Septal infarct (cited on or before 05-DEC-2020)    Confirmed by INNA VENTURA ([id number]) on 12/6/2020 11:34:47 AM    < end of copied text >

## 2020-12-09 NOTE — BEHAVIORAL HEALTH ASSESSMENT NOTE - NS ED [facility] REVIEW OF ED CHART AVAILABLE IMAGING REVIEWED
Ordered Lipid, Microalbumin, and Hemoglobin A1C, but do not see Lyme's indicated, will route to Dr. Benedict to review.    JETHRO Casas    
Reason for Call:  Other call back    Detailed comments: wants orders placed for fasting labs and lyme's and A1C appointment made for her diabetic check 05.21.2018  Please call when orders placed so she will able to make a lab appointment    Phone Number Patient can be reached at: Home number on file [phone] (home)    Best Time: anytime    Can we leave a detailed message on this number? Not Applicable    Call taken on 5/11/2018 at 12:54 PM by Coco Tejeda      
Yes
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
## 2020-12-09 NOTE — BEHAVIORAL HEALTH ASSESSMENT NOTE - SUMMARY
79 y/o DW female, domiciled, no prior psychiatric hospitalizations, history of chronic depression, with outpatient treatment for 30 years, with pmhx of hypothyroid, depression, subdural hematoma 8/2020 2/2 fall presents to [facility] with C/C of cough, low grade temp.     IMP: MDD recurrent currently in remmision    REC: At this time patient is psychiatrically stable

## 2020-12-09 NOTE — BEHAVIORAL HEALTH ASSESSMENT NOTE - NSBHCHARTREVIEWIMAGING_PSY_A_CORE FT
< from: CT Head No Cont (12.04.20 @ 19:07) >    IMPRESSION: No acute intracranial hemorrhage.            MELI REYNOSO MD; Attending Radiologist  This document has been electronically signed. Dec  4 2020  7:30PM    < end of copied text >

## 2020-12-09 NOTE — BEHAVIORAL HEALTH ASSESSMENT NOTE - NSBHCHARTREVIEWVS_PSY_A_CORE FT
Vital Signs Last 24 Hrs  T(C): 36.9 (09 Dec 2020 08:11), Max: 36.9 (09 Dec 2020 00:36)  T(F): 98.5 (09 Dec 2020 08:11), Max: 98.5 (09 Dec 2020 00:36)  HR: 104 (09 Dec 2020 11:50) (64 - 104)  BP: 118/75 (09 Dec 2020 11:50) (100/62 - 132/68)  BP(mean): --  RR: 20 (09 Dec 2020 11:50) (16 - 20)  SpO2: 94% (09 Dec 2020 11:50) (93% - 97%)

## 2020-12-09 NOTE — BEHAVIORAL HEALTH ASSESSMENT NOTE - OTHER PAST PSYCHIATRIC HISTORY (INCLUDE DETAILS REGARDING ONSET, COURSE OF ILLNESS, INPATIENT/OUTPATIENT TREATMENT)
No psychiatric hospitalizations, has been treated on an outpatient basis by her psychiatrist Dr. Monzon for over 30 years.

## 2020-12-09 NOTE — BEHAVIORAL HEALTH ASSESSMENT NOTE - SUICIDE PROTECTIVE FACTORS
Identifies reasons for living/Has future plans/Responsibility to family and others/Fear of death or the actual act of killing self

## 2020-12-09 NOTE — PROGRESS NOTE ADULT - SUBJECTIVE AND OBJECTIVE BOX
INTERVAL HPI/OVERNIGHT EVENTS:  Patient seen and examined at bedside. Patient very anxious on my exam, RN present at bedside. Patient repeating "I have COVID". Patient denies any CP, abd pain, SOB. Patient states she doesn't know how she will take care of herself at home.  PT evaluated patient, no skilled PT needs and as per , patient had informed her yesterday she is independent of most ADLs.    ROS: Unable to obtain as patient anxious, not participating in my questions    MEDICATIONS  (STANDING):  amLODIPine   Tablet 5 milliGRAM(s) Oral daily  citalopram 10 milliGRAM(s) Oral daily  dexAMETHasone  Injectable 6 milliGRAM(s) IV Push daily  enoxaparin Injectable 40 milliGRAM(s) SubCutaneous daily  guaiFENesin  milliGRAM(s) Oral every 12 hours  lamoTRIgine 25 milliGRAM(s) Oral two times a day  levothyroxine 25 MICROGram(s) Oral daily  pantoprazole    Tablet 40 milliGRAM(s) Oral before breakfast    MEDICATIONS  (PRN):  acetaminophen   Tablet .. 650 milliGRAM(s) Oral every 6 hours PRN Temp greater or equal to 38C (100.4F), Mild Pain (1 - 3)  ALBUTerol    90 MICROgram(s) HFA Inhaler 2 Puff(s) Inhalation every 6 hours PRN Bronchospasm  aluminum hydroxide/magnesium hydroxide/simethicone Suspension 30 milliLiter(s) Oral every 6 hours PRN Dyspepsia  ondansetron Injectable 4 milliGRAM(s) IV Push every 6 hours PRN Nausea and/or Vomiting  traMADol 25 milliGRAM(s) Oral every 6 hours PRN Moderate Pain (4 - 6)      Allergies    cephalexin (Hives)  cephalexin (Unknown)    Intolerances    No seafood (Unknown)      Vital Signs Last 24 Hrs  T(C): 36.8 (09 Dec 2020 19:42), Max: 36.9 (09 Dec 2020 00:36)  T(F): 98.3 (09 Dec 2020 19:42), Max: 98.5 (09 Dec 2020 00:36)  HR: 87 (09 Dec 2020 19:42) (64 - 106)  BP: 122/73 (09 Dec 2020 19:42) (118/75 - 132/68)  BP(mean): --  RR: 17 (09 Dec 2020 19:42) (16 - 20)  SpO2: 93% (09 Dec 2020 19:42) (93% - 97%)     @ 07:  -   @ 07:00  --------------------------------------------------------  IN: 770 mL / OUT: 0 mL / NET: 770 mL     @ 07:01  -   @ 23:15  --------------------------------------------------------  IN: 250 mL / OUT: 0 mL / NET: 250 mL      Physical Exam:  General: anxious appearing  Neurology: A&Ox3, nonfocal, STRANGE x 4  Respiratory: CTA B/L  CV: RRR, S1S2  Abdominal: Soft, NT, ND +BS  Extremities: No edema, + peripheral pulses      LABS:                        11.7   9.15  )-----------( 386      ( 09 Dec 2020 06:40 )             34.4     12-    140  |  105  |  29<H>  ----------------------------<  94  4.0   |  26  |  1.10    Ca    8.6      09 Dec 2020 06:40  Mg     2.0         TPro  6.8  /  Alb  3.0<L>  /  TBili  0.2  /  DBili  <.10  /  AST  19  /  ALT  23  /  AlkPhos  74  12-      Urinalysis Basic - ( 08 Dec 2020 16:56 )    Color: Yellow / Appearance: Clear / S.015 / pH: x  Gluc: x / Ketone: Negative  / Bili: Negative / Urobili: Negative   Blood: x / Protein: 15 / Nitrite: Negative   Leuk Esterase: Moderate / RBC: 3-5 /HPF / WBC 3-5   Sq Epi: x / Non Sq Epi: Few / Bacteria: Few        RADIOLOGY & ADDITIONAL TESTS:

## 2020-12-09 NOTE — BEHAVIORAL HEALTH ASSESSMENT NOTE - RISK ASSESSMENT
Low Acute Suicide Risk 79 y/o DWF, with limited support, long history of depression, currently future-oriented and worried about possibility of dying

## 2020-12-09 NOTE — PROGRESS NOTE ADULT - ASSESSMENT
79yo female with pmhx of hypothyroid, depression, subdural hematoma 8/2020 2/2 fall adm with COVID+ suggested day of onset 11/25    RECOMMENDATIONS  12/4 NLR ~3, sats<94% on RA, rec REMDESIVIR, STEROIDs, caution with anticoagulation with prior subdural  12/5 NLR <3, NC-2L pt appears stable and comfortable  12/6 NLR <3 btwn NC and RA-cont current Rx  12/7 on RA-continue Rx, 12/8 last day of remdesivir and may be able to consider dc after Rx  12/8 btwn RA and 2L NC, last day of remdesivir, suggested day 13 of illness and can look at potential dc  12/9 NLR 5.58/1.83=<3, 97% on RA, can look at dc planning    When pt is doing well and  past the critical second week when decompensation can occur and has started to improve, fine to consider discharge planning with for early but safe discharge. Discharge with oxygen (4L or less and able to keep sats>90) and even persistent fever at time of discharge is reasonable. With high risk for thromboembolic disease  consider dc on  rivaroxaban (Xarelto) or Eliquis (apixaban).  For outpt mild disease considered noninfectious at day 10 after onset of illness but for hospitalized patients day 20. If pt going to facility or to dc isolation in house then will require 2 negative PCR tests  by a minimum of 24 hours and fever free without antipyretics for >24hrs and more than 10 days from first positive test.   79yo female with pmhx of hypothyroid, depression, subdural hematoma 8/2020 2/2 fall adm with COVID+ suggested day of onset 11/25    RECOMMENDATIONS  12/4 NLR ~3, sats<94% on RA, rec REMDESIVIR, STEROIDs, caution with anticoagulation with prior subdural  12/5 NLR <3, NC-2L pt appears stable and comfortable  12/6 NLR <3 btwn NC and RA-cont current Rx  12/7 on RA-continue Rx, 12/8 last day of remdesivir and may be able to consider dc after Rx  12/8 btwn RA and 2L NC, last day of remdesivir, suggested day 13 of illness and can look at potential dc  12/9 NLR 5.58/1.83=<3, 97% on RA, can look at dc planning    When pt is doing well and  past the critical second week when decompensation can occur and has started to improve, fine to consider discharge planning with for early but safe discharge. Discharge with oxygen (4L or less and able to keep sats>90) and even persistent fever at time of discharge is reasonable. With high risk for thromboembolic disease we usually consider dc on  rivaroxaban (Xarelto) or Eliquis (apixaban) but with prior subdural rec ASA 325mg PO Q-day as discussed w primary team  For outpt mild disease considered noninfectious at day 10 after onset of illness but for hospitalized patients day 20. If pt going to facility or to dc isolation in house then will require 2 negative PCR tests  by a minimum of 24 hours and fever free without antipyretics for >24hrs and more than 10 days from first positive test.    Thank you for consulting us and involving us in the management of this most interesting and challenging case.  Please call us at [phone] or text me directly on my cell#[phone] with any concerns or further questions.

## 2020-12-09 NOTE — PROGRESS NOTE ADULT - SUBJECTIVE AND OBJECTIVE BOX
Date/Time Patient Seen:  		  Referring MD:   Data Reviewed	       Patient is a 80y old  Female who presents with a chief complaint of COVID 19 Pneumonia (08 Dec 2020 10:29)      Subjective/HPI     PAST MEDICAL & SURGICAL HISTORY:  Vertigo    Depression    Hypothyroidism    Vertigo    Hypothyroidism    Depression    UTI (urinary tract infection)    No significant past surgical history  RIGHT KNEE ARTHROSCOPY    No significant past surgical history          Medication list         MEDICATIONS  (STANDING):  amLODIPine   Tablet 5 milliGRAM(s) Oral daily  citalopram 10 milliGRAM(s) Oral daily  dexAMETHasone  Injectable 6 milliGRAM(s) IV Push daily  enoxaparin Injectable 40 milliGRAM(s) SubCutaneous daily  guaiFENesin  milliGRAM(s) Oral every 12 hours  lamoTRIgine 25 milliGRAM(s) Oral two times a day  levothyroxine 25 MICROGram(s) Oral daily  pantoprazole    Tablet 40 milliGRAM(s) Oral before breakfast    MEDICATIONS  (PRN):  acetaminophen   Tablet .. 650 milliGRAM(s) Oral every 6 hours PRN Temp greater or equal to 38C (100.4F), Mild Pain (1 - 3)  ALBUTerol    90 MICROgram(s) HFA Inhaler 2 Puff(s) Inhalation every 6 hours PRN Bronchospasm  aluminum hydroxide/magnesium hydroxide/simethicone Suspension 30 milliLiter(s) Oral every 6 hours PRN Dyspepsia  ondansetron Injectable 4 milliGRAM(s) IV Push every 6 hours PRN Nausea and/or Vomiting  traMADol 25 milliGRAM(s) Oral every 6 hours PRN Moderate Pain (4 - 6)         Vitals log        ICU Vital Signs Last 24 Hrs  T(C): 36.9 (09 Dec 2020 08:11), Max: 36.9 (09 Dec 2020 00:36)  T(F): 98.5 (09 Dec 2020 08:11), Max: 98.5 (09 Dec 2020 00:36)  HR: 64 (09 Dec 2020 08:11) (64 - 81)  BP: 123/68 (09 Dec 2020 08:11) (100/62 - 132/68)  BP(mean): --  ABP: --  ABP(mean): --  RR: 16 (09 Dec 2020 08:11) (16 - 18)  SpO2: 93% (09 Dec 2020 08:11) (93% - 97%)           Input and Output:  I&O's Detail    08 Dec 2020 07:01  -  09 Dec 2020 07:00  --------------------------------------------------------  IN:    IV PiggyBack: 250 mL    Oral Fluid: 520 mL  Total IN: 770 mL    OUT:  Total OUT: 0 mL    Total NET: 770 mL          Lab Data                        11.7   9.15  )-----------( 386      ( 09 Dec 2020 06:40 )             34.4     12-09    140  |  105  |  29<H>  ----------------------------<  94  4.0   |  26  |  1.10    Ca    8.6      09 Dec 2020 06:40  Mg     2.0     12-09    TPro  6.8  /  Alb  3.0<L>  /  TBili  0.2  /  DBili  <.10  /  AST  19  /  ALT  23  /  AlkPhos  74  12-09            Review of Systems	      Objective     Physical Examination    heart s1s2  lung dc BS  abd soft      Pertinent Lab findings & Imaging      Pam:  NO   Adequate UO     I&O's Detail    08 Dec 2020 07:01  -  09 Dec 2020 07:00  --------------------------------------------------------  IN:    IV PiggyBack: 250 mL    Oral Fluid: 520 mL  Total IN: 770 mL    OUT:  Total OUT: 0 mL    Total NET: 770 mL               Discussed with:     Cultures:	        Radiology

## 2020-12-09 NOTE — BEHAVIORAL HEALTH ASSESSMENT NOTE - NSBHCHARTREVIEWLAB_PSY_A_CORE FT
11.7   9.15  )-----------( 386      ( 09 Dec 2020 06:40 )             34.4   12-09    140  |  105  |  29<H>  ----------------------------<  94  4.0   |  26  |  1.10    Ca    8.6      09 Dec 2020 06:40  Mg     2.0     12-09    TPro  6.8  /  Alb  3.0<L>  /  TBili  0.2  /  DBili  <.10  /  AST  19  /  ALT  23  /  AlkPhos  74  12-09

## 2020-12-10 DIAGNOSIS — U07.1 COVID-19: ICD-10-CM

## 2020-12-10 LAB
ALBUMIN SERPL ELPH-MCNC: 3.2 G/DL — LOW (ref 3.3–5)
ALP SERPL-CCNC: 82 U/L — SIGNIFICANT CHANGE UP (ref 40–120)
ALT FLD-CCNC: 25 U/L — SIGNIFICANT CHANGE UP (ref 12–78)
AST SERPL-CCNC: 18 U/L — SIGNIFICANT CHANGE UP (ref 15–37)
BILIRUB DIRECT SERPL-MCNC: 0.1 MG/DL — SIGNIFICANT CHANGE UP (ref 0.05–0.2)
BILIRUB INDIRECT FLD-MCNC: 0.1 MG/DL — LOW (ref 0.2–1)
BILIRUB SERPL-MCNC: 0.2 MG/DL — SIGNIFICANT CHANGE UP (ref 0.2–1.2)
CREAT SERPL-MCNC: 1 MG/DL — SIGNIFICANT CHANGE UP (ref 0.5–1.3)
PROT SERPL-MCNC: 7.3 G/DL — SIGNIFICANT CHANGE UP (ref 6–8.3)

## 2020-12-10 PROCEDURE — 99233 SBSQ HOSP IP/OBS HIGH 50: CPT | Mod: CS

## 2020-12-10 RX ORDER — ALPRAZOLAM 0.25 MG
0.25 TABLET ORAL ONCE
Refills: 0 | Status: DISCONTINUED | OUTPATIENT
Start: 2020-12-10 | End: 2020-12-10

## 2020-12-10 RX ADMIN — Medication 30 MILLILITER(S): at 12:04

## 2020-12-10 RX ADMIN — Medication 650 MILLIGRAM(S): at 11:38

## 2020-12-10 RX ADMIN — Medication 600 MILLIGRAM(S): at 17:38

## 2020-12-10 RX ADMIN — Medication 6 MILLIGRAM(S): at 05:48

## 2020-12-10 RX ADMIN — AMLODIPINE BESYLATE 5 MILLIGRAM(S): 2.5 TABLET ORAL at 05:47

## 2020-12-10 RX ADMIN — ENOXAPARIN SODIUM 40 MILLIGRAM(S): 100 INJECTION SUBCUTANEOUS at 11:38

## 2020-12-10 RX ADMIN — CITALOPRAM 10 MILLIGRAM(S): 10 TABLET, FILM COATED ORAL at 21:32

## 2020-12-10 RX ADMIN — PANTOPRAZOLE SODIUM 40 MILLIGRAM(S): 20 TABLET, DELAYED RELEASE ORAL at 05:47

## 2020-12-10 RX ADMIN — Medication 25 MICROGRAM(S): at 05:47

## 2020-12-10 RX ADMIN — Medication 0.25 MILLIGRAM(S): at 14:34

## 2020-12-10 RX ADMIN — Medication 600 MILLIGRAM(S): at 05:47

## 2020-12-10 RX ADMIN — LAMOTRIGINE 25 MILLIGRAM(S): 25 TABLET, ORALLY DISINTEGRATING ORAL at 05:47

## 2020-12-10 RX ADMIN — Medication 650 MILLIGRAM(S): at 12:30

## 2020-12-10 NOTE — PROGRESS NOTE ADULT - SUBJECTIVE AND OBJECTIVE BOX
INTERVAL HPI/OVERNIGHT EVENTS:    MEDICATIONS  (STANDING):  amLODIPine   Tablet 5 milliGRAM(s) Oral daily  citalopram 10 milliGRAM(s) Oral daily  dexAMETHasone  Injectable 6 milliGRAM(s) IV Push daily  enoxaparin Injectable 40 milliGRAM(s) SubCutaneous daily  guaiFENesin  milliGRAM(s) Oral every 12 hours  lamoTRIgine 25 milliGRAM(s) Oral two times a day  levothyroxine 25 MICROGram(s) Oral daily  pantoprazole    Tablet 40 milliGRAM(s) Oral before breakfast    MEDICATIONS  (PRN):  acetaminophen   Tablet .. 650 milliGRAM(s) Oral every 6 hours PRN Temp greater or equal to 38C (100.4F), Mild Pain (1 - 3)  ALBUTerol    90 MICROgram(s) HFA Inhaler 2 Puff(s) Inhalation every 6 hours PRN Bronchospasm  aluminum hydroxide/magnesium hydroxide/simethicone Suspension 30 milliLiter(s) Oral every 6 hours PRN Dyspepsia  ondansetron Injectable 4 milliGRAM(s) IV Push every 6 hours PRN Nausea and/or Vomiting  traMADol 25 milliGRAM(s) Oral every 6 hours PRN Moderate Pain (4 - 6)      Allergies    cephalexin (Hives)  cephalexin (Unknown)    Intolerances    No seafood (Unknown)      CONSTITUTIONAL: No weakness, fevers or chills  EYES/ENT: No visual changes;  No vertigo or throat pain   NECK: No pain or stiffness  RESPIRATORY: No cough, wheezing, hemoptysis; No shortness of breath  CARDIOVASCULAR: No chest pain or palpitations  GASTROINTESTINAL: No abdominal or epigastric pain. No nausea, vomiting, or hematemesis; No diarrhea or constipation. No melena or hematochezia.  GENITOURINARY: No dysuria, frequency or hematuria  NEUROLOGICAL: No numbness or weakness  SKIN: No itching, burning, rashes, or lesions   All other review of systems is negative unless indicated above.    Vital Signs Last 24 Hrs  T(C): 36.5 (10 Dec 2020 11:45), Max: 36.8 (09 Dec 2020 19:42)  T(F): 97.7 (10 Dec 2020 11:45), Max: 98.3 (09 Dec 2020 19:42)  HR: 92 (10 Dec 2020 11:45) (61 - 106)  BP: 104/65 (10 Dec 2020 11:45) (104/65 - 128/72)  BP(mean): --  RR: 17 (10 Dec 2020 11:45) (17 - 19)  SpO2: 92% (10 Dec 2020 11:45) (91% - 97%)    12 @ 07:01  -  12-10 @ 07:00  --------------------------------------------------------  IN: 250 mL / OUT: 0 mL / NET: 250 mL      Physical Exam:  General: anxious appearing  Neurology: A&Ox3, nonfocal, STRANGE x 4  Respiratory: CTA B/L  CV: RRR, S1S2  Abdominal: Soft, NT, ND +BS  Extremities: No edema, + peripheral pulses        LABS:                        11.7   9.15  )-----------( 386      ( 09 Dec 2020 06:40 )             34.4     12-10    x   |  x   |  x   ----------------------------<  x   x    |  x   |  1.00    Ca    8.6      09 Dec 2020 06:40  Mg     2.0     12-09    TPro  7.3  /  Alb  3.2<L>  /  TBili  0.2  /  DBili  .10  /  AST  18  /  ALT  25  /  AlkPhos  82  12-10      Urinalysis Basic - ( 08 Dec 2020 16:56 )    Color: Yellow / Appearance: Clear / S.015 / pH: x  Gluc: x / Ketone: Negative  / Bili: Negative / Urobili: Negative   Blood: x / Protein: 15 / Nitrite: Negative   Leuk Esterase: Moderate / RBC: 3-5 /HPF / WBC 3-5   Sq Epi: x / Non Sq Epi: Few / Bacteria: Few        RADIOLOGY & ADDITIONAL TESTS: INTERVAL HPI/OVERNIGHT EVENTS:  Patient seen and examined at bedside. States she feels weak, complaining of mild abdominal pain today. States she has flight of stairs to get into her house and is not sure if she would be able to manage that, PT consulted.     MEDICATIONS  (STANDING):  amLODIPine   Tablet 5 milliGRAM(s) Oral daily  citalopram 10 milliGRAM(s) Oral daily  dexAMETHasone  Injectable 6 milliGRAM(s) IV Push daily  enoxaparin Injectable 40 milliGRAM(s) SubCutaneous daily  guaiFENesin  milliGRAM(s) Oral every 12 hours  lamoTRIgine 25 milliGRAM(s) Oral two times a day  levothyroxine 25 MICROGram(s) Oral daily  pantoprazole    Tablet 40 milliGRAM(s) Oral before breakfast    MEDICATIONS  (PRN):  acetaminophen   Tablet .. 650 milliGRAM(s) Oral every 6 hours PRN Temp greater or equal to 38C (100.4F), Mild Pain (1 - 3)  ALBUTerol    90 MICROgram(s) HFA Inhaler 2 Puff(s) Inhalation every 6 hours PRN Bronchospasm  aluminum hydroxide/magnesium hydroxide/simethicone Suspension 30 milliLiter(s) Oral every 6 hours PRN Dyspepsia  ondansetron Injectable 4 milliGRAM(s) IV Push every 6 hours PRN Nausea and/or Vomiting  traMADol 25 milliGRAM(s) Oral every 6 hours PRN Moderate Pain (4 - 6)      Allergies    cephalexin (Hives)  cephalexin (Unknown)    Intolerances    No seafood (Unknown)    ROS:  CONSTITUTIONAL: + weakness, no fevers or chills  EYES/ENT: No visual changes;  No vertigo or throat pain   NECK: No pain or stiffness  RESPIRATORY: No cough, wheezing, hemoptysis; No shortness of breath  CARDIOVASCULAR: No chest pain or palpitations  GASTROINTESTINAL: + mild abdominal pain. No nausea, vomiting, or hematemesis  GENITOURINARY: No dysuria, frequency or hematuria  NEUROLOGICAL: No numbness or weakness  SKIN: No itching, burning, rashes, or lesions   All other review of systems is negative unless indicated above.    Vital Signs Last 24 Hrs  T(C): 36.5 (10 Dec 2020 11:45), Max: 36.8 (09 Dec 2020 19:42)  T(F): 97.7 (10 Dec 2020 11:45), Max: 98.3 (09 Dec 2020 19:42)  HR: 92 (10 Dec 2020 11:45) (61 - 106)  BP: 104/65 (10 Dec 2020 11:45) (104/65 - 128/72)  BP(mean): --  RR: 17 (10 Dec 2020 11:45) (17 - 19)  SpO2: 92% (10 Dec 2020 11:45) (91% - 97%)     @ 07:01  -  12-10 @ 07:00  --------------------------------------------------------  IN: 250 mL / OUT: 0 mL / NET: 250 mL      Physical Exam:  General: anxious appearing  Neurology: A&Ox3, nonfocal, STRANGE x 4  Respiratory: CTA B/L  CV: RRR, S1S2  Abdominal: Soft, NT, ND +BS  Extremities: No edema, + peripheral pulses        LABS:                        11.7   9.15  )-----------( 386      ( 09 Dec 2020 06:40 )             34.4     12-10    x   |  x   |  x   ----------------------------<  x   x    |  x   |  1.00    Ca    8.6      09 Dec 2020 06:40  Mg     2.0     12-09    TPro  7.3  /  Alb  3.2<L>  /  TBili  0.2  /  DBili  .10  /  AST  18  /  ALT  25  /  AlkPhos  82  12-10      Urinalysis Basic - ( 08 Dec 2020 16:56 )    Color: Yellow / Appearance: Clear / S.015 / pH: x  Gluc: x / Ketone: Negative  / Bili: Negative / Urobili: Negative   Blood: x / Protein: 15 / Nitrite: Negative   Leuk Esterase: Moderate / RBC: 3-5 /HPF / WBC 3-5   Sq Epi: x / Non Sq Epi: Few / Bacteria: Few        RADIOLOGY & ADDITIONAL TESTS:

## 2020-12-10 NOTE — PROGRESS NOTE ADULT - SUBJECTIVE AND OBJECTIVE BOX
[facility] DIVISION of INFECTIOUS DISEASE  Salvatore Dennis MD PhD, Suzanne Hughes MD, Elvira Valdez MD, Fidel Rollins MD  and providing coverage with Mary Ann Lucas MD and Loren Fritz MD  Providing Infectious Disease Consultations at [facility], [facility], [facility]'s      Office# [phone] to schedule follow up appointments  Answering Service for urgent calls or New Consults [phone]  Cell# to text for urgent issues Salvatore Dennis [phone]     Infectious diseases progress note:    LINN WHITE is a 80y y. o. Female patient    COVID Patient    Allergies    cephalexin (Hives)  cephalexin (Unknown)    Intolerances    No seafood (Unknown)      ANTIBIOTICS/RELEVANT:  antimicrobials    immunologic:    OTHER:  acetaminophen   Tablet .. 650 milliGRAM(s) Oral every 6 hours PRN  ALBUTerol    90 MICROgram(s) HFA Inhaler 2 Puff(s) Inhalation every 6 hours PRN  aluminum hydroxide/magnesium hydroxide/simethicone Suspension 30 milliLiter(s) Oral every 6 hours PRN  amLODIPine   Tablet 5 milliGRAM(s) Oral daily  citalopram 10 milliGRAM(s) Oral daily  dexAMETHasone  Injectable 6 milliGRAM(s) IV Push daily  enoxaparin Injectable 40 milliGRAM(s) SubCutaneous daily  guaiFENesin  milliGRAM(s) Oral every 12 hours  lamoTRIgine 25 milliGRAM(s) Oral two times a day  levothyroxine 25 MICROGram(s) Oral daily  ondansetron Injectable 4 milliGRAM(s) IV Push every 6 hours PRN  pantoprazole    Tablet 40 milliGRAM(s) Oral before breakfast  traMADol 25 milliGRAM(s) Oral every 6 hours PRN      Objective:  Vital Signs Last 24 Hrs  T(C): 36.6 (10 Dec 2020 07:23), Max: 36.8 (09 Dec 2020 11:50)  T(F): 97.9 (10 Dec 2020 07:23), Max: 98.3 (09 Dec 2020 11:50)  HR: 61 (10 Dec 2020 07:23) (61 - 106)  BP: 128/72 (10 Dec 2020 07:23) (107/57 - 128/72)  BP(mean): --  RR: 19 (10 Dec 2020 07:23) (17 - 20)  SpO2: 91% (10 Dec 2020 07:23) (91% - 97%)    T(C): 36.6 (12-10-20 @ 07:23), Max: 36.9 (20 @ 00:36)  T(C): 36.6 (12-10-20 @ 07:23), Max: 36.9 (20 @ 00:36)  T(C): 36.6 (12-10-20 @ 07:23), Max: 37.1 (20 @ 04:38)    PHYSICAL EXAM:  HEENT: NC atraumatic  Neck: supple  Respiratory: no accessory muscle use, breathing comfortably  Cardiovascular: distant  Gastrointestinal: normal appearing, nondistended  Extremities: no clubbing, no cyanosis,      LABS:                          11.7   9.15  )-----------( 386      ( 09 Dec 2020 06:40 )             34.4       9.15  06:40  7.03  @ 07:01  7.84  07:41  7.96  @ 07:46  2.65  @ 06:43  4.85  @ 15:55      12-10    x   |  x   |  x   ----------------------------<  x   x    |  x   |  1.00    Ca    8.6      09 Dec 2020 06:40  Mg     2.0         TPro  7.3  /  Alb  3.2<L>  /  TBili  0.2  /  DBili  .10  /  AST  18  /  ALT  25  /  AlkPhos  82  12-10      Creatinine, Serum: 1.00 mg/dL (12-10-20 @ 07:39)  Creatinine, Serum: 1.10 mg/dL (20 @ 06:40)  Creatinine, Serum: 0.88 mg/dL (20 @ 07:01)  Creatinine, Serum: 0.97 mg/dL (20 @ 07:41)  Creatinine, Serum: 0.99 mg/dL (20 @ 07:46)  Creatinine, Serum: 1.10 mg/dL (20 @ 06:43)  Creatinine, Serum: 1.20 mg/dL (20 @ 19:55)  Creatinine, Serum: 1.27 mg/dL (20 @ 15:55)        Urinalysis Basic - ( 08 Dec 2020 16:56 )    Color: Yellow / Appearance: Clear / S.015 / pH: x  Gluc: x / Ketone: Negative  / Bili: Negative / Urobili: Negative   Blood: x / Protein: 15 / Nitrite: Negative   Leuk Esterase: Moderate / RBC: 3-5 /HPF / WBC 3-5   Sq Epi: x / Non Sq Epi: Few / Bacteria: Few            COVID RISK SCORE  Auto Neutrophil #: 5.58 K/uL (20 @ 06:40)  Auto Lymphocyte #: 1.83 K/uL (20 @ 06:40)  Auto Neutrophil #: 3.93 K/uL (20 @ 07:01)  Auto Lymphocyte #: 1.96 K/uL (20 @ 07:01)  Auto Neutrophil #: 4.33 K/uL (20 @ 07:41)  Auto Lymphocyte #: 2.33 K/uL (20 @ 07:41)  Auto Neutrophil #: 5.28 K/uL (20 @ 07:46)  Auto Lymphocyte #: 1.60 K/uL (20 @ 07:46)  Auto Neutrophil #: 1.46 K/uL (20 @ 06:43)  Auto Lymphocyte #: 0.72 K/uL (20 @ 06:43)  Auto Neutrophil #: 3.20 K/uL (20 @ 15:55)  Auto Lymphocyte #: 0.98 K/uL (20 @ 15:55)  Auto Lymphocyte #: 0.95 K/uL (20 @ 12:50)  Auto Neutrophil #: 3.08 K/uL (20 @ 12:50)    Lactate, Blood: 1.0 mmol/L (20 @ 12:50)    Auto Eosinophil #: 0.00 K/uL (20 @ 06:40)  Auto Eosinophil #: 0.01 K/uL (20 @ 07:01)  Auto Eosinophil #: 0.00 K/uL (20 @ 07:41)  Auto Eosinophil #: 0.00 K/uL (20 @ 07:46)  Auto Eosinophil #: 0.00 K/uL (20 @ 06:43)  Auto Eosinophil #: 0.00 K/uL (20 @ 15:55)  Auto Eosinophil #: 0.00 K/uL (20 @ 12:50)    Lactate Dehydrogenase, Serum: 277 U/L (20 @ 12:26)    Sedimentation Rate, Erythrocyte: 38 mm/hr (20 @ 06:40)  Sedimentation Rate, Erythrocyte: 41 mm/hr (20 @ 07:01)  Sedimentation Rate, Erythrocyte: 45 mm/hr (20 @ 07:41)  Sedimentation Rate, Erythrocyte: 44 mm/hr (20 @ 07:46)    Procalcitonin, Serum: 0.07 ng/mL (20 @ 21:38)    Troponin I, Serum: <.015 ng/mL (20 @ 16:45)    Creatine Kinase, Serum: 174 U/L (20 @ 16:45)        Ferritin, Serum: 241 ng/mL (20 @ 12:26)  Ferritin, Serum: 255 ng/mL (20 @ 22:27)  Ferritin, Serum: 149 ng/mL (20 @ 01:57)          D-Dimer Assay, Quantitative: 199 ng/mL DDU (20 @ 06:40)  D-Dimer Assay, Quantitative: 267 ng/mL DDU (20 @ 07:46)  D-Dimer Assay, Quantitative: 247 ng/mL DDU (20 @ 06:43)  D-Dimer Assay, Quantitative: 279 ng/mL DDU (20 @ 15:55)        MICROBIOLOGY:              RADIOLOGY & ADDITIONAL STUDIES:

## 2020-12-10 NOTE — PROVIDER CONTACT NOTE (OTHER) - RECOMMENDATIONS
pt took a few sips of maalox and stated she thought it was making her worse
Assess patient, order urine studies.
Pt given maalox as per Dr. Jeong
Pt then ate lunch and seen by Dr. Beasley , psych.  Pt also stated that she talked on the phone with her private psychiatrist dr. Keith Monzon.

## 2020-12-10 NOTE — PROGRESS NOTE ADULT - SUBJECTIVE AND OBJECTIVE BOX
Date/Time Patient Seen:  		  Referring MD:   Data Reviewed	       Patient is a 80y old  Female who presents with a chief complaint of COVID 19 Pneumonia (09 Dec 2020 13:14)      Subjective/HPI     PAST MEDICAL & SURGICAL HISTORY:  Vertigo    Depression    Hypothyroidism    Vertigo    Hypothyroidism    Depression    UTI (urinary tract infection)    No significant past surgical history  RIGHT KNEE ARTHROSCOPY    No significant past surgical history          Medication list         MEDICATIONS  (STANDING):  amLODIPine   Tablet 5 milliGRAM(s) Oral daily  citalopram 10 milliGRAM(s) Oral daily  dexAMETHasone  Injectable 6 milliGRAM(s) IV Push daily  enoxaparin Injectable 40 milliGRAM(s) SubCutaneous daily  guaiFENesin  milliGRAM(s) Oral every 12 hours  lamoTRIgine 25 milliGRAM(s) Oral two times a day  levothyroxine 25 MICROGram(s) Oral daily  pantoprazole    Tablet 40 milliGRAM(s) Oral before breakfast    MEDICATIONS  (PRN):  acetaminophen   Tablet .. 650 milliGRAM(s) Oral every 6 hours PRN Temp greater or equal to 38C (100.4F), Mild Pain (1 - 3)  ALBUTerol    90 MICROgram(s) HFA Inhaler 2 Puff(s) Inhalation every 6 hours PRN Bronchospasm  aluminum hydroxide/magnesium hydroxide/simethicone Suspension 30 milliLiter(s) Oral every 6 hours PRN Dyspepsia  ondansetron Injectable 4 milliGRAM(s) IV Push every 6 hours PRN Nausea and/or Vomiting  traMADol 25 milliGRAM(s) Oral every 6 hours PRN Moderate Pain (4 - 6)         Vitals log        ICU Vital Signs Last 24 Hrs  T(C): 36.6 (10 Dec 2020 07:23), Max: 36.8 (09 Dec 2020 11:50)  T(F): 97.9 (10 Dec 2020 07:23), Max: 98.3 (09 Dec 2020 11:50)  HR: 61 (10 Dec 2020 07:23) (61 - 106)  BP: 128/72 (10 Dec 2020 07:23) (107/57 - 128/72)  BP(mean): --  ABP: --  ABP(mean): --  RR: 19 (10 Dec 2020 07:23) (17 - 20)  SpO2: 91% (10 Dec 2020 07:23) (91% - 97%)           Input and Output:  I&O's Detail    09 Dec 2020 07:01  -  10 Dec 2020 07:00  --------------------------------------------------------  IN:    IV PiggyBack: 250 mL  Total IN: 250 mL    OUT:  Total OUT: 0 mL    Total NET: 250 mL          Lab Data                        11.7   9.15  )-----------( 386      ( 09 Dec 2020 06:40 )             34.4     12-10    x   |  x   |  x   ----------------------------<  x   x    |  x   |  1.00    Ca    8.6      09 Dec 2020 06:40  Mg     2.0     12-09    TPro  7.3  /  Alb  3.2<L>  /  TBili  0.2  /  DBili  .10  /  AST  18  /  ALT  25  /  AlkPhos  82  12-10            Review of Systems	      Objective     Physical Examination    heart s1s2  lung dc BS  abd soft      Pertinent Lab findings & Imaging      Pam:  NO   Adequate UO     I&O's Detail    09 Dec 2020 07:01  -  10 Dec 2020 07:00  --------------------------------------------------------  IN:    IV PiggyBack: 250 mL  Total IN: 250 mL    OUT:  Total OUT: 0 mL    Total NET: 250 mL               Discussed with:     Cultures:	        Radiology

## 2020-12-10 NOTE — PROGRESS NOTE ADULT - ASSESSMENT
81yo female with pmhx of hypothyroid, depression, subdural hematoma 8/2020 2/2 fall adm with COVID+ suggested day of onset 11/25    RECOMMENDATIONS  12/4 NLR ~3, sats<94% on RA, rec REMDESIVIR, STEROIDs, caution with anticoagulation with prior subdural  12/5 NLR <3, NC-2L pt appears stable and comfortable  12/6 NLR <3 btwn NC and RA-cont current Rx  12/7 on RA-continue Rx, 12/8 last day of remdesivir and may be able to consider dc after Rx  12/8 btwn RA and 2L NC, last day of remdesivir, suggested day 13 of illness and can look at potential dc  12/9 NLR 5.58/1.83=<3, 97% on RA, can look at dc planning  12/10 remains stable on RA    When pt is doing well and  past the critical second week when decompensation can occur and has started to improve, fine to consider discharge planning with for early but safe discharge. Discharge with oxygen (4L or less and able to keep sats>90) and even persistent fever at time of discharge is reasonable. With high risk for thromboembolic disease we usually consider dc on  rivaroxaban (Xarelto) or Eliquis (apixaban) but with prior subdural rec ASA 325mg PO Q-day as discussed w primary team  For outpt mild disease considered noninfectious at day 10 after onset of illness but for hospitalized patients day 20. If pt going to facility or to dc isolation in house then will require 2 negative PCR tests  by a minimum of 24 hours and fever free without antipyretics for >24hrs and more than 10 days from first positive test.    Thank you for consulting us and involving us in the management of this most interesting and challenging case.  Please call us at [phone] or text me directly on my cell#[phone] with any concerns or further questions.

## 2020-12-10 NOTE — PROGRESS NOTE ADULT - ASSESSMENT
Assessment and Plan:  -Acute hypoxic respiratory failure 2/2 COVID 19 pneumonia:  completed Remdesivir, and Decadron 6mg IV daily.  Albuterol inh Q6h PRN and Guaifenesin ER 600mg PO Q12h.  Titrate down O2 support- now on RA.  Maintain SPO2>88%.  ID and Pulmonary f/u  -Dysuria:  Will check UA and urine cx.    -Hx of subdural hematoma:  continue Lamictal 25mg PO BID  -HTN:  continue Norvasc 5mg PO daily  -Hypothyroidism:  continue Synthroid 25mcg PO daily  -Depression:  continue Celexa 10mg PO daily, psych consulted, no objection to d/c  -VTE ppx:  Lovenox 40mg SQ daily Assessment and Plan:  -Acute hypoxic respiratory failure 2/2 COVID 19 pneumonia:  completed Remdesivir, and Decadron 6mg IV daily.  Albuterol inh Q6h PRN and Guaifenesin ER 600mg PO Q12h.  Titrate down O2 support- now on RA.  Maintain SPO2>88%.  ID and Pulmonary f/u  -Dysuria:  Will check UA and urine cx.    -Hx of subdural hematoma:  continue Lamictal 25mg PO BID  -HTN:  continue Norvasc 5mg PO daily  -Hypothyroidism:  continue Synthroid 25mcg PO daily  -Depression:  continue Celexa 10mg PO daily, psych consulted, no objection to d/c  -VTE ppx:  Lovenox 40mg SQ daily  -PT eval

## 2020-12-11 LAB
ALBUMIN SERPL ELPH-MCNC: 2.7 G/DL — LOW (ref 3.3–5)
ALBUMIN SERPL ELPH-MCNC: 2.7 G/DL — LOW (ref 3.3–5)
ALP SERPL-CCNC: 71 U/L — SIGNIFICANT CHANGE UP (ref 40–120)
ALP SERPL-CCNC: 72 U/L — SIGNIFICANT CHANGE UP (ref 40–120)
ALT FLD-CCNC: 25 U/L — SIGNIFICANT CHANGE UP (ref 12–78)
ALT FLD-CCNC: 27 U/L — SIGNIFICANT CHANGE UP (ref 12–78)
ANION GAP SERPL CALC-SCNC: 7 MMOL/L — SIGNIFICANT CHANGE UP (ref 5–17)
AST SERPL-CCNC: 21 U/L — SIGNIFICANT CHANGE UP (ref 15–37)
AST SERPL-CCNC: 22 U/L — SIGNIFICANT CHANGE UP (ref 15–37)
BASOPHILS # BLD AUTO: 0.03 K/UL — SIGNIFICANT CHANGE UP (ref 0–0.2)
BASOPHILS NFR BLD AUTO: 0.3 % — SIGNIFICANT CHANGE UP (ref 0–2)
BILIRUB DIRECT SERPL-MCNC: <.1 MG/DL — SIGNIFICANT CHANGE UP (ref 0.05–0.2)
BILIRUB INDIRECT FLD-MCNC: >0.2 MG/DL — SIGNIFICANT CHANGE UP (ref 0.2–1)
BILIRUB SERPL-MCNC: 0.3 MG/DL — SIGNIFICANT CHANGE UP (ref 0.2–1.2)
BILIRUB SERPL-MCNC: 0.5 MG/DL — SIGNIFICANT CHANGE UP (ref 0.2–1.2)
BUN SERPL-MCNC: 38 MG/DL — HIGH (ref 7–23)
CALCIUM SERPL-MCNC: 8.8 MG/DL — SIGNIFICANT CHANGE UP (ref 8.5–10.1)
CHLORIDE SERPL-SCNC: 99 MMOL/L — SIGNIFICANT CHANGE UP (ref 96–108)
CO2 SERPL-SCNC: 32 MMOL/L — HIGH (ref 22–31)
CREAT SERPL-MCNC: 1.2 MG/DL — SIGNIFICANT CHANGE UP (ref 0.5–1.3)
EOSINOPHIL # BLD AUTO: 0.14 K/UL — SIGNIFICANT CHANGE UP (ref 0–0.5)
EOSINOPHIL NFR BLD AUTO: 1.3 % — SIGNIFICANT CHANGE UP (ref 0–6)
GLUCOSE SERPL-MCNC: 82 MG/DL — SIGNIFICANT CHANGE UP (ref 70–99)
HCT VFR BLD CALC: 34.3 % — LOW (ref 34.5–45)
HGB BLD-MCNC: 11.2 G/DL — LOW (ref 11.5–15.5)
IMM GRANULOCYTES NFR BLD AUTO: 4.3 % — HIGH (ref 0–1.5)
LYMPHOCYTES # BLD AUTO: 2.77 K/UL — SIGNIFICANT CHANGE UP (ref 1–3.3)
LYMPHOCYTES # BLD AUTO: 25.7 % — SIGNIFICANT CHANGE UP (ref 13–44)
MCHC RBC-ENTMCNC: 30.1 PG — SIGNIFICANT CHANGE UP (ref 27–34)
MCHC RBC-ENTMCNC: 32.7 GM/DL — SIGNIFICANT CHANGE UP (ref 32–36)
MCV RBC AUTO: 92.2 FL — SIGNIFICANT CHANGE UP (ref 80–100)
MONOCYTES # BLD AUTO: 1.36 K/UL — HIGH (ref 0–0.9)
MONOCYTES NFR BLD AUTO: 12.6 % — SIGNIFICANT CHANGE UP (ref 2–14)
NEUTROPHILS # BLD AUTO: 6 K/UL — SIGNIFICANT CHANGE UP (ref 1.8–7.4)
NEUTROPHILS NFR BLD AUTO: 55.8 % — SIGNIFICANT CHANGE UP (ref 43–77)
NRBC # BLD: 0 /100 WBCS — SIGNIFICANT CHANGE UP (ref 0–0)
PLATELET # BLD AUTO: 459 K/UL — HIGH (ref 150–400)
POTASSIUM SERPL-MCNC: 5.3 MMOL/L — SIGNIFICANT CHANGE UP (ref 3.5–5.3)
POTASSIUM SERPL-SCNC: 5.3 MMOL/L — SIGNIFICANT CHANGE UP (ref 3.5–5.3)
PROT SERPL-MCNC: 6.3 G/DL — SIGNIFICANT CHANGE UP (ref 6–8.3)
PROT SERPL-MCNC: 6.3 G/DL — SIGNIFICANT CHANGE UP (ref 6–8.3)
RBC # BLD: 3.72 M/UL — LOW (ref 3.8–5.2)
RBC # FLD: 13.2 % — SIGNIFICANT CHANGE UP (ref 10.3–14.5)
SODIUM SERPL-SCNC: 138 MMOL/L — SIGNIFICANT CHANGE UP (ref 135–145)
WBC # BLD: 10.76 K/UL — HIGH (ref 3.8–10.5)
WBC # FLD AUTO: 10.76 K/UL — HIGH (ref 3.8–10.5)

## 2020-12-11 PROCEDURE — 99233 SBSQ HOSP IP/OBS HIGH 50: CPT | Mod: CS

## 2020-12-11 RX ORDER — ALPRAZOLAM 0.25 MG
0.25 TABLET ORAL
Refills: 0 | Status: DISCONTINUED | OUTPATIENT
Start: 2020-12-11 | End: 2020-12-18

## 2020-12-11 RX ORDER — ALPRAZOLAM 0.25 MG
0.25 TABLET ORAL
Refills: 0 | Status: DISCONTINUED | OUTPATIENT
Start: 2020-12-11 | End: 2020-12-11

## 2020-12-11 RX ADMIN — Medication 0.25 MILLIGRAM(S): at 13:59

## 2020-12-11 RX ADMIN — Medication 600 MILLIGRAM(S): at 18:08

## 2020-12-11 RX ADMIN — Medication 6 MILLIGRAM(S): at 05:11

## 2020-12-11 RX ADMIN — CITALOPRAM 10 MILLIGRAM(S): 10 TABLET, FILM COATED ORAL at 21:21

## 2020-12-11 RX ADMIN — Medication 25 MICROGRAM(S): at 05:11

## 2020-12-11 RX ADMIN — LAMOTRIGINE 25 MILLIGRAM(S): 25 TABLET, ORALLY DISINTEGRATING ORAL at 18:08

## 2020-12-11 RX ADMIN — ENOXAPARIN SODIUM 40 MILLIGRAM(S): 100 INJECTION SUBCUTANEOUS at 13:59

## 2020-12-11 RX ADMIN — AMLODIPINE BESYLATE 5 MILLIGRAM(S): 2.5 TABLET ORAL at 05:11

## 2020-12-11 RX ADMIN — PANTOPRAZOLE SODIUM 40 MILLIGRAM(S): 20 TABLET, DELAYED RELEASE ORAL at 05:11

## 2020-12-11 RX ADMIN — Medication 600 MILLIGRAM(S): at 05:11

## 2020-12-11 RX ADMIN — LAMOTRIGINE 25 MILLIGRAM(S): 25 TABLET, ORALLY DISINTEGRATING ORAL at 05:11

## 2020-12-11 RX ADMIN — Medication 0.25 MILLIGRAM(S): at 23:58

## 2020-12-11 NOTE — PROGRESS NOTE ADULT - SUBJECTIVE AND OBJECTIVE BOX
INTERVAL HPI/OVERNIGHT EVENTS:    MEDICATIONS  (STANDING):  amLODIPine   Tablet 5 milliGRAM(s) Oral daily  citalopram 10 milliGRAM(s) Oral daily  dexAMETHasone  Injectable 6 milliGRAM(s) IV Push daily  enoxaparin Injectable 40 milliGRAM(s) SubCutaneous daily  guaiFENesin  milliGRAM(s) Oral every 12 hours  lamoTRIgine 25 milliGRAM(s) Oral two times a day  levothyroxine 25 MICROGram(s) Oral daily  pantoprazole    Tablet 40 milliGRAM(s) Oral before breakfast    MEDICATIONS  (PRN):  acetaminophen   Tablet .. 650 milliGRAM(s) Oral every 6 hours PRN Temp greater or equal to 38C (100.4F), Mild Pain (1 - 3)  ALBUTerol    90 MICROgram(s) HFA Inhaler 2 Puff(s) Inhalation every 6 hours PRN Bronchospasm  aluminum hydroxide/magnesium hydroxide/simethicone Suspension 30 milliLiter(s) Oral every 6 hours PRN Dyspepsia  ondansetron Injectable 4 milliGRAM(s) IV Push every 6 hours PRN Nausea and/or Vomiting  traMADol 25 milliGRAM(s) Oral every 6 hours PRN Moderate Pain (4 - 6)      Allergies    cephalexin (Hives)  cephalexin (Unknown)    Intolerances    No seafood (Unknown)      ROS:  CONSTITUTIONAL: + weakness, no fevers or chills  EYES/ENT: No visual changes;  No vertigo or throat pain   NECK: No pain or stiffness  RESPIRATORY: No cough, wheezing, hemoptysis; No shortness of breath  CARDIOVASCULAR: No chest pain or palpitations  GASTROINTESTINAL: + mild abdominal pain. No nausea, vomiting, or hematemesis  GENITOURINARY: No dysuria, frequency or hematuria  NEUROLOGICAL: No numbness or weakness  SKIN: No itching, burning, rashes, or lesions   All other review of systems is negative unless indicated above.    Vital Signs Last 24 Hrs  T(C): 36.6 (11 Dec 2020 07:30), Max: 37.1 (11 Dec 2020 00:17)  T(F): 97.9 (11 Dec 2020 07:30), Max: 98.8 (11 Dec 2020 00:17)  HR: 93 (11 Dec 2020 07:30) (74 - 93)  BP: 116/68 (11 Dec 2020 07:30) (104/65 - 123/67)  BP(mean): --  RR: 19 (11 Dec 2020 07:30) (17 - 19)  SpO2: 91% (11 Dec 2020 07:30) (91% - 95%)      Physical Exam:  General: anxious appearing  Neurology: A&Ox3, nonfocal, STRANGE x 4  Respiratory: CTA B/L  CV: RRR, S1S2  Abdominal: Soft, NT, ND +BS  Extremities: No edema, + peripheral pulses      LABS:                        11.2   10.76 )-----------( 459      ( 11 Dec 2020 06:55 )             34.3     12-11    138  |  99  |  38<H>  ----------------------------<  82  5.3   |  32<H>  |  1.20    Ca    8.8      11 Dec 2020 06:55    TPro  6.3  /  Alb  2.7<L>  /  TBili  0.5  /  DBili  <.10  /  AST  21  /  ALT  25  /  AlkPhos  72  12-11          RADIOLOGY & ADDITIONAL TESTS: INTERVAL HPI/OVERNIGHT EVENTS:  Patient seen and examined at bedside. Patient in catatonic state during my exam, answering one word responses and sometimes not responding at all. States she feels "fine".    Unable to obtain ROS 2/2 current medical condition    MEDICATIONS  (STANDING):  amLODIPine   Tablet 5 milliGRAM(s) Oral daily  citalopram 10 milliGRAM(s) Oral daily  dexAMETHasone  Injectable 6 milliGRAM(s) IV Push daily  enoxaparin Injectable 40 milliGRAM(s) SubCutaneous daily  guaiFENesin  milliGRAM(s) Oral every 12 hours  lamoTRIgine 25 milliGRAM(s) Oral two times a day  levothyroxine 25 MICROGram(s) Oral daily  pantoprazole    Tablet 40 milliGRAM(s) Oral before breakfast    MEDICATIONS  (PRN):  acetaminophen   Tablet .. 650 milliGRAM(s) Oral every 6 hours PRN Temp greater or equal to 38C (100.4F), Mild Pain (1 - 3)  ALBUTerol    90 MICROgram(s) HFA Inhaler 2 Puff(s) Inhalation every 6 hours PRN Bronchospasm  aluminum hydroxide/magnesium hydroxide/simethicone Suspension 30 milliLiter(s) Oral every 6 hours PRN Dyspepsia  ondansetron Injectable 4 milliGRAM(s) IV Push every 6 hours PRN Nausea and/or Vomiting  traMADol 25 milliGRAM(s) Oral every 6 hours PRN Moderate Pain (4 - 6)      Allergies    cephalexin (Hives)  cephalexin (Unknown)    Intolerances    No seafood (Unknown)      Vital Signs Last 24 Hrs  T(C): 36.6 (11 Dec 2020 07:30), Max: 37.1 (11 Dec 2020 00:17)  T(F): 97.9 (11 Dec 2020 07:30), Max: 98.8 (11 Dec 2020 00:17)  HR: 93 (11 Dec 2020 07:30) (74 - 93)  BP: 116/68 (11 Dec 2020 07:30) (104/65 - 123/67)  BP(mean): --  RR: 19 (11 Dec 2020 07:30) (17 - 19)  SpO2: 91% (11 Dec 2020 07:30) (91% - 95%)      Physical Exam:  General: catatonic  Neurology: nonfocal  Respiratory: CTA B/L  CV: RRR, S1S2  Abdominal: Soft, NT, ND +BS  Extremities: No edema, + peripheral pulses      LABS:                        11.2   10.76 )-----------( 459      ( 11 Dec 2020 06:55 )             34.3     12-11    138  |  99  |  38<H>  ----------------------------<  82  5.3   |  32<H>  |  1.20    Ca    8.8      11 Dec 2020 06:55    TPro  6.3  /  Alb  2.7<L>  /  TBili  0.5  /  DBili  <.10  /  AST  21  /  ALT  25  /  AlkPhos  72  12-11          RADIOLOGY & ADDITIONAL TESTS:

## 2020-12-11 NOTE — CHART NOTE - NSCHARTNOTEFT_GEN_A_CORE
Assessment: Called pt for follow up, states she ate all of lunch today and is drinking Ensure/mighty shakes. Very flat affect. Menu assistance provided. BM yesterday noted. No pressure injuries.Awaiting safe d/c plan.     Factors impacting intake: [ ] none [ ] nausea  [ ] vomiting [ ] diarrhea [ ] constipation  [ ]chewing problems [ ] swallowing issues  [ ] other:     Diet Presciption: Diet, Soft:   Supplement Feeding Modality:  Oral  Ensure Enlive Cans or Servings Per Day:  1       Frequency:  Three Times a day (12-08-20 @ 13:17)    Intake: good per pt, 50% recorded in EMR    Current Weight: Weight (kg): 85 (12-04 @ 18:09), 52.2 (12-04 @ 14:02) no new wt available  % Weight Change    Pertinent Medications: MEDICATIONS  (STANDING):  ALPRAZolam 0.25 milliGRAM(s) Oral two times a day  amLODIPine   Tablet 5 milliGRAM(s) Oral daily  citalopram 10 milliGRAM(s) Oral daily  dexAMETHasone  Injectable 6 milliGRAM(s) IV Push daily  enoxaparin Injectable 40 milliGRAM(s) SubCutaneous daily  guaiFENesin  milliGRAM(s) Oral every 12 hours  lamoTRIgine 25 milliGRAM(s) Oral two times a day  levothyroxine 25 MICROGram(s) Oral daily  pantoprazole    Tablet 40 milliGRAM(s) Oral before breakfast    MEDICATIONS  (PRN):  acetaminophen   Tablet .. 650 milliGRAM(s) Oral every 6 hours PRN Temp greater or equal to 38C (100.4F), Mild Pain (1 - 3)  ALBUTerol    90 MICROgram(s) HFA Inhaler 2 Puff(s) Inhalation every 6 hours PRN Bronchospasm  aluminum hydroxide/magnesium hydroxide/simethicone Suspension 30 milliLiter(s) Oral every 6 hours PRN Dyspepsia  ondansetron Injectable 4 milliGRAM(s) IV Push every 6 hours PRN Nausea and/or Vomiting  traMADol 25 milliGRAM(s) Oral every 6 hours PRN Moderate Pain (4 - 6)    Pertinent Labs: 12-11 Na138 mmol/L Glu 82 mg/dL K+ 5.3 mmol/L Cr  1.20 mg/dL BUN 38 mg/dL<H> 12-05 Phos 3.6 mg/dL 12-11 Alb 2.7 g/dL<L>     CAPILLARY BLOOD GLUCOSE        Skin:     Estimated Needs:   [x ] no change since previous assessment  [ ] recalculated:     Previous Nutrition Diagnosis:   [ ] Inadequate Energy Intake [x ]Inadequate Oral Intake [ ] Excessive Energy Intake   [ ] Underweight [ x] Increased Nutrient Needs [ ] Overweight/Obesity   [ ] Altered GI Function [ ] Unintended Weight Loss [ ] Food & Nutrition Related Knowledge Deficit [ ] Malnutrition     Nutrition Diagnosis is [ x] ongoing  [ ] resolved [ ] not applicable     New Nutrition Diagnosis: [ ] not applicable       Interventions: Intake encouraged, snacks added  Recommend  [ ] Change Diet To:  [ ] Nutrition Supplement  [ ] Nutrition Support  [x ] Other: Rec new wt    Monitoring and Evaluation:   [ ] PO intake [ x ] Tolerance to diet prescription [ x ] weights [ x ] labs[ x ] follow up per protocol  [ ] other:

## 2020-12-11 NOTE — PROGRESS NOTE ADULT - SUBJECTIVE AND OBJECTIVE BOX
Date/Time Patient Seen:  		  Referring MD:   Data Reviewed	       Patient is a 80y old  Female who presents with a chief complaint of COVID 19 Pneumonia (10 Dec 2020 12:08)      Subjective/HPI     PAST MEDICAL & SURGICAL HISTORY:  Vertigo    Depression    Hypothyroidism    Vertigo    Hypothyroidism    Depression    UTI (urinary tract infection)    No significant past surgical history  RIGHT KNEE ARTHROSCOPY    No significant past surgical history          Medication list         MEDICATIONS  (STANDING):  amLODIPine   Tablet 5 milliGRAM(s) Oral daily  citalopram 10 milliGRAM(s) Oral daily  dexAMETHasone  Injectable 6 milliGRAM(s) IV Push daily  enoxaparin Injectable 40 milliGRAM(s) SubCutaneous daily  guaiFENesin  milliGRAM(s) Oral every 12 hours  lamoTRIgine 25 milliGRAM(s) Oral two times a day  levothyroxine 25 MICROGram(s) Oral daily  pantoprazole    Tablet 40 milliGRAM(s) Oral before breakfast    MEDICATIONS  (PRN):  acetaminophen   Tablet .. 650 milliGRAM(s) Oral every 6 hours PRN Temp greater or equal to 38C (100.4F), Mild Pain (1 - 3)  ALBUTerol    90 MICROgram(s) HFA Inhaler 2 Puff(s) Inhalation every 6 hours PRN Bronchospasm  aluminum hydroxide/magnesium hydroxide/simethicone Suspension 30 milliLiter(s) Oral every 6 hours PRN Dyspepsia  ondansetron Injectable 4 milliGRAM(s) IV Push every 6 hours PRN Nausea and/or Vomiting  traMADol 25 milliGRAM(s) Oral every 6 hours PRN Moderate Pain (4 - 6)         Vitals log        ICU Vital Signs Last 24 Hrs  T(C): 36.8 (11 Dec 2020 05:07), Max: 37.1 (11 Dec 2020 00:17)  T(F): 98.2 (11 Dec 2020 05:07), Max: 98.8 (11 Dec 2020 00:17)  HR: 77 (11 Dec 2020 05:07) (74 - 92)  BP: 116/65 (11 Dec 2020 05:07) (104/65 - 123/67)  BP(mean): --  ABP: --  ABP(mean): --  RR: 18 (11 Dec 2020 05:07) (17 - 18)  SpO2: 92% (11 Dec 2020 05:07) (92% - 95%)           Input and Output:  I&O's Detail      Lab Data                        11.2   10.76 )-----------( 459      ( 11 Dec 2020 06:55 )             34.3     12-11    138  |  99  |  38<H>  ----------------------------<  82  5.3   |  32<H>  |  1.20    Ca    8.8      11 Dec 2020 06:55    TPro  6.3  /  Alb  2.7<L>  /  TBili  0.5  /  DBili  <.10  /  AST  21  /  ALT  25  /  AlkPhos  72  12-11            Review of Systems	      Objective     Physical Examination    heart s1s2  lung dc BS  abd soft      Pertinent Lab findings & Imaging      Pam:  NO   Adequate UO     I&O's Detail           Discussed with:     Cultures:	        Radiology

## 2020-12-11 NOTE — PROGRESS NOTE ADULT - SUBJECTIVE AND OBJECTIVE BOX
[facility] DIVISION of INFECTIOUS DISEASE  Salvatore Dennis MD PhD, Suzanne Hughes MD, Elvira Valdez MD, Fidel Rollins MD  and providing coverage with Mary Ann Lucas MD and Loren Fritz MD  Providing Infectious Disease Consultations at [facility], [facility], [facility]'s      Office# [phone] to schedule follow up appointments  Answering Service for urgent calls or New Consults [phone]  Cell# to text for urgent issues Salvatore Dennis [phone]     Infectious diseases progress note:    LINN WHITE is a 80y y. o. Female patient    COVID Patient    Allergies    cephalexin (Hives)  cephalexin (Unknown)    Intolerances    No seafood (Unknown)      ANTIBIOTICS/RELEVANT:  antimicrobials    immunologic:    OTHER:  acetaminophen   Tablet .. 650 milliGRAM(s) Oral every 6 hours PRN  ALBUTerol    90 MICROgram(s) HFA Inhaler 2 Puff(s) Inhalation every 6 hours PRN  aluminum hydroxide/magnesium hydroxide/simethicone Suspension 30 milliLiter(s) Oral every 6 hours PRN  amLODIPine   Tablet 5 milliGRAM(s) Oral daily  citalopram 10 milliGRAM(s) Oral daily  dexAMETHasone  Injectable 6 milliGRAM(s) IV Push daily  enoxaparin Injectable 40 milliGRAM(s) SubCutaneous daily  guaiFENesin  milliGRAM(s) Oral every 12 hours  lamoTRIgine 25 milliGRAM(s) Oral two times a day  levothyroxine 25 MICROGram(s) Oral daily  ondansetron Injectable 4 milliGRAM(s) IV Push every 6 hours PRN  pantoprazole    Tablet 40 milliGRAM(s) Oral before breakfast  traMADol 25 milliGRAM(s) Oral every 6 hours PRN      Objective:  Vital Signs Last 24 Hrs  T(C): 36.6 (11 Dec 2020 07:30), Max: 37.1 (11 Dec 2020 00:17)  T(F): 97.9 (11 Dec 2020 07:30), Max: 98.8 (11 Dec 2020 00:17)  HR: 93 (11 Dec 2020 07:30) (74 - 93)  BP: 116/68 (11 Dec 2020 07:30) (104/65 - 123/67)  BP(mean): --  RR: 19 (11 Dec 2020 07:30) (17 - 19)  SpO2: 91% (11 Dec 2020 07:30) (91% - 95%)    T(C): 36.6 (12-11-20 @ 07:30), Max: 37.1 (12-11-20 @ 00:17)  T(C): 36.6 (12-11-20 @ 07:30), Max: 37.1 (12-11-20 @ 00:17)  T(C): 36.6 (12-11-20 @ 07:30), Max: 37.1 (12-11-20 @ 00:17)    PHYSICAL EXAM:  HEENT: NC atraumatic  Neck: supple  Respiratory: no accessory muscle use, breathing comfortably  Cardiovascular: distant  Gastrointestinal: normal appearing, nondistended  Extremities: no clubbing, no cyanosis,      LABS:                          11.2   10.76 )-----------( 459      ( 11 Dec 2020 06:55 )             34.3       10.76 12-11 @ 06:55  9.15 12-09 @ 06:40  7.03 12-08 @ 07:01  7.84 12-07 @ 07:41  7.96 12-06 @ 07:46  2.65 12-05 @ 06:43  4.85 12-04 @ 15:55      12-11    138  |  99  |  38<H>  ----------------------------<  82  5.3   |  32<H>  |  1.20    Ca    8.8      11 Dec 2020 06:55    TPro  6.3  /  Alb  2.7<L>  /  TBili  0.5  /  DBili  <.10  /  AST  21  /  ALT  25  /  AlkPhos  72  12-11      Creatinine, Serum: 1.20 mg/dL (12-11-20 @ 06:55)  Creatinine, Serum: 1.00 mg/dL (12-10-20 @ 07:39)  Creatinine, Serum: 1.10 mg/dL (12-09-20 @ 06:40)  Creatinine, Serum: 0.88 mg/dL (12-08-20 @ 07:01)  Creatinine, Serum: 0.97 mg/dL (12-07-20 @ 07:41)  Creatinine, Serum: 0.99 mg/dL (12-06-20 @ 07:46)  Creatinine, Serum: 1.10 mg/dL (12-05-20 @ 06:43)  Creatinine, Serum: 1.20 mg/dL (12-04-20 @ 19:55)  Creatinine, Serum: 1.27 mg/dL (12-04-20 @ 15:55)                COVID RISK SCORE  Auto Neutrophil #: 6.00 K/uL (12-11-20 @ 06:55)  Auto Lymphocyte #: 2.77 K/uL (12-11-20 @ 06:55)  Auto Neutrophil #: 5.58 K/uL (12-09-20 @ 06:40)  Auto Lymphocyte #: 1.83 K/uL (12-09-20 @ 06:40)  Auto Neutrophil #: 3.93 K/uL (12-08-20 @ 07:01)  Auto Lymphocyte #: 1.96 K/uL (12-08-20 @ 07:01)  Auto Neutrophil #: 4.33 K/uL (12-07-20 @ 07:41)  Auto Lymphocyte #: 2.33 K/uL (12-07-20 @ 07:41)  Auto Neutrophil #: 5.28 K/uL (12-06-20 @ 07:46)  Auto Lymphocyte #: 1.60 K/uL (12-06-20 @ 07:46)  Auto Neutrophil #: 1.46 K/uL (12-05-20 @ 06:43)  Auto Lymphocyte #: 0.72 K/uL (12-05-20 @ 06:43)  Auto Neutrophil #: 3.20 K/uL (12-04-20 @ 15:55)  Auto Lymphocyte #: 0.98 K/uL (12-04-20 @ 15:55)  Auto Lymphocyte #: 0.95 K/uL (12-02-20 @ 12:50)  Auto Neutrophil #: 3.08 K/uL (12-02-20 @ 12:50)    Lactate, Blood: 1.0 mmol/L (12-02-20 @ 12:50)    Auto Eosinophil #: 0.14 K/uL (12-11-20 @ 06:55)  Auto Eosinophil #: 0.00 K/uL (12-09-20 @ 06:40)  Auto Eosinophil #: 0.01 K/uL (12-08-20 @ 07:01)  Auto Eosinophil #: 0.00 K/uL (12-07-20 @ 07:41)  Auto Eosinophil #: 0.00 K/uL (12-06-20 @ 07:46)  Auto Eosinophil #: 0.00 K/uL (12-05-20 @ 06:43)  Auto Eosinophil #: 0.00 K/uL (12-04-20 @ 15:55)  Auto Eosinophil #: 0.00 K/uL (12-02-20 @ 12:50)    Lactate Dehydrogenase, Serum: 277 U/L (12-05-20 @ 12:26)    Sedimentation Rate, Erythrocyte: 38 mm/hr (12-09-20 @ 06:40)  Sedimentation Rate, Erythrocyte: 41 mm/hr (12-08-20 @ 07:01)  Sedimentation Rate, Erythrocyte: 45 mm/hr (12-07-20 @ 07:41)  Sedimentation Rate, Erythrocyte: 44 mm/hr (12-06-20 @ 07:46)    Procalcitonin, Serum: 0.07 ng/mL (12-04-20 @ 21:38)    Troponin I, Serum: <.015 ng/mL (12-05-20 @ 16:45)    Creatine Kinase, Serum: 174 U/L (12-05-20 @ 16:45)        Ferritin, Serum: 241 ng/mL (12-05-20 @ 12:26)  Ferritin, Serum: 255 ng/mL (12-04-20 @ 22:27)  Ferritin, Serum: 149 ng/mL (12-03-20 @ 01:57)          D-Dimer Assay, Quantitative: 199 ng/mL DDU (12-09-20 @ 06:40)  D-Dimer Assay, Quantitative: 267 ng/mL DDU (12-06-20 @ 07:46)  D-Dimer Assay, Quantitative: 247 ng/mL DDU (12-05-20 @ 06:43)  D-Dimer Assay, Quantitative: 279 ng/mL DDU (12-04-20 @ 15:55)        MICROBIOLOGY:              RADIOLOGY & ADDITIONAL STUDIES:

## 2020-12-11 NOTE — PROGRESS NOTE ADULT - ASSESSMENT
79yo female with pmhx of hypothyroid, depression, subdural hematoma 8/2020 2/2 fall adm with COVID+ suggested day of onset 11/25    RECOMMENDATIONS  12/4 NLR ~3, sats<94% on RA, rec REMDESIVIR, STEROIDs, caution with anticoagulation with prior subdural  12/5 NLR <3, NC-2L pt appears stable and comfortable  12/6 NLR <3 btwn NC and RA-cont current Rx  12/7 on RA-continue Rx, 12/8 last day of remdesivir and may be able to consider dc after Rx  12/8 btwn RA and 2L NC, last day of remdesivir, suggested day 13 of illness and can look at potential dc  12/9 NLR 5.58/1.83=<3, 97% on RA, can look at dc planning  12/10 remains stable on RA  12/11 remains stable on RA    When pt is doing well and  past the critical second week when decompensation can occur and has started to improve, fine to consider discharge planning with for early but safe discharge. Discharge with oxygen (4L or less and able to keep sats>90) and even persistent fever at time of discharge is reasonable. With high risk for thromboembolic disease we usually consider dc on  rivaroxaban (Xarelto) or Eliquis (apixaban) but with prior subdural rec ASA 325mg PO Q-day as discussed w primary team  For outpt mild disease considered noninfectious at day 10 after onset of illness but for hospitalized patients day 20. If pt going to facility or to dc isolation in house then will require 2 negative PCR tests  by a minimum of 24 hours and fever free without antipyretics for >24hrs and more than 10 days from first positive test.

## 2020-12-11 NOTE — PROGRESS NOTE ADULT - ASSESSMENT
Assessment and Plan:  -Acute hypoxic respiratory failure 2/2 COVID 19 pneumonia:  completed Remdesivir, and Decadron 6mg IV daily.  Albuterol inh Q6h PRN and Guaifenesin ER 600mg PO Q12h.  Titrate down O2 support- now on RA.  Maintain SPO2>88%.  ID and Pulmonary f/u  -Dysuria:  Will check UA and urine cx.    -Hx of subdural hematoma:  continue Lamictal 25mg PO BID  -HTN:  continue Norvasc 5mg PO daily  -Hypothyroidism:  continue Synthroid 25mcg PO daily  -Depression:  continue Celexa 10mg PO daily, psych consulted, no objection to d/c  -VTE ppx:  Lovenox 40mg SQ daily  -PT eval

## 2020-12-12 LAB
ALBUMIN SERPL ELPH-MCNC: 3.5 G/DL — SIGNIFICANT CHANGE UP (ref 3.3–5)
ALP SERPL-CCNC: 81 U/L — SIGNIFICANT CHANGE UP (ref 40–120)
ALT FLD-CCNC: 41 U/L — SIGNIFICANT CHANGE UP (ref 12–78)
AST SERPL-CCNC: 32 U/L — SIGNIFICANT CHANGE UP (ref 15–37)
BILIRUB DIRECT SERPL-MCNC: 0.1 MG/DL — SIGNIFICANT CHANGE UP (ref 0.05–0.2)
BILIRUB INDIRECT FLD-MCNC: 0.3 MG/DL — SIGNIFICANT CHANGE UP (ref 0.2–1)
BILIRUB SERPL-MCNC: 0.4 MG/DL — SIGNIFICANT CHANGE UP (ref 0.2–1.2)
CREAT SERPL-MCNC: 1.1 MG/DL — SIGNIFICANT CHANGE UP (ref 0.5–1.3)
PROT SERPL-MCNC: 7.8 G/DL — SIGNIFICANT CHANGE UP (ref 6–8.3)

## 2020-12-12 PROCEDURE — 99233 SBSQ HOSP IP/OBS HIGH 50: CPT | Mod: CS

## 2020-12-12 RX ADMIN — AMLODIPINE BESYLATE 5 MILLIGRAM(S): 2.5 TABLET ORAL at 05:33

## 2020-12-12 RX ADMIN — Medication 0.25 MILLIGRAM(S): at 11:03

## 2020-12-12 RX ADMIN — CITALOPRAM 10 MILLIGRAM(S): 10 TABLET, FILM COATED ORAL at 23:20

## 2020-12-12 RX ADMIN — Medication 600 MILLIGRAM(S): at 05:33

## 2020-12-12 RX ADMIN — LAMOTRIGINE 25 MILLIGRAM(S): 25 TABLET, ORALLY DISINTEGRATING ORAL at 05:33

## 2020-12-12 RX ADMIN — ENOXAPARIN SODIUM 40 MILLIGRAM(S): 100 INJECTION SUBCUTANEOUS at 11:03

## 2020-12-12 RX ADMIN — Medication 25 MICROGRAM(S): at 05:33

## 2020-12-12 RX ADMIN — PANTOPRAZOLE SODIUM 40 MILLIGRAM(S): 20 TABLET, DELAYED RELEASE ORAL at 05:33

## 2020-12-12 RX ADMIN — Medication 600 MILLIGRAM(S): at 17:31

## 2020-12-12 RX ADMIN — Medication 6 MILLIGRAM(S): at 05:33

## 2020-12-12 RX ADMIN — Medication 0.25 MILLIGRAM(S): at 23:20

## 2020-12-12 RX ADMIN — LAMOTRIGINE 25 MILLIGRAM(S): 25 TABLET, ORALLY DISINTEGRATING ORAL at 17:31

## 2020-12-12 NOTE — PROGRESS NOTE ADULT - SUBJECTIVE AND OBJECTIVE BOX
INTERVAL HPI/OVERNIGHT EVENTS:    MEDICATIONS  (STANDING):  ALPRAZolam 0.25 milliGRAM(s) Oral two times a day  amLODIPine   Tablet 5 milliGRAM(s) Oral daily  citalopram 10 milliGRAM(s) Oral daily  dexAMETHasone  Injectable 6 milliGRAM(s) IV Push daily  enoxaparin Injectable 40 milliGRAM(s) SubCutaneous daily  guaiFENesin  milliGRAM(s) Oral every 12 hours  lamoTRIgine 25 milliGRAM(s) Oral two times a day  levothyroxine 25 MICROGram(s) Oral daily  pantoprazole    Tablet 40 milliGRAM(s) Oral before breakfast    MEDICATIONS  (PRN):  acetaminophen   Tablet .. 650 milliGRAM(s) Oral every 6 hours PRN Temp greater or equal to 38C (100.4F), Mild Pain (1 - 3)  ALBUTerol    90 MICROgram(s) HFA Inhaler 2 Puff(s) Inhalation every 6 hours PRN Bronchospasm  aluminum hydroxide/magnesium hydroxide/simethicone Suspension 30 milliLiter(s) Oral every 6 hours PRN Dyspepsia  ondansetron Injectable 4 milliGRAM(s) IV Push every 6 hours PRN Nausea and/or Vomiting  traMADol 25 milliGRAM(s) Oral every 6 hours PRN Moderate Pain (4 - 6)      Allergies    cephalexin (Hives)  cephalexin (Unknown)    Intolerances    No seafood (Unknown)      CONSTITUTIONAL: No weakness, fevers or chills  EYES/ENT: No visual changes;  No vertigo or throat pain   NECK: No pain or stiffness  RESPIRATORY: No cough, wheezing, hemoptysis; No shortness of breath  CARDIOVASCULAR: No chest pain or palpitations  GASTROINTESTINAL: No abdominal or epigastric pain. No nausea, vomiting, or hematemesis; No diarrhea or constipation. No melena or hematochezia.  GENITOURINARY: No dysuria, frequency or hematuria  NEUROLOGICAL: No numbness or weakness  SKIN: No itching, burning, rashes, or lesions   All other review of systems is negative unless indicated above.    Vital Signs Last 24 Hrs  T(C): 36.8 (12 Dec 2020 08:38), Max: 37 (11 Dec 2020 16:36)  T(F): 98.2 (12 Dec 2020 08:38), Max: 98.6 (11 Dec 2020 16:36)  HR: 90 (12 Dec 2020 08:38) (86 - 103)  BP: 154/83 (12 Dec 2020 08:38) (118/77 - 169/82)  BP(mean): --  RR: 17 (12 Dec 2020 08:38) (17 - 18)  SpO2: 90% (12 Dec 2020 08:38) (90% - 96%)    Physical Exam:  General: catatonic  Neurology: nonfocal  Respiratory: CTA B/L  CV: RRR, S1S2  Abdominal: Soft, NT, ND +BS  Extremities: No edema, + peripheral pulses      LABS:                        11.2   10.76 )-----------( 459      ( 11 Dec 2020 06:55 )             34.3     12-12    x   |  x   |  x   ----------------------------<  x   x    |  x   |  1.10    Ca    8.8      11 Dec 2020 06:55    TPro  7.8  /  Alb  3.5  /  TBili  0.4  /  DBili  .10  /  AST  32  /  ALT  41  /  AlkPhos  81  12-12          RADIOLOGY & ADDITIONAL TESTS: INTERVAL HPI/OVERNIGHT EVENTS:  Patient seen and examine at bedside. Patient a but more cooperative with exam today. Patient AAOx3 on exam, but stating "I don't know why I'm here" and "I don't know" to multiple questions. No acute overnight events.    Unable to obtain ROS 2/2 current medical condition    MEDICATIONS  (STANDING):  ALPRAZolam 0.25 milliGRAM(s) Oral two times a day  amLODIPine   Tablet 5 milliGRAM(s) Oral daily  citalopram 10 milliGRAM(s) Oral daily  dexAMETHasone  Injectable 6 milliGRAM(s) IV Push daily  enoxaparin Injectable 40 milliGRAM(s) SubCutaneous daily  guaiFENesin  milliGRAM(s) Oral every 12 hours  lamoTRIgine 25 milliGRAM(s) Oral two times a day  levothyroxine 25 MICROGram(s) Oral daily  pantoprazole    Tablet 40 milliGRAM(s) Oral before breakfast    MEDICATIONS  (PRN):  acetaminophen   Tablet .. 650 milliGRAM(s) Oral every 6 hours PRN Temp greater or equal to 38C (100.4F), Mild Pain (1 - 3)  ALBUTerol    90 MICROgram(s) HFA Inhaler 2 Puff(s) Inhalation every 6 hours PRN Bronchospasm  aluminum hydroxide/magnesium hydroxide/simethicone Suspension 30 milliLiter(s) Oral every 6 hours PRN Dyspepsia  ondansetron Injectable 4 milliGRAM(s) IV Push every 6 hours PRN Nausea and/or Vomiting  traMADol 25 milliGRAM(s) Oral every 6 hours PRN Moderate Pain (4 - 6)      Allergies    cephalexin (Hives)  cephalexin (Unknown)    Intolerances    No seafood (Unknown)      Vital Signs Last 24 Hrs  T(C): 36.8 (12 Dec 2020 08:38), Max: 37 (11 Dec 2020 16:36)  T(F): 98.2 (12 Dec 2020 08:38), Max: 98.6 (11 Dec 2020 16:36)  HR: 90 (12 Dec 2020 08:38) (86 - 103)  BP: 154/83 (12 Dec 2020 08:38) (118/77 - 169/82)  BP(mean): --  RR: 17 (12 Dec 2020 08:38) (17 - 18)  SpO2: 90% (12 Dec 2020 08:38) (90% - 96%)    Physical Exam:  General: anxious appearing  Neurology: nonfocal  Respiratory: CTA B/L  CV: RRR, S1S2  Abdominal: Soft, NT, ND +BS  Extremities: No edema, + peripheral pulses      LABS:                        11.2   10.76 )-----------( 459      ( 11 Dec 2020 06:55 )             34.3     12-12    x   |  x   |  x   ----------------------------<  x   x    |  x   |  1.10    Ca    8.8      11 Dec 2020 06:55    TPro  7.8  /  Alb  3.5  /  TBili  0.4  /  DBili  .10  /  AST  32  /  ALT  41  /  AlkPhos  81  12-12          RADIOLOGY & ADDITIONAL TESTS:

## 2020-12-12 NOTE — PROGRESS NOTE ADULT - SUBJECTIVE AND OBJECTIVE BOX
Date/Time Patient Seen:  		  Referring MD:   Data Reviewed	       Patient is a 80y old  Female who presents with a chief complaint of COVID 19 Pneumonia (11 Dec 2020 10:24)      Subjective/HPI     PAST MEDICAL & SURGICAL HISTORY:  Vertigo    Depression    Hypothyroidism    Vertigo    Hypothyroidism    Depression    UTI (urinary tract infection)    No significant past surgical history  RIGHT KNEE ARTHROSCOPY    No significant past surgical history          Medication list         MEDICATIONS  (STANDING):  ALPRAZolam 0.25 milliGRAM(s) Oral two times a day  amLODIPine   Tablet 5 milliGRAM(s) Oral daily  citalopram 10 milliGRAM(s) Oral daily  dexAMETHasone  Injectable 6 milliGRAM(s) IV Push daily  enoxaparin Injectable 40 milliGRAM(s) SubCutaneous daily  guaiFENesin  milliGRAM(s) Oral every 12 hours  lamoTRIgine 25 milliGRAM(s) Oral two times a day  levothyroxine 25 MICROGram(s) Oral daily  pantoprazole    Tablet 40 milliGRAM(s) Oral before breakfast    MEDICATIONS  (PRN):  acetaminophen   Tablet .. 650 milliGRAM(s) Oral every 6 hours PRN Temp greater or equal to 38C (100.4F), Mild Pain (1 - 3)  ALBUTerol    90 MICROgram(s) HFA Inhaler 2 Puff(s) Inhalation every 6 hours PRN Bronchospasm  aluminum hydroxide/magnesium hydroxide/simethicone Suspension 30 milliLiter(s) Oral every 6 hours PRN Dyspepsia  ondansetron Injectable 4 milliGRAM(s) IV Push every 6 hours PRN Nausea and/or Vomiting  traMADol 25 milliGRAM(s) Oral every 6 hours PRN Moderate Pain (4 - 6)         Vitals log        ICU Vital Signs Last 24 Hrs  T(C): 36.8 (12 Dec 2020 08:38), Max: 37 (11 Dec 2020 16:36)  T(F): 98.2 (12 Dec 2020 08:38), Max: 98.6 (11 Dec 2020 16:36)  HR: 90 (12 Dec 2020 08:38) (86 - 103)  BP: 154/83 (12 Dec 2020 08:38) (118/77 - 169/82)  BP(mean): --  ABP: --  ABP(mean): --  RR: 17 (12 Dec 2020 08:38) (17 - 18)  SpO2: 90% (12 Dec 2020 08:38) (90% - 96%)           Input and Output:  I&O's Detail      Lab Data                        11.2   10.76 )-----------( 459      ( 11 Dec 2020 06:55 )             34.3     12-11    138  |  99  |  38<H>  ----------------------------<  82  5.3   |  32<H>  |  1.20    Ca    8.8      11 Dec 2020 06:55    TPro  6.3  /  Alb  2.7<L>  /  TBili  0.5  /  DBili  <.10  /  AST  21  /  ALT  25  /  AlkPhos  72  12-11            Review of Systems	      Objective     Physical Examination    heart s1s2  lung dc BS  abd soft      Pertinent Lab findings & Imaging      Pam:  NO   Adequate UO     I&O's Detail           Discussed with:     Cultures:	        Radiology

## 2020-12-12 NOTE — PROGRESS NOTE ADULT - ASSESSMENT
Assessment and Plan:  -Acute hypoxic respiratory failure 2/2 COVID 19 pneumonia:  completed Remdesivir, and Decadron 6mg IV daily.  Albuterol inh Q6h PRN and Guaifenesin ER 600mg PO Q12h.  Titrate down O2 support- now on RA.  Maintain SPO2>88%.  ID and Pulmonary f/u  -Dysuria:  Will check UA and urine cx.    -Hx of subdural hematoma:  continue Lamictal 25mg PO BID  -HTN:  continue Norvasc 5mg PO daily  -Hypothyroidism:  continue Synthroid 25mcg PO daily  -Depression:  continue Celexa 10mg PO daily, psych consulted, no objection to d/c  -VTE ppx:  Lovenox 40mg SQ daily  -PT eval Assessment and Plan:  -Acute hypoxic respiratory failure 2/2 COVID 19 pneumonia:  completed Remdesivir, and Decadron 6mg IV daily.  Albuterol inh Q6h PRN and Guaifenesin ER 600mg PO Q12h.  Titrate down O2 support- now on RA.  Maintain SPO2>88%.  ID and Pulmonary f/u    -Hx of subdural hematoma:  continue Lamictal 25mg PO BID  -HTN:  continue Norvasc 5mg PO daily  -Hypothyroidism:  continue Synthroid 25mcg PO daily  -Depression, anxiety, PTSD:  continue Celexa 10mg PO daily, psych consulted, no objection to d/c, started on low dose benzo  -VTE ppx:  Lovenox 40mg SQ daily  -PT eval

## 2020-12-13 LAB
ALBUMIN SERPL ELPH-MCNC: 2.9 G/DL — LOW (ref 3.3–5)
ALBUMIN SERPL ELPH-MCNC: 2.9 G/DL — LOW (ref 3.3–5)
ALP SERPL-CCNC: 69 U/L — SIGNIFICANT CHANGE UP (ref 40–120)
ALP SERPL-CCNC: 71 U/L — SIGNIFICANT CHANGE UP (ref 40–120)
ALT FLD-CCNC: 34 U/L — SIGNIFICANT CHANGE UP (ref 12–78)
ALT FLD-CCNC: 34 U/L — SIGNIFICANT CHANGE UP (ref 12–78)
ANION GAP SERPL CALC-SCNC: 8 MMOL/L — SIGNIFICANT CHANGE UP (ref 5–17)
AST SERPL-CCNC: 22 U/L — SIGNIFICANT CHANGE UP (ref 15–37)
AST SERPL-CCNC: 24 U/L — SIGNIFICANT CHANGE UP (ref 15–37)
BASOPHILS # BLD AUTO: 0.02 K/UL — SIGNIFICANT CHANGE UP (ref 0–0.2)
BASOPHILS NFR BLD AUTO: 0.2 % — SIGNIFICANT CHANGE UP (ref 0–2)
BILIRUB DIRECT SERPL-MCNC: 0.1 MG/DL — SIGNIFICANT CHANGE UP (ref 0.05–0.2)
BILIRUB INDIRECT FLD-MCNC: 0.3 MG/DL — SIGNIFICANT CHANGE UP (ref 0.2–1)
BILIRUB SERPL-MCNC: 0.4 MG/DL — SIGNIFICANT CHANGE UP (ref 0.2–1.2)
BILIRUB SERPL-MCNC: 0.4 MG/DL — SIGNIFICANT CHANGE UP (ref 0.2–1.2)
BUN SERPL-MCNC: 38 MG/DL — HIGH (ref 7–23)
CALCIUM SERPL-MCNC: 9.1 MG/DL — SIGNIFICANT CHANGE UP (ref 8.5–10.1)
CHLORIDE SERPL-SCNC: 99 MMOL/L — SIGNIFICANT CHANGE UP (ref 96–108)
CO2 SERPL-SCNC: 30 MMOL/L — SIGNIFICANT CHANGE UP (ref 22–31)
CREAT SERPL-MCNC: 1 MG/DL — SIGNIFICANT CHANGE UP (ref 0.5–1.3)
EOSINOPHIL # BLD AUTO: 0.06 K/UL — SIGNIFICANT CHANGE UP (ref 0–0.5)
EOSINOPHIL NFR BLD AUTO: 0.5 % — SIGNIFICANT CHANGE UP (ref 0–6)
GLUCOSE SERPL-MCNC: 93 MG/DL — SIGNIFICANT CHANGE UP (ref 70–99)
HCT VFR BLD CALC: 33.2 % — LOW (ref 34.5–45)
HGB BLD-MCNC: 11.1 G/DL — LOW (ref 11.5–15.5)
IMM GRANULOCYTES NFR BLD AUTO: 3.6 % — HIGH (ref 0–1.5)
LYMPHOCYTES # BLD AUTO: 18.1 % — SIGNIFICANT CHANGE UP (ref 13–44)
LYMPHOCYTES # BLD AUTO: 2.14 K/UL — SIGNIFICANT CHANGE UP (ref 1–3.3)
MCHC RBC-ENTMCNC: 29.8 PG — SIGNIFICANT CHANGE UP (ref 27–34)
MCHC RBC-ENTMCNC: 33.4 GM/DL — SIGNIFICANT CHANGE UP (ref 32–36)
MCV RBC AUTO: 89.2 FL — SIGNIFICANT CHANGE UP (ref 80–100)
MONOCYTES # BLD AUTO: 0.91 K/UL — HIGH (ref 0–0.9)
MONOCYTES NFR BLD AUTO: 7.7 % — SIGNIFICANT CHANGE UP (ref 2–14)
NEUTROPHILS # BLD AUTO: 8.27 K/UL — HIGH (ref 1.8–7.4)
NEUTROPHILS NFR BLD AUTO: 69.9 % — SIGNIFICANT CHANGE UP (ref 43–77)
NRBC # BLD: 0 /100 WBCS — SIGNIFICANT CHANGE UP (ref 0–0)
PLATELET # BLD AUTO: 494 K/UL — HIGH (ref 150–400)
POTASSIUM SERPL-MCNC: 4.3 MMOL/L — SIGNIFICANT CHANGE UP (ref 3.5–5.3)
POTASSIUM SERPL-SCNC: 4.3 MMOL/L — SIGNIFICANT CHANGE UP (ref 3.5–5.3)
PROT SERPL-MCNC: 6.8 G/DL — SIGNIFICANT CHANGE UP (ref 6–8.3)
PROT SERPL-MCNC: 6.8 G/DL — SIGNIFICANT CHANGE UP (ref 6–8.3)
RBC # BLD: 3.72 M/UL — LOW (ref 3.8–5.2)
RBC # FLD: 13.3 % — SIGNIFICANT CHANGE UP (ref 10.3–14.5)
SODIUM SERPL-SCNC: 137 MMOL/L — SIGNIFICANT CHANGE UP (ref 135–145)
WBC # BLD: 11.83 K/UL — HIGH (ref 3.8–10.5)
WBC # FLD AUTO: 11.83 K/UL — HIGH (ref 3.8–10.5)

## 2020-12-13 PROCEDURE — 99233 SBSQ HOSP IP/OBS HIGH 50: CPT | Mod: CS

## 2020-12-13 RX ADMIN — LAMOTRIGINE 25 MILLIGRAM(S): 25 TABLET, ORALLY DISINTEGRATING ORAL at 05:20

## 2020-12-13 RX ADMIN — Medication 600 MILLIGRAM(S): at 18:16

## 2020-12-13 RX ADMIN — Medication 600 MILLIGRAM(S): at 05:20

## 2020-12-13 RX ADMIN — CITALOPRAM 10 MILLIGRAM(S): 10 TABLET, FILM COATED ORAL at 23:16

## 2020-12-13 RX ADMIN — Medication 0.25 MILLIGRAM(S): at 23:16

## 2020-12-13 RX ADMIN — Medication 6 MILLIGRAM(S): at 05:20

## 2020-12-13 RX ADMIN — LAMOTRIGINE 25 MILLIGRAM(S): 25 TABLET, ORALLY DISINTEGRATING ORAL at 18:16

## 2020-12-13 RX ADMIN — AMLODIPINE BESYLATE 5 MILLIGRAM(S): 2.5 TABLET ORAL at 05:20

## 2020-12-13 RX ADMIN — ENOXAPARIN SODIUM 40 MILLIGRAM(S): 100 INJECTION SUBCUTANEOUS at 11:54

## 2020-12-13 RX ADMIN — Medication 0.25 MILLIGRAM(S): at 11:54

## 2020-12-13 RX ADMIN — Medication 25 MICROGRAM(S): at 05:20

## 2020-12-13 RX ADMIN — PANTOPRAZOLE SODIUM 40 MILLIGRAM(S): 20 TABLET, DELAYED RELEASE ORAL at 05:20

## 2020-12-13 NOTE — PROGRESS NOTE ADULT - ASSESSMENT
Assessment and Plan:  -Acute hypoxic respiratory failure 2/2 COVID 19 pneumonia:  completed Remdesivir, and Decadron 6mg IV daily.  Albuterol inh Q6h PRN and Guaifenesin ER 600mg PO Q12h.  Titrate down O2 support- now on RA.  Maintain SPO2>88%.  ID and Pulmonary f/u    -Hx of subdural hematoma:  continue Lamictal 25mg PO BID  -HTN:  continue Norvasc 5mg PO daily  -Hypothyroidism:  continue Synthroid 25mcg PO daily  -Depression, anxiety, PTSD:  continue Celexa 10mg PO daily, psych consulted, no objection to d/c, started on low dose benzo  -VTE ppx:  Lovenox 40mg SQ daily  -PT eval Assessment and Plan:  -Acute hypoxic respiratory failure 2/2 COVID 19 pneumonia:  completed Remdesivir, and Decadron 6mg IV daily.  Albuterol inh Q6h PRN and Guaifenesin ER 600mg PO Q12h.  Titrate down O2 support- now on RA.  Maintain SPO2>88%.  ID and Pulmonary f/u    -Hx of subdural hematoma:  continue Lamictal 25mg PO BID  -HTN:  continue Norvasc 5mg PO daily  -Hypothyroidism:  continue Synthroid 25mcg PO daily  -Depression, anxiety, PTSD:  continue Celexa 10mg PO daily, psych consulted, no objection to d/c, started on low dose benzo. Patient with continuous episodes of catatonia, psych called to evaluate patient again in AM. Discussed with son over phone, states he has not seen his mom in 2 years, but she has never been this disoriented (states she has been a bit "off" in the past). Patient also spitting out meds, if not taking PO, may need to give IV meds.  -VTE ppx:  Lovenox 40mg SQ daily  -PT eval

## 2020-12-13 NOTE — PROGRESS NOTE ADULT - SUBJECTIVE AND OBJECTIVE BOX
Date/Time Patient Seen:  		  Referring MD:   Data Reviewed	       Patient is a 80y old  Female who presents with a chief complaint of COVID 19 Pneumonia (12 Dec 2020 09:31)      Subjective/HPI     PAST MEDICAL & SURGICAL HISTORY:  Vertigo    Depression    Hypothyroidism    Vertigo    Hypothyroidism    Depression    UTI (urinary tract infection)    No significant past surgical history  RIGHT KNEE ARTHROSCOPY    No significant past surgical history          Medication list         MEDICATIONS  (STANDING):  ALPRAZolam 0.25 milliGRAM(s) Oral two times a day  amLODIPine   Tablet 5 milliGRAM(s) Oral daily  citalopram 10 milliGRAM(s) Oral daily  enoxaparin Injectable 40 milliGRAM(s) SubCutaneous daily  guaiFENesin  milliGRAM(s) Oral every 12 hours  lamoTRIgine 25 milliGRAM(s) Oral two times a day  levothyroxine 25 MICROGram(s) Oral daily  pantoprazole    Tablet 40 milliGRAM(s) Oral before breakfast    MEDICATIONS  (PRN):  acetaminophen   Tablet .. 650 milliGRAM(s) Oral every 6 hours PRN Temp greater or equal to 38C (100.4F), Mild Pain (1 - 3)  ALBUTerol    90 MICROgram(s) HFA Inhaler 2 Puff(s) Inhalation every 6 hours PRN Bronchospasm  aluminum hydroxide/magnesium hydroxide/simethicone Suspension 30 milliLiter(s) Oral every 6 hours PRN Dyspepsia  ondansetron Injectable 4 milliGRAM(s) IV Push every 6 hours PRN Nausea and/or Vomiting  traMADol 25 milliGRAM(s) Oral every 6 hours PRN Moderate Pain (4 - 6)         Vitals log        ICU Vital Signs Last 24 Hrs  T(C): 36.8 (13 Dec 2020 08:32), Max: 36.9 (12 Dec 2020 23:51)  T(F): 98.3 (13 Dec 2020 08:32), Max: 98.4 (12 Dec 2020 23:51)  HR: 94 (13 Dec 2020 08:32) (69 - 105)  BP: 111/61 (13 Dec 2020 08:32) (101/59 - 138/77)  BP(mean): --  ABP: --  ABP(mean): --  RR: 17 (13 Dec 2020 08:32) (17 - 18)  SpO2: 91% (13 Dec 2020 08:32) (90% - 93%)           Input and Output:  I&O's Detail      Lab Data                        11.1   11.83 )-----------( 494      ( 13 Dec 2020 07:52 )             33.2     12-13    137  |  99  |  38<H>  ----------------------------<  93  4.3   |  30  |  1.00    Ca    9.1      13 Dec 2020 07:52    TPro  6.8  /  Alb  2.9<L>  /  TBili  0.4  /  DBili  .10  /  AST  24  /  ALT  34  /  AlkPhos  69  12-13            Review of Systems	      Objective     Physical Examination    heart s1s2  lung dec BS  abd sft  on room air      Pertinent Lab findings & Imaging      Pam:  NO   Adequate UO     I&O's Detail           Discussed with:     Cultures:	        Radiology

## 2020-12-13 NOTE — PROGRESS NOTE ADULT - SUBJECTIVE AND OBJECTIVE BOX
INTERVAL HPI/OVERNIGHT EVENTS:    MEDICATIONS  (STANDING):  ALPRAZolam 0.25 milliGRAM(s) Oral two times a day  amLODIPine   Tablet 5 milliGRAM(s) Oral daily  citalopram 10 milliGRAM(s) Oral daily  enoxaparin Injectable 40 milliGRAM(s) SubCutaneous daily  guaiFENesin  milliGRAM(s) Oral every 12 hours  lamoTRIgine 25 milliGRAM(s) Oral two times a day  levothyroxine 25 MICROGram(s) Oral daily  pantoprazole    Tablet 40 milliGRAM(s) Oral before breakfast    MEDICATIONS  (PRN):  acetaminophen   Tablet .. 650 milliGRAM(s) Oral every 6 hours PRN Temp greater or equal to 38C (100.4F), Mild Pain (1 - 3)  ALBUTerol    90 MICROgram(s) HFA Inhaler 2 Puff(s) Inhalation every 6 hours PRN Bronchospasm  aluminum hydroxide/magnesium hydroxide/simethicone Suspension 30 milliLiter(s) Oral every 6 hours PRN Dyspepsia  ondansetron Injectable 4 milliGRAM(s) IV Push every 6 hours PRN Nausea and/or Vomiting  traMADol 25 milliGRAM(s) Oral every 6 hours PRN Moderate Pain (4 - 6)      Allergies    cephalexin (Hives)  cephalexin (Unknown)    Intolerances    No seafood (Unknown)      CONSTITUTIONAL: No weakness, fevers or chills  EYES/ENT: No visual changes;  No vertigo or throat pain   NECK: No pain or stiffness  RESPIRATORY: No cough, wheezing, hemoptysis; No shortness of breath  CARDIOVASCULAR: No chest pain or palpitations  GASTROINTESTINAL: No abdominal or epigastric pain. No nausea, vomiting, or hematemesis; No diarrhea or constipation. No melena or hematochezia.  GENITOURINARY: No dysuria, frequency or hematuria  NEUROLOGICAL: No numbness or weakness  SKIN: No itching, burning, rashes, or lesions   All other review of systems is negative unless indicated above.    Vital Signs Last 24 Hrs  T(C): 36.8 (13 Dec 2020 08:32), Max: 36.9 (12 Dec 2020 23:51)  T(F): 98.3 (13 Dec 2020 08:32), Max: 98.4 (12 Dec 2020 23:51)  HR: 94 (13 Dec 2020 08:32) (69 - 105)  BP: 111/61 (13 Dec 2020 08:32) (101/59 - 138/77)  BP(mean): --  RR: 17 (13 Dec 2020 08:32) (17 - 18)  SpO2: 91% (13 Dec 2020 08:32) (90% - 93%)    Physical Exam:  General: anxious appearing  Neurology: nonfocal  Respiratory: CTA B/L  CV: RRR, S1S2  Abdominal: Soft, NT, ND +BS  Extremities: No edema, + peripheral pulses      LABS:                        11.1   11.83 )-----------( 494      ( 13 Dec 2020 07:52 )             33.2     12-13    137  |  99  |  38<H>  ----------------------------<  93  4.3   |  30  |  1.00    Ca    9.1      13 Dec 2020 07:52    TPro  6.8  /  Alb  2.9<L>  /  TBili  0.4  /  DBili  .10  /  AST  24  /  ALT  34  /  AlkPhos  69  12-13          RADIOLOGY & ADDITIONAL TESTS: INTERVAL HPI/OVERNIGHT EVENTS:  Patient seen and examined at bedside. Patient again noted to be in catatonic state today, not responding appropriately to questions. Stating she has Alzheimer's and COVID again and again.    ROS: unable to obtain 2/2 current mental status    MEDICATIONS  (STANDING):  ALPRAZolam 0.25 milliGRAM(s) Oral two times a day  amLODIPine   Tablet 5 milliGRAM(s) Oral daily  citalopram 10 milliGRAM(s) Oral daily  enoxaparin Injectable 40 milliGRAM(s) SubCutaneous daily  guaiFENesin  milliGRAM(s) Oral every 12 hours  lamoTRIgine 25 milliGRAM(s) Oral two times a day  levothyroxine 25 MICROGram(s) Oral daily  pantoprazole    Tablet 40 milliGRAM(s) Oral before breakfast    MEDICATIONS  (PRN):  acetaminophen   Tablet .. 650 milliGRAM(s) Oral every 6 hours PRN Temp greater or equal to 38C (100.4F), Mild Pain (1 - 3)  ALBUTerol    90 MICROgram(s) HFA Inhaler 2 Puff(s) Inhalation every 6 hours PRN Bronchospasm  aluminum hydroxide/magnesium hydroxide/simethicone Suspension 30 milliLiter(s) Oral every 6 hours PRN Dyspepsia  ondansetron Injectable 4 milliGRAM(s) IV Push every 6 hours PRN Nausea and/or Vomiting  traMADol 25 milliGRAM(s) Oral every 6 hours PRN Moderate Pain (4 - 6)      Allergies    cephalexin (Hives)  cephalexin (Unknown)    Intolerances    No seafood (Unknown)      Vital Signs Last 24 Hrs  T(C): 36.8 (13 Dec 2020 08:32), Max: 36.9 (12 Dec 2020 23:51)  T(F): 98.3 (13 Dec 2020 08:32), Max: 98.4 (12 Dec 2020 23:51)  HR: 94 (13 Dec 2020 08:32) (69 - 105)  BP: 111/61 (13 Dec 2020 08:32) (101/59 - 138/77)  BP(mean): --  RR: 17 (13 Dec 2020 08:32) (17 - 18)  SpO2: 91% (13 Dec 2020 08:32) (90% - 93%)    Physical Exam:  General: catatonic  Neurology: nonfocal  Respiratory: CTA B/L  CV: RRR, S1S2  Abdominal: Soft, NT, ND +BS  Extremities: No edema, + peripheral pulses      LABS:                        11.1   11.83 )-----------( 494      ( 13 Dec 2020 07:52 )             33.2     12-13    137  |  99  |  38<H>  ----------------------------<  93  4.3   |  30  |  1.00    Ca    9.1      13 Dec 2020 07:52    TPro  6.8  /  Alb  2.9<L>  /  TBili  0.4  /  DBili  .10  /  AST  24  /  ALT  34  /  AlkPhos  69  12-13          RADIOLOGY & ADDITIONAL TESTS:

## 2020-12-14 LAB
ALBUMIN SERPL ELPH-MCNC: 3.3 G/DL — SIGNIFICANT CHANGE UP (ref 3.3–5)
ALP SERPL-CCNC: 75 U/L — SIGNIFICANT CHANGE UP (ref 40–120)
ALT FLD-CCNC: 35 U/L — SIGNIFICANT CHANGE UP (ref 12–78)
ANION GAP SERPL CALC-SCNC: 8 MMOL/L — SIGNIFICANT CHANGE UP (ref 5–17)
AST SERPL-CCNC: 21 U/L — SIGNIFICANT CHANGE UP (ref 15–37)
BASOPHILS # BLD AUTO: 0.03 K/UL — SIGNIFICANT CHANGE UP (ref 0–0.2)
BASOPHILS NFR BLD AUTO: 0.2 % — SIGNIFICANT CHANGE UP (ref 0–2)
BILIRUB SERPL-MCNC: 0.4 MG/DL — SIGNIFICANT CHANGE UP (ref 0.2–1.2)
BUN SERPL-MCNC: 43 MG/DL — HIGH (ref 7–23)
CALCIUM SERPL-MCNC: 9.3 MG/DL — SIGNIFICANT CHANGE UP (ref 8.5–10.1)
CHLORIDE SERPL-SCNC: 102 MMOL/L — SIGNIFICANT CHANGE UP (ref 96–108)
CO2 SERPL-SCNC: 29 MMOL/L — SIGNIFICANT CHANGE UP (ref 22–31)
CREAT SERPL-MCNC: 1.2 MG/DL — SIGNIFICANT CHANGE UP (ref 0.5–1.3)
EOSINOPHIL # BLD AUTO: 0.07 K/UL — SIGNIFICANT CHANGE UP (ref 0–0.5)
EOSINOPHIL NFR BLD AUTO: 0.4 % — SIGNIFICANT CHANGE UP (ref 0–6)
GLUCOSE SERPL-MCNC: 115 MG/DL — HIGH (ref 70–99)
HCT VFR BLD CALC: 39.1 % — SIGNIFICANT CHANGE UP (ref 34.5–45)
HGB BLD-MCNC: 12.8 G/DL — SIGNIFICANT CHANGE UP (ref 11.5–15.5)
IMM GRANULOCYTES NFR BLD AUTO: 1.8 % — HIGH (ref 0–1.5)
LYMPHOCYTES # BLD AUTO: 25.1 % — SIGNIFICANT CHANGE UP (ref 13–44)
LYMPHOCYTES # BLD AUTO: 4.22 K/UL — HIGH (ref 1–3.3)
MCHC RBC-ENTMCNC: 29.9 PG — SIGNIFICANT CHANGE UP (ref 27–34)
MCHC RBC-ENTMCNC: 32.7 GM/DL — SIGNIFICANT CHANGE UP (ref 32–36)
MCV RBC AUTO: 91.4 FL — SIGNIFICANT CHANGE UP (ref 80–100)
MONOCYTES # BLD AUTO: 1.52 K/UL — HIGH (ref 0–0.9)
MONOCYTES NFR BLD AUTO: 9 % — SIGNIFICANT CHANGE UP (ref 2–14)
NEUTROPHILS # BLD AUTO: 10.7 K/UL — HIGH (ref 1.8–7.4)
NEUTROPHILS NFR BLD AUTO: 63.5 % — SIGNIFICANT CHANGE UP (ref 43–77)
NRBC # BLD: 0 /100 WBCS — SIGNIFICANT CHANGE UP (ref 0–0)
PLATELET # BLD AUTO: 581 K/UL — HIGH (ref 150–400)
POTASSIUM SERPL-MCNC: 3.9 MMOL/L — SIGNIFICANT CHANGE UP (ref 3.5–5.3)
POTASSIUM SERPL-SCNC: 3.9 MMOL/L — SIGNIFICANT CHANGE UP (ref 3.5–5.3)
PROT SERPL-MCNC: 7.7 G/DL — SIGNIFICANT CHANGE UP (ref 6–8.3)
RBC # BLD: 4.28 M/UL — SIGNIFICANT CHANGE UP (ref 3.8–5.2)
RBC # FLD: 13.5 % — SIGNIFICANT CHANGE UP (ref 10.3–14.5)
SARS-COV-2 RNA SPEC QL NAA+PROBE: DETECTED
SODIUM SERPL-SCNC: 139 MMOL/L — SIGNIFICANT CHANGE UP (ref 135–145)
WBC # BLD: 16.84 K/UL — HIGH (ref 3.8–10.5)
WBC # FLD AUTO: 16.84 K/UL — HIGH (ref 3.8–10.5)

## 2020-12-14 PROCEDURE — 99233 SBSQ HOSP IP/OBS HIGH 50: CPT | Mod: CS

## 2020-12-14 PROCEDURE — 99232 SBSQ HOSP IP/OBS MODERATE 35: CPT

## 2020-12-14 RX ADMIN — ENOXAPARIN SODIUM 40 MILLIGRAM(S): 100 INJECTION SUBCUTANEOUS at 11:57

## 2020-12-14 RX ADMIN — AMLODIPINE BESYLATE 5 MILLIGRAM(S): 2.5 TABLET ORAL at 05:27

## 2020-12-14 RX ADMIN — Medication 600 MILLIGRAM(S): at 17:37

## 2020-12-14 RX ADMIN — Medication 25 MICROGRAM(S): at 05:27

## 2020-12-14 RX ADMIN — LAMOTRIGINE 25 MILLIGRAM(S): 25 TABLET, ORALLY DISINTEGRATING ORAL at 17:37

## 2020-12-14 RX ADMIN — Medication 600 MILLIGRAM(S): at 05:27

## 2020-12-14 RX ADMIN — CITALOPRAM 10 MILLIGRAM(S): 10 TABLET, FILM COATED ORAL at 21:44

## 2020-12-14 RX ADMIN — Medication 0.25 MILLIGRAM(S): at 11:57

## 2020-12-14 RX ADMIN — LAMOTRIGINE 25 MILLIGRAM(S): 25 TABLET, ORALLY DISINTEGRATING ORAL at 05:26

## 2020-12-14 RX ADMIN — Medication 0.25 MILLIGRAM(S): at 21:44

## 2020-12-14 RX ADMIN — PANTOPRAZOLE SODIUM 40 MILLIGRAM(S): 20 TABLET, DELAYED RELEASE ORAL at 05:27

## 2020-12-14 NOTE — PROGRESS NOTE ADULT - ASSESSMENT
79yo female with pmhx of hypothyroid, depression, subdural hematoma 8/2020 2/2 fall adm with COVID+ suggested day of onset 11/25    RECOMMENDATIONS  12/4 NLR ~3, sats<94% on RA, rec REMDESIVIR, STEROIDs, caution with anticoagulation with prior subdural  12/5 NLR <3, NC-2L pt appears stable and comfortable  12/6 NLR <3 btwn NC and RA-cont current Rx  12/7 on RA-continue Rx, 12/8 last day of remdesivir and may be able to consider dc after Rx  12/8 btwn RA and 2L NC, last day of remdesivir, suggested day 13 of illness and can look at potential dc (COVID PCR+)  12/9 NLR 5.58/1.83=<3, 97% on RA, can look at dc planning  12/10 remains stable on RA  12/11 remains stable on RA  12/14-stable on RA, sp steroids, can repeat COVID PCRs if required for dc planning    When pt is doing well and  past the critical second week when decompensation can occur and has started to improve, fine to consider discharge planning with for early but safe discharge. Discharge with oxygen (4L or less and able to keep sats>90) and even persistent fever at time of discharge is reasonable. With high risk for thromboembolic disease we usually consider dc on  rivaroxaban (Xarelto) or Eliquis (apixaban) but with prior subdural rec ASA 325mg PO Q-day as discussed w primary team  For outpt mild disease considered noninfectious at day 10 after onset of illness but for hospitalized patients day 20. If pt going to facility or to dc isolation in house then will require 2 negative PCR tests  by a minimum of 24 hours and fever free without antipyretics for >24hrs and more than 10 days from first positive test.

## 2020-12-14 NOTE — PROGRESS NOTE ADULT - SUBJECTIVE AND OBJECTIVE BOX
INTERVAL HPI/OVERNIGHT EVENTS:  Patient seen and examined at bedside. Patient not responding to me or answering my questions. Tried multiple times to ask patient what was bothering her, but patient with her head on the table and not responding. As per Psych, patient had "pleasant" conversation with him this AM, stated to Psych that main concern was her COVID diagnosis, wanted repeat COVID test. Tried to contact son today, no answer.     ROS: unable to obtain ROS as patient uncooperative    MEDICATIONS  (STANDING):  ALPRAZolam 0.25 milliGRAM(s) Oral two times a day  amLODIPine   Tablet 5 milliGRAM(s) Oral daily  citalopram 10 milliGRAM(s) Oral daily  enoxaparin Injectable 40 milliGRAM(s) SubCutaneous daily  guaiFENesin  milliGRAM(s) Oral every 12 hours  lamoTRIgine 25 milliGRAM(s) Oral two times a day  levothyroxine 25 MICROGram(s) Oral daily  pantoprazole    Tablet 40 milliGRAM(s) Oral before breakfast    MEDICATIONS  (PRN):  acetaminophen   Tablet .. 650 milliGRAM(s) Oral every 6 hours PRN Temp greater or equal to 38C (100.4F), Mild Pain (1 - 3)  ALBUTerol    90 MICROgram(s) HFA Inhaler 2 Puff(s) Inhalation every 6 hours PRN Bronchospasm  aluminum hydroxide/magnesium hydroxide/simethicone Suspension 30 milliLiter(s) Oral every 6 hours PRN Dyspepsia  ondansetron Injectable 4 milliGRAM(s) IV Push every 6 hours PRN Nausea and/or Vomiting  traMADol 25 milliGRAM(s) Oral every 6 hours PRN Moderate Pain (4 - 6)      Allergies    cephalexin (Hives)  cephalexin (Unknown)    Intolerances    No seafood (Unknown)        Vital Signs Last 24 Hrs  T(C): 36.4 (14 Dec 2020 15:47), Max: 36.9 (14 Dec 2020 04:30)  T(F): 97.5 (14 Dec 2020 15:47), Max: 98.5 (14 Dec 2020 08:25)  HR: 101 (14 Dec 2020 15:47) (71 - 104)  BP: 127/82 (14 Dec 2020 15:47) (111/55 - 127/82)  BP(mean): --  RR: 17 (14 Dec 2020 15:47) (17 - 18)  SpO2: 96% (14 Dec 2020 15:47) (91% - 96%)      Physical Exam:  General: catatonic  Neurology: nonfocal  Respiratory: CTA B/L  CV: RRR, S1S2  Abdominal: Soft, NT, ND +BS  Extremities: No edema, + peripheral pulses    LABS:                        12.8   16.84 )-----------( 581      ( 14 Dec 2020 09:58 )             39.1     12-14    139  |  102  |  43<H>  ----------------------------<  115<H>  3.9   |  29  |  1.20    Ca    9.3      14 Dec 2020 09:58    TPro  7.7  /  Alb  3.3  /  TBili  0.4  /  DBili  x   /  AST  21  /  ALT  35  /  AlkPhos  75  12-14          RADIOLOGY & ADDITIONAL TESTS:

## 2020-12-14 NOTE — PROGRESS NOTE ADULT - SUBJECTIVE AND OBJECTIVE BOX
Date/Time Patient Seen:  		  Referring MD:   Data Reviewed	       Patient is a 80y old  Female who presents with a chief complaint of COVID 19 Pneumonia (14 Dec 2020 09:48)      Subjective/HPI     PAST MEDICAL & SURGICAL HISTORY:  Vertigo    Depression    Hypothyroidism    Vertigo    Hypothyroidism    Depression    UTI (urinary tract infection)    No significant past surgical history  RIGHT KNEE ARTHROSCOPY    No significant past surgical history          Medication list         MEDICATIONS  (STANDING):  ALPRAZolam 0.25 milliGRAM(s) Oral two times a day  amLODIPine   Tablet 5 milliGRAM(s) Oral daily  citalopram 10 milliGRAM(s) Oral daily  enoxaparin Injectable 40 milliGRAM(s) SubCutaneous daily  guaiFENesin  milliGRAM(s) Oral every 12 hours  lamoTRIgine 25 milliGRAM(s) Oral two times a day  levothyroxine 25 MICROGram(s) Oral daily  pantoprazole    Tablet 40 milliGRAM(s) Oral before breakfast    MEDICATIONS  (PRN):  acetaminophen   Tablet .. 650 milliGRAM(s) Oral every 6 hours PRN Temp greater or equal to 38C (100.4F), Mild Pain (1 - 3)  ALBUTerol    90 MICROgram(s) HFA Inhaler 2 Puff(s) Inhalation every 6 hours PRN Bronchospasm  aluminum hydroxide/magnesium hydroxide/simethicone Suspension 30 milliLiter(s) Oral every 6 hours PRN Dyspepsia  ondansetron Injectable 4 milliGRAM(s) IV Push every 6 hours PRN Nausea and/or Vomiting  traMADol 25 milliGRAM(s) Oral every 6 hours PRN Moderate Pain (4 - 6)         Vitals log        ICU Vital Signs Last 24 Hrs  T(C): 36.9 (14 Dec 2020 08:25), Max: 36.9 (13 Dec 2020 16:19)  T(F): 98.5 (14 Dec 2020 08:25), Max: 98.5 (14 Dec 2020 08:25)  HR: 86 (14 Dec 2020 08:25) (71 - 98)  BP: 120/60 (14 Dec 2020 08:25) (111/55 - 126/70)  BP(mean): --  ABP: --  ABP(mean): --  RR: 18 (14 Dec 2020 08:25) (17 - 18)  SpO2: 91% (14 Dec 2020 08:25) (91% - 96%)           Input and Output:  I&O's Detail      Lab Data                        12.8   16.84 )-----------( 581      ( 14 Dec 2020 09:58 )             39.1     12-14    139  |  102  |  43<H>  ----------------------------<  115<H>  3.9   |  29  |  1.20    Ca    9.3      14 Dec 2020 09:58    TPro  7.7  /  Alb  3.3  /  TBili  0.4  /  DBili  x   /  AST  21  /  ALT  35  /  AlkPhos  75  12-14            Review of Systems	      Objective     Physical Examination    heart s1s2  lung dec BS  abd soft      Pertinent Lab findings & Imaging      Pam:  NO   Adequate UO     I&O's Detail           Discussed with:     Cultures:	        Radiology

## 2020-12-14 NOTE — PROGRESS NOTE ADULT - SUBJECTIVE AND OBJECTIVE BOX
[facility] DIVISION of INFECTIOUS DISEASE  Salvatore Dennis MD PhD, Suzanne Hughes MD, Elvira Valdez MD, Fidel Rollins MD  and providing coverage with Mary Ann Lucas MD and Loren Fritz MD  Providing Infectious Disease Consultations at [facility], [facility], [facility]'s      Office# [phone] to schedule follow up appointments  Answering Service for urgent calls or New Consults [phone]  Cell# to text for urgent issues Salvatore Dennis [phone]     Infectious diseases progress note:    LINN WHITE is a 80y y. o. Female patient    COVID Patient    Allergies    cephalexin (Hives)  cephalexin (Unknown)    Intolerances    No seafood (Unknown)      ANTIBIOTICS/RELEVANT:  antimicrobials    immunologic:    OTHER:  acetaminophen   Tablet .. 650 milliGRAM(s) Oral every 6 hours PRN  ALBUTerol    90 MICROgram(s) HFA Inhaler 2 Puff(s) Inhalation every 6 hours PRN  ALPRAZolam 0.25 milliGRAM(s) Oral two times a day  aluminum hydroxide/magnesium hydroxide/simethicone Suspension 30 milliLiter(s) Oral every 6 hours PRN  amLODIPine   Tablet 5 milliGRAM(s) Oral daily  citalopram 10 milliGRAM(s) Oral daily  enoxaparin Injectable 40 milliGRAM(s) SubCutaneous daily  guaiFENesin  milliGRAM(s) Oral every 12 hours  lamoTRIgine 25 milliGRAM(s) Oral two times a day  levothyroxine 25 MICROGram(s) Oral daily  ondansetron Injectable 4 milliGRAM(s) IV Push every 6 hours PRN  pantoprazole    Tablet 40 milliGRAM(s) Oral before breakfast  traMADol 25 milliGRAM(s) Oral every 6 hours PRN      Objective:  Vital Signs Last 24 Hrs  T(C): 36.9 (14 Dec 2020 08:25), Max: 36.9 (13 Dec 2020 16:19)  T(F): 98.5 (14 Dec 2020 08:25), Max: 98.5 (14 Dec 2020 08:25)  HR: 86 (14 Dec 2020 08:25) (71 - 98)  BP: 120/60 (14 Dec 2020 08:25) (111/55 - 126/70)  BP(mean): --  RR: 18 (14 Dec 2020 08:25) (17 - 18)  SpO2: 91% (14 Dec 2020 08:25) (91% - 96%)    T(C): 36.9 (12-14-20 @ 08:25), Max: 36.9 (12-12-20 @ 23:51)  T(C): 36.9 (12-14-20 @ 08:25), Max: 37 (12-11-20 @ 16:36)  T(C): 36.9 (12-14-20 @ 08:25), Max: 37.1 (12-11-20 @ 00:17)    PHYSICAL EXAM:  HEENT: NC atraumatic  Neck: supple  Respiratory: no accessory muscle use, breathing comfortably  Cardiovascular: distant  Gastrointestinal: normal appearing, nondistended  Extremities: no clubbing, no cyanosis,      LABS:                          11.1   11.83 )-----------( 494      ( 13 Dec 2020 07:52 )             33.2       11.83 12-13 @ 07:52  10.76 12-11 @ 06:55  9.15 12-09 @ 06:40  7.03 12-08 @ 07:01      12-13    137  |  99  |  38<H>  ----------------------------<  93  4.3   |  30  |  1.00    Ca    9.1      13 Dec 2020 07:52    TPro  6.8  /  Alb  2.9<L>  /  TBili  0.4  /  DBili  .10  /  AST  24  /  ALT  34  /  AlkPhos  69  12-13      Creatinine, Serum: 1.00 mg/dL (12-13-20 @ 07:52)  Creatinine, Serum: 1.10 mg/dL (12-12-20 @ 08:33)  Creatinine, Serum: 1.20 mg/dL (12-11-20 @ 06:55)  Creatinine, Serum: 1.00 mg/dL (12-10-20 @ 07:39)  Creatinine, Serum: 1.10 mg/dL (12-09-20 @ 06:40)  Creatinine, Serum: 0.88 mg/dL (12-08-20 @ 07:01)                COVID RISK SCORE  Auto Neutrophil #: 8.27 K/uL (12-13-20 @ 07:52)  Auto Lymphocyte #: 2.14 K/uL (12-13-20 @ 07:52)  Auto Neutrophil #: 6.00 K/uL (12-11-20 @ 06:55)  Auto Lymphocyte #: 2.77 K/uL (12-11-20 @ 06:55)  Auto Neutrophil #: 5.58 K/uL (12-09-20 @ 06:40)  Auto Lymphocyte #: 1.83 K/uL (12-09-20 @ 06:40)  Auto Neutrophil #: 3.93 K/uL (12-08-20 @ 07:01)  Auto Lymphocyte #: 1.96 K/uL (12-08-20 @ 07:01)  Auto Neutrophil #: 4.33 K/uL (12-07-20 @ 07:41)  Auto Lymphocyte #: 2.33 K/uL (12-07-20 @ 07:41)  Auto Neutrophil #: 5.28 K/uL (12-06-20 @ 07:46)  Auto Lymphocyte #: 1.60 K/uL (12-06-20 @ 07:46)  Auto Neutrophil #: 1.46 K/uL (12-05-20 @ 06:43)  Auto Lymphocyte #: 0.72 K/uL (12-05-20 @ 06:43)  Auto Neutrophil #: 3.20 K/uL (12-04-20 @ 15:55)  Auto Lymphocyte #: 0.98 K/uL (12-04-20 @ 15:55)  Auto Lymphocyte #: 0.95 K/uL (12-02-20 @ 12:50)  Auto Neutrophil #: 3.08 K/uL (12-02-20 @ 12:50)    Lactate, Blood: 1.0 mmol/L (12-02-20 @ 12:50)    Auto Eosinophil #: 0.06 K/uL (12-13-20 @ 07:52)  Auto Eosinophil #: 0.14 K/uL (12-11-20 @ 06:55)  Auto Eosinophil #: 0.00 K/uL (12-09-20 @ 06:40)  Auto Eosinophil #: 0.01 K/uL (12-08-20 @ 07:01)  Auto Eosinophil #: 0.00 K/uL (12-07-20 @ 07:41)  Auto Eosinophil #: 0.00 K/uL (12-06-20 @ 07:46)  Auto Eosinophil #: 0.00 K/uL (12-05-20 @ 06:43)  Auto Eosinophil #: 0.00 K/uL (12-04-20 @ 15:55)  Auto Eosinophil #: 0.00 K/uL (12-02-20 @ 12:50)    Lactate Dehydrogenase, Serum: 277 U/L (12-05-20 @ 12:26)    Sedimentation Rate, Erythrocyte: 38 mm/hr (12-09-20 @ 06:40)  Sedimentation Rate, Erythrocyte: 41 mm/hr (12-08-20 @ 07:01)  Sedimentation Rate, Erythrocyte: 45 mm/hr (12-07-20 @ 07:41)  Sedimentation Rate, Erythrocyte: 44 mm/hr (12-06-20 @ 07:46)    Procalcitonin, Serum: 0.07 ng/mL (12-04-20 @ 21:38)    Troponin I, Serum: <.015 ng/mL (12-05-20 @ 16:45)    Creatine Kinase, Serum: 174 U/L (12-05-20 @ 16:45)        Ferritin, Serum: 241 ng/mL (12-05-20 @ 12:26)  Ferritin, Serum: 255 ng/mL (12-04-20 @ 22:27)  Ferritin, Serum: 149 ng/mL (12-03-20 @ 01:57)          D-Dimer Assay, Quantitative: 199 ng/mL DDU (12-09-20 @ 06:40)  D-Dimer Assay, Quantitative: 267 ng/mL DDU (12-06-20 @ 07:46)  D-Dimer Assay, Quantitative: 247 ng/mL DDU (12-05-20 @ 06:43)  D-Dimer Assay, Quantitative: 279 ng/mL DDU (12-04-20 @ 15:55)        MICROBIOLOGY:              RADIOLOGY & ADDITIONAL STUDIES:

## 2020-12-14 NOTE — PROGRESS NOTE ADULT - ASSESSMENT
Assessment and Plan:  -Acute hypoxic respiratory failure 2/2 COVID 19 pneumonia:  completed Remdesivir, and Decadron 6mg IV daily.  Albuterol inh Q6h PRN and Guaifenesin ER 600mg PO Q12h.  Titrate down O2 support- now on RA.  Maintain SPO2>88%.  ID and Pulmonary f/u    -Hx of subdural hematoma:  continue Lamictal 25mg PO BID  -HTN:  continue Norvasc 5mg PO daily  -Hypothyroidism:  continue Synthroid 25mcg PO daily  -Depression, anxiety, PTSD:  continue Celexa 10mg PO daily, psych consulted, no objection to d/c, started on low dose benzo. Patient with continuous episodes of catatonia, psych called to evaluate patient again in AM- as per psych- had pleasant conversation, but when I went to examine patient, patient with head down on table, refusing to speak with me.  As per Psych, no contraindications to discharge. Will check COVID test today.  -VTE ppx:  Lovenox 40mg SQ daily  -PT eval

## 2020-12-15 LAB
ANION GAP SERPL CALC-SCNC: 6 MMOL/L — SIGNIFICANT CHANGE UP (ref 5–17)
BASOPHILS # BLD AUTO: 0.01 K/UL — SIGNIFICANT CHANGE UP (ref 0–0.2)
BASOPHILS NFR BLD AUTO: 0.1 % — SIGNIFICANT CHANGE UP (ref 0–2)
BUN SERPL-MCNC: 43 MG/DL — HIGH (ref 7–23)
CALCIUM SERPL-MCNC: 8.5 MG/DL — SIGNIFICANT CHANGE UP (ref 8.5–10.1)
CHLORIDE SERPL-SCNC: 104 MMOL/L — SIGNIFICANT CHANGE UP (ref 96–108)
CO2 SERPL-SCNC: 30 MMOL/L — SIGNIFICANT CHANGE UP (ref 22–31)
CREAT SERPL-MCNC: 1 MG/DL — SIGNIFICANT CHANGE UP (ref 0.5–1.3)
EOSINOPHIL # BLD AUTO: 0.11 K/UL — SIGNIFICANT CHANGE UP (ref 0–0.5)
EOSINOPHIL NFR BLD AUTO: 1 % — SIGNIFICANT CHANGE UP (ref 0–6)
GLUCOSE SERPL-MCNC: 84 MG/DL — SIGNIFICANT CHANGE UP (ref 70–99)
HCT VFR BLD CALC: 33.6 % — LOW (ref 34.5–45)
HGB BLD-MCNC: 10.8 G/DL — LOW (ref 11.5–15.5)
IMM GRANULOCYTES NFR BLD AUTO: 1.7 % — HIGH (ref 0–1.5)
LYMPHOCYTES # BLD AUTO: 19.3 % — SIGNIFICANT CHANGE UP (ref 13–44)
LYMPHOCYTES # BLD AUTO: 2.22 K/UL — SIGNIFICANT CHANGE UP (ref 1–3.3)
MCHC RBC-ENTMCNC: 29.8 PG — SIGNIFICANT CHANGE UP (ref 27–34)
MCHC RBC-ENTMCNC: 32.1 GM/DL — SIGNIFICANT CHANGE UP (ref 32–36)
MCV RBC AUTO: 92.8 FL — SIGNIFICANT CHANGE UP (ref 80–100)
MONOCYTES # BLD AUTO: 1.26 K/UL — HIGH (ref 0–0.9)
MONOCYTES NFR BLD AUTO: 11 % — SIGNIFICANT CHANGE UP (ref 2–14)
NEUTROPHILS # BLD AUTO: 7.7 K/UL — HIGH (ref 1.8–7.4)
NEUTROPHILS NFR BLD AUTO: 66.9 % — SIGNIFICANT CHANGE UP (ref 43–77)
NRBC # BLD: 0 /100 WBCS — SIGNIFICANT CHANGE UP (ref 0–0)
PLATELET # BLD AUTO: 370 K/UL — SIGNIFICANT CHANGE UP (ref 150–400)
POTASSIUM SERPL-MCNC: 4.2 MMOL/L — SIGNIFICANT CHANGE UP (ref 3.5–5.3)
POTASSIUM SERPL-SCNC: 4.2 MMOL/L — SIGNIFICANT CHANGE UP (ref 3.5–5.3)
RBC # BLD: 3.62 M/UL — LOW (ref 3.8–5.2)
RBC # FLD: 13.4 % — SIGNIFICANT CHANGE UP (ref 10.3–14.5)
SODIUM SERPL-SCNC: 140 MMOL/L — SIGNIFICANT CHANGE UP (ref 135–145)
WBC # BLD: 11.5 K/UL — HIGH (ref 3.8–10.5)
WBC # FLD AUTO: 11.5 K/UL — HIGH (ref 3.8–10.5)

## 2020-12-15 PROCEDURE — 99233 SBSQ HOSP IP/OBS HIGH 50: CPT | Mod: CS

## 2020-12-15 RX ADMIN — Medication 0.25 MILLIGRAM(S): at 21:35

## 2020-12-15 RX ADMIN — Medication 25 MICROGRAM(S): at 05:34

## 2020-12-15 RX ADMIN — AMLODIPINE BESYLATE 5 MILLIGRAM(S): 2.5 TABLET ORAL at 05:33

## 2020-12-15 RX ADMIN — PANTOPRAZOLE SODIUM 40 MILLIGRAM(S): 20 TABLET, DELAYED RELEASE ORAL at 05:33

## 2020-12-15 RX ADMIN — LAMOTRIGINE 25 MILLIGRAM(S): 25 TABLET, ORALLY DISINTEGRATING ORAL at 05:34

## 2020-12-15 RX ADMIN — CITALOPRAM 10 MILLIGRAM(S): 10 TABLET, FILM COATED ORAL at 21:34

## 2020-12-15 RX ADMIN — Medication 0.25 MILLIGRAM(S): at 11:35

## 2020-12-15 RX ADMIN — LAMOTRIGINE 25 MILLIGRAM(S): 25 TABLET, ORALLY DISINTEGRATING ORAL at 17:28

## 2020-12-15 RX ADMIN — Medication 600 MILLIGRAM(S): at 17:28

## 2020-12-15 RX ADMIN — Medication 600 MILLIGRAM(S): at 05:34

## 2020-12-15 RX ADMIN — ENOXAPARIN SODIUM 40 MILLIGRAM(S): 100 INJECTION SUBCUTANEOUS at 11:35

## 2020-12-15 NOTE — PROGRESS NOTE ADULT - SUBJECTIVE AND OBJECTIVE BOX
INTERVAL HPI/OVERNIGHT EVENTS:  Patient seen and examined at bedside. Patient laying in bed, half-eaten breakfast tray at bedside. Patient hard to arouse, opened eyes on command, but not responding to any questions. Not following my commands. This afternoon, when SW spoke with patient over phone, patient expressing she wants to die, wants a truck to run her over, asking if she can open to window to jump out. Patient placed on constant observation for suicidal ideations.     ROS: unable to obtain ROS as patient uncooperative    MEDICATIONS  (STANDING):  ALPRAZolam 0.25 milliGRAM(s) Oral two times a day  amLODIPine   Tablet 5 milliGRAM(s) Oral daily  citalopram 10 milliGRAM(s) Oral daily  enoxaparin Injectable 40 milliGRAM(s) SubCutaneous daily  guaiFENesin  milliGRAM(s) Oral every 12 hours  lamoTRIgine 25 milliGRAM(s) Oral two times a day  levothyroxine 25 MICROGram(s) Oral daily  pantoprazole    Tablet 40 milliGRAM(s) Oral before breakfast    MEDICATIONS  (PRN):  acetaminophen   Tablet .. 650 milliGRAM(s) Oral every 6 hours PRN Temp greater or equal to 38C (100.4F), Mild Pain (1 - 3)  ALBUTerol    90 MICROgram(s) HFA Inhaler 2 Puff(s) Inhalation every 6 hours PRN Bronchospasm  aluminum hydroxide/magnesium hydroxide/simethicone Suspension 30 milliLiter(s) Oral every 6 hours PRN Dyspepsia  ondansetron Injectable 4 milliGRAM(s) IV Push every 6 hours PRN Nausea and/or Vomiting      Allergies    cephalexin (Hives)  cephalexin (Unknown)    Intolerances    No seafood (Unknown)      Vital Signs Last 24 Hrs  T(C): 37 (15 Dec 2020 16:38), Max: 37.1 (15 Dec 2020 08:23)  T(F): 98.6 (15 Dec 2020 16:38), Max: 98.7 (15 Dec 2020 08:23)  HR: 88 (15 Dec 2020 16:38) (73 - 89)  BP: 107/71 (15 Dec 2020 16:38) (107/71 - 144/82)  BP(mean): --  RR: 18 (15 Dec 2020 16:38) (17 - 18)  SpO2: 95% (15 Dec 2020 16:38) (93% - 95%)    12-14 @ 07:01  -  12-15 @ 07:00  --------------------------------------------------------  IN: 200 mL / OUT: 0 mL / NET: 200 mL      Physical Exam:  General: laying in bed comfortably NAD  Neurology: nonfocal  Respiratory: CTA B/L  CV: RRR, S1S2  Abdominal: Soft, NT, ND +BS  Extremities: No edema, + peripheral pulses      LABS:                        10.8   11.50 )-----------( 370      ( 15 Dec 2020 09:53 )             33.6     12-15    140  |  104  |  43<H>  ----------------------------<  84  4.2   |  30  |  1.00    Ca    8.5      15 Dec 2020 09:53    TPro  7.7  /  Alb  3.3  /  TBili  0.4  /  DBili  x   /  AST  21  /  ALT  35  /  AlkPhos  75  12-14          RADIOLOGY & ADDITIONAL TESTS:

## 2020-12-15 NOTE — PROGRESS NOTE ADULT - ASSESSMENT
79yo female with pmhx of hypothyroid, depression, subdural hematoma 8/2020 2/2 fall adm with COVID+ suggested day of onset 11/25    RECOMMENDATIONS  12/4 NLR ~3, sats<94% on RA, rec REMDESIVIR, STEROIDs, caution with anticoagulation with prior subdural  12/5 NLR <3, NC-2L pt appears stable and comfortable  12/6 NLR <3 btwn NC and RA-cont current Rx  12/7 on RA-continue Rx, 12/8 last day of remdesivir and may be able to consider dc after Rx  12/8 btwn RA and 2L NC, last day of remdesivir, suggested day 13 of illness and can look at potential dc (COVID PCR+)  12/9 NLR 5.58/1.83=<3, 97% on RA, can look at dc planning  12/10 remains stable on RA  12/11 remains stable on RA  12/14-stable on RA, sp steroids, can repeat COVID PCRs if required for dc planning COVID PCR+  12/15-follow for neg PCRs    When pt is doing well and  past the critical second week when decompensation can occur and has started to improve, fine to consider discharge planning with for early but safe discharge. Discharge with oxygen (4L or less and able to keep sats>90) and even persistent fever at time of discharge is reasonable. With high risk for thromboembolic disease we usually consider dc on  rivaroxaban (Xarelto) or Eliquis (apixaban) but with prior subdural rec ASA 325mg PO Q-day as discussed w primary team  For outpt mild disease considered noninfectious at day 10 after onset of illness but for hospitalized patients day 20. If pt going to facility or to dc isolation in house then will require 2 negative PCR tests  by a minimum of 24 hours and fever free without antipyretics for >24hrs and more than 10 days from first positive test.

## 2020-12-15 NOTE — PROGRESS NOTE ADULT - SUBJECTIVE AND OBJECTIVE BOX
Date/Time Patient Seen:  		  Referring MD:   Data Reviewed	       Patient is a 80y old  Female who presents with a chief complaint of COVID 19 Pneumonia (14 Dec 2020 19:34)      Subjective/HPI     PAST MEDICAL & SURGICAL HISTORY:  Vertigo    Depression    Hypothyroidism    Vertigo    Hypothyroidism    Depression    UTI (urinary tract infection)    No significant past surgical history  RIGHT KNEE ARTHROSCOPY    No significant past surgical history          Medication list         MEDICATIONS  (STANDING):  ALPRAZolam 0.25 milliGRAM(s) Oral two times a day  amLODIPine   Tablet 5 milliGRAM(s) Oral daily  citalopram 10 milliGRAM(s) Oral daily  enoxaparin Injectable 40 milliGRAM(s) SubCutaneous daily  guaiFENesin  milliGRAM(s) Oral every 12 hours  lamoTRIgine 25 milliGRAM(s) Oral two times a day  levothyroxine 25 MICROGram(s) Oral daily  pantoprazole    Tablet 40 milliGRAM(s) Oral before breakfast    MEDICATIONS  (PRN):  acetaminophen   Tablet .. 650 milliGRAM(s) Oral every 6 hours PRN Temp greater or equal to 38C (100.4F), Mild Pain (1 - 3)  ALBUTerol    90 MICROgram(s) HFA Inhaler 2 Puff(s) Inhalation every 6 hours PRN Bronchospasm  aluminum hydroxide/magnesium hydroxide/simethicone Suspension 30 milliLiter(s) Oral every 6 hours PRN Dyspepsia  ondansetron Injectable 4 milliGRAM(s) IV Push every 6 hours PRN Nausea and/or Vomiting         Vitals log        ICU Vital Signs Last 24 Hrs  T(C): 37.1 (15 Dec 2020 08:23), Max: 37.1 (15 Dec 2020 08:23)  T(F): 98.7 (15 Dec 2020 08:23), Max: 98.7 (15 Dec 2020 08:23)  HR: 75 (15 Dec 2020 08:23) (73 - 104)  BP: 117/55 (15 Dec 2020 08:23) (115/65 - 144/82)  BP(mean): --  ABP: --  ABP(mean): --  RR: 18 (15 Dec 2020 08:23) (17 - 18)  SpO2: 94% (15 Dec 2020 08:23) (92% - 96%)           Input and Output:  I&O's Detail    14 Dec 2020 07:01  -  15 Dec 2020 07:00  --------------------------------------------------------  IN:    Oral Fluid: 200 mL  Total IN: 200 mL    OUT:  Total OUT: 0 mL    Total NET: 200 mL          Lab Data                        12.8   16.84 )-----------( 581      ( 14 Dec 2020 09:58 )             39.1     12-14    139  |  102  |  43<H>  ----------------------------<  115<H>  3.9   |  29  |  1.20    Ca    9.3      14 Dec 2020 09:58    TPro  7.7  /  Alb  3.3  /  TBili  0.4  /  DBili  x   /  AST  21  /  ALT  35  /  AlkPhos  75  12-14            Review of Systems	      Objective     Physical Examination    heart s1s2  lung dec BS      Pertinent Lab findings & Imaging      Pam:  NO   Adequate UO     I&O's Detail    14 Dec 2020 07:01  -  15 Dec 2020 07:00  --------------------------------------------------------  IN:    Oral Fluid: 200 mL  Total IN: 200 mL    OUT:  Total OUT: 0 mL    Total NET: 200 mL               Discussed with:     Cultures:	        Radiology

## 2020-12-15 NOTE — PROGRESS NOTE ADULT - SUBJECTIVE AND OBJECTIVE BOX
[facility] DIVISION of INFECTIOUS DISEASE  Salvatore Dennis MD PhD, Suzanne Hughes MD, Elvira Valdez MD, Fidel Rollins MD  and providing coverage with Mary Ann Lucas MD and Loren Fritz MD  Providing Infectious Disease Consultations at [facility], [facility], [facility]'s      Office# [phone] to schedule follow up appointments  Answering Service for urgent calls or New Consults [phone]  Cell# to text for urgent issues Salvatore Dennis [phone]     Infectious diseases progress note:    LINN WHITE is a 80y y. o. Female patient    COVID Patient    Allergies    cephalexin (Hives)  cephalexin (Unknown)    Intolerances    No seafood (Unknown)      ANTIBIOTICS/RELEVANT:  antimicrobials    immunologic:    OTHER:  acetaminophen   Tablet .. 650 milliGRAM(s) Oral every 6 hours PRN  ALBUTerol    90 MICROgram(s) HFA Inhaler 2 Puff(s) Inhalation every 6 hours PRN  ALPRAZolam 0.25 milliGRAM(s) Oral two times a day  aluminum hydroxide/magnesium hydroxide/simethicone Suspension 30 milliLiter(s) Oral every 6 hours PRN  amLODIPine   Tablet 5 milliGRAM(s) Oral daily  citalopram 10 milliGRAM(s) Oral daily  enoxaparin Injectable 40 milliGRAM(s) SubCutaneous daily  guaiFENesin  milliGRAM(s) Oral every 12 hours  lamoTRIgine 25 milliGRAM(s) Oral two times a day  levothyroxine 25 MICROGram(s) Oral daily  ondansetron Injectable 4 milliGRAM(s) IV Push every 6 hours PRN  pantoprazole    Tablet 40 milliGRAM(s) Oral before breakfast      Objective:  Vital Signs Last 24 Hrs  T(C): 37.1 (15 Dec 2020 08:23), Max: 37.1 (15 Dec 2020 08:23)  T(F): 98.7 (15 Dec 2020 08:23), Max: 98.7 (15 Dec 2020 08:23)  HR: 75 (15 Dec 2020 08:23) (73 - 104)  BP: 117/55 (15 Dec 2020 08:23) (115/65 - 144/82)  BP(mean): --  RR: 18 (15 Dec 2020 08:23) (17 - 18)  SpO2: 94% (15 Dec 2020 08:23) (92% - 96%)    T(C): 37.1 (12-15-20 @ 08:23), Max: 37.1 (12-15-20 @ 08:23)  T(C): 37.1 (12-15-20 @ 08:23), Max: 37.1 (12-15-20 @ 08:23)  T(C): 37.1 (12-15-20 @ 08:23), Max: 37.1 (12-15-20 @ 08:23)    PHYSICAL EXAM:  HEENT: NC atraumatic  Neck: supple  Respiratory: no accessory muscle use, breathing comfortably  Cardiovascular: distant  Gastrointestinal: normal appearing, nondistended  Extremities: no clubbing, no cyanosis,      LABS:                          10.8   11.50 )-----------( 370      ( 15 Dec 2020 09:53 )             33.6       11.50 12-15 @ 09:53  16.84 12-14 @ 09:58  11.83 12-13 @ 07:52  10.76 12-11 @ 06:55  9.15 12-09 @ 06:40      12-15    140  |  104  |  43<H>  ----------------------------<  84  4.2   |  30  |  1.00    Ca    8.5      15 Dec 2020 09:53    TPro  7.7  /  Alb  3.3  /  TBili  0.4  /  DBili  x   /  AST  21  /  ALT  35  /  AlkPhos  75  12-14      Creatinine, Serum: 1.00 mg/dL (12-15-20 @ 09:53)  Creatinine, Serum: 1.20 mg/dL (12-14-20 @ 09:58)  Creatinine, Serum: 1.00 mg/dL (12-13-20 @ 07:52)  Creatinine, Serum: 1.10 mg/dL (12-12-20 @ 08:33)  Creatinine, Serum: 1.20 mg/dL (12-11-20 @ 06:55)  Creatinine, Serum: 1.00 mg/dL (12-10-20 @ 07:39)  Creatinine, Serum: 1.10 mg/dL (12-09-20 @ 06:40)                COVID RISK SCORE  Auto Neutrophil #: 7.70 K/uL (12-15-20 @ 09:53)  Auto Lymphocyte #: 2.22 K/uL (12-15-20 @ 09:53)  Auto Neutrophil #: 10.70 K/uL (12-14-20 @ 09:58)  Auto Lymphocyte #: 4.22 K/uL (12-14-20 @ 09:58)  Auto Neutrophil #: 8.27 K/uL (12-13-20 @ 07:52)  Auto Lymphocyte #: 2.14 K/uL (12-13-20 @ 07:52)  Auto Neutrophil #: 6.00 K/uL (12-11-20 @ 06:55)  Auto Lymphocyte #: 2.77 K/uL (12-11-20 @ 06:55)  Auto Neutrophil #: 5.58 K/uL (12-09-20 @ 06:40)  Auto Lymphocyte #: 1.83 K/uL (12-09-20 @ 06:40)  Auto Neutrophil #: 3.93 K/uL (12-08-20 @ 07:01)  Auto Lymphocyte #: 1.96 K/uL (12-08-20 @ 07:01)  Auto Neutrophil #: 4.33 K/uL (12-07-20 @ 07:41)  Auto Lymphocyte #: 2.33 K/uL (12-07-20 @ 07:41)  Auto Neutrophil #: 5.28 K/uL (12-06-20 @ 07:46)  Auto Lymphocyte #: 1.60 K/uL (12-06-20 @ 07:46)  Auto Neutrophil #: 1.46 K/uL (12-05-20 @ 06:43)  Auto Lymphocyte #: 0.72 K/uL (12-05-20 @ 06:43)  Auto Neutrophil #: 3.20 K/uL (12-04-20 @ 15:55)  Auto Lymphocyte #: 0.98 K/uL (12-04-20 @ 15:55)  Auto Lymphocyte #: 0.95 K/uL (12-02-20 @ 12:50)  Auto Neutrophil #: 3.08 K/uL (12-02-20 @ 12:50)    Lactate, Blood: 1.0 mmol/L (12-02-20 @ 12:50)    Auto Eosinophil #: 0.11 K/uL (12-15-20 @ 09:53)  Auto Eosinophil #: 0.07 K/uL (12-14-20 @ 09:58)  Auto Eosinophil #: 0.06 K/uL (12-13-20 @ 07:52)  Auto Eosinophil #: 0.14 K/uL (12-11-20 @ 06:55)  Auto Eosinophil #: 0.00 K/uL (12-09-20 @ 06:40)  Auto Eosinophil #: 0.01 K/uL (12-08-20 @ 07:01)  Auto Eosinophil #: 0.00 K/uL (12-07-20 @ 07:41)  Auto Eosinophil #: 0.00 K/uL (12-06-20 @ 07:46)  Auto Eosinophil #: 0.00 K/uL (12-05-20 @ 06:43)  Auto Eosinophil #: 0.00 K/uL (12-04-20 @ 15:55)  Auto Eosinophil #: 0.00 K/uL (12-02-20 @ 12:50)    Lactate Dehydrogenase, Serum: 277 U/L (12-05-20 @ 12:26)    Sedimentation Rate, Erythrocyte: 38 mm/hr (12-09-20 @ 06:40)  Sedimentation Rate, Erythrocyte: 41 mm/hr (12-08-20 @ 07:01)  Sedimentation Rate, Erythrocyte: 45 mm/hr (12-07-20 @ 07:41)  Sedimentation Rate, Erythrocyte: 44 mm/hr (12-06-20 @ 07:46)    Procalcitonin, Serum: 0.07 ng/mL (12-04-20 @ 21:38)    Troponin I, Serum: <.015 ng/mL (12-05-20 @ 16:45)    Creatine Kinase, Serum: 174 U/L (12-05-20 @ 16:45)        Ferritin, Serum: 241 ng/mL (12-05-20 @ 12:26)  Ferritin, Serum: 255 ng/mL (12-04-20 @ 22:27)  Ferritin, Serum: 149 ng/mL (12-03-20 @ 01:57)          D-Dimer Assay, Quantitative: 199 ng/mL DDU (12-09-20 @ 06:40)  D-Dimer Assay, Quantitative: 267 ng/mL DDU (12-06-20 @ 07:46)  D-Dimer Assay, Quantitative: 247 ng/mL DDU (12-05-20 @ 06:43)  D-Dimer Assay, Quantitative: 279 ng/mL DDU (12-04-20 @ 15:55)        MICROBIOLOGY:              RADIOLOGY & ADDITIONAL STUDIES:

## 2020-12-16 LAB
ANION GAP SERPL CALC-SCNC: 9 MMOL/L — SIGNIFICANT CHANGE UP (ref 5–17)
BASOPHILS # BLD AUTO: 0.01 K/UL — SIGNIFICANT CHANGE UP (ref 0–0.2)
BASOPHILS NFR BLD AUTO: 0.1 % — SIGNIFICANT CHANGE UP (ref 0–2)
BUN SERPL-MCNC: 42 MG/DL — HIGH (ref 7–23)
CALCIUM SERPL-MCNC: 8.6 MG/DL — SIGNIFICANT CHANGE UP (ref 8.5–10.1)
CHLORIDE SERPL-SCNC: 103 MMOL/L — SIGNIFICANT CHANGE UP (ref 96–108)
CK MB BLD-MCNC: 4.3 % — HIGH (ref 0–3.5)
CK MB BLD-MCNC: 5.4 % — HIGH (ref 0–3.5)
CK MB CFR SERPL CALC: 2.3 NG/ML — SIGNIFICANT CHANGE UP (ref 0–3.6)
CK MB CFR SERPL CALC: 3.1 NG/ML — SIGNIFICANT CHANGE UP (ref 0–3.6)
CK SERPL-CCNC: 54 U/L — SIGNIFICANT CHANGE UP (ref 26–192)
CK SERPL-CCNC: 57 U/L — SIGNIFICANT CHANGE UP (ref 26–192)
CO2 SERPL-SCNC: 28 MMOL/L — SIGNIFICANT CHANGE UP (ref 22–31)
CREAT SERPL-MCNC: 1.2 MG/DL — SIGNIFICANT CHANGE UP (ref 0.5–1.3)
EOSINOPHIL # BLD AUTO: 0.09 K/UL — SIGNIFICANT CHANGE UP (ref 0–0.5)
EOSINOPHIL NFR BLD AUTO: 0.7 % — SIGNIFICANT CHANGE UP (ref 0–6)
GLUCOSE SERPL-MCNC: 134 MG/DL — HIGH (ref 70–99)
HCT VFR BLD CALC: 37.9 % — SIGNIFICANT CHANGE UP (ref 34.5–45)
HGB BLD-MCNC: 12.4 G/DL — SIGNIFICANT CHANGE UP (ref 11.5–15.5)
IMM GRANULOCYTES NFR BLD AUTO: 1.3 % — SIGNIFICANT CHANGE UP (ref 0–1.5)
LYMPHOCYTES # BLD AUTO: 18.3 % — SIGNIFICANT CHANGE UP (ref 13–44)
LYMPHOCYTES # BLD AUTO: 2.32 K/UL — SIGNIFICANT CHANGE UP (ref 1–3.3)
MCHC RBC-ENTMCNC: 30.5 PG — SIGNIFICANT CHANGE UP (ref 27–34)
MCHC RBC-ENTMCNC: 32.7 GM/DL — SIGNIFICANT CHANGE UP (ref 32–36)
MCV RBC AUTO: 93.1 FL — SIGNIFICANT CHANGE UP (ref 80–100)
MONOCYTES # BLD AUTO: 1.22 K/UL — HIGH (ref 0–0.9)
MONOCYTES NFR BLD AUTO: 9.6 % — SIGNIFICANT CHANGE UP (ref 2–14)
NEUTROPHILS # BLD AUTO: 8.86 K/UL — HIGH (ref 1.8–7.4)
NEUTROPHILS NFR BLD AUTO: 70 % — SIGNIFICANT CHANGE UP (ref 43–77)
NRBC # BLD: 0 /100 WBCS — SIGNIFICANT CHANGE UP (ref 0–0)
PLATELET # BLD AUTO: 411 K/UL — HIGH (ref 150–400)
POTASSIUM SERPL-MCNC: 4.3 MMOL/L — SIGNIFICANT CHANGE UP (ref 3.5–5.3)
POTASSIUM SERPL-SCNC: 4.3 MMOL/L — SIGNIFICANT CHANGE UP (ref 3.5–5.3)
RBC # BLD: 4.07 M/UL — SIGNIFICANT CHANGE UP (ref 3.8–5.2)
RBC # FLD: 13.6 % — SIGNIFICANT CHANGE UP (ref 10.3–14.5)
SODIUM SERPL-SCNC: 140 MMOL/L — SIGNIFICANT CHANGE UP (ref 135–145)
TROPONIN I SERPL-MCNC: 0.04 NG/ML — SIGNIFICANT CHANGE UP (ref 0.01–0.04)
TROPONIN I SERPL-MCNC: 0.08 NG/ML — HIGH (ref 0.01–0.04)
WBC # BLD: 12.67 K/UL — HIGH (ref 3.8–10.5)
WBC # FLD AUTO: 12.67 K/UL — HIGH (ref 3.8–10.5)

## 2020-12-16 PROCEDURE — 99222 1ST HOSP IP/OBS MODERATE 55: CPT

## 2020-12-16 PROCEDURE — 93010 ELECTROCARDIOGRAM REPORT: CPT

## 2020-12-16 PROCEDURE — 99232 SBSQ HOSP IP/OBS MODERATE 35: CPT | Mod: CS,GC

## 2020-12-16 RX ORDER — IBUPROFEN 200 MG
600 TABLET ORAL ONCE
Refills: 0 | Status: COMPLETED | OUTPATIENT
Start: 2020-12-16 | End: 2020-12-16

## 2020-12-16 RX ADMIN — Medication 600 MILLIGRAM(S): at 02:49

## 2020-12-16 RX ADMIN — Medication 0.25 MILLIGRAM(S): at 11:45

## 2020-12-16 RX ADMIN — AMLODIPINE BESYLATE 5 MILLIGRAM(S): 2.5 TABLET ORAL at 05:49

## 2020-12-16 RX ADMIN — Medication 600 MILLIGRAM(S): at 18:06

## 2020-12-16 RX ADMIN — Medication 25 MICROGRAM(S): at 05:49

## 2020-12-16 RX ADMIN — CITALOPRAM 10 MILLIGRAM(S): 10 TABLET, FILM COATED ORAL at 23:26

## 2020-12-16 RX ADMIN — LAMOTRIGINE 25 MILLIGRAM(S): 25 TABLET, ORALLY DISINTEGRATING ORAL at 18:06

## 2020-12-16 RX ADMIN — Medication 0.25 MILLIGRAM(S): at 23:26

## 2020-12-16 RX ADMIN — Medication 600 MILLIGRAM(S): at 05:49

## 2020-12-16 RX ADMIN — PANTOPRAZOLE SODIUM 40 MILLIGRAM(S): 20 TABLET, DELAYED RELEASE ORAL at 05:49

## 2020-12-16 RX ADMIN — Medication 600 MILLIGRAM(S): at 02:06

## 2020-12-16 RX ADMIN — ENOXAPARIN SODIUM 40 MILLIGRAM(S): 100 INJECTION SUBCUTANEOUS at 11:44

## 2020-12-16 RX ADMIN — LAMOTRIGINE 25 MILLIGRAM(S): 25 TABLET, ORALLY DISINTEGRATING ORAL at 05:49

## 2020-12-16 NOTE — PROGRESS NOTE ADULT - SUBJECTIVE AND OBJECTIVE BOX
[facility] DIVISION of INFECTIOUS DISEASE  Salvatore Dennis MD PhD, Suzanne Hughes MD, Elvira Valdez MD, Fidel Rollins MD  and providing coverage with Mary Ann Lucas MD and Loren Fritz MD  Providing Infectious Disease Consultations at [facility], [facility], [facility]'s      Office# [phone] to schedule follow up appointments  Answering Service for urgent calls or New Consults [phone]  Cell# to text for urgent issues Salvatore Dennis [phone]     Infectious diseases progress note:    LINN WHITE is a 80y y. o. Female patient    COVID Patient    Allergies    cephalexin (Hives)  cephalexin (Unknown)    Intolerances    No seafood (Unknown)      ANTIBIOTICS/RELEVANT:  antimicrobials    immunologic:    OTHER:  acetaminophen   Tablet .. 650 milliGRAM(s) Oral every 6 hours PRN  ALBUTerol    90 MICROgram(s) HFA Inhaler 2 Puff(s) Inhalation every 6 hours PRN  ALPRAZolam 0.25 milliGRAM(s) Oral two times a day  aluminum hydroxide/magnesium hydroxide/simethicone Suspension 30 milliLiter(s) Oral every 6 hours PRN  amLODIPine   Tablet 5 milliGRAM(s) Oral daily  citalopram 10 milliGRAM(s) Oral daily  enoxaparin Injectable 40 milliGRAM(s) SubCutaneous daily  guaiFENesin  milliGRAM(s) Oral every 12 hours  lamoTRIgine 25 milliGRAM(s) Oral two times a day  levothyroxine 25 MICROGram(s) Oral daily  ondansetron Injectable 4 milliGRAM(s) IV Push every 6 hours PRN  pantoprazole    Tablet 40 milliGRAM(s) Oral before breakfast      Objective:  Vital Signs Last 24 Hrs  T(C): 36.9 (16 Dec 2020 08:22), Max: 37 (15 Dec 2020 16:38)  T(F): 98.4 (16 Dec 2020 08:22), Max: 98.6 (15 Dec 2020 16:38)  HR: 79 (16 Dec 2020 08:22) (70 - 97)  BP: 103/60 (16 Dec 2020 08:22) (103/60 - 135/77)  BP(mean): --  RR: 18 (16 Dec 2020 08:22) (18 - 19)  SpO2: 93% (16 Dec 2020 08:22) (90% - 95%)    T(C): 36.9 (12-16-20 @ 08:22), Max: 37.1 (12-15-20 @ 08:23)  T(C): 36.9 (12-16-20 @ 08:22), Max: 37.1 (12-15-20 @ 08:23)  T(C): 36.9 (12-16-20 @ 08:22), Max: 37.1 (12-15-20 @ 08:23)    PHYSICAL EXAM:  HEENT: NC atraumatic  Neck: supple  Respiratory: no accessory muscle use, breathing comfortably  Cardiovascular: distant  Gastrointestinal: normal appearing, nondistended  Extremities: no clubbing, no cyanosis,      LABS:                          10.8   11.50 )-----------( 370      ( 15 Dec 2020 09:53 )             33.6       11.50 12-15 @ 09:53  16.84 12-14 @ 09:58  11.83 12-13 @ 07:52  10.76 12-11 @ 06:55      12-15    140  |  104  |  43<H>  ----------------------------<  84  4.2   |  30  |  1.00    Ca    8.5      15 Dec 2020 09:53        Creatinine, Serum: 1.00 mg/dL (12-15-20 @ 09:53)  Creatinine, Serum: 1.20 mg/dL (12-14-20 @ 09:58)  Creatinine, Serum: 1.00 mg/dL (12-13-20 @ 07:52)  Creatinine, Serum: 1.10 mg/dL (12-12-20 @ 08:33)  Creatinine, Serum: 1.20 mg/dL (12-11-20 @ 06:55)  Creatinine, Serum: 1.00 mg/dL (12-10-20 @ 07:39)                COVID RISK SCORE  Auto Neutrophil #: 7.70 K/uL (12-15-20 @ 09:53)  Auto Lymphocyte #: 2.22 K/uL (12-15-20 @ 09:53)  Auto Neutrophil #: 10.70 K/uL (12-14-20 @ 09:58)  Auto Lymphocyte #: 4.22 K/uL (12-14-20 @ 09:58)  Auto Neutrophil #: 8.27 K/uL (12-13-20 @ 07:52)  Auto Lymphocyte #: 2.14 K/uL (12-13-20 @ 07:52)  Auto Neutrophil #: 6.00 K/uL (12-11-20 @ 06:55)  Auto Lymphocyte #: 2.77 K/uL (12-11-20 @ 06:55)  Auto Neutrophil #: 5.58 K/uL (12-09-20 @ 06:40)  Auto Lymphocyte #: 1.83 K/uL (12-09-20 @ 06:40)  Auto Neutrophil #: 3.93 K/uL (12-08-20 @ 07:01)  Auto Lymphocyte #: 1.96 K/uL (12-08-20 @ 07:01)  Auto Neutrophil #: 4.33 K/uL (12-07-20 @ 07:41)  Auto Lymphocyte #: 2.33 K/uL (12-07-20 @ 07:41)  Auto Neutrophil #: 5.28 K/uL (12-06-20 @ 07:46)  Auto Lymphocyte #: 1.60 K/uL (12-06-20 @ 07:46)  Auto Neutrophil #: 1.46 K/uL (12-05-20 @ 06:43)  Auto Lymphocyte #: 0.72 K/uL (12-05-20 @ 06:43)  Auto Neutrophil #: 3.20 K/uL (12-04-20 @ 15:55)  Auto Lymphocyte #: 0.98 K/uL (12-04-20 @ 15:55)  Auto Lymphocyte #: 0.95 K/uL (12-02-20 @ 12:50)  Auto Neutrophil #: 3.08 K/uL (12-02-20 @ 12:50)    Lactate, Blood: 1.0 mmol/L (12-02-20 @ 12:50)    Auto Eosinophil #: 0.11 K/uL (12-15-20 @ 09:53)  Auto Eosinophil #: 0.07 K/uL (12-14-20 @ 09:58)  Auto Eosinophil #: 0.06 K/uL (12-13-20 @ 07:52)  Auto Eosinophil #: 0.14 K/uL (12-11-20 @ 06:55)  Auto Eosinophil #: 0.00 K/uL (12-09-20 @ 06:40)  Auto Eosinophil #: 0.01 K/uL (12-08-20 @ 07:01)  Auto Eosinophil #: 0.00 K/uL (12-07-20 @ 07:41)  Auto Eosinophil #: 0.00 K/uL (12-06-20 @ 07:46)  Auto Eosinophil #: 0.00 K/uL (12-05-20 @ 06:43)  Auto Eosinophil #: 0.00 K/uL (12-04-20 @ 15:55)  Auto Eosinophil #: 0.00 K/uL (12-02-20 @ 12:50)    Lactate Dehydrogenase, Serum: 277 U/L (12-05-20 @ 12:26)    Sedimentation Rate, Erythrocyte: 38 mm/hr (12-09-20 @ 06:40)  Sedimentation Rate, Erythrocyte: 41 mm/hr (12-08-20 @ 07:01)  Sedimentation Rate, Erythrocyte: 45 mm/hr (12-07-20 @ 07:41)  Sedimentation Rate, Erythrocyte: 44 mm/hr (12-06-20 @ 07:46)    Procalcitonin, Serum: 0.07 ng/mL (12-04-20 @ 21:38)    Troponin I, Serum: .077 ng/mL (12-16-20 @ 01:31)  Troponin I, Serum: <.015 ng/mL (12-05-20 @ 16:45)    Creatine Kinase, Serum: 57 U/L (12-16-20 @ 01:31)  Creatine Kinase, Serum: 174 U/L (12-05-20 @ 16:45)        Ferritin, Serum: 241 ng/mL (12-05-20 @ 12:26)  Ferritin, Serum: 255 ng/mL (12-04-20 @ 22:27)  Ferritin, Serum: 149 ng/mL (12-03-20 @ 01:57)          D-Dimer Assay, Quantitative: 199 ng/mL DDU (12-09-20 @ 06:40)  D-Dimer Assay, Quantitative: 267 ng/mL DDU (12-06-20 @ 07:46)  D-Dimer Assay, Quantitative: 247 ng/mL DDU (12-05-20 @ 06:43)  D-Dimer Assay, Quantitative: 279 ng/mL DDU (12-04-20 @ 15:55)        MICROBIOLOGY:              RADIOLOGY & ADDITIONAL STUDIES:

## 2020-12-16 NOTE — CONSULT NOTE ADULT - ASSESSMENT
The patient is a 79yo female with pmhx of hypothyroid, depression, subdural hematoma 8/2020 2/2 fall presents to [facility] with C/C of cough, low grade temp.       # Costochondritis VS Musculoskeletal.: ACS less likely  - Patient states that her symptoms resolved after taking ibuprofen   - Denies any further chest pain.  - EKG shows no acute ischemic changes  - Trops mildly elevated 0.077, CPK- 5.4 likely 2/2 to demand  - will follow repeat trops x1  - Vitals stable. BP-119/69, HR-70, Saturation 95% on RA  -   - Other cardiovascular workup will depend on clinical course. All other workup per primary team.  - Will follow     The patient is a 81yo female with pmhx of hypothyroid, depression, subdural hematoma 8/2020 2/2 fall presents to [facility] with C/C of cough, low grade temp.     # Costochondritis VS Musculoskeletal.: ACS less likely  - Patient states that her symptoms resolved after taking ibuprofen   - Denies any further chest pain.  - EKG shows no acute ischemic changes  - Trops mildly elevated 0.077, CPK- 5.4 likely 2/2 to demand ischemia  - will follow repeat trops x1  - Vitals stable. BP-119/69, HR-70, Saturation 95% on RA  - Other cardiovascular workup will depend on clinical course. All other workup per primary team.  - Will follow    # Covid PNA:  - Saturating at 95% on RA  - Monitor respiratory status  - Follow respiratory and ID rec's

## 2020-12-16 NOTE — PROGRESS NOTE ADULT - ASSESSMENT
Assessment and Plan:  -Acute hypoxic respiratory failure 2/2 COVID 19 pneumonia:  completed Remdesivir, and Decadron 6mg IV daily.  Albuterol inh Q6h PRN and Guaifenesin ER 600mg PO Q12h.  Titrate down O2 support- now on RA.  Maintain SPO2>88%.  ID and Pulmonary f/u . COVID PCR + 12/14  -Hx of subdural hematoma:  continue Lamictal 25mg PO BID  -HTN:  continue Norvasc 5mg PO daily  -Hypothyroidism:  continue Synthroid 25mcg PO daily  -Depression, anxiety, PTSD:  continue Celexa 10mg PO daily, psych consulted, no objection to d/c, started on low dose benzo. Patient with continuous episodes of catatonia, psych called to evaluate patient again- as per psych- had pleasant conversation, but when I went to examine patient, patient with head down on table, refusing to speak with me.  12/15- patient expressing thoughts of dying, wanting to jump out window- placed on 1:1, Psych called- will again re-evaluate patient in AM. Discussed events with son, he is aware, states he has been calling patient over phone, but she is not speaking much to him as well.  -VTE ppx:  Lovenox 40mg SQ daily  -PT eval

## 2020-12-16 NOTE — PROGRESS NOTE ADULT - SUBJECTIVE AND OBJECTIVE BOX
Date/Time Patient Seen:  		  Referring MD:   Data Reviewed	       Patient is a 80y old  Female who presents with a chief complaint of COVID 19 Pneumonia (16 Dec 2020 08:47)      Subjective/HPI     PAST MEDICAL & SURGICAL HISTORY:  Vertigo    Depression    Hypothyroidism    Vertigo    Hypothyroidism    Depression    UTI (urinary tract infection)    No significant past surgical history  RIGHT KNEE ARTHROSCOPY    No significant past surgical history          Medication list         MEDICATIONS  (STANDING):  ALPRAZolam 0.25 milliGRAM(s) Oral two times a day  amLODIPine   Tablet 5 milliGRAM(s) Oral daily  citalopram 10 milliGRAM(s) Oral daily  enoxaparin Injectable 40 milliGRAM(s) SubCutaneous daily  guaiFENesin  milliGRAM(s) Oral every 12 hours  lamoTRIgine 25 milliGRAM(s) Oral two times a day  levothyroxine 25 MICROGram(s) Oral daily  pantoprazole    Tablet 40 milliGRAM(s) Oral before breakfast    MEDICATIONS  (PRN):  acetaminophen   Tablet .. 650 milliGRAM(s) Oral every 6 hours PRN Temp greater or equal to 38C (100.4F), Mild Pain (1 - 3)  ALBUTerol    90 MICROgram(s) HFA Inhaler 2 Puff(s) Inhalation every 6 hours PRN Bronchospasm  aluminum hydroxide/magnesium hydroxide/simethicone Suspension 30 milliLiter(s) Oral every 6 hours PRN Dyspepsia  ondansetron Injectable 4 milliGRAM(s) IV Push every 6 hours PRN Nausea and/or Vomiting         Vitals log        ICU Vital Signs Last 24 Hrs  T(C): 36.9 (16 Dec 2020 08:22), Max: 37 (15 Dec 2020 16:38)  T(F): 98.4 (16 Dec 2020 08:22), Max: 98.6 (15 Dec 2020 16:38)  HR: 79 (16 Dec 2020 08:22) (70 - 97)  BP: 103/60 (16 Dec 2020 08:22) (103/60 - 135/77)  BP(mean): --  ABP: --  ABP(mean): --  RR: 18 (16 Dec 2020 08:22) (18 - 19)  SpO2: 93% (16 Dec 2020 08:22) (90% - 95%)           Input and Output:  I&O's Detail    15 Dec 2020 07:01  -  16 Dec 2020 07:00  --------------------------------------------------------  IN:    Oral Fluid: 300 mL  Total IN: 300 mL    OUT:    Voided (mL): 1 mL  Total OUT: 1 mL    Total NET: 299 mL          Lab Data                        10.8   11.50 )-----------( 370      ( 15 Dec 2020 09:53 )             33.6     12-15    140  |  104  |  43<H>  ----------------------------<  84  4.2   |  30  |  1.00    Ca    8.5      15 Dec 2020 09:53    TPro  7.7  /  Alb  3.3  /  TBili  0.4  /  DBili  x   /  AST  21  /  ALT  35  /  AlkPhos  75  12-14      CARDIAC MARKERS ( 16 Dec 2020 01:31 )  .077 ng/mL / x     / 57 U/L / x     / 3.1 ng/mL        Review of Systems	      Objective     Physical Examination    heart s1s2  lung dec BS  abd soft      Pertinent Lab findings & Imaging      Pam:  NO   Adequate UO     I&O's Detail    15 Dec 2020 07:01  -  16 Dec 2020 07:00  --------------------------------------------------------  IN:    Oral Fluid: 300 mL  Total IN: 300 mL    OUT:    Voided (mL): 1 mL  Total OUT: 1 mL    Total NET: 299 mL               Discussed with:     Cultures:	        Radiology

## 2020-12-16 NOTE — CHART NOTE - NSCHARTNOTEFT_GEN_A_CORE
Called by RN for chest pain. Pt seen and examined. Pt has 7/10 chest pain that she describes as a "weight on her chest." Says it is difficult to take deep breathes. Denies headache, dizziness, blurry vision, palpitations, abdominal pain, n/v/d, leg swelling.    Vital Signs Last 24 Hrs  T(C): 36.7 (16 Dec 2020 00:43), Max: 37.1 (15 Dec 2020 08:23)  T(F): 98 (16 Dec 2020 00:43), Max: 98.7 (15 Dec 2020 08:23)  HR: 83 (16 Dec 2020 00:43) (73 - 97)  BP: 135/77 (16 Dec 2020 00:43) (107/71 - 135/77)  BP(mean): --  RR: 18 (16 Dec 2020 00:43) (18 - 19)  SpO2: 95% (16 Dec 2020 00:43) (90% - 95%)    General: NAD, laying in bed on room air   Neurology: A&Ox3. answers questions and follows commands  Respiratory: CTA B/L  CV: RRR, S1S2, chest pain to palpation  Abdominal: Soft, NT, ND  Extremities: No cyanosis, clubbing, or edema; + peripheral pulses; no calf tenderness    A/P: 79yo female with pmhx of hypothyroid, depression, subdural hematoma 8/2020 2/2 fall presents with cough x 1 week. Transferred from [place] ED to [facility] for COVID pna 19. Called by RN for chest pain.    - Chest pain reproducible by palpation, likely 2/2 costochondritis  - STAT EKG NSR, no ischemic changes  - Vital signs stable, saturating 95% on room air  - F/u STAT cardiac enzymes  - Will give Motrin 600 mg x1 for chest pain  - Will continue to monitor, RN to call with any changes Called by RN for chest pain. Pt seen and examined. Pt has 7/10 chest pain that she describes as a "weight on her chest." Says it is difficult to take deep breathes. Denies radiation of pain. Denies headache, dizziness, blurry vision, palpitations, abdominal pain, n/v/d, leg swelling.    Vital Signs Last 24 Hrs  T(C): 36.7 (16 Dec 2020 00:43), Max: 37.1 (15 Dec 2020 08:23)  T(F): 98 (16 Dec 2020 00:43), Max: 98.7 (15 Dec 2020 08:23)  HR: 83 (16 Dec 2020 00:43) (73 - 97)  BP: 135/77 (16 Dec 2020 00:43) (107/71 - 135/77)  BP(mean): --  RR: 18 (16 Dec 2020 00:43) (18 - 19)  SpO2: 95% (16 Dec 2020 00:43) (90% - 95%)    General: NAD, laying in bed on room air   Neurology: A&Ox3. answers questions and follows commands  Respiratory: CTA B/L, no increased work of breathing  CV: RRR, S1S2, chest pain to palpation  Abdominal: Soft, NT, ND  Extremities: No cyanosis, clubbing, or edema; + peripheral pulses; no calf tenderness    A/P: 79yo female with pmhx of hypothyroid, depression, subdural hematoma 8/2020 2/2 fall presents with cough x 1 week. Transferred from [place] [facility] to [facility] for COVID pna 19. Called by RN for chest pain.    - Chest pain reproducible by palpation, likely 2/2 costochondritis  - STAT EKG NSR, no ischemic changes  - Vital signs stable, saturating 95% on room air  - F/u STAT cardiac enzymes  - Will give Motrin 600 mg x1 for chest pain  - Will continue to monitor, RN to call with any changes

## 2020-12-16 NOTE — PROGRESS NOTE ADULT - ASSESSMENT
79yo female with pmhx of hypothyroid, depression, subdural hematoma 8/2020 2/2 fall adm with COVID+ suggested day of onset 11/25    RECOMMENDATIONS  12/4 NLR ~3, sats<94% on RA, rec REMDESIVIR, STEROIDs, caution with anticoagulation with prior subdural  12/5 NLR <3, NC-2L pt appears stable and comfortable  12/6 NLR <3 btwn NC and RA-cont current Rx  12/7 on RA-continue Rx, 12/8 last day of remdesivir and may be able to consider dc after Rx  12/8 btwn RA and 2L NC, last day of remdesivir, suggested day 13 of illness and can look at potential dc (COVID PCR+)  12/9 NLR 5.58/1.83=<3, 97% on RA, can look at dc planning  12/10 remains stable on RA  12/11 remains stable on RA  12/14-stable on RA, sp steroids, can repeat COVID PCRs if required for dc planning COVID PCR+  -follow for neg PCRs unless headed to home    When pt is doing well and  past the critical second week when decompensation can occur and has started to improve, fine to consider discharge planning with for early but safe discharge. Discharge with oxygen (4L or less and able to keep sats>90) and even persistent fever at time of discharge is reasonable. With high risk for thromboembolic disease we usually consider dc on  rivaroxaban (Xarelto) or Eliquis (apixaban) but with prior subdural rec ASA 325mg PO Q-day as discussed w primary team  For outpt mild disease considered noninfectious at day 10 after onset of illness but for hospitalized patients day 20. If pt going to facility or to dc isolation in house then will require 2 negative PCR tests  by a minimum of 24 hours and fever free without antipyretics for >24hrs and more than 10 days from first positive test.

## 2020-12-16 NOTE — PROGRESS NOTE ADULT - SUBJECTIVE AND OBJECTIVE BOX
Patient is a 80y old  Female who presents with a chief complaint of COVID 19 Pneumonia (16 Dec 2020 11:03)        HPI:  81yo female with pmhx of hypothyroid, depression, subdural hematoma 8/2020 2/2 fall presents to [facility] with C/C of cough, low grade temp.  History obtained by patient. Patient reports her symptoms started less than one week ago. She reports productive cough, sore throat, low grade temps. Patient reported visiting [facility] whom dx pt with covid + on rapid test and advised pt to come to [place] [facility] December 2 for evaluation.   Patient was  seen in [place] on December 2 and ultimatel dced home. Since initial discharge, patient shortness of breath worsened, therefore she returned to [place] ED. Patient reports she did not take medication for her symptoms. Patient lives alone with home health aid ,although uunsure of covid exposure.  on ROS: patient denies fever, cp, n/v/d, abd pain, rash.    ER course:  Patient initially seen in [facility]. hypoxemic to 81 on room air. improved to 96 on 2 liters  labs: benign  ddimer 290   (04 Dec 2020 18:13)      SUBJECTIVE & OBJECTIVE: Pt seen and examined at bedside. no acute complaints     PHYSICAL EXAM:  T(C): 36.9 (12-16-20 @ 08:22), Max: 37 (12-15-20 @ 16:38)  HR: 79 (12-16-20 @ 08:22) (70 - 97)  BP: 103/60 (12-16-20 @ 08:22) (103/60 - 135/77)  RR: 18 (12-16-20 @ 08:22) (18 - 19)  SpO2: 93% (12-16-20 @ 08:22) (90% - 95%)  Wt(kg): --   GENERAL: NAD, well-groomed, well-developed  NECK: Supple, No JVD  NERVOUS SYSTEM:  Aler  CHEST/LUNG: Clear to auscultation bilaterally; No rales, rhonchi, wheezing, or rubs  HEART: Regular rate and rhythm; No murmurs, rubs, or gallops  ABDOMEN: Soft, Nontender, Nondistended; Bowel sounds present  EXTREMITIES:  2+ Peripheral Pulses, No clubbing, cyanosis, or edema        MEDICATIONS  (STANDING):  ALPRAZolam 0.25 milliGRAM(s) Oral two times a day  amLODIPine   Tablet 5 milliGRAM(s) Oral daily  citalopram 10 milliGRAM(s) Oral daily  enoxaparin Injectable 40 milliGRAM(s) SubCutaneous daily  guaiFENesin  milliGRAM(s) Oral every 12 hours  lamoTRIgine 25 milliGRAM(s) Oral two times a day  levothyroxine 25 MICROGram(s) Oral daily  pantoprazole    Tablet 40 milliGRAM(s) Oral before breakfast    MEDICATIONS  (PRN):  acetaminophen   Tablet .. 650 milliGRAM(s) Oral every 6 hours PRN Temp greater or equal to 38C (100.4F), Mild Pain (1 - 3)  ALBUTerol    90 MICROgram(s) HFA Inhaler 2 Puff(s) Inhalation every 6 hours PRN Bronchospasm  aluminum hydroxide/magnesium hydroxide/simethicone Suspension 30 milliLiter(s) Oral every 6 hours PRN Dyspepsia  ondansetron Injectable 4 milliGRAM(s) IV Push every 6 hours PRN Nausea and/or Vomiting      LABS:                        12.4   12.67 )-----------( 411      ( 16 Dec 2020 13:05 )             37.9     12-16    140  |  103  |  42<H>  ----------------------------<  134<H>  4.3   |  28  |  1.20    Ca    8.6      16 Dec 2020 13:05            CAPILLARY BLOOD GLUCOSE          CAPILLARY BLOOD GLUCOSE        CAPILLARY BLOOD GLUCOSE          CARDIAC MARKERS ( 16 Dec 2020 13:05 )  .041 ng/mL / x     / 54 U/L / x     / 2.3 ng/mL  CARDIAC MARKERS ( 16 Dec 2020 01:31 )  .077 ng/mL / x     / 57 U/L / x     / 3.1 ng/mL        RECENT CULTURES:      RADIOLOGY & ADDITIONAL TESTS:                        DVT/GI ppx  Discussed with pt @ bedside

## 2020-12-16 NOTE — CONSULT NOTE ADULT - SUBJECTIVE AND OBJECTIVE BOX
[facility]         Mckayla Brasher, Jan Marie, Hazel, Ayo, Flaco        [phone] (office)    Reason for Consult: Chest Pain      HPI: The patient is a 81yo female with pmhx of hypothyroid, depression, subdural hematoma 8/2020 2/2 fall presents to [facility] with C/C of cough, low grade temp. History obtained by patient. Patient reports her symptoms started less than one week ago. She reports productive cough, sore throat, low grade temps. Patient [place] [facility] [place] with covid + on rapid test and advised pt to come to [place] [facility] December 2 for evaluation.   Patient was  seen in [place] on December 2 and ultimatel dced home. Since initial discharge, patient shortness of breath worsened, therefore she returned to [place] [facility]. Patient reports she did not take medication for her symptoms. Patient lives alone with home health aid ,although uunsure of covid exposure.  on ROS: patient denies fever, cp, n/v/d, abd pain, rash.      Interval HPI: Patient seen and examined at bedside. Overnight patient c/o chest pain. According to the patient she was doing well and was sleeping when around 1 AM she started having chest pain. She describes the pain as intermittent and sharp. Associated with breathing. Pain can be reproduced on palpation of left anterolateral ribs. Denies any associated SOB, Palpitation or diaphoresis. No N/V/D/C. Patient was given Ibuprofen for pain and has helped. Currently denies any chest pain,       ER course:  Patient initially seen in [facility]. hypoxemic to 81 on room air. improved to 96 on 2 liters  labs: benign  ddimer [id number]   (04 Dec 2020 18:13)      PAST MEDICAL & SURGICAL HISTORY:  Vertigo    Depression    Hypothyroidism    Vertigo    Hypothyroidism    Depression    UTI (urinary tract infection)    No significant past surgical history  RIGHT KNEE ARTHROSCOPY    No significant past surgical history        SOCIAL HISTORY: No active tobacco, alcohol or illicit drug use    FAMILY HISTORY:      Home Medications:  CeleXA 10 mg oral tablet: 1 tab(s) orally once a day (at bedtime) (05 Dec 2020 09:44)  LaMICtal 25 mg oral tablet:  (05 Dec 2020 09:44)  Synthroid 25 mcg (0.025 mg) oral tablet: 1 tab(s) orally once a day (05 Dec 2020 09:44)      MEDICATIONS  (STANDING):  ALPRAZolam 0.25 milliGRAM(s) Oral two times a day  amLODIPine   Tablet 5 milliGRAM(s) Oral daily  citalopram 10 milliGRAM(s) Oral daily  enoxaparin Injectable 40 milliGRAM(s) SubCutaneous daily  guaiFENesin  milliGRAM(s) Oral every 12 hours  lamoTRIgine 25 milliGRAM(s) Oral two times a day  levothyroxine 25 MICROGram(s) Oral daily  pantoprazole    Tablet 40 milliGRAM(s) Oral before breakfast    MEDICATIONS  (PRN):  acetaminophen   Tablet .. 650 milliGRAM(s) Oral every 6 hours PRN Temp greater or equal to 38C (100.4F), Mild Pain (1 - 3)  ALBUTerol    90 MICROgram(s) HFA Inhaler 2 Puff(s) Inhalation every 6 hours PRN Bronchospasm  aluminum hydroxide/magnesium hydroxide/simethicone Suspension 30 milliLiter(s) Oral every 6 hours PRN Dyspepsia  ondansetron Injectable 4 milliGRAM(s) IV Push every 6 hours PRN Nausea and/or Vomiting      Allergies    cephalexin (Hives)  cephalexin (Unknown)    Intolerances    No seafood (Unknown)      REVIEW OF SYSTEMS: Negative except as per HPI.    VITAL SIGNS:   Vital Signs Last 24 Hrs  T(C): 36.9 (16 Dec 2020 08:22), Max: 37 (15 Dec 2020 16:38)  T(F): 98.4 (16 Dec 2020 08:22), Max: 98.6 (15 Dec 2020 16:38)  HR: 79 (16 Dec 2020 08:22) (70 - 97)  BP: 103/60 (16 Dec 2020 08:22) (103/60 - 135/77)  BP(mean): --  RR: 18 (16 Dec 2020 08:22) (18 - 19)  SpO2: 93% (16 Dec 2020 08:22) (90% - 95%)    I&O's Summary    15 Dec 2020 07:01  -  16 Dec 2020 07:00  --------------------------------------------------------  IN: 300 mL / OUT: 1 mL / NET: 299 mL        PHYSICAL EXAM:  Constitutional: NAD, well-developed  Pulmonary:, breath sounds are clear bilaterally, no wheezing, rales or rhonchi, no increased work of breathing  Cardiovascular: +S1, S2, RRR, no murmur  Gastrointestinal: Soft, nontender, nondistended, normoactive bowel sounds  Extremities: No peripheral edema   Neurological: AAO X3, strength and sensitivity are grossly intact        LABS: All Labs Reviewed:                        10.8   11.50 )-----------( 370      ( 15 Dec 2020 09:53 )             33.6                         12.8   16.84 )-----------( 581      ( 14 Dec 2020 09:58 )             39.1     15 Dec 2020 09:53    140    |  104    |  43     ----------------------------<  84     4.2     |  30     |  1.00   14 Dec 2020 09:58    139    |  102    |  43     ----------------------------<  115    3.9     |  29     |  1.20     Ca    8.5        15 Dec 2020 09:53  Ca    9.3        14 Dec 2020 09:58    TPro  7.7    /  Alb  3.3    /  TBili  0.4    /  DBili  x      /  AST  21     /  ALT  35     /  AlkPhos  75     14 Dec 2020 09:58      CARDIAC MARKERS ( 16 Dec 2020 01:31 )  .077 ng/mL / x     / 57 U/L / x     / 3.1 ng/mL      Blood Culture:         EKG:    RADIOLOGY:    EXAM:  CT BRAIN                            PROCEDURE DATE:  12/04/2020          INTERPRETATION:  CLINICAL INFORMATION: bleed. . ADMDIAG1: U07.1 COVID-19/. Hypoxia.    TECHNIQUE: Sequential axial images were obtained from the vertex to the skull base without intravenous contrast. Coronal and sagittal reformations were obtained.    COMPARISON: Prior CT dated 8/13/2020.    FINDINGS:    There is no acute intracranial hemorrhage. There are areas of hypodensity in the bilateral hemispheric white matter suggesting white matter microvascular ischemic change. There is cerebral volume loss.    Left posterior fossa fluid attenuation lesion suggesting arachnoid cyst again seen.    There is no displaced calvarial fracture. The visualized orbits are within normal limits. The visualized portions of the paranasal sinuses are well aerated. The mastoid air cells are well aerated.      IMPRESSION: No acute intracranial hemorrhage.  CXR:  EXAM:  XR CHEST PORTABLE IMMED 1V                            PROCEDURE DATE:  12/04/2020          INTERPRETATION:  Portable chest radiograph    CLINICAL INFORMATION: Positive Covid 19.    TECHNIQUE:  Portable  AP view of the chest was obtained.    COMPARISON: 12/2/2020 available for review.    FINDINGS:  The lungs are clear of airspace consolidations or effusions. No pneumothorax.    The heart and mediastinum are within normal limits.    Visualized osseous structures are intact.        IMPRESSION:   No evidence of active chest disease.

## 2020-12-17 DIAGNOSIS — F33.2 MAJOR DEPRESSIVE DISORDER, RECURRENT SEVERE WITHOUT PSYCHOTIC FEATURES: ICD-10-CM

## 2020-12-17 LAB — SARS-COV-2 RNA SPEC QL NAA+PROBE: DETECTED

## 2020-12-17 PROCEDURE — 99232 SBSQ HOSP IP/OBS MODERATE 35: CPT | Mod: CS

## 2020-12-17 PROCEDURE — 99232 SBSQ HOSP IP/OBS MODERATE 35: CPT

## 2020-12-17 PROCEDURE — 99232 SBSQ HOSP IP/OBS MODERATE 35: CPT | Mod: CS,GC

## 2020-12-17 RX ADMIN — PANTOPRAZOLE SODIUM 40 MILLIGRAM(S): 20 TABLET, DELAYED RELEASE ORAL at 05:34

## 2020-12-17 RX ADMIN — Medication 600 MILLIGRAM(S): at 05:33

## 2020-12-17 RX ADMIN — CITALOPRAM 10 MILLIGRAM(S): 10 TABLET, FILM COATED ORAL at 23:07

## 2020-12-17 RX ADMIN — LAMOTRIGINE 25 MILLIGRAM(S): 25 TABLET, ORALLY DISINTEGRATING ORAL at 05:34

## 2020-12-17 RX ADMIN — ENOXAPARIN SODIUM 40 MILLIGRAM(S): 100 INJECTION SUBCUTANEOUS at 12:33

## 2020-12-17 RX ADMIN — Medication 600 MILLIGRAM(S): at 17:28

## 2020-12-17 RX ADMIN — Medication 0.25 MILLIGRAM(S): at 23:07

## 2020-12-17 RX ADMIN — Medication 0.25 MILLIGRAM(S): at 12:33

## 2020-12-17 RX ADMIN — AMLODIPINE BESYLATE 5 MILLIGRAM(S): 2.5 TABLET ORAL at 05:33

## 2020-12-17 RX ADMIN — Medication 25 MICROGRAM(S): at 05:33

## 2020-12-17 RX ADMIN — LAMOTRIGINE 25 MILLIGRAM(S): 25 TABLET, ORALLY DISINTEGRATING ORAL at 17:28

## 2020-12-17 NOTE — PROGRESS NOTE ADULT - SUBJECTIVE AND OBJECTIVE BOX
[facility] -- Mckayla Brasher, Cynthia, Jan, Ayo Ventura Savella, Goodger  Office # [phone]    Follow Up:  Atypical CP    Subjective/Observations: with mild pleuritic CP.  Denies cough or SOB, HAHN    REVIEW OF SYSTEMS: All other review of systems is negative unless indicated above  PAST MEDICAL & SURGICAL HISTORY:  Vertigo    Depression    Hypothyroidism    Vertigo    Hypothyroidism    Depression    UTI (urinary tract infection)    No significant past surgical history  RIGHT KNEE ARTHROSCOPY    No significant past surgical history    MEDICATIONS  (STANDING):  ALPRAZolam 0.25 milliGRAM(s) Oral two times a day  amLODIPine   Tablet 5 milliGRAM(s) Oral daily  citalopram 10 milliGRAM(s) Oral daily  enoxaparin Injectable 40 milliGRAM(s) SubCutaneous daily  guaiFENesin  milliGRAM(s) Oral every 12 hours  lamoTRIgine 25 milliGRAM(s) Oral two times a day  levothyroxine 25 MICROGram(s) Oral daily  pantoprazole    Tablet 40 milliGRAM(s) Oral before breakfast    MEDICATIONS  (PRN):  acetaminophen   Tablet .. 650 milliGRAM(s) Oral every 6 hours PRN Temp greater or equal to 38C (100.4F), Mild Pain (1 - 3)  ALBUTerol    90 MICROgram(s) HFA Inhaler 2 Puff(s) Inhalation every 6 hours PRN Bronchospasm  aluminum hydroxide/magnesium hydroxide/simethicone Suspension 30 milliLiter(s) Oral every 6 hours PRN Dyspepsia  ondansetron Injectable 4 milliGRAM(s) IV Push every 6 hours PRN Nausea and/or Vomiting    Allergies    cephalexin (Hives)  cephalexin (Unknown)    Intolerances    No seafood (Unknown)    Vital Signs Last 24 Hrs  T(C): 36.4 (17 Dec 2020 08:48), Max: 37.2 (16 Dec 2020 15:26)  T(F): 97.6 (17 Dec 2020 08:48), Max: 98.9 (16 Dec 2020 15:26)  HR: 97 (17 Dec 2020 08:48) (77 - 97)  BP: 125/69 (17 Dec 2020 08:48) (114/68 - 125/74)  BP(mean): --  RR: 18 (17 Dec 2020 08:48) (18 - 18)  SpO2: 95% (17 Dec 2020 08:48) (91% - 95%)  I&O's Summary      PHYSICAL EXAM:  TELE: Not on tele  Constitutional: NAD, awake and alert, well-developed  HEENT: Moist Mucous Membranes, Anicteric  Pulmonary: Non-labored, breath sounds are clear bilaterally, No wheezing, rales or rhonchi  Cardiovascular: Regular, S1 and S2, No murmurs, rubs, gallops or clicks  Gastrointestinal: Bowel Sounds present, soft, nontender.   Lymph: No peripheral edema. No lymphadenopathy.  Skin: No visible rashes or ulcers.  Psych:  Mood & affect appropriate  LABS: All Labs Reviewed:                        12.4   12.67 )-----------( 411      ( 16 Dec 2020 13:05 )             37.9                         10.8   11.50 )-----------( 370      ( 15 Dec 2020 09:53 )             33.6     16 Dec 2020 13:05    140    |  103    |  42     ----------------------------<  134    4.3     |  28     |  1.20   15 Dec 2020 09:53    140    |  104    |  43     ----------------------------<  84     4.2     |  30     |  1.00     Ca    8.6        16 Dec 2020 13:05  Ca    8.5        15 Dec 2020 09:53    CARDIAC MARKERS ( 16 Dec 2020 13:05 )  .041 ng/mL / x     / 54 U/L / x     / 2.3 ng/mL  CARDIAC MARKERS ( 16 Dec 2020 01:31 )  .077 ng/mL / x     / 57 U/L / x     / 3.1 ng/mL      EXAM:  XR CHEST PORTABLE IMMED 1V                          PROCEDURE DATE:  12/02/2020      INTERPRETATION:  CLINICAL STATEMENT: Chest pain.    TECHNIQUE: AP view of the chest.    COMPARISON: 8/12/2020    FINDINGS/  IMPRESSION:  No consolidation or pleural effusion. Atelectasis right lung base.    Heart size within normal limits.    ISACC LE MD; Attending Radiologist  This document has been electronically signed. Dec  2 2020  2:29PM       Ventricular Rate 85 BPM    Atrial Rate 85 BPM    P-R Interval 128 ms    QRS Duration 88 ms    Q-T Interval 412 ms    QTC Calculation(Bazett) 490 ms    P Axis 64 degrees    R Axis -49 degrees    T Axis 55 degrees    Diagnosis Line Normal sinus rhythm  Left anterior fascicular block    Confirmed by INNA VENTURA ([id number]) on 12/16/2020 8:38:59 AM

## 2020-12-17 NOTE — PROGRESS NOTE ADULT - SUBJECTIVE AND OBJECTIVE BOX
Date/Time Patient Seen:  		  Referring MD:   Data Reviewed	       Patient is a 80y old  Female who presents with a chief complaint of COVID 19 Pneumonia (16 Dec 2020 14:56)      Subjective/HPI     PAST MEDICAL & SURGICAL HISTORY:  Vertigo    Depression    Hypothyroidism    Vertigo    Hypothyroidism    Depression    UTI (urinary tract infection)    No significant past surgical history  RIGHT KNEE ARTHROSCOPY    No significant past surgical history          Medication list         MEDICATIONS  (STANDING):  ALPRAZolam 0.25 milliGRAM(s) Oral two times a day  amLODIPine   Tablet 5 milliGRAM(s) Oral daily  citalopram 10 milliGRAM(s) Oral daily  enoxaparin Injectable 40 milliGRAM(s) SubCutaneous daily  guaiFENesin  milliGRAM(s) Oral every 12 hours  lamoTRIgine 25 milliGRAM(s) Oral two times a day  levothyroxine 25 MICROGram(s) Oral daily  pantoprazole    Tablet 40 milliGRAM(s) Oral before breakfast    MEDICATIONS  (PRN):  acetaminophen   Tablet .. 650 milliGRAM(s) Oral every 6 hours PRN Temp greater or equal to 38C (100.4F), Mild Pain (1 - 3)  ALBUTerol    90 MICROgram(s) HFA Inhaler 2 Puff(s) Inhalation every 6 hours PRN Bronchospasm  aluminum hydroxide/magnesium hydroxide/simethicone Suspension 30 milliLiter(s) Oral every 6 hours PRN Dyspepsia  ondansetron Injectable 4 milliGRAM(s) IV Push every 6 hours PRN Nausea and/or Vomiting         Vitals log        ICU Vital Signs Last 24 Hrs  T(C): 36.4 (17 Dec 2020 08:48), Max: 37.2 (16 Dec 2020 15:26)  T(F): 97.6 (17 Dec 2020 08:48), Max: 98.9 (16 Dec 2020 15:26)  HR: 97 (17 Dec 2020 08:48) (77 - 97)  BP: 125/69 (17 Dec 2020 08:48) (114/68 - 125/74)  BP(mean): --  ABP: --  ABP(mean): --  RR: 18 (17 Dec 2020 08:48) (18 - 18)  SpO2: 95% (17 Dec 2020 08:48) (91% - 95%)           Input and Output:  I&O's Detail      Lab Data                        12.4   12.67 )-----------( 411      ( 16 Dec 2020 13:05 )             37.9     12-16    140  |  103  |  42<H>  ----------------------------<  134<H>  4.3   |  28  |  1.20    Ca    8.6      16 Dec 2020 13:05        CARDIAC MARKERS ( 16 Dec 2020 13:05 )  .041 ng/mL / x     / 54 U/L / x     / 2.3 ng/mL  CARDIAC MARKERS ( 16 Dec 2020 01:31 )  .077 ng/mL / x     / 57 U/L / x     / 3.1 ng/mL        Review of Systems	      Objective     Physical Examination    heart s1s2  lung dc BS  abd soft  head nc  head at      Pertinent Lab findings & Imaging      Pam:  NO   Adequate UO     I&O's Detail           Discussed with:     Cultures:	        Radiology

## 2020-12-17 NOTE — CHART NOTE - NSCHARTNOTEFT_GEN_A_CORE
Assessment/Follow up: Pt continues on soft diet with ensure enlive TID. Usually with fair appetite/po intake; 25-80% documented in EMR past 5 days, however over past 24hrs patria was consulted as pt with delerium/pyschosis/lethargy. Pt expressed suicidal ideation- dx major depressive disorder- recurrent. Will monitor po intake closely. Snacks/supplements provided to maximize po intake. GI wdl. Last BM 12/15. Encourage po fluids/dietary fiber with stool softeners prn. Recommend continued assistance/encouragement with meals and snacks.     Factors impacting intake: [ ] none [ ] nausea  [ ] vomiting [ ] diarrhea [ ] constipation  [ ]chewing problems [ ] swallowing issues  [ x] other: major depressive disorder, lack of appetite, COVID-19     Diet Presciption: Diet, Soft:   Supplement Feeding Modality:  Oral  Ensure Enlive Cans or Servings Per Day:  1       Frequency:  Three Times a day (12-08-20 @ 13:17)    Intake: Fair; 20-80% documented in EMR past 5 days    Current Weight: 118.1lbs(12/16)      Pertinent Medications: MEDICATIONS  (STANDING):  ALPRAZolam 0.25 milliGRAM(s) Oral two times a day  amLODIPine   Tablet 5 milliGRAM(s) Oral daily  citalopram 10 milliGRAM(s) Oral daily  enoxaparin Injectable 40 milliGRAM(s) SubCutaneous daily  guaiFENesin  milliGRAM(s) Oral every 12 hours  lamoTRIgine 25 milliGRAM(s) Oral two times a day  levothyroxine 25 MICROGram(s) Oral daily  pantoprazole    Tablet 40 milliGRAM(s) Oral before breakfast    MEDICATIONS  (PRN):  acetaminophen   Tablet .. 650 milliGRAM(s) Oral every 6 hours PRN Temp greater or equal to 38C (100.4F), Mild Pain (1 - 3)  ALBUTerol    90 MICROgram(s) HFA Inhaler 2 Puff(s) Inhalation every 6 hours PRN Bronchospasm  aluminum hydroxide/magnesium hydroxide/simethicone Suspension 30 milliLiter(s) Oral every 6 hours PRN Dyspepsia  ondansetron Injectable 4 milliGRAM(s) IV Push every 6 hours PRN Nausea and/or Vomiting    Pertinent Labs: 12-16 Na140 mmol/L Glu 134 mg/dL<H> K+ 4.3 mmol/L Cr  1.20 mg/dL BUN 42 mg/dL<H> 12-14 Alb 3.3 g/dL     CAPILLARY BLOOD GLUCOSE        Skin: intact, ecchymotic. David 20.     Estimated Needs:   [ x] no change since previous assessment  [ ] recalculated:     Previous Nutrition Diagnosis:   [ ] Inadequate Energy Intake [x ]Inadequate Oral Intake [ ] Excessive Energy Intake   [ ] Underweight [x ] Increased Nutrient Needs [ ] Overweight/Obesity   [ ] Altered GI Function [ ] Unintended Weight Loss [ ] Food & Nutrition Related Knowledge Deficit [ ] Malnutrition     Nutrition Diagnosis is [x ] ongoing  [ ] resolved [ ] not applicable     New Nutrition Diagnosis: [x ] not applicable       Interventions:   Recommend  [ ] Change Diet To:  [ ] Nutrition Supplement  [ ] Nutrition Support  [ x] Other: Continued assistance/encouragement with meals & supplements. Appetite stimulant per MD discretion. Stool softeners prn.     Monitoring and Evaluation:   [x ] PO intake [ x ] Tolerance to diet prescription [ x ] weights [ x ] labs[ x ] follow up per protocol  [ ] other: Assessment/Follow up: Pt continues on soft diet with ensure enlive TID. Pt contacted via telephone, appears a+ox2-3, extremely flat affect, minimally conversive. Pt usually with fair appetite/po intake; 25-80% documented in EMR past 5 days, however over past 24hrs patria was consulted as pt with delerium/pyschosis/lethargy. Pt expressed suicidal ideation- dx major depressive disorder- recurrent. Will monitor po intake closely. Pt reports consuming ~50% meals. Request chocolate ensure. Agreeable to ice cream, yogurt, puddings to maximize po intake. GI wdl. Last BM 12/15. Encourage po fluids/dietary fiber with stool softeners prn. Recommend continued assistance/encouragement with meals and snacks.     Factors impacting intake: [ ] none [ ] nausea  [ ] vomiting [ ] diarrhea [ ] constipation  [ ]chewing problems [ ] swallowing issues  [ x] other: major depressive disorder, lack of appetite, COVID-19     Diet Presciption: Diet, Soft:   Supplement Feeding Modality:  Oral  Ensure Enlive Cans or Servings Per Day:  1       Frequency:  Three Times a day (12-08-20 @ 13:17)    Intake: Fair; 20-80% documented in EMR past 5 days    Current Weight: 118.1lbs(12/16)      Pertinent Medications: MEDICATIONS  (STANDING):  ALPRAZolam 0.25 milliGRAM(s) Oral two times a day  amLODIPine   Tablet 5 milliGRAM(s) Oral daily  citalopram 10 milliGRAM(s) Oral daily  enoxaparin Injectable 40 milliGRAM(s) SubCutaneous daily  guaiFENesin  milliGRAM(s) Oral every 12 hours  lamoTRIgine 25 milliGRAM(s) Oral two times a day  levothyroxine 25 MICROGram(s) Oral daily  pantoprazole    Tablet 40 milliGRAM(s) Oral before breakfast    MEDICATIONS  (PRN):  acetaminophen   Tablet .. 650 milliGRAM(s) Oral every 6 hours PRN Temp greater or equal to 38C (100.4F), Mild Pain (1 - 3)  ALBUTerol    90 MICROgram(s) HFA Inhaler 2 Puff(s) Inhalation every 6 hours PRN Bronchospasm  aluminum hydroxide/magnesium hydroxide/simethicone Suspension 30 milliLiter(s) Oral every 6 hours PRN Dyspepsia  ondansetron Injectable 4 milliGRAM(s) IV Push every 6 hours PRN Nausea and/or Vomiting    Pertinent Labs: 12-16 Na140 mmol/L Glu 134 mg/dL<H> K+ 4.3 mmol/L Cr  1.20 mg/dL BUN 42 mg/dL<H> 12-14 Alb 3.3 g/dL     CAPILLARY BLOOD GLUCOSE        Skin: intact, ecchymotic. David 20.     Estimated Needs:   [ x] no change since previous assessment  [ ] recalculated:     Previous Nutrition Diagnosis:   [ ] Inadequate Energy Intake [x ]Inadequate Oral Intake [ ] Excessive Energy Intake   [ ] Underweight [x ] Increased Nutrient Needs [ ] Overweight/Obesity   [ ] Altered GI Function [ ] Unintended Weight Loss [ ] Food & Nutrition Related Knowledge Deficit [ ] Malnutrition     Nutrition Diagnosis is [x ] ongoing  [ ] resolved [ ] not applicable     New Nutrition Diagnosis: [x ] not applicable       Interventions:   Recommend  [ ] Change Diet To:  [ ] Nutrition Supplement  [ ] Nutrition Support  [ x] Other: Continued assistance/encouragement with meals & supplements. Appetite stimulant per MD discretion. Stool softeners prn.     Monitoring and Evaluation:   [x ] PO intake [ x ] Tolerance to diet prescription [ x ] weights [ x ] labs[ x ] follow up per protocol  [ ] other: Assessment/Follow up: Pt continues on soft diet with ensure enlive TID. Pt contacted via telephone, appears a+ox2-3, extremely flat affect, minimally conversive. Pt usually with fair appetite/po intake; 25-80% documented in EMR past 5 days, however over past 24hrs patria was consulted as pt with delerium/pyschosis/lethargy. Pt expressed suicidal ideation- dx major depressive disorder- recurrent. Pt reports consuming ~50% meals. Requests chocolate ensure. Agreeable to ice cream, yogurt, puddings to maximize po intake. GI wdl. Last BM 12/15. Encourage po fluids/dietary fiber with stool softeners prn. Recommend continued assistance/encouragement with meals and snacks.     Factors impacting intake: [ ] none [ ] nausea  [ ] vomiting [ ] diarrhea [ ] constipation  [ ]chewing problems [ ] swallowing issues  [ x] other: major depressive disorder, lack of appetite, COVID-19     Diet Presciption: Diet, Soft:   Supplement Feeding Modality:  Oral  Ensure Enlive Cans or Servings Per Day:  1       Frequency:  Three Times a day (12-08-20 @ 13:17)    Intake: Fair; 20-80% documented in EMR past 5 days    Current Weight: 118.1lbs(12/16)      Pertinent Medications: MEDICATIONS  (STANDING):  ALPRAZolam 0.25 milliGRAM(s) Oral two times a day  amLODIPine   Tablet 5 milliGRAM(s) Oral daily  citalopram 10 milliGRAM(s) Oral daily  enoxaparin Injectable 40 milliGRAM(s) SubCutaneous daily  guaiFENesin  milliGRAM(s) Oral every 12 hours  lamoTRIgine 25 milliGRAM(s) Oral two times a day  levothyroxine 25 MICROGram(s) Oral daily  pantoprazole    Tablet 40 milliGRAM(s) Oral before breakfast    MEDICATIONS  (PRN):  acetaminophen   Tablet .. 650 milliGRAM(s) Oral every 6 hours PRN Temp greater or equal to 38C (100.4F), Mild Pain (1 - 3)  ALBUTerol    90 MICROgram(s) HFA Inhaler 2 Puff(s) Inhalation every 6 hours PRN Bronchospasm  aluminum hydroxide/magnesium hydroxide/simethicone Suspension 30 milliLiter(s) Oral every 6 hours PRN Dyspepsia  ondansetron Injectable 4 milliGRAM(s) IV Push every 6 hours PRN Nausea and/or Vomiting    Pertinent Labs: 12-16 Na140 mmol/L Glu 134 mg/dL<H> K+ 4.3 mmol/L Cr  1.20 mg/dL BUN 42 mg/dL<H> 12-14 Alb 3.3 g/dL     CAPILLARY BLOOD GLUCOSE        Skin: intact, ecchymotic. David 20.     Estimated Needs:   [ x] no change since previous assessment  [ ] recalculated:     Previous Nutrition Diagnosis:   [ ] Inadequate Energy Intake [x ]Inadequate Oral Intake [ ] Excessive Energy Intake   [ ] Underweight [x ] Increased Nutrient Needs [ ] Overweight/Obesity   [ ] Altered GI Function [ ] Unintended Weight Loss [ ] Food & Nutrition Related Knowledge Deficit [ ] Malnutrition     Nutrition Diagnosis is [x ] ongoing  [ ] resolved [ ] not applicable     New Nutrition Diagnosis: [x ] not applicable       Interventions:   Recommend  [ ] Change Diet To:  [ ] Nutrition Supplement  [ ] Nutrition Support  [ x] Other: Continued assistance/encouragement with meals & supplements. Appetite stimulant per MD discretion. Stool softeners prn.     Monitoring and Evaluation:   [x ] PO intake [ x ] Tolerance to diet prescription [ x ] weights [ x ] labs[ x ] follow up per protocol  [ ] other:

## 2020-12-17 NOTE — PROGRESS NOTE ADULT - ASSESSMENT
79yo female with pmhx of hypothyroid, depression, subdural hematoma 8/2020 2/2 fall presents to [facility] with C/C of cough, low grade temp.     Atypical CP  - Likely pleuritic in nature  - Patient states that her symptoms resolved after taking ibuprofen   - EKG shows no acute ischemic changes  - Trops mildly elevated 0.077, CPK- 5.4 likely 2/2 to demand ischemia  - TTE when timing is appropriate    # Covid PNA:  - Requiring NC.  Titrate as needed  - Monitor respiratory status  - Follow respiratory and ID recs  - Initiate incentive spirometer    DVT ppx  - Per Primary    Will continue to follow    Allie Valenzuela DNP, NP-C  Cardiology   Spectra #[phone]

## 2020-12-17 NOTE — PROGRESS NOTE ADULT - SUBJECTIVE AND OBJECTIVE BOX
[facility] DIVISION of INFECTIOUS DISEASE  Salvatore Dennis MD PhD, Suzanne Hughes MD, Elvira Valdez MD, Fidel Rollins MD  and providing coverage with Mary Ann Lucas MD and Loren Fritz MD  Providing Infectious Disease Consultations at [facility], [facility], [facility]'s      Office# [phone] to schedule follow up appointments  Answering Service for urgent calls or New Consults [phone]  Cell# to text for urgent issues Salvatore Dennis [phone]     Infectious diseases progress note:    LINN WHITE is a 80y y. o. Female patient    COVID Patient    Allergies    cephalexin (Hives)  cephalexin (Unknown)    Intolerances    No seafood (Unknown)      ANTIBIOTICS/RELEVANT:  antimicrobials    immunologic:    OTHER:  acetaminophen   Tablet .. 650 milliGRAM(s) Oral every 6 hours PRN  ALBUTerol    90 MICROgram(s) HFA Inhaler 2 Puff(s) Inhalation every 6 hours PRN  ALPRAZolam 0.25 milliGRAM(s) Oral two times a day  aluminum hydroxide/magnesium hydroxide/simethicone Suspension 30 milliLiter(s) Oral every 6 hours PRN  amLODIPine   Tablet 5 milliGRAM(s) Oral daily  citalopram 10 milliGRAM(s) Oral daily  enoxaparin Injectable 40 milliGRAM(s) SubCutaneous daily  guaiFENesin  milliGRAM(s) Oral every 12 hours  lamoTRIgine 25 milliGRAM(s) Oral two times a day  levothyroxine 25 MICROGram(s) Oral daily  ondansetron Injectable 4 milliGRAM(s) IV Push every 6 hours PRN  pantoprazole    Tablet 40 milliGRAM(s) Oral before breakfast      Objective:  Vital Signs Last 24 Hrs  T(C): 36.4 (17 Dec 2020 08:48), Max: 37.2 (16 Dec 2020 15:26)  T(F): 97.6 (17 Dec 2020 08:48), Max: 98.9 (16 Dec 2020 15:26)  HR: 97 (17 Dec 2020 08:48) (77 - 97)  BP: 125/69 (17 Dec 2020 08:48) (114/68 - 125/74)  BP(mean): --  RR: 18 (17 Dec 2020 08:48) (18 - 18)  SpO2: 95% (17 Dec 2020 08:48) (91% - 95%)    T(C): 36.4 (12-17-20 @ 08:48), Max: 37.2 (12-16-20 @ 15:26)  T(C): 36.4 (12-17-20 @ 08:48), Max: 37.2 (12-16-20 @ 15:26)  T(C): 36.4 (12-17-20 @ 08:48), Max: 37.2 (12-16-20 @ 15:26)    PHYSICAL EXAM:  HEENT: NC atraumatic  Neck: supple  Respiratory: no accessory muscle use, breathing comfortably  Cardiovascular: distant  Gastrointestinal: normal appearing, nondistended  Extremities: no clubbing, no cyanosis,      LABS:                          12.4   12.67 )-----------( 411      ( 16 Dec 2020 13:05 )             37.9       12.67 12-16 @ 13:05  11.50 12-15 @ 09:53  16.84 12-14 @ 09:58  11.83 12-13 @ 07:52  10.76 12-11 @ 06:55      12-16    140  |  103  |  42<H>  ----------------------------<  134<H>  4.3   |  28  |  1.20    Ca    8.6      16 Dec 2020 13:05        Creatinine, Serum: 1.20 mg/dL (12-16-20 @ 13:05)  Creatinine, Serum: 1.00 mg/dL (12-15-20 @ 09:53)  Creatinine, Serum: 1.20 mg/dL (12-14-20 @ 09:58)  Creatinine, Serum: 1.00 mg/dL (12-13-20 @ 07:52)  Creatinine, Serum: 1.10 mg/dL (12-12-20 @ 08:33)  Creatinine, Serum: 1.20 mg/dL (12-11-20 @ 06:55)                COVID RISK SCORE  Auto Neutrophil #: 8.86 K/uL (12-16-20 @ 13:05)  Auto Lymphocyte #: 2.32 K/uL (12-16-20 @ 13:05)  Auto Neutrophil #: 7.70 K/uL (12-15-20 @ 09:53)  Auto Lymphocyte #: 2.22 K/uL (12-15-20 @ 09:53)  Auto Neutrophil #: 10.70 K/uL (12-14-20 @ 09:58)  Auto Lymphocyte #: 4.22 K/uL (12-14-20 @ 09:58)  Auto Neutrophil #: 8.27 K/uL (12-13-20 @ 07:52)  Auto Lymphocyte #: 2.14 K/uL (12-13-20 @ 07:52)  Auto Neutrophil #: 6.00 K/uL (12-11-20 @ 06:55)  Auto Lymphocyte #: 2.77 K/uL (12-11-20 @ 06:55)  Auto Neutrophil #: 5.58 K/uL (12-09-20 @ 06:40)  Auto Lymphocyte #: 1.83 K/uL (12-09-20 @ 06:40)  Auto Neutrophil #: 3.93 K/uL (12-08-20 @ 07:01)  Auto Lymphocyte #: 1.96 K/uL (12-08-20 @ 07:01)  Auto Neutrophil #: 4.33 K/uL (12-07-20 @ 07:41)  Auto Lymphocyte #: 2.33 K/uL (12-07-20 @ 07:41)  Auto Neutrophil #: 5.28 K/uL (12-06-20 @ 07:46)  Auto Lymphocyte #: 1.60 K/uL (12-06-20 @ 07:46)  Auto Neutrophil #: 1.46 K/uL (12-05-20 @ 06:43)  Auto Lymphocyte #: 0.72 K/uL (12-05-20 @ 06:43)  Auto Neutrophil #: 3.20 K/uL (12-04-20 @ 15:55)  Auto Lymphocyte #: 0.98 K/uL (12-04-20 @ 15:55)      Auto Eosinophil #: 0.09 K/uL (12-16-20 @ 13:05)  Auto Eosinophil #: 0.11 K/uL (12-15-20 @ 09:53)  Auto Eosinophil #: 0.07 K/uL (12-14-20 @ 09:58)  Auto Eosinophil #: 0.06 K/uL (12-13-20 @ 07:52)  Auto Eosinophil #: 0.14 K/uL (12-11-20 @ 06:55)  Auto Eosinophil #: 0.00 K/uL (12-09-20 @ 06:40)  Auto Eosinophil #: 0.01 K/uL (12-08-20 @ 07:01)  Auto Eosinophil #: 0.00 K/uL (12-07-20 @ 07:41)  Auto Eosinophil #: 0.00 K/uL (12-06-20 @ 07:46)  Auto Eosinophil #: 0.00 K/uL (12-05-20 @ 06:43)  Auto Eosinophil #: 0.00 K/uL (12-04-20 @ 15:55)    Lactate Dehydrogenase, Serum: 277 U/L (12-05-20 @ 12:26)    Sedimentation Rate, Erythrocyte: 38 mm/hr (12-09-20 @ 06:40)  Sedimentation Rate, Erythrocyte: 41 mm/hr (12-08-20 @ 07:01)  Sedimentation Rate, Erythrocyte: 45 mm/hr (12-07-20 @ 07:41)  Sedimentation Rate, Erythrocyte: 44 mm/hr (12-06-20 @ 07:46)    Procalcitonin, Serum: 0.07 ng/mL (12-04-20 @ 21:38)    Troponin I, Serum: .041 ng/mL (12-16-20 @ 13:05)  Troponin I, Serum: .077 ng/mL (12-16-20 @ 01:31)  Troponin I, Serum: <.015 ng/mL (12-05-20 @ 16:45)    Creatine Kinase, Serum: 54 U/L (12-16-20 @ 13:05)  Creatine Kinase, Serum: 57 U/L (12-16-20 @ 01:31)  Creatine Kinase, Serum: 174 U/L (12-05-20 @ 16:45)        Ferritin, Serum: 241 ng/mL (12-05-20 @ 12:26)  Ferritin, Serum: 255 ng/mL (12-04-20 @ 22:27)          D-Dimer Assay, Quantitative: 199 ng/mL DDU (12-09-20 @ 06:40)  D-Dimer Assay, Quantitative: 267 ng/mL DDU (12-06-20 @ 07:46)  D-Dimer Assay, Quantitative: 247 ng/mL DDU (12-05-20 @ 06:43)  D-Dimer Assay, Quantitative: 279 ng/mL DDU (12-04-20 @ 15:55)        MICROBIOLOGY:              RADIOLOGY & ADDITIONAL STUDIES:

## 2020-12-17 NOTE — PROGRESS NOTE ADULT - SUBJECTIVE AND OBJECTIVE BOX
Patient is a 80y old  Female who presents with a chief complaint of COVID 19 Pneumonia (18 Dec 2020 10:23)        HPI:  79yo female with pmhx of hypothyroid, depression, subdural hematoma 8/2020 2/2 fall presents to [facility] with C/C of cough, low grade temp.  History obtained by patient. Patient reports her symptoms started less than one week ago. She reports productive cough, sore throat, low grade temps. [place] [facility] [place] with [place]id + on rapid test and advised pt to come to [facility] December 2 for evaluation.   Patient was  seen in [facility] on December 2 and ultimatel dced home. Since initial discharge, patient shortness of breath worsened, therefore she returned to [facility]. Patient reports she did not take medication for her symptoms. Patient lives alone with home health aid ,although uunsure of covid exposure.  on ROS: patient denies fever, cp, n/v/d, abd pain, rash.    ER course:  Patient initially seen in [facility]. hypoxemic to 81 on room air. improved to 96 on 2 liters  labs: benign  ddimer 290   (04 Dec 2020 18:13)      SUBJECTIVE & OBJECTIVE: Pt seen and examined at bedside. no acute complaints     PHYSICAL EXAM:  T(C): 36.9 (12-18-20 @ 07:23), Max: 37 (12-18-20 @ 05:00)  HR: 74 (12-18-20 @ 07:23) (74 - 93)  BP: 121/73 (12-18-20 @ 07:23) (121/73 - 152/73)  RR: 18 (12-18-20 @ 07:23) (18 - 19)  SpO2: 96% (12-18-20 @ 07:23) (93% - 96%)  Wt(kg): --   GENERAL: NAD, well-groomed, well-developed  HEAD:  Atraumatic, Normocephalic  EYES: EOMI, PERRLA, conjunctiva and sclera clear  ENMT: Moist mucous membranes  NECK: Supple, No JVD  NERVOUS SYSTEM:  Alert & Oriented X3, Motor Strength 5/5 B/L upper and lower extremities; DTRs 2+ intact and symmetric  CHEST/LUNG: Clear to auscultation bilaterally; No rales, rhonchi, wheezing, or rubs  HEART: Regular rate and rhythm; No murmurs, rubs, or gallops  ABDOMEN: Soft, Nontender, Nondistended; Bowel sounds present  EXTREMITIES:  2+ Peripheral Pulses, No clubbing, cyanosis, or edema        MEDICATIONS  (STANDING):  ALPRAZolam 0.25 milliGRAM(s) Oral two times a day  amLODIPine   Tablet 5 milliGRAM(s) Oral daily  citalopram 10 milliGRAM(s) Oral daily  enoxaparin Injectable 40 milliGRAM(s) SubCutaneous daily  guaiFENesin  milliGRAM(s) Oral every 12 hours  lamoTRIgine 25 milliGRAM(s) Oral two times a day  levothyroxine 25 MICROGram(s) Oral daily  pantoprazole    Tablet 40 milliGRAM(s) Oral before breakfast    MEDICATIONS  (PRN):  acetaminophen   Tablet .. 650 milliGRAM(s) Oral every 6 hours PRN Temp greater or equal to 38C (100.4F), Mild Pain (1 - 3)  ALBUTerol    90 MICROgram(s) HFA Inhaler 2 Puff(s) Inhalation every 6 hours PRN Bronchospasm  aluminum hydroxide/magnesium hydroxide/simethicone Suspension 30 milliLiter(s) Oral every 6 hours PRN Dyspepsia  ondansetron Injectable 4 milliGRAM(s) IV Push every 6 hours PRN Nausea and/or Vomiting      LABS:                        12.4   12.67 )-----------( 411      ( 16 Dec 2020 13:05 )             37.9     12-16    140  |  103  |  42<H>  ----------------------------<  134<H>  4.3   |  28  |  1.20    Ca    8.6      16 Dec 2020 13:05            CAPILLARY BLOOD GLUCOSE          CAPILLARY BLOOD GLUCOSE        CAPILLARY BLOOD GLUCOSE          CARDIAC MARKERS ( 16 Dec 2020 13:05 )  .041 ng/mL / x     / 54 U/L / x     / 2.3 ng/mL        RECENT CULTURES:      RADIOLOGY & ADDITIONAL TESTS:                        DVT/GI ppx  Discussed with pt @ bedside

## 2020-12-18 LAB
SARS-COV-2 IGG SERPL IA-ACNC: 2.9 RATIO — HIGH
SARS-COV-2 IGG SERPL QL IA: POSITIVE
SARS-COV-2 IGG SERPL QL IA: POSITIVE
SARS-COV-2 IGM SERPL IA-ACNC: 4.68 RATIO — HIGH

## 2020-12-18 PROCEDURE — 99232 SBSQ HOSP IP/OBS MODERATE 35: CPT | Mod: CS,GC

## 2020-12-18 PROCEDURE — 99232 SBSQ HOSP IP/OBS MODERATE 35: CPT | Mod: CS

## 2020-12-18 RX ADMIN — PANTOPRAZOLE SODIUM 40 MILLIGRAM(S): 20 TABLET, DELAYED RELEASE ORAL at 05:50

## 2020-12-18 RX ADMIN — Medication 0.25 MILLIGRAM(S): at 23:16

## 2020-12-18 RX ADMIN — LAMOTRIGINE 25 MILLIGRAM(S): 25 TABLET, ORALLY DISINTEGRATING ORAL at 05:50

## 2020-12-18 RX ADMIN — Medication 600 MILLIGRAM(S): at 05:50

## 2020-12-18 RX ADMIN — AMLODIPINE BESYLATE 5 MILLIGRAM(S): 2.5 TABLET ORAL at 05:50

## 2020-12-18 RX ADMIN — CITALOPRAM 10 MILLIGRAM(S): 10 TABLET, FILM COATED ORAL at 23:16

## 2020-12-18 RX ADMIN — LAMOTRIGINE 25 MILLIGRAM(S): 25 TABLET, ORALLY DISINTEGRATING ORAL at 19:03

## 2020-12-18 RX ADMIN — Medication 600 MILLIGRAM(S): at 19:03

## 2020-12-18 RX ADMIN — Medication 0.25 MILLIGRAM(S): at 13:27

## 2020-12-18 RX ADMIN — Medication 25 MICROGRAM(S): at 05:50

## 2020-12-18 RX ADMIN — ENOXAPARIN SODIUM 40 MILLIGRAM(S): 100 INJECTION SUBCUTANEOUS at 13:27

## 2020-12-18 NOTE — PROGRESS NOTE ADULT - SUBJECTIVE AND OBJECTIVE BOX
[facility] DIVISION of INFECTIOUS DISEASE  Salvatore Dennis MD PhD, Suzanne Hughes MD, Elvira Valdez MD, Fidel Rollins MD  and providing coverage with Mary Ann Lucas MD and Loren Fritz MD  Providing Infectious Disease Consultations at [facility], [facility], [facility]'s      Office# [phone] to schedule follow up appointments  Answering Service for urgent calls or New Consults [phone]  Cell# to text for urgent issues Salvatore Dennis [phone]     Infectious diseases progress note:    LINN WHITE is a 80y y. o. Female patient    COVID Patient    Allergies    cephalexin (Hives)  cephalexin (Unknown)    Intolerances    No seafood (Unknown)      ANTIBIOTICS/RELEVANT:  antimicrobials    immunologic:    OTHER:  acetaminophen   Tablet .. 650 milliGRAM(s) Oral every 6 hours PRN  ALBUTerol    90 MICROgram(s) HFA Inhaler 2 Puff(s) Inhalation every 6 hours PRN  ALPRAZolam 0.25 milliGRAM(s) Oral two times a day  aluminum hydroxide/magnesium hydroxide/simethicone Suspension 30 milliLiter(s) Oral every 6 hours PRN  amLODIPine   Tablet 5 milliGRAM(s) Oral daily  citalopram 10 milliGRAM(s) Oral daily  enoxaparin Injectable 40 milliGRAM(s) SubCutaneous daily  guaiFENesin  milliGRAM(s) Oral every 12 hours  lamoTRIgine 25 milliGRAM(s) Oral two times a day  levothyroxine 25 MICROGram(s) Oral daily  ondansetron Injectable 4 milliGRAM(s) IV Push every 6 hours PRN  pantoprazole    Tablet 40 milliGRAM(s) Oral before breakfast      Objective:  Vital Signs Last 24 Hrs  T(C): 36.9 (18 Dec 2020 07:23), Max: 37 (18 Dec 2020 05:00)  T(F): 98.4 (18 Dec 2020 07:23), Max: 98.6 (18 Dec 2020 05:00)  HR: 74 (18 Dec 2020 07:23) (74 - 93)  BP: 121/73 (18 Dec 2020 07:23) (121/73 - 152/73)  BP(mean): --  RR: 18 (18 Dec 2020 07:23) (18 - 19)  SpO2: 96% (18 Dec 2020 07:23) (93% - 96%)    T(C): 36.9 (12-18-20 @ 07:23), Max: 37.2 (12-16-20 @ 15:26)  T(C): 36.9 (12-18-20 @ 07:23), Max: 37.2 (12-16-20 @ 15:26)  T(C): 36.9 (12-18-20 @ 07:23), Max: 37.2 (12-16-20 @ 15:26)    PHYSICAL EXAM:  HEENT: NC atraumatic  Neck: supple  Respiratory: no accessory muscle use, breathing comfortably  Cardiovascular: distant  Gastrointestinal: normal appearing, nondistended  Extremities: no clubbing, no cyanosis,      LABS:                          12.4   12.67 )-----------( 411      ( 16 Dec 2020 13:05 )             37.9       12.67 12-16 @ 13:05  11.50 12-15 @ 09:53  16.84 12-14 @ 09:58  11.83 12-13 @ 07:52      12-16    140  |  103  |  42<H>  ----------------------------<  134<H>  4.3   |  28  |  1.20    Ca    8.6      16 Dec 2020 13:05        Creatinine, Serum: 1.20 mg/dL (12-16-20 @ 13:05)  Creatinine, Serum: 1.00 mg/dL (12-15-20 @ 09:53)  Creatinine, Serum: 1.20 mg/dL (12-14-20 @ 09:58)  Creatinine, Serum: 1.00 mg/dL (12-13-20 @ 07:52)  Creatinine, Serum: 1.10 mg/dL (12-12-20 @ 08:33)                COVID RISK SCORE  Auto Neutrophil #: 8.86 K/uL (12-16-20 @ 13:05)  Auto Lymphocyte #: 2.32 K/uL (12-16-20 @ 13:05)  Auto Neutrophil #: 7.70 K/uL (12-15-20 @ 09:53)  Auto Lymphocyte #: 2.22 K/uL (12-15-20 @ 09:53)  Auto Neutrophil #: 10.70 K/uL (12-14-20 @ 09:58)  Auto Lymphocyte #: 4.22 K/uL (12-14-20 @ 09:58)  Auto Neutrophil #: 8.27 K/uL (12-13-20 @ 07:52)  Auto Lymphocyte #: 2.14 K/uL (12-13-20 @ 07:52)  Auto Neutrophil #: 6.00 K/uL (12-11-20 @ 06:55)  Auto Lymphocyte #: 2.77 K/uL (12-11-20 @ 06:55)  Auto Neutrophil #: 5.58 K/uL (12-09-20 @ 06:40)  Auto Lymphocyte #: 1.83 K/uL (12-09-20 @ 06:40)  Auto Neutrophil #: 3.93 K/uL (12-08-20 @ 07:01)  Auto Lymphocyte #: 1.96 K/uL (12-08-20 @ 07:01)  Auto Neutrophil #: 4.33 K/uL (12-07-20 @ 07:41)  Auto Lymphocyte #: 2.33 K/uL (12-07-20 @ 07:41)  Auto Neutrophil #: 5.28 K/uL (12-06-20 @ 07:46)  Auto Lymphocyte #: 1.60 K/uL (12-06-20 @ 07:46)  Auto Neutrophil #: 1.46 K/uL (12-05-20 @ 06:43)  Auto Lymphocyte #: 0.72 K/uL (12-05-20 @ 06:43)  Auto Neutrophil #: 3.20 K/uL (12-04-20 @ 15:55)  Auto Lymphocyte #: 0.98 K/uL (12-04-20 @ 15:55)      Auto Eosinophil #: 0.09 K/uL (12-16-20 @ 13:05)  Auto Eosinophil #: 0.11 K/uL (12-15-20 @ 09:53)  Auto Eosinophil #: 0.07 K/uL (12-14-20 @ 09:58)  Auto Eosinophil #: 0.06 K/uL (12-13-20 @ 07:52)  Auto Eosinophil #: 0.14 K/uL (12-11-20 @ 06:55)  Auto Eosinophil #: 0.00 K/uL (12-09-20 @ 06:40)  Auto Eosinophil #: 0.01 K/uL (12-08-20 @ 07:01)  Auto Eosinophil #: 0.00 K/uL (12-07-20 @ 07:41)  Auto Eosinophil #: 0.00 K/uL (12-06-20 @ 07:46)  Auto Eosinophil #: 0.00 K/uL (12-05-20 @ 06:43)  Auto Eosinophil #: 0.00 K/uL (12-04-20 @ 15:55)    Lactate Dehydrogenase, Serum: 277 U/L (12-05-20 @ 12:26)    Sedimentation Rate, Erythrocyte: 38 mm/hr (12-09-20 @ 06:40)  Sedimentation Rate, Erythrocyte: 41 mm/hr (12-08-20 @ 07:01)  Sedimentation Rate, Erythrocyte: 45 mm/hr (12-07-20 @ 07:41)  Sedimentation Rate, Erythrocyte: 44 mm/hr (12-06-20 @ 07:46)    Procalcitonin, Serum: 0.07 ng/mL (12-04-20 @ 21:38)    Troponin I, Serum: .041 ng/mL (12-16-20 @ 13:05)  Troponin I, Serum: .077 ng/mL (12-16-20 @ 01:31)  Troponin I, Serum: <.015 ng/mL (12-05-20 @ 16:45)    Creatine Kinase, Serum: 54 U/L (12-16-20 @ 13:05)  Creatine Kinase, Serum: 57 U/L (12-16-20 @ 01:31)  Creatine Kinase, Serum: 174 U/L (12-05-20 @ 16:45)        Ferritin, Serum: 241 ng/mL (12-05-20 @ 12:26)  Ferritin, Serum: 255 ng/mL (12-04-20 @ 22:27)          D-Dimer Assay, Quantitative: 199 ng/mL DDU (12-09-20 @ 06:40)  D-Dimer Assay, Quantitative: 267 ng/mL DDU (12-06-20 @ 07:46)  D-Dimer Assay, Quantitative: 247 ng/mL DDU (12-05-20 @ 06:43)  D-Dimer Assay, Quantitative: 279 ng/mL DDU (12-04-20 @ 15:55)        MICROBIOLOGY:              RADIOLOGY & ADDITIONAL STUDIES:

## 2020-12-18 NOTE — PROGRESS NOTE ADULT - ASSESSMENT
79yo female with pmhx of hypothyroid, depression, subdural hematoma 8/2020 2/2 fall adm with COVID+ suggested day of onset 11/25    RECOMMENDATIONS  12/4 NLR ~3, sats<94% on RA, rec REMDESIVIR, STEROIDs, caution with anticoagulation with prior subdural  12/5 NLR <3, NC-2L pt appears stable and comfortable  12/6 NLR <3 btwn NC and RA-cont current Rx  12/7 on RA-continue Rx, 12/8 last day of remdesivir and may be able to consider dc after Rx  12/8 btwn RA and 2L NC, last day of remdesivir, suggested day 13 of illness and can look at potential dc (COVID PCR+)  12/9 NLR 5.58/1.83=<3, 97% on RA, can look at dc planning  12/10 remains stable on RA  12/11 remains stable on RA  12/14-stable on RA, sp steroids, can repeat COVID PCRs if required for dc planning   12/17 -COVID PCR+  -follow for neg PCRs unless headed to home    When pt is doing well and  past the critical second week when decompensation can occur and has started to improve, fine to consider discharge planning with for early but safe discharge. Discharge with oxygen (4L or less and able to keep sats>90) and even persistent fever at time of discharge is reasonable. With high risk for thromboembolic disease we usually consider dc on  rivaroxaban (Xarelto) or Eliquis (apixaban) but with prior subdural rec ASA 325mg PO Q-day as discussed w primary team  For outpt mild disease considered noninfectious at day 10 after onset of illness but for hospitalized patients day 20. If pt going to facility or to dc isolation in house then will require 2 negative PCR tests  by a minimum of 24 hours and fever free without antipyretics for >24hrs and more than 10 days from first positive test.

## 2020-12-18 NOTE — PROGRESS NOTE ADULT - SUBJECTIVE AND OBJECTIVE BOX
[facility] -- Mckayla Brasher, Jan Marie, Ayo Ventura Savella, Goodger  Office # [phone]    Follow Up:  Atypical CP    Subjective/Observations: Seen laying flat, comfortable on RA.  Denies any respiratory discomfort but still c/o intermittent pleuritic chest pain on deep- breathing.      REVIEW OF SYSTEMS: All other review of systems is negative unless indicated above  PAST MEDICAL & SURGICAL HISTORY:  Vertigo    Depression    Hypothyroidism    Vertigo    Hypothyroidism    Depression    UTI (urinary tract infection)    No significant past surgical history  RIGHT KNEE ARTHROSCOPY    No significant past surgical history    MEDICATIONS  (STANDING):  ALPRAZolam 0.25 milliGRAM(s) Oral two times a day  amLODIPine   Tablet 5 milliGRAM(s) Oral daily  citalopram 10 milliGRAM(s) Oral daily  enoxaparin Injectable 40 milliGRAM(s) SubCutaneous daily  guaiFENesin  milliGRAM(s) Oral every 12 hours  lamoTRIgine 25 milliGRAM(s) Oral two times a day  levothyroxine 25 MICROGram(s) Oral daily  pantoprazole    Tablet 40 milliGRAM(s) Oral before breakfast    MEDICATIONS  (PRN):  acetaminophen   Tablet .. 650 milliGRAM(s) Oral every 6 hours PRN Temp greater or equal to 38C (100.4F), Mild Pain (1 - 3)  ALBUTerol    90 MICROgram(s) HFA Inhaler 2 Puff(s) Inhalation every 6 hours PRN Bronchospasm  aluminum hydroxide/magnesium hydroxide/simethicone Suspension 30 milliLiter(s) Oral every 6 hours PRN Dyspepsia  ondansetron Injectable 4 milliGRAM(s) IV Push every 6 hours PRN Nausea and/or Vomiting    Allergies    cephalexin (Hives)  cephalexin (Unknown)    Intolerances    No seafood (Unknown)    Vital Signs Last 24 Hrs  T(C): 36.9 (18 Dec 2020 07:23), Max: 37 (18 Dec 2020 05:00)  T(F): 98.4 (18 Dec 2020 07:23), Max: 98.6 (18 Dec 2020 05:00)  HR: 74 (18 Dec 2020 07:23) (74 - 93)  BP: 121/73 (18 Dec 2020 07:23) (121/73 - 152/73)  BP(mean): --  RR: 18 (18 Dec 2020 07:23) (18 - 19)  SpO2: 96% (18 Dec 2020 07:23) (93% - 96%)  I&O's Summary      PHYSICAL EXAM:  TELE: Not on tele  Constitutional: NAD, awake and alert, well-developed  HEENT: Moist Mucous Membranes, Anicteric  Pulmonary: Non-labored, breath sounds are clear but diminished bilaterally, No wheezing, rales or rhonchi  Cardiovascular: Regular, S1 and S2, No murmurs, rubs, gallops or clicks  Gastrointestinal: Bowel Sounds present, soft, nontender.   Lymph: No peripheral edema. No lymphadenopathy.  Skin: No visible rashes or ulcers.  Psych:  Mood & affect appropriate  LABS: All Labs Reviewed:                        12.4   12.67 )-----------( 411      ( 16 Dec 2020 13:05 )             37.9     16 Dec 2020 13:05    140    |  103    |  42     ----------------------------<  134    4.3     |  28     |  1.20     Ca    8.6        16 Dec 2020 13:05    CARDIAC MARKERS ( 16 Dec 2020 13:05 )  .041 ng/mL / x     / 54 U/L / x     / 2.3 ng/mL    EXAM:  XR CHEST PORTABLE IMMED 1V                          PROCEDURE DATE:  12/02/2020      INTERPRETATION:  CLINICAL STATEMENT: Chest pain.    TECHNIQUE: AP view of the chest.    COMPARISON: 8/12/2020    FINDINGS/  IMPRESSION:  No consolidation or pleural effusion. Atelectasis right lung base.    Heart size within normal limits.    ISACC LE MD; Attending Radiologist  This document has been electronically signed. Dec  2 2020  2:29PM       Ventricular Rate 85 BPM    Atrial Rate 85 BPM    P-R Interval 128 ms    QRS Duration 88 ms    Q-T Interval 412 ms    QTC Calculation(Bazett) 490 ms    P Axis 64 degrees    R Axis -49 degrees    T Axis 55 degrees    Diagnosis Line Normal sinus rhythm  Left anterior fascicular block    Confirmed by INNA VENTURA ([id number]) on 12/16/2020 8:38:59 AM

## 2020-12-18 NOTE — PROGRESS NOTE ADULT - SUBJECTIVE AND OBJECTIVE BOX
Date/Time Patient Seen:  		  Referring MD:   Data Reviewed	       Patient is a 80y old  Female who presents with a chief complaint of COVID 19 Pneumonia (17 Dec 2020 13:21)      Subjective/HPI     PAST MEDICAL & SURGICAL HISTORY:  Vertigo    Depression    Hypothyroidism    Vertigo    Hypothyroidism    Depression    UTI (urinary tract infection)    No significant past surgical history  RIGHT KNEE ARTHROSCOPY    No significant past surgical history          Medication list         MEDICATIONS  (STANDING):  ALPRAZolam 0.25 milliGRAM(s) Oral two times a day  amLODIPine   Tablet 5 milliGRAM(s) Oral daily  citalopram 10 milliGRAM(s) Oral daily  enoxaparin Injectable 40 milliGRAM(s) SubCutaneous daily  guaiFENesin  milliGRAM(s) Oral every 12 hours  lamoTRIgine 25 milliGRAM(s) Oral two times a day  levothyroxine 25 MICROGram(s) Oral daily  pantoprazole    Tablet 40 milliGRAM(s) Oral before breakfast    MEDICATIONS  (PRN):  acetaminophen   Tablet .. 650 milliGRAM(s) Oral every 6 hours PRN Temp greater or equal to 38C (100.4F), Mild Pain (1 - 3)  ALBUTerol    90 MICROgram(s) HFA Inhaler 2 Puff(s) Inhalation every 6 hours PRN Bronchospasm  aluminum hydroxide/magnesium hydroxide/simethicone Suspension 30 milliLiter(s) Oral every 6 hours PRN Dyspepsia  ondansetron Injectable 4 milliGRAM(s) IV Push every 6 hours PRN Nausea and/or Vomiting         Vitals log        ICU Vital Signs Last 24 Hrs  T(C): 36.9 (18 Dec 2020 07:23), Max: 37 (18 Dec 2020 05:00)  T(F): 98.4 (18 Dec 2020 07:23), Max: 98.6 (18 Dec 2020 05:00)  HR: 74 (18 Dec 2020 07:23) (74 - 93)  BP: 121/73 (18 Dec 2020 07:23) (121/73 - 152/73)  BP(mean): --  ABP: --  ABP(mean): --  RR: 18 (18 Dec 2020 07:23) (18 - 19)  SpO2: 96% (18 Dec 2020 07:23) (93% - 96%)           Input and Output:  I&O's Detail      Lab Data                        12.4   12.67 )-----------( 411      ( 16 Dec 2020 13:05 )             37.9     12-16    140  |  103  |  42<H>  ----------------------------<  134<H>  4.3   |  28  |  1.20    Ca    8.6      16 Dec 2020 13:05        CARDIAC MARKERS ( 16 Dec 2020 13:05 )  .041 ng/mL / x     / 54 U/L / x     / 2.3 ng/mL        Review of Systems	      Objective     Physical Examination    heart s1s2  lung dec BS  abd soft      Pertinent Lab findings & Imaging      Pam:  NO   Adequate UO     I&O's Detail           Discussed with:     Cultures:	        Radiology

## 2020-12-18 NOTE — PROGRESS NOTE ADULT - ASSESSMENT
81yo female with pmhx of hypothyroid, depression, subdural hematoma 8/2020 2/2 fall presents to [facility] with C/C of cough, low grade temp.     Atypical CP  - Likely pleuritic in nature  - Patient states that her symptoms resolved after taking ibuprofen.  Still with intermittent pleuritic CP on deep inspiration but not consistent with ACS  - EKG shows no acute ischemic changes  - Trops mildly elevated 0.077, CPK- 5.4 likely 2/2 to demand ischemia  - TTE when timing is appropriate    Covid PNA:  - Now tolerating RA on supine position  - Monitor respiratory status  - Follow respiratory and ID recs  - Initiate incentive spirometer    DVT ppx  - Per Primary    Will continue to follow    Allie Valenzuela DNP, NP-C  Cardiology   Spectra #[phone]

## 2020-12-18 NOTE — PROGRESS NOTE ADULT - SUBJECTIVE AND OBJECTIVE BOX
Patient is a 80y old  Female who presents with a chief complaint of COVID 19 Pneumonia (18 Dec 2020 10:19)        HPI:  81yo female with pmhx of hypothyroid, depression, subdural hematoma 8/2020 2/2 fall presents to [facility] with C/C of cough, low grade temp.  History obtained by patient. Patient reports her symptoms started less than one week ago. She reports productive cough, sore throat, low grade temps. Patient reported visiting [facility] whom dx pt with covid + on rapid test and advised pt to come to [facility] December 2 for evaluation.   Patient was  seen in [place] on December 2 and ultimatel dced home. Since initial discharge, patient shortness of breath worsened, therefore she returned to [facility]. Patient reports she did not take medication for her symptoms. Patient lives alone with home health aid ,although uunsure of covid exposure.  on ROS: patient denies fever, cp, n/v/d, abd pain, rash.    ER course:  Patient initially seen in [facility]. hypoxemic to 81 on room air. improved to 96 on 2 liters  labs: benign  ddimer [id number]   (04 Dec 2020 18:13)      SUBJECTIVE & OBJECTIVE: Pt seen and examined at bedside. no acute complaints     PHYSICAL EXAM:  T(C): 36.9 (12-18-20 @ 07:23), Max: 37 (12-18-20 @ 05:00)  HR: 74 (12-18-20 @ 07:23) (74 - 93)  BP: 121/73 (12-18-20 @ 07:23) (121/73 - 152/73)  RR: 18 (12-18-20 @ 07:23) (18 - 19)  SpO2: 96% (12-18-20 @ 07:23) (93% - 96%)  Wt(kg): --   GENERAL: NAD, well-groomed, well-developed  HEAD:  Atraumatic, Normocephalic  NERVOUS SYSTEM:  Alert & Oriented X3  CHEST/LUNG: Clear to auscultation bilaterally; No rales, rhonchi, wheezing, or rubs  HEART: Regular rate and rhythm; No murmurs, rubs, or gallops  ABDOMEN: Soft, Nontender, Nondistended; Bowel sounds present  EXTREMITIES:  2+ Peripheral Pulses, No clubbing, cyanosis, or edema        MEDICATIONS  (STANDING):  ALPRAZolam 0.25 milliGRAM(s) Oral two times a day  amLODIPine   Tablet 5 milliGRAM(s) Oral daily  citalopram 10 milliGRAM(s) Oral daily  enoxaparin Injectable 40 milliGRAM(s) SubCutaneous daily  guaiFENesin  milliGRAM(s) Oral every 12 hours  lamoTRIgine 25 milliGRAM(s) Oral two times a day  levothyroxine 25 MICROGram(s) Oral daily  pantoprazole    Tablet 40 milliGRAM(s) Oral before breakfast    MEDICATIONS  (PRN):  acetaminophen   Tablet .. 650 milliGRAM(s) Oral every 6 hours PRN Temp greater or equal to 38C (100.4F), Mild Pain (1 - 3)  ALBUTerol    90 MICROgram(s) HFA Inhaler 2 Puff(s) Inhalation every 6 hours PRN Bronchospasm  aluminum hydroxide/magnesium hydroxide/simethicone Suspension 30 milliLiter(s) Oral every 6 hours PRN Dyspepsia  ondansetron Injectable 4 milliGRAM(s) IV Push every 6 hours PRN Nausea and/or Vomiting      LABS:                        12.4   12.67 )-----------( 411      ( 16 Dec 2020 13:05 )             37.9     12-16    140  |  103  |  42<H>  ----------------------------<  134<H>  4.3   |  28  |  1.20    Ca    8.6      16 Dec 2020 13:05            CAPILLARY BLOOD GLUCOSE          CAPILLARY BLOOD GLUCOSE        CAPILLARY BLOOD GLUCOSE          CARDIAC MARKERS ( 16 Dec 2020 13:05 )  .041 ng/mL / x     / 54 U/L / x     / 2.3 ng/mL        RECENT CULTURES:      RADIOLOGY & ADDITIONAL TESTS:                        DVT/GI ppx  Discussed with pt @ bedside

## 2020-12-19 LAB — SARS-COV-2 RNA SPEC QL NAA+PROBE: DETECTED

## 2020-12-19 PROCEDURE — 99232 SBSQ HOSP IP/OBS MODERATE 35: CPT | Mod: CS,GC

## 2020-12-19 PROCEDURE — 99232 SBSQ HOSP IP/OBS MODERATE 35: CPT | Mod: CS

## 2020-12-19 RX ORDER — ALPRAZOLAM 0.25 MG
0.25 TABLET ORAL
Refills: 0 | Status: DISCONTINUED | OUTPATIENT
Start: 2020-12-19 | End: 2020-12-26

## 2020-12-19 RX ADMIN — LAMOTRIGINE 25 MILLIGRAM(S): 25 TABLET, ORALLY DISINTEGRATING ORAL at 05:31

## 2020-12-19 RX ADMIN — PANTOPRAZOLE SODIUM 40 MILLIGRAM(S): 20 TABLET, DELAYED RELEASE ORAL at 05:31

## 2020-12-19 RX ADMIN — Medication 25 MICROGRAM(S): at 05:31

## 2020-12-19 RX ADMIN — LAMOTRIGINE 25 MILLIGRAM(S): 25 TABLET, ORALLY DISINTEGRATING ORAL at 17:19

## 2020-12-19 RX ADMIN — Medication 600 MILLIGRAM(S): at 05:31

## 2020-12-19 RX ADMIN — ENOXAPARIN SODIUM 40 MILLIGRAM(S): 100 INJECTION SUBCUTANEOUS at 11:46

## 2020-12-19 RX ADMIN — CITALOPRAM 10 MILLIGRAM(S): 10 TABLET, FILM COATED ORAL at 21:26

## 2020-12-19 RX ADMIN — Medication 600 MILLIGRAM(S): at 17:19

## 2020-12-19 NOTE — PROGRESS NOTE ADULT - ASSESSMENT
79yo female with pmhx of hypothyroid, depression, subdural hematoma 8/2020 2/2 fall adm with COVID+ suggested day of onset 11/25    RECOMMENDATIONS  12/4 NLR ~3, sats<94% on RA, rec REMDESIVIR, STEROIDs, caution with anticoagulation with prior subdural  12/5 NLR <3, NC-2L pt appears stable and comfortable  12/6 NLR <3 btwn NC and RA-cont current Rx  12/7 on RA-continue Rx, 12/8 last day of remdesivir and may be able to consider dc after Rx  12/8 btwn RA and 2L NC, last day of remdesivir, suggested day 13 of illness and can look at potential dc (COVID PCR+)  12/9 NLR 5.58/1.83=<3, 97% on RA, can look at dc planning  12/10 remains stable on RA  12/11 remains stable on RA  12/14-stable on RA, sp steroids, can repeat COVID PCRs if required for dc planning   12/17 -COVID PCR+  12/19 - remains on RA, afebrile, pending negative COVID PCR, planning for inpt psych facility    When pt is doing well and  past the critical second week when decompensation can occur and has started to improve, fine to consider discharge planning with for early but safe discharge. Discharge with oxygen (4L or less and able to keep sats>90) and even persistent fever at time of discharge is reasonable. With high risk for thromboembolic disease we usually consider dc on  rivaroxaban (Xarelto) or Eliquis (apixaban) but with prior subdural rec ASA 325mg PO Q-day as discussed w primary team  For outpt mild disease considered noninfectious at day 10 after onset of illness but for hospitalized patients day 20. If pt going to facility or to dc isolation in house then will require 2 negative PCR tests  by a minimum of 24 hours and fever free without antipyretics for >24hrs and more than 10 days from first positive test.

## 2020-12-19 NOTE — PROGRESS NOTE ADULT - SUBJECTIVE AND OBJECTIVE BOX
[facility] DIVISION of INFECTIOUS DISEASE  Salvatore Dennis MD PhD, Suzanne Hughes MD, Elvira Valdez MD, Fidel Rollins MD  and providing coverage with Mary Ann Lucas MD and Loren Fritz MD  Providing Infectious Disease Consultations at [facility], [facility], [facility]'s      Office# [phone] to schedule follow up appointments  Answering Service for urgent calls or New Consults [phone]    Infectious Diseases Progress Note:    LINN WHITE is a 80y year old Female patient    COVID-19 Patient    Allergies: cephalexin (Hives)  cephalexin (Unknown)    ANTIBIOTICS/RELEVANT:  Antimicrobials  Immunologic:  Other Meds:  acetaminophen   Tablet .. 650 milliGRAM(s) Oral every 6 hours PRN  ALBUTerol    90 MICROgram(s) HFA Inhaler 2 Puff(s) Inhalation every 6 hours PRN  ALPRAZolam 0.25 milliGRAM(s) Oral two times a day  aluminum hydroxide/magnesium hydroxide/simethicone Suspension 30 milliLiter(s) Oral every 6 hours PRN  amLODIPine   Tablet 5 milliGRAM(s) Oral daily  citalopram 10 milliGRAM(s) Oral daily  enoxaparin Injectable 40 milliGRAM(s) SubCutaneous daily  guaiFENesin  milliGRAM(s) Oral every 12 hours  lamoTRIgine 25 milliGRAM(s) Oral two times a day  levothyroxine 25 MICROGram(s) Oral daily  ondansetron Injectable 4 milliGRAM(s) IV Push every 6 hours PRN  pantoprazole    Tablet 40 milliGRAM(s) Oral before breakfast      Objective:  Vital Signs Last 24 Hrs  T(F): 98 (19 Dec 2020 15:24), Max: 99.1 (19 Dec 2020 08:34)  HR: 89 (19 Dec 2020 15:24) (69 - 91)  BP: 132/81 (19 Dec 2020 15:24) (105/63 - 132/81)  RR: 18 (19 Dec 2020 15:24) (17 - 18)  SpO2: 97% (19 Dec 2020 15:24) (94% - 97%)  T(C): 36.7 (12-19-20 @ 15:24), Max: 37.3 (12-19-20 @ 08:34)  T(C): 36.7 (12-19-20 @ 15:24), Max: 37.3 (12-19-20 @ 08:34)  T(C): 36.7 (12-19-20 @ 15:24), Max: 37.3 (12-19-20 @ 08:34)    PHYSICAL EXAM:  HEENT: NC/AT, anicteric, supple  Respiratory: no accessory muscle use, breathing comfortably  Cardiovascular: S1 S2 present, normal rate  Gastrointestinal: normal appearing, nondistended  Extremities: no edema, no cyanosis      LABS:    12.67 12-16 @ 13:05  11.50 12-15 @ 09:53  16.84 12-14 @ 09:58  11.83 12-13 @ 07:52    Creatinine, Serum: 1.20 mg/dL (12-16-20 @ 13:05)  Creatinine, Serum: 1.00 mg/dL (12-15-20 @ 09:53)  Creatinine, Serum: 1.20 mg/dL (12-14-20 @ 09:58)  Creatinine, Serum: 1.00 mg/dL (12-13-20 @ 07:52)    COVID RISK SCORE  Auto Neutrophil #: 8.86 K/uL (12-16-20 @ 13:05)  Auto Neutrophil #: 7.70 K/uL (12-15-20 @ 09:53)  Auto Neutrophil #: 10.70 K/uL (12-14-20 @ 09:58)  Auto Neutrophil #: 8.27 K/uL (12-13-20 @ 07:52)  Auto Neutrophil #: 6.00 K/uL (12-11-20 @ 06:55)    Auto Lymphocyte #: 2.32 K/uL (12-16-20 @ 13:05)  Auto Lymphocyte #: 2.22 K/uL (12-15-20 @ 09:53)  Auto Lymphocyte #: 4.22 K/uL (12-14-20 @ 09:58)  Auto Lymphocyte #: 2.14 K/uL (12-13-20 @ 07:52)  Auto Lymphocyte #: 2.77 K/uL (12-11-20 @ 06:55)    Creatine Kinase, Serum: 54 U/L (12-16-20 @ 13:05)  Creatine Kinase, Serum: 57 U/L (12-16-20 @ 01:31)    CPK Mass Assay %: 4.3 % (12-16-20 @ 13:05)  CPK Mass Assay %: 5.4 % (12-16-20 @ 01:31)    Troponin I, Serum: .041 ng/mL (12-16-20 @ 13:05)  Troponin I, Serum: .077 ng/mL (12-16-20 @ 01:31)    MICROBIOLOGY: reviewed    RADIOLOGY & ADDITIONAL STUDIES: reviewed

## 2020-12-19 NOTE — PROGRESS NOTE ADULT - SUBJECTIVE AND OBJECTIVE BOX
[facility] -- Mckayla Brasher, Jan Marie, Ayo Ventura Savella, Goodger  Office # [phone]    Follow Up:   Atypical CP    Subjective/Observations: Still tolerating RA on side-lying position.  Sleeping but easily awakened.  Denies any respiratory or cardiac discomfort    REVIEW OF SYSTEMS: All other review of systems is negative unless indicated above  PAST MEDICAL & SURGICAL HISTORY:  Vertigo    Depression    Hypothyroidism    Vertigo    Hypothyroidism    Depression    UTI (urinary tract infection)    No significant past surgical history  RIGHT KNEE ARTHROSCOPY    No significant past surgical history    MEDICATIONS  (STANDING):  ALPRAZolam 0.25 milliGRAM(s) Oral two times a day  amLODIPine   Tablet 5 milliGRAM(s) Oral daily  citalopram 10 milliGRAM(s) Oral daily  enoxaparin Injectable 40 milliGRAM(s) SubCutaneous daily  guaiFENesin  milliGRAM(s) Oral every 12 hours  lamoTRIgine 25 milliGRAM(s) Oral two times a day  levothyroxine 25 MICROGram(s) Oral daily  pantoprazole    Tablet 40 milliGRAM(s) Oral before breakfast    MEDICATIONS  (PRN):  acetaminophen   Tablet .. 650 milliGRAM(s) Oral every 6 hours PRN Temp greater or equal to 38C (100.4F), Mild Pain (1 - 3)  ALBUTerol    90 MICROgram(s) HFA Inhaler 2 Puff(s) Inhalation every 6 hours PRN Bronchospasm  aluminum hydroxide/magnesium hydroxide/simethicone Suspension 30 milliLiter(s) Oral every 6 hours PRN Dyspepsia  ondansetron Injectable 4 milliGRAM(s) IV Push every 6 hours PRN Nausea and/or Vomiting    Allergies    cephalexin (Hives)  cephalexin (Unknown)    Intolerances    No seafood (Unknown)    Vital Signs Last 24 Hrs  T(C): 37.1 (19 Dec 2020 10:34), Max: 37.3 (19 Dec 2020 08:34)  T(F): 98.8 (19 Dec 2020 10:34), Max: 99.1 (19 Dec 2020 08:34)  HR: 69 (19 Dec 2020 08:34) (69 - 102)  BP: 109/61 (19 Dec 2020 08:34) (105/63 - 133/75)  BP(mean): --  RR: 17 (19 Dec 2020 08:34) (16 - 18)  SpO2: 94% (19 Dec 2020 08:34) (92% - 97%)  I&O's Summary      PHYSICAL EXAM:  TELE: Not on tele  Constitutional: NAD, awake and alert, well-developed  HEENT: Moist Mucous Membranes, Anicteric  Pulmonary: Non-labored, breath sounds are clear but diminished bilaterally, No wheezing, rales or rhonchi.  On RA  Cardiovascular: Regular, S1 and S2, No murmurs, rubs, gallops or clicks  Gastrointestinal: Bowel Sounds present, soft, nontender.   Lymph: No peripheral edema. No lymphadenopathy.  Skin: No visible rashes or ulcers.  Psych:  Mood & affect: Flat affect  LABS: All Labs Reviewed:                        12.4   12.67 )-----------( 411      ( 16 Dec 2020 13:05 )             37.9     16 Dec 2020 13:05    140    |  103    |  42     ----------------------------<  134    4.3     |  28     |  1.20     Ca    8.6        16 Dec 2020 13:05    EXAM:  XR CHEST PORTABLE IMMED 1V                          PROCEDURE DATE:  12/02/2020      INTERPRETATION:  CLINICAL STATEMENT: Chest pain.    TECHNIQUE: AP view of the chest.    COMPARISON: 8/12/2020    FINDINGS/  IMPRESSION:  No consolidation or pleural effusion. Atelectasis right lung base.    Heart size within normal limits.    ISACC LE MD; Attending Radiologist  This document has been electronically signed. Dec  2 2020  2:29PM       Ventricular Rate 85 BPM    Atrial Rate 85 BPM    P-R Interval 128 ms    QRS Duration 88 ms    Q-T Interval 412 ms    QTC Calculation(Bazett) 490 ms    P Axis 64 degrees    R Axis -49 degrees    T Axis 55 degrees    Diagnosis Line Normal sinus rhythm  Left anterior fascicular block    Confirmed by INNA VENTURA ([id number]) on 12/16/2020 8:38:59 AM

## 2020-12-19 NOTE — PROGRESS NOTE ADULT - ASSESSMENT
Assessment and Plan:  -Acute hypoxic respiratory failure 2/2 COVID 19 pneumonia:  completed Remdesivir, and Decadron 6mg IV daily.  Albuterol inh Q6h PRN and Guaifenesin ER 600mg PO Q12h.  Titrate down O2 support- now on RA.  Maintain SPO2>88%.  ID and Pulmonary f/u . COVID PCR + 12/14  -Hx of subdural hematoma:  continue Lamictal 25mg PO BID  -HTN:  continue Norvasc 5mg PO daily  -Hypothyroidism:  continue Synthroid 25mcg PO daily  f/u spuch. awaiting for psych bed  pt reval   -Depression, anxiety, PTSD:  continue Celexa 10mg PO daily, psych consulted, no objection to d/c, started on low dose benzo. Patient with continuous episodes of catatonia, psych called to evaluate patient again- as per psych- had pleasant conversation, but when I went to examine patient, patient with head down on table, refusing to speak with me.  12/15- patient expressing thoughts of dying, wanting to jump out window- placed on 1:1, Psych called- will again re-evaluate patient in AM. Discussed events with son, he is aware, states he has been calling patient over phone, but she is not speaking much to him as well.  -VTE ppx:  Lovenox 40mg SQ daily  -PT eval

## 2020-12-19 NOTE — PROGRESS NOTE ADULT - ASSESSMENT
79yo female with pmhx of hypothyroid, depression, subdural hematoma 8/2020 2/2 fall presents to [facility] with C/C of cough, low grade temp.     Atypical CP  - Likely pleuritic in nature, now resolved  - Her symptoms resolved after taking ibuprofen.   - EKG showed no acute ischemic changes  - Trops mildly elevated 0.077, CPK- 5.4 likely 2/2 to demand ischemia  - TTE when timing is appropriate    Covid PNA:  - Still tolerating RA on supine position  - Monitor respiratory status  - Follow respiratory and ID recs  - Initiate incentive spirometer and continue to encourage use    DVT ppx  - Per Primary    Will continue to follow    Allie Valenzuela DNP, NP-C  Cardiology   Spectra #[phone]

## 2020-12-19 NOTE — PROGRESS NOTE ADULT - SUBJECTIVE AND OBJECTIVE BOX
Date/Time Patient Seen:  		  Referring MD:   Data Reviewed	       Patient is a 80y old  Female who presents with a chief complaint of COVID 19 Pneumonia (18 Dec 2020 11:32)      Subjective/HPI     PAST MEDICAL & SURGICAL HISTORY:  Vertigo    Depression    Hypothyroidism    Vertigo    Hypothyroidism    Depression    UTI (urinary tract infection)    No significant past surgical history  RIGHT KNEE ARTHROSCOPY    No significant past surgical history          Medication list         MEDICATIONS  (STANDING):  amLODIPine   Tablet 5 milliGRAM(s) Oral daily  citalopram 10 milliGRAM(s) Oral daily  enoxaparin Injectable 40 milliGRAM(s) SubCutaneous daily  guaiFENesin  milliGRAM(s) Oral every 12 hours  lamoTRIgine 25 milliGRAM(s) Oral two times a day  levothyroxine 25 MICROGram(s) Oral daily  pantoprazole    Tablet 40 milliGRAM(s) Oral before breakfast    MEDICATIONS  (PRN):  acetaminophen   Tablet .. 650 milliGRAM(s) Oral every 6 hours PRN Temp greater or equal to 38C (100.4F), Mild Pain (1 - 3)  ALBUTerol    90 MICROgram(s) HFA Inhaler 2 Puff(s) Inhalation every 6 hours PRN Bronchospasm  aluminum hydroxide/magnesium hydroxide/simethicone Suspension 30 milliLiter(s) Oral every 6 hours PRN Dyspepsia  ondansetron Injectable 4 milliGRAM(s) IV Push every 6 hours PRN Nausea and/or Vomiting         Vitals log        ICU Vital Signs Last 24 Hrs  T(C): 37.3 (19 Dec 2020 08:34), Max: 37.3 (19 Dec 2020 08:34)  T(F): 99.1 (19 Dec 2020 08:34), Max: 99.1 (19 Dec 2020 08:34)  HR: 69 (19 Dec 2020 08:34) (69 - 102)  BP: 109/61 (19 Dec 2020 08:34) (105/63 - 133/75)  BP(mean): --  ABP: --  ABP(mean): --  RR: 17 (19 Dec 2020 08:34) (16 - 18)  SpO2: 94% (19 Dec 2020 08:34) (92% - 97%)           Input and Output:  I&O's Detail      Lab Data                  Review of Systems	      Objective     Physical Examination    heart s1s2  lung dec BS  abd soft  head nc  head at      Pertinent Lab findings & Imaging      Orozco:  NO   Adequate UO     I&O's Detail           Discussed with:     Cultures:	        Radiology

## 2020-12-19 NOTE — PROGRESS NOTE ADULT - SUBJECTIVE AND OBJECTIVE BOX
Patient is a 80y old  Female who presents with a chief complaint of COVID 19 Pneumonia (19 Dec 2020 10:53)        HPI:  79yo female with pmhx of hypothyroid, depression, subdural hematoma 8/2020 2/2 fall presents to [facility] with C/C of cough, low grade temp.  History obtained by patient. Patient reports her symptoms started less than one week ago. She reports productive cough, sore throat, low grade temps. [place] [facility] [place]id + on rapid test and advised pt to come to [facility] December 2 for evaluation.   Patient was  seen in [facility] on December 2 and ultimatel dced home. Since initial discharge, patient shortness of breath worsened, therefore she returned to [facility]. Patient reports she did not take medication for her symptoms. Patient lives alone with home health aid ,although uunsure of covid exposure.  on ROS: patient denies fever, cp, n/v/d, abd pain, rash.    ER course:  Patient initially seen in [facility]. hypoxemic to 81 on room air. improved to 96 on 2 liters  labs: benign  ddimer [id number]   (04 Dec 2020 18:13)      SUBJECTIVE & OBJECTIVE: Pt seen and examined at bedside. no acute complaints     PHYSICAL EXAM:  T(C): 37.1 (12-19-20 @ 10:34), Max: 37.3 (12-19-20 @ 08:34)  HR: 69 (12-19-20 @ 08:34) (69 - 102)  BP: 109/61 (12-19-20 @ 08:34) (105/63 - 133/75)  RR: 17 (12-19-20 @ 08:34) (16 - 18)  SpO2: 94% (12-19-20 @ 08:34) (92% - 97%)  Wt(kg): --   GENERAL: NAD, well-groomed, well-developed  NECK: Supple, No JVD  NERVOUS SYSTEM:  Alert & Oriented X3,  CHEST/LUNG:decrease aire ntry at the bases   HEART: Regular rate and rhythm; No murmurs, rubs, or gallops  ABDOMEN: Soft, Nontender, Nondistended; Bowel sounds present  EXTREMITIES:  2+ Peripheral Pulses, No clubbing, cyanosis, or edema        MEDICATIONS  (STANDING):  ALPRAZolam 0.25 milliGRAM(s) Oral two times a day  amLODIPine   Tablet 5 milliGRAM(s) Oral daily  citalopram 10 milliGRAM(s) Oral daily  enoxaparin Injectable 40 milliGRAM(s) SubCutaneous daily  guaiFENesin  milliGRAM(s) Oral every 12 hours  lamoTRIgine 25 milliGRAM(s) Oral two times a day  levothyroxine 25 MICROGram(s) Oral daily  pantoprazole    Tablet 40 milliGRAM(s) Oral before breakfast    MEDICATIONS  (PRN):  acetaminophen   Tablet .. 650 milliGRAM(s) Oral every 6 hours PRN Temp greater or equal to 38C (100.4F), Mild Pain (1 - 3)  ALBUTerol    90 MICROgram(s) HFA Inhaler 2 Puff(s) Inhalation every 6 hours PRN Bronchospasm  aluminum hydroxide/magnesium hydroxide/simethicone Suspension 30 milliLiter(s) Oral every 6 hours PRN Dyspepsia  ondansetron Injectable 4 milliGRAM(s) IV Push every 6 hours PRN Nausea and/or Vomiting      LABS:                CAPILLARY BLOOD GLUCOSE          CAPILLARY BLOOD GLUCOSE        CAPILLARY BLOOD GLUCOSE                RECENT CULTURES:      RADIOLOGY & ADDITIONAL TESTS:                        DVT/GI ppx  Discussed with pt @ bedside

## 2020-12-20 LAB
ALBUMIN SERPL ELPH-MCNC: 2.8 G/DL — LOW (ref 3.3–5)
ALP SERPL-CCNC: 57 U/L — SIGNIFICANT CHANGE UP (ref 40–120)
ALT FLD-CCNC: 43 U/L — SIGNIFICANT CHANGE UP (ref 12–78)
ANION GAP SERPL CALC-SCNC: 6 MMOL/L — SIGNIFICANT CHANGE UP (ref 5–17)
AST SERPL-CCNC: 27 U/L — SIGNIFICANT CHANGE UP (ref 15–37)
BILIRUB SERPL-MCNC: 0.4 MG/DL — SIGNIFICANT CHANGE UP (ref 0.2–1.2)
BUN SERPL-MCNC: 39 MG/DL — HIGH (ref 7–23)
CALCIUM SERPL-MCNC: 8.7 MG/DL — SIGNIFICANT CHANGE UP (ref 8.5–10.1)
CHLORIDE SERPL-SCNC: 105 MMOL/L — SIGNIFICANT CHANGE UP (ref 96–108)
CO2 SERPL-SCNC: 29 MMOL/L — SIGNIFICANT CHANGE UP (ref 22–31)
CREAT SERPL-MCNC: 1 MG/DL — SIGNIFICANT CHANGE UP (ref 0.5–1.3)
GLUCOSE SERPL-MCNC: 93 MG/DL — SIGNIFICANT CHANGE UP (ref 70–99)
HCT VFR BLD CALC: 34.4 % — LOW (ref 34.5–45)
HGB BLD-MCNC: 11.1 G/DL — LOW (ref 11.5–15.5)
MCHC RBC-ENTMCNC: 30.5 PG — SIGNIFICANT CHANGE UP (ref 27–34)
MCHC RBC-ENTMCNC: 32.3 GM/DL — SIGNIFICANT CHANGE UP (ref 32–36)
MCV RBC AUTO: 94.5 FL — SIGNIFICANT CHANGE UP (ref 80–100)
NRBC # BLD: 0 /100 WBCS — SIGNIFICANT CHANGE UP (ref 0–0)
PLATELET # BLD AUTO: 245 K/UL — SIGNIFICANT CHANGE UP (ref 150–400)
POTASSIUM SERPL-MCNC: 4.2 MMOL/L — SIGNIFICANT CHANGE UP (ref 3.5–5.3)
POTASSIUM SERPL-SCNC: 4.2 MMOL/L — SIGNIFICANT CHANGE UP (ref 3.5–5.3)
PROT SERPL-MCNC: 6.3 G/DL — SIGNIFICANT CHANGE UP (ref 6–8.3)
RBC # BLD: 3.64 M/UL — LOW (ref 3.8–5.2)
RBC # FLD: 13.6 % — SIGNIFICANT CHANGE UP (ref 10.3–14.5)
SARS-COV-2 RNA SPEC QL NAA+PROBE: DETECTED
SODIUM SERPL-SCNC: 140 MMOL/L — SIGNIFICANT CHANGE UP (ref 135–145)
WBC # BLD: 7.59 K/UL — SIGNIFICANT CHANGE UP (ref 3.8–10.5)
WBC # FLD AUTO: 7.59 K/UL — SIGNIFICANT CHANGE UP (ref 3.8–10.5)

## 2020-12-20 PROCEDURE — 99232 SBSQ HOSP IP/OBS MODERATE 35: CPT | Mod: CS,GC

## 2020-12-20 PROCEDURE — 99232 SBSQ HOSP IP/OBS MODERATE 35: CPT | Mod: CS

## 2020-12-20 RX ADMIN — CITALOPRAM 10 MILLIGRAM(S): 10 TABLET, FILM COATED ORAL at 21:50

## 2020-12-20 RX ADMIN — AMLODIPINE BESYLATE 5 MILLIGRAM(S): 2.5 TABLET ORAL at 05:44

## 2020-12-20 RX ADMIN — Medication 600 MILLIGRAM(S): at 05:45

## 2020-12-20 RX ADMIN — PANTOPRAZOLE SODIUM 40 MILLIGRAM(S): 20 TABLET, DELAYED RELEASE ORAL at 05:44

## 2020-12-20 RX ADMIN — Medication 600 MILLIGRAM(S): at 17:19

## 2020-12-20 RX ADMIN — LAMOTRIGINE 25 MILLIGRAM(S): 25 TABLET, ORALLY DISINTEGRATING ORAL at 05:44

## 2020-12-20 RX ADMIN — Medication 650 MILLIGRAM(S): at 06:15

## 2020-12-20 RX ADMIN — LAMOTRIGINE 25 MILLIGRAM(S): 25 TABLET, ORALLY DISINTEGRATING ORAL at 17:19

## 2020-12-20 RX ADMIN — Medication 650 MILLIGRAM(S): at 05:45

## 2020-12-20 RX ADMIN — Medication 25 MICROGRAM(S): at 05:44

## 2020-12-20 RX ADMIN — ENOXAPARIN SODIUM 40 MILLIGRAM(S): 100 INJECTION SUBCUTANEOUS at 11:19

## 2020-12-20 NOTE — PROGRESS NOTE ADULT - SUBJECTIVE AND OBJECTIVE BOX
[facility] -- Mckayla Brasher, Cynthia, Jan, Ayo Oliva Savella  Office # [phone]      Follow Up:    Atypical CP  Subjective/Observations:   No events overnight resting comfortably in bed.  No complaints of chest pain, dyspnea, or palpitations reported. No signs of orthopnea or PND.     REVIEW OF SYSTEMS: All other review of systems is negative unless indicated above    PAST MEDICAL & SURGICAL HISTORY:  Vertigo    Depression    Hypothyroidism    Vertigo    Hypothyroidism    Depression    UTI (urinary tract infection)    No significant past surgical history  RIGHT KNEE ARTHROSCOPY    No significant past surgical history        MEDICATIONS  (STANDING):  ALPRAZolam 0.25 milliGRAM(s) Oral two times a day  amLODIPine   Tablet 5 milliGRAM(s) Oral daily  citalopram 10 milliGRAM(s) Oral daily  enoxaparin Injectable 40 milliGRAM(s) SubCutaneous daily  guaiFENesin  milliGRAM(s) Oral every 12 hours  lamoTRIgine 25 milliGRAM(s) Oral two times a day  levothyroxine 25 MICROGram(s) Oral daily  pantoprazole    Tablet 40 milliGRAM(s) Oral before breakfast    MEDICATIONS  (PRN):  acetaminophen   Tablet .. 650 milliGRAM(s) Oral every 6 hours PRN Temp greater or equal to 38C (100.4F), Mild Pain (1 - 3)  ALBUTerol    90 MICROgram(s) HFA Inhaler 2 Puff(s) Inhalation every 6 hours PRN Bronchospasm  aluminum hydroxide/magnesium hydroxide/simethicone Suspension 30 milliLiter(s) Oral every 6 hours PRN Dyspepsia  ondansetron Injectable 4 milliGRAM(s) IV Push every 6 hours PRN Nausea and/or Vomiting      Allergies    cephalexin (Hives)  cephalexin (Unknown)    Intolerances    No seafood (Unknown)      Vital Signs Last 24 Hrs  T(C): 36.6 (20 Dec 2020 04:39), Max: 37.1 (19 Dec 2020 10:34)  T(F): 97.9 (20 Dec 2020 04:39), Max: 98.8 (19 Dec 2020 10:34)  HR: 67 (20 Dec 2020 04:39) (67 - 91)  BP: 105/66 (20 Dec 2020 04:39) (105/66 - 132/81)  BP(mean): --  RR: 18 (20 Dec 2020 04:39) (17 - 18)  SpO2: 99% (20 Dec 2020 04:39) (96% - 99%)    I&O's Summary        PHYSICAL EXAM:  TELE: Off tele   Constitutional: NAD, awake and alert, Frail   HEENT: Moist Mucous Membranes, Anicteric  Pulmonary: Non-labored, breath sounds are clear bilaterally, No wheezing, crackles or rhonchi  Cardiovascular: Regular, S1 and S2 nl, No murmurs, rubs, gallops or clicks  Gastrointestinal: Bowel Sounds present, soft, nontender.   Lymph: No lymphadenopathy. No peripheral edema.  Skin: No visible rashes or ulcers.  Psych:  Mood & affect appropriate    LABS: All Labs Reviewed:                        11.1   7.59  )-----------( 245      ( 20 Dec 2020 07:46 )             34.4     20 Dec 2020 07:46    140    |  105    |  39     ----------------------------<  93     4.2     |  29     |  1.00     Ca    8.7        20 Dec 2020 07:46    TPro  6.3    /  Alb  2.8    /  TBili  0.4    /  DBili  x      /  AST  27     /  ALT  43     /  AlkPhos  57     20 Dec 2020 07:46

## 2020-12-20 NOTE — PROGRESS NOTE ADULT - SUBJECTIVE AND OBJECTIVE BOX
[facility] DIVISION of INFECTIOUS DISEASE  Salvatore Dennis MD PhD, Suzanne Hughes MD, Elvira Valdez MD, Fidel Rollins MD  and providing coverage with Mary Ann Lucas MD and Loren Fritz MD  Providing Infectious Disease Consultations at [facility], [facility], [facility]'s      Office# [phone] to schedule follow up appointments  Answering Service for urgent calls or New Consults [phone]    Infectious Diseases Progress Note:    LINN WHITE is a 80y year old Female patient    COVID-19 Patient    Allergies: cephalexin (Hives)  cephalexin (Unknown)    ANTIBIOTICS/RELEVANT:  Antimicrobial:  Immunologic:  Other Meds:  acetaminophen   Tablet .. 650 milliGRAM(s) Oral every 6 hours PRN  ALBUTerol    90 MICROgram(s) HFA Inhaler 2 Puff(s) Inhalation every 6 hours PRN  ALPRAZolam 0.25 milliGRAM(s) Oral two times a day  aluminum hydroxide/magnesium hydroxide/simethicone Suspension 30 milliLiter(s) Oral every 6 hours PRN  amLODIPine   Tablet 5 milliGRAM(s) Oral daily  citalopram 10 milliGRAM(s) Oral daily  enoxaparin Injectable 40 milliGRAM(s) SubCutaneous daily  guaiFENesin  milliGRAM(s) Oral every 12 hours  lamoTRIgine 25 milliGRAM(s) Oral two times a day  levothyroxine 25 MICROGram(s) Oral daily  ondansetron Injectable 4 milliGRAM(s) IV Push every 6 hours PRN  pantoprazole    Tablet 40 milliGRAM(s) Oral before breakfast    Objective:  Vital Signs Last 24 Hrs  T(F): 98.1 (20 Dec 2020 10:00), Max: 98.1 (20 Dec 2020 10:00)  HR: 79 (20 Dec 2020 10:00) (67 - 79)  BP: 112/74 (20 Dec 2020 10:00) (105/66 - 112/74)  RR: 18 (20 Dec 2020 10:00) (18 - 18)  SpO2: 94% (20 Dec 2020 10:00) (94% - 99%)  T(C): 36.7 (12-20-20 @ 10:00), Max: 37.3 (12-19-20 @ 08:34)  T(C): 36.7 (12-20-20 @ 10:00), Max: 37.3 (12-19-20 @ 08:34)  T(C): 36.7 (12-20-20 @ 10:00), Max: 37.3 (12-19-20 @ 08:34)    PHYSICAL EXAM:  HEENT: NC/AT, anicteric, supple  Respiratory: no accessory muscle use, breathing comfortably  Cardiovascular: S1 S2 present, normal rate  Gastrointestinal: normal appearing, nondistended  Extremities: no edema, no cyanosis    LABS:                        11.1   7.59  )-----------( 245      ( 20 Dec 2020 07:46 )             34.4     7.59 12-20 @ 07:46  12.67 12-16 @ 13:05  11.50 12-15 @ 09:53  16.84 12-14 @ 09:58    12-20    140  |  105  |  39<H>  ----------------------------<  93  4.2   |  29  |  1.00    Ca    8.7      20 Dec 2020 07:46    TPro  6.3  /  Alb  2.8<L>  /  TBili  0.4  /  DBili  x   /  AST  27  /  ALT  43  /  AlkPhos  57  12-20    Creatinine, Serum: 1.00 mg/dL (12-20-20 @ 07:46)  Creatinine, Serum: 1.20 mg/dL (12-16-20 @ 13:05)  Creatinine, Serum: 1.00 mg/dL (12-15-20 @ 09:53)  Creatinine, Serum: 1.20 mg/dL (12-14-20 @ 09:58)    COVID RISK SCORE  Auto Neutrophil #: 8.86 K/uL (12-16-20 @ 13:05)  Auto Neutrophil #: 7.70 K/uL (12-15-20 @ 09:53)  Auto Neutrophil #: 10.70 K/uL (12-14-20 @ 09:58)  Auto Neutrophil #: 8.27 K/uL (12-13-20 @ 07:52)  Auto Neutrophil #: 6.00 K/uL (12-11-20 @ 06:55)    Auto Lymphocyte #: 2.32 K/uL (12-16-20 @ 13:05)  Auto Lymphocyte #: 2.22 K/uL (12-15-20 @ 09:53)  Auto Lymphocyte #: 4.22 K/uL (12-14-20 @ 09:58)  Auto Lymphocyte #: 2.14 K/uL (12-13-20 @ 07:52)  Auto Lymphocyte #: 2.77 K/uL (12-11-20 @ 06:55)    Creatine Kinase, Serum: 54 U/L (12-16-20 @ 13:05)  Creatine Kinase, Serum: 57 U/L (12-16-20 @ 01:31)    CPK Mass Assay %: 4.3 % (12-16-20 @ 13:05)  CPK Mass Assay %: 5.4 % (12-16-20 @ 01:31)    Troponin I, Serum: .041 ng/mL (12-16-20 @ 13:05)  Troponin I, Serum: .077 ng/mL (12-16-20 @ 01:31)    MICROBIOLOGY: reviewed    RADIOLOGY & ADDITIONAL STUDIES: reviewed

## 2020-12-20 NOTE — PROGRESS NOTE ADULT - SUBJECTIVE AND OBJECTIVE BOX
Patient is a 80y old  Female who presents with a chief complaint of COVID 19 Pneumonia (20 Dec 2020 09:54)        HPI:  79yo female with pmhx of hypothyroid, depression, subdural hematoma 8/2020 2/2 fall presents to [facility] with C/C of cough, low grade temp.  History obtained by patient. Patient reports her symptoms started less than one week ago. She reports productive cough, sore throat, low grade temps. Patient reported visiting [facility] whom dx pt with covid + on rapid test and advised pt to come to [place] [facility] December 2 for evaluation.   Patient was  seen in [place] on December 2 and ultimatel dced home. Since initial discharge, patient shortness of breath worsened, therefore she returned to [place] [facility]. Patient reports she did not take medication for her symptoms. Patient lives alone with home health aid ,although uunsure of covid exposure.  on ROS: patient denies fever, cp, n/v/d, abd pain, rash.    ER course:  Patient initially seen in [facility]. hypoxemic to 81 on room air. improved to 96 on 2 liters  labs: benign  ddimer [id number]   (04 Dec 2020 18:13)      SUBJECTIVE & OBJECTIVE: Pt seen and examined at bedside. dyspnea+    PHYSICAL EXAM:  T(C): 36.7 (12-20-20 @ 10:00), Max: 36.9 (12-19-20 @ 13:02)  HR: 79 (12-20-20 @ 10:00) (67 - 91)  BP: 112/74 (12-20-20 @ 10:00) (105/66 - 132/81)  RR: 18 (12-20-20 @ 10:00) (17 - 18)  SpO2: 94% (12-20-20 @ 10:00) (94% - 99%)  Wt(kg): --   GENERAL: NAD, well-groomed, well-developed  NERVOUS SYSTEM:  Alert & Oriented X3  CHEST/LUNG: Clear to auscultation bilaterally; No rales, rhonchi, wheezing, or rubs  HEART: Regular rate and rhythm; No murmurs, rubs, or gallops  ABDOMEN: Soft, Nontender, Nondistended; Bowel sounds present  EXTREMITIES:  2+ Peripheral Pulses, No clubbing, cyanosis, or edema        MEDICATIONS  (STANDING):  ALPRAZolam 0.25 milliGRAM(s) Oral two times a day  amLODIPine   Tablet 5 milliGRAM(s) Oral daily  citalopram 10 milliGRAM(s) Oral daily  enoxaparin Injectable 40 milliGRAM(s) SubCutaneous daily  guaiFENesin  milliGRAM(s) Oral every 12 hours  lamoTRIgine 25 milliGRAM(s) Oral two times a day  levothyroxine 25 MICROGram(s) Oral daily  pantoprazole    Tablet 40 milliGRAM(s) Oral before breakfast    MEDICATIONS  (PRN):  acetaminophen   Tablet .. 650 milliGRAM(s) Oral every 6 hours PRN Temp greater or equal to 38C (100.4F), Mild Pain (1 - 3)  ALBUTerol    90 MICROgram(s) HFA Inhaler 2 Puff(s) Inhalation every 6 hours PRN Bronchospasm  aluminum hydroxide/magnesium hydroxide/simethicone Suspension 30 milliLiter(s) Oral every 6 hours PRN Dyspepsia  ondansetron Injectable 4 milliGRAM(s) IV Push every 6 hours PRN Nausea and/or Vomiting      LABS:                        11.1   7.59  )-----------( 245      ( 20 Dec 2020 07:46 )             34.4     12-20    140  |  105  |  39<H>  ----------------------------<  93  4.2   |  29  |  1.00    Ca    8.7      20 Dec 2020 07:46    TPro  6.3  /  Alb  2.8<L>  /  TBili  0.4  /  DBili  x   /  AST  27  /  ALT  43  /  AlkPhos  57  12-20          CAPILLARY BLOOD GLUCOSE          CAPILLARY BLOOD GLUCOSE        CAPILLARY BLOOD GLUCOSE                RECENT CULTURES:      RADIOLOGY & ADDITIONAL TESTS:                        DVT/GI ppx  Discussed with pt @ bedside

## 2020-12-20 NOTE — PHYSICAL THERAPY INITIAL EVALUATION ADULT - ADDITIONAL COMMENTS
Patient reports she lives in an apartment + [facility] with railing.  Patient states she has HHA and was independent in ADLS and ambulation with a SAC, has RW
Patient reports she lives in an apartment . Patient states she has HHA and was independent in ADLS and ambulation with a SAC, has RW

## 2020-12-20 NOTE — PROGRESS NOTE ADULT - ASSESSMENT
79yo female with pmhx of hypothyroid, depression, subdural hematoma 8/2020 2/2 fall adm with COVID+ suggested day of onset 11/25    RECOMMENDATIONS  12/4 NLR ~3, sats<94% on RA, rec REMDESIVIR, STEROIDs, caution with anticoagulation with prior subdural  12/5 NLR <3, NC-2L pt appears stable and comfortable  12/6 NLR <3 btwn NC and RA-cont current Rx  12/7 on RA-continue Rx, 12/8 last day of remdesivir and may be able to consider dc after Rx  12/8 btwn RA and 2L NC, last day of remdesivir, suggested day 13 of illness and can look at potential dc (COVID PCR+)  12/9 NLR 5.58/1.83=<3, 97% on RA, can look at dc planning  12/10 remains stable on RA  12/11 remains stable on RA  12/14-stable on RA, sp steroids, can repeat COVID PCRs if required for dc planning   12/17 -COVID PCR+  12/19 - remains on RA, afebrile, pending negative COVID PCR, planning for inpt psych facility  12/20 - on RA, repeat COVID today +    When pt is doing well and  past the critical second week when decompensation can occur and has started to improve, fine to consider discharge planning with for early but safe discharge. Discharge with oxygen (4L or less and able to keep sats>90) and even persistent fever at time of discharge is reasonable. With high risk for thromboembolic disease we usually consider dc on  rivaroxaban (Xarelto) or Eliquis (apixaban) but with prior subdural rec ASA 325mg PO Q-day as discussed w primary team  For outpt mild disease considered noninfectious at day 10 after onset of illness but for hospitalized patients day 20. If pt going to facility or to dc isolation in house then will require 2 negative PCR tests  by a minimum of 24 hours and fever free without antipyretics for >24hrs and more than 10 days from first positive test.

## 2020-12-20 NOTE — PHYSICAL THERAPY INITIAL EVALUATION ADULT - DISCHARGE DISPOSITION, PT EVAL
Pt is functionally independent and requires no skilled physical therapy needs. Will discharge from physical therapy secondary to patient independent/home/no skilled PT needs
no skilled PT needs

## 2020-12-20 NOTE — PHYSICAL THERAPY INITIAL EVALUATION ADULT - GENERAL OBSERVATIONS, REHAB EVAL
Patient received supine in bed, cooperative with physical therapy evaluation
Patient  received supine in bed

## 2020-12-20 NOTE — PHYSICAL THERAPY INITIAL EVALUATION ADULT - PERTINENT HX OF CURRENT PROBLEM, REHAB EVAL
81yo female with pmhx of hypothyroid, depression, subdural hematoma 8/2020 2/2 fall presents to [facility] with C/C of cough, low grade temp.
79 y/o female adm 12/4 with  + COVID 19

## 2020-12-20 NOTE — PROGRESS NOTE ADULT - SUBJECTIVE AND OBJECTIVE BOX
Date/Time Patient Seen:  		  Referring MD:   Data Reviewed	       Patient is a 80y old  Female who presents with a chief complaint of COVID 19 Pneumonia (19 Dec 2020 12:04)      Subjective/HPI     PAST MEDICAL & SURGICAL HISTORY:  Vertigo    Depression    Hypothyroidism    Vertigo    Hypothyroidism    Depression    UTI (urinary tract infection)    No significant past surgical history  RIGHT KNEE ARTHROSCOPY    No significant past surgical history          Medication list         MEDICATIONS  (STANDING):  ALPRAZolam 0.25 milliGRAM(s) Oral two times a day  amLODIPine   Tablet 5 milliGRAM(s) Oral daily  citalopram 10 milliGRAM(s) Oral daily  enoxaparin Injectable 40 milliGRAM(s) SubCutaneous daily  guaiFENesin  milliGRAM(s) Oral every 12 hours  lamoTRIgine 25 milliGRAM(s) Oral two times a day  levothyroxine 25 MICROGram(s) Oral daily  pantoprazole    Tablet 40 milliGRAM(s) Oral before breakfast    MEDICATIONS  (PRN):  acetaminophen   Tablet .. 650 milliGRAM(s) Oral every 6 hours PRN Temp greater or equal to 38C (100.4F), Mild Pain (1 - 3)  ALBUTerol    90 MICROgram(s) HFA Inhaler 2 Puff(s) Inhalation every 6 hours PRN Bronchospasm  aluminum hydroxide/magnesium hydroxide/simethicone Suspension 30 milliLiter(s) Oral every 6 hours PRN Dyspepsia  ondansetron Injectable 4 milliGRAM(s) IV Push every 6 hours PRN Nausea and/or Vomiting         Vitals log        ICU Vital Signs Last 24 Hrs  T(C): 36.6 (20 Dec 2020 04:39), Max: 37.3 (19 Dec 2020 08:34)  T(F): 97.9 (20 Dec 2020 04:39), Max: 99.1 (19 Dec 2020 08:34)  HR: 67 (20 Dec 2020 04:39) (67 - 91)  BP: 105/66 (20 Dec 2020 04:39) (105/66 - 132/81)  BP(mean): --  ABP: --  ABP(mean): --  RR: 18 (20 Dec 2020 04:39) (17 - 18)  SpO2: 99% (20 Dec 2020 04:39) (94% - 99%)           Input and Output:  I&O's Detail      Lab Data                        11.1   7.59  )-----------( 245      ( 20 Dec 2020 07:46 )             34.4     12-20    140  |  105  |  39<H>  ----------------------------<  93  4.2   |  29  |  1.00    Ca    8.7      20 Dec 2020 07:46    TPro  6.3  /  Alb  2.8<L>  /  TBili  0.4  /  DBili  x   /  AST  27  /  ALT  43  /  AlkPhos  57  12-20            Review of Systems	      Objective     Physical Examination    heart s1s2  lung dec BS  abd soft      Pertinent Lab findings & Imaging      Pam:  NO   Adequate UO     I&O's Detail           Discussed with:     Cultures:	        Radiology

## 2020-12-20 NOTE — PHYSICAL THERAPY INITIAL EVALUATION ADULT - RANGE OF MOTION EXAMINATION, REHAB EVAL
no ROM deficits were identified
bilateral lower extremity was ROM was WNL (within normal limits)/bilateral upper extremity ROM was WNL (within normal limits)

## 2020-12-20 NOTE — PROGRESS NOTE ADULT - ASSESSMENT
81yo female with pmhx of hypothyroid, depression, subdural hematoma 8/2020 2/2 fall presents to [facility] with C/C of cough, low grade temp.     Atypical CP  - Likely pleuritic in nature, now resolved  - Her symptoms resolved after taking ibuprofen.   - EKG showed no acute ischemic changes  - Trops mildly elevated 0.077 x1 t now normalized   - CPK- 5.4 likely 2/2 to demand ischemia  - TTE when timing is appropriate this can be done as outpt  -Considering pt hx and desire to refrain from male caretakers we will sign off at this time to be reconsulted as needed.    Covid PNA:  - Still tolerating RA on supine position  - Monitor respiratory status  - Follow respiratory and ID recs  - Initiate incentive spirometer and continue to encourage use    DVT ppx  - Per Primary    Rip Low DNP, ANP-c  Cardiology   Spectra #[id number]  [phone]

## 2020-12-21 LAB
ALBUMIN SERPL ELPH-MCNC: 3.1 G/DL — LOW (ref 3.3–5)
ALP SERPL-CCNC: 65 U/L — SIGNIFICANT CHANGE UP (ref 40–120)
ALT FLD-CCNC: 45 U/L — SIGNIFICANT CHANGE UP (ref 12–78)
ANION GAP SERPL CALC-SCNC: 6 MMOL/L — SIGNIFICANT CHANGE UP (ref 5–17)
AST SERPL-CCNC: 26 U/L — SIGNIFICANT CHANGE UP (ref 15–37)
BILIRUB SERPL-MCNC: 0.4 MG/DL — SIGNIFICANT CHANGE UP (ref 0.2–1.2)
BUN SERPL-MCNC: 36 MG/DL — HIGH (ref 7–23)
CALCIUM SERPL-MCNC: 9 MG/DL — SIGNIFICANT CHANGE UP (ref 8.5–10.1)
CHLORIDE SERPL-SCNC: 106 MMOL/L — SIGNIFICANT CHANGE UP (ref 96–108)
CO2 SERPL-SCNC: 30 MMOL/L — SIGNIFICANT CHANGE UP (ref 22–31)
CREAT SERPL-MCNC: 1 MG/DL — SIGNIFICANT CHANGE UP (ref 0.5–1.3)
GLUCOSE SERPL-MCNC: 132 MG/DL — HIGH (ref 70–99)
HCT VFR BLD CALC: 36.6 % — SIGNIFICANT CHANGE UP (ref 34.5–45)
HGB BLD-MCNC: 11.8 G/DL — SIGNIFICANT CHANGE UP (ref 11.5–15.5)
MCHC RBC-ENTMCNC: 30.6 PG — SIGNIFICANT CHANGE UP (ref 27–34)
MCHC RBC-ENTMCNC: 32.2 GM/DL — SIGNIFICANT CHANGE UP (ref 32–36)
MCV RBC AUTO: 94.8 FL — SIGNIFICANT CHANGE UP (ref 80–100)
NRBC # BLD: 0 /100 WBCS — SIGNIFICANT CHANGE UP (ref 0–0)
PLATELET # BLD AUTO: 267 K/UL — SIGNIFICANT CHANGE UP (ref 150–400)
POTASSIUM SERPL-MCNC: 4.3 MMOL/L — SIGNIFICANT CHANGE UP (ref 3.5–5.3)
POTASSIUM SERPL-SCNC: 4.3 MMOL/L — SIGNIFICANT CHANGE UP (ref 3.5–5.3)
PROT SERPL-MCNC: 7.1 G/DL — SIGNIFICANT CHANGE UP (ref 6–8.3)
RBC # BLD: 3.86 M/UL — SIGNIFICANT CHANGE UP (ref 3.8–5.2)
RBC # FLD: 13.9 % — SIGNIFICANT CHANGE UP (ref 10.3–14.5)
SODIUM SERPL-SCNC: 142 MMOL/L — SIGNIFICANT CHANGE UP (ref 135–145)
WBC # BLD: 8.21 K/UL — SIGNIFICANT CHANGE UP (ref 3.8–10.5)
WBC # FLD AUTO: 8.21 K/UL — SIGNIFICANT CHANGE UP (ref 3.8–10.5)

## 2020-12-21 PROCEDURE — 99232 SBSQ HOSP IP/OBS MODERATE 35: CPT | Mod: CS,GC

## 2020-12-21 RX ADMIN — Medication 600 MILLIGRAM(S): at 17:08

## 2020-12-21 RX ADMIN — LAMOTRIGINE 25 MILLIGRAM(S): 25 TABLET, ORALLY DISINTEGRATING ORAL at 17:08

## 2020-12-21 RX ADMIN — Medication 25 MICROGRAM(S): at 06:05

## 2020-12-21 RX ADMIN — LAMOTRIGINE 25 MILLIGRAM(S): 25 TABLET, ORALLY DISINTEGRATING ORAL at 06:06

## 2020-12-21 RX ADMIN — AMLODIPINE BESYLATE 5 MILLIGRAM(S): 2.5 TABLET ORAL at 06:06

## 2020-12-21 RX ADMIN — CITALOPRAM 10 MILLIGRAM(S): 10 TABLET, FILM COATED ORAL at 22:00

## 2020-12-21 RX ADMIN — PANTOPRAZOLE SODIUM 40 MILLIGRAM(S): 20 TABLET, DELAYED RELEASE ORAL at 06:07

## 2020-12-21 RX ADMIN — ENOXAPARIN SODIUM 40 MILLIGRAM(S): 100 INJECTION SUBCUTANEOUS at 11:03

## 2020-12-21 RX ADMIN — Medication 600 MILLIGRAM(S): at 06:06

## 2020-12-21 NOTE — PROGRESS NOTE ADULT - SUBJECTIVE AND OBJECTIVE BOX
Patient is a 80y old  Female who presents with a chief complaint of COVID 19 Pneumonia (21 Dec 2020 08:56)        HPI:  81yo female with pmhx of hypothyroid, depression, subdural hematoma 8/2020 2/2 fall presents to [facility] with C/C of cough, low grade temp.  History obtained by patient. Patient reports her symptoms started less than one week ago. She reports productive cough, sore throat, low grade temps. Patient reported visiting [facility] whom dx pt with covid + on rapid test and advised pt to come to [place] [facility] December 2 for evaluation.   Patient was  seen in [place] on December 2 and ultimatel dced home. Since initial discharge, patient shortness of breath worsened, therefore she returned to [place] [facility]. Patient reports she did not take medication for her symptoms. Patient lives alone with home health aid ,although uunsure of covid exposure.  on ROS: patient denies fever, cp, n/v/d, abd pain, rash.    ER course:  Patient initially seen in [facility]. hypoxemic to 81 on room air. improved to 96 on 2 liters  labs: benign  ddimer [id number]   (04 Dec 2020 18:13)      SUBJECTIVE & OBJECTIVE: Pt seen and examined at bedside. moraima cute complaint     PHYSICAL EXAM:  T(C): 36.9 (12-21-20 @ 08:04), Max: 37.1 (12-21-20 @ 04:40)  HR: 77 (12-21-20 @ 08:04) (77 - 87)  BP: 117/68 (12-21-20 @ 08:04) (107/58 - 117/-)  RR: 18 (12-21-20 @ 08:04) (18 - 18)  SpO2: 95% (12-21-20 @ 08:04) (92% - 95%)  Wt(kg): --   GENERAL: NAD, well-groomed, well-developed  NERVOUS SYSTEM:  Alert & Oriented X3, M  CHEST/LUNG: decrease air entr ya thte bases   HEART: Regular rate and rhythm; No murmurs, rubs, or gallops  ABDOMEN: Soft, Nontender, Nondistended; Bowel sounds present  EXTREMITIES:  2+ Peripheral Pulses, No clubbing, cyanosis, or edema        MEDICATIONS  (STANDING):  ALPRAZolam 0.25 milliGRAM(s) Oral two times a day  amLODIPine   Tablet 5 milliGRAM(s) Oral daily  citalopram 10 milliGRAM(s) Oral daily  enoxaparin Injectable 40 milliGRAM(s) SubCutaneous daily  guaiFENesin  milliGRAM(s) Oral every 12 hours  lamoTRIgine 25 milliGRAM(s) Oral two times a day  levothyroxine 25 MICROGram(s) Oral daily  pantoprazole    Tablet 40 milliGRAM(s) Oral before breakfast    MEDICATIONS  (PRN):  acetaminophen   Tablet .. 650 milliGRAM(s) Oral every 6 hours PRN Temp greater or equal to 38C (100.4F), Mild Pain (1 - 3)  ALBUTerol    90 MICROgram(s) HFA Inhaler 2 Puff(s) Inhalation every 6 hours PRN Bronchospasm  aluminum hydroxide/magnesium hydroxide/simethicone Suspension 30 milliLiter(s) Oral every 6 hours PRN Dyspepsia  ondansetron Injectable 4 milliGRAM(s) IV Push every 6 hours PRN Nausea and/or Vomiting      LABS:                        11.1   7.59  )-----------( 245      ( 20 Dec 2020 07:46 )             34.4     12-20    140  |  105  |  39<H>  ----------------------------<  93  4.2   |  29  |  1.00    Ca    8.7      20 Dec 2020 07:46    TPro  6.3  /  Alb  2.8<L>  /  TBili  0.4  /  DBili  x   /  AST  27  /  ALT  43  /  AlkPhos  57  12-20          CAPILLARY BLOOD GLUCOSE          CAPILLARY BLOOD GLUCOSE        CAPILLARY BLOOD GLUCOSE                RECENT CULTURES:      RADIOLOGY & ADDITIONAL TESTS:                        DVT/GI ppx  Discussed with pt @ bedside

## 2020-12-21 NOTE — PROGRESS NOTE ADULT - ASSESSMENT
79yo female with pmhx of hypothyroid, depression, subdural hematoma 8/2020 2/2 fall adm with COVID+ suggested day of onset 11/25    RECOMMENDATIONS  12/4 NLR ~3, sats<94% on RA, rec REMDESIVIR, STEROIDs, caution with anticoagulation with prior subdural  12/5 NLR <3, NC-2L pt appears stable and comfortable  12/6 NLR <3 btwn NC and RA-cont current Rx  12/7 on RA-continue Rx, 12/8 last day of remdesivir and may be able to consider dc after Rx  12/8 btwn RA and 2L NC, last day of remdesivir, suggested day 13 of illness and can look at potential dc (COVID PCR+)  12/9 NLR 5.58/1.83=<3, 97% on RA, can look at dc planning  12/10 remains stable on RA  12/11 remains stable on RA  12/14-stable on RA, sp steroids, can repeat COVID PCRs if required for dc planning   12/17 -COVID PCR+  12/19 - remains on RA, afebrile, pending negative COVID PCR, planning for inpt psych facility  12/20 - on RA, repeat COVID today +  -repeat PCRs until negatives    When pt is doing well and  past the critical second week when decompensation can occur and has started to improve, fine to consider discharge planning with for early but safe discharge. Discharge with oxygen (4L or less and able to keep sats>90) and even persistent fever at time of discharge is reasonable. With high risk for thromboembolic disease we usually consider dc on  rivaroxaban (Xarelto) or Eliquis (apixaban) but with prior subdural rec ASA 325mg PO Q-day as discussed w primary team  For outpt mild disease considered noninfectious at day 10 after onset of illness but for hospitalized patients day 20. If pt going to facility or to dc isolation in house then will require 2 negative PCR tests  by a minimum of 24 hours and fever free without antipyretics for >24hrs and more than 10 days from first positive test.

## 2020-12-21 NOTE — PROGRESS NOTE ADULT - SUBJECTIVE AND OBJECTIVE BOX
Date/Time Patient Seen:  		  Referring MD:   Data Reviewed	       Patient is a 80y old  Female who presents with a chief complaint of COVID 19 Pneumonia (20 Dec 2020 16:11)      Subjective/HPI     PAST MEDICAL & SURGICAL HISTORY:  Vertigo    Depression    Hypothyroidism    Vertigo    Hypothyroidism    Depression    UTI (urinary tract infection)    No significant past surgical history  RIGHT KNEE ARTHROSCOPY    No significant past surgical history          Medication list         MEDICATIONS  (STANDING):  ALPRAZolam 0.25 milliGRAM(s) Oral two times a day  amLODIPine   Tablet 5 milliGRAM(s) Oral daily  citalopram 10 milliGRAM(s) Oral daily  enoxaparin Injectable 40 milliGRAM(s) SubCutaneous daily  guaiFENesin  milliGRAM(s) Oral every 12 hours  lamoTRIgine 25 milliGRAM(s) Oral two times a day  levothyroxine 25 MICROGram(s) Oral daily  pantoprazole    Tablet 40 milliGRAM(s) Oral before breakfast    MEDICATIONS  (PRN):  acetaminophen   Tablet .. 650 milliGRAM(s) Oral every 6 hours PRN Temp greater or equal to 38C (100.4F), Mild Pain (1 - 3)  ALBUTerol    90 MICROgram(s) HFA Inhaler 2 Puff(s) Inhalation every 6 hours PRN Bronchospasm  aluminum hydroxide/magnesium hydroxide/simethicone Suspension 30 milliLiter(s) Oral every 6 hours PRN Dyspepsia  ondansetron Injectable 4 milliGRAM(s) IV Push every 6 hours PRN Nausea and/or Vomiting         Vitals log        ICU Vital Signs Last 24 Hrs  T(C): 36.9 (21 Dec 2020 08:04), Max: 37.1 (21 Dec 2020 04:40)  T(F): 98.5 (21 Dec 2020 08:04), Max: 98.8 (21 Dec 2020 04:40)  HR: 77 (21 Dec 2020 08:04) (77 - 87)  BP: 117/68 (21 Dec 2020 08:04) (107/58 - 117/-)  BP(mean): --  ABP: --  ABP(mean): --  RR: 18 (21 Dec 2020 08:04) (18 - 18)  SpO2: 95% (21 Dec 2020 08:04) (92% - 95%)           Input and Output:  I&O's Detail      Lab Data                        11.1   7.59  )-----------( 245      ( 20 Dec 2020 07:46 )             34.4     12-20    140  |  105  |  39<H>  ----------------------------<  93  4.2   |  29  |  1.00    Ca    8.7      20 Dec 2020 07:46    TPro  6.3  /  Alb  2.8<L>  /  TBili  0.4  /  DBili  x   /  AST  27  /  ALT  43  /  AlkPhos  57  12-20            Review of Systems	      Objective     Physical Examination    heart s1s2  lung dec BS  abd soft      Pertinent Lab findings & Imaging      Pam:  NO   Adequate UO     I&O's Detail           Discussed with:     Cultures:	        Radiology

## 2020-12-21 NOTE — PROGRESS NOTE ADULT - SUBJECTIVE AND OBJECTIVE BOX
[facility] DIVISION of INFECTIOUS DISEASE  Salvatore Dennis MD PhD, Suzanne Hughes MD, Elvira Valdez MD, Fidel Rollins MD  and providing coverage with Mary Ann Lucas MD and Loren Fritz MD  Providing Infectious Disease Consultations at [facility], [facility], [facility]'s      Office# [phone] to schedule follow up appointments  Answering Service for urgent calls or New Consults [phone]  Cell# to text for urgent issues Salvatore Dennis [phone]     Infectious diseases progress note:    LINN WHITE is a 80y y. o. Female patient    COVID Patient    Allergies    cephalexin (Hives)  cephalexin (Unknown)    Intolerances    No seafood (Unknown)      ANTIBIOTICS/RELEVANT:  antimicrobials    immunologic:    OTHER:  acetaminophen   Tablet .. 650 milliGRAM(s) Oral every 6 hours PRN  ALBUTerol    90 MICROgram(s) HFA Inhaler 2 Puff(s) Inhalation every 6 hours PRN  ALPRAZolam 0.25 milliGRAM(s) Oral two times a day  aluminum hydroxide/magnesium hydroxide/simethicone Suspension 30 milliLiter(s) Oral every 6 hours PRN  amLODIPine   Tablet 5 milliGRAM(s) Oral daily  citalopram 10 milliGRAM(s) Oral daily  enoxaparin Injectable 40 milliGRAM(s) SubCutaneous daily  guaiFENesin  milliGRAM(s) Oral every 12 hours  lamoTRIgine 25 milliGRAM(s) Oral two times a day  levothyroxine 25 MICROGram(s) Oral daily  ondansetron Injectable 4 milliGRAM(s) IV Push every 6 hours PRN  pantoprazole    Tablet 40 milliGRAM(s) Oral before breakfast      Objective:  Vital Signs Last 24 Hrs  T(C): 36.9 (21 Dec 2020 08:04), Max: 37.1 (21 Dec 2020 04:40)  T(F): 98.5 (21 Dec 2020 08:04), Max: 98.8 (21 Dec 2020 04:40)  HR: 77 (21 Dec 2020 08:04) (77 - 87)  BP: 117/68 (21 Dec 2020 08:04) (107/58 - 117/-)  BP(mean): --  RR: 18 (21 Dec 2020 08:04) (18 - 18)  SpO2: 95% (21 Dec 2020 08:04) (92% - 95%)    T(C): 36.9 (12-21-20 @ 08:04), Max: 37.1 (12-21-20 @ 04:40)  T(C): 36.9 (12-21-20 @ 08:04), Max: 37.3 (12-19-20 @ 08:34)  T(C): 36.9 (12-21-20 @ 08:04), Max: 37.3 (12-19-20 @ 08:34)    PHYSICAL EXAM:  HEENT: NC atraumatic  Neck: supple  Respiratory: no accessory muscle use, breathing comfortably  Cardiovascular: distant  Gastrointestinal: normal appearing, nondistended  Extremities: no clubbing, no cyanosis,      LABS:                          11.8   8.21  )-----------( 267      ( 21 Dec 2020 10:32 )             36.6       8.21 12-21 @ 10:32  7.59 12-20 @ 07:46  12.67 12-16 @ 13:05  11.50 12-15 @ 09:53      12-21    142  |  106  |  36<H>  ----------------------------<  132<H>  4.3   |  30  |  1.00    Ca    9.0      21 Dec 2020 10:32    TPro  7.1  /  Alb  3.1<L>  /  TBili  0.4  /  DBili  x   /  AST  26  /  ALT  45  /  AlkPhos  65  12-21      Creatinine, Serum: 1.00 mg/dL (12-21-20 @ 10:32)  Creatinine, Serum: 1.00 mg/dL (12-20-20 @ 07:46)  Creatinine, Serum: 1.20 mg/dL (12-16-20 @ 13:05)  Creatinine, Serum: 1.00 mg/dL (12-15-20 @ 09:53)                COVID RISK SCORE  Auto Neutrophil #: 8.86 K/uL (12-16-20 @ 13:05)  Auto Lymphocyte #: 2.32 K/uL (12-16-20 @ 13:05)  Auto Neutrophil #: 7.70 K/uL (12-15-20 @ 09:53)  Auto Lymphocyte #: 2.22 K/uL (12-15-20 @ 09:53)  Auto Neutrophil #: 10.70 K/uL (12-14-20 @ 09:58)  Auto Lymphocyte #: 4.22 K/uL (12-14-20 @ 09:58)  Auto Neutrophil #: 8.27 K/uL (12-13-20 @ 07:52)  Auto Lymphocyte #: 2.14 K/uL (12-13-20 @ 07:52)  Auto Neutrophil #: 6.00 K/uL (12-11-20 @ 06:55)  Auto Lymphocyte #: 2.77 K/uL (12-11-20 @ 06:55)  Auto Neutrophil #: 5.58 K/uL (12-09-20 @ 06:40)  Auto Lymphocyte #: 1.83 K/uL (12-09-20 @ 06:40)  Auto Neutrophil #: 3.93 K/uL (12-08-20 @ 07:01)  Auto Lymphocyte #: 1.96 K/uL (12-08-20 @ 07:01)      Auto Eosinophil #: 0.09 K/uL (12-16-20 @ 13:05)  Auto Eosinophil #: 0.11 K/uL (12-15-20 @ 09:53)  Auto Eosinophil #: 0.07 K/uL (12-14-20 @ 09:58)  Auto Eosinophil #: 0.06 K/uL (12-13-20 @ 07:52)  Auto Eosinophil #: 0.14 K/uL (12-11-20 @ 06:55)  Auto Eosinophil #: 0.00 K/uL (12-09-20 @ 06:40)  Auto Eosinophil #: 0.01 K/uL (12-08-20 @ 07:01)      Sedimentation Rate, Erythrocyte: 38 mm/hr (12-09-20 @ 06:40)  Sedimentation Rate, Erythrocyte: 41 mm/hr (12-08-20 @ 07:01)      Troponin I, Serum: .041 ng/mL (12-16-20 @ 13:05)  Troponin I, Serum: .077 ng/mL (12-16-20 @ 01:31)    Creatine Kinase, Serum: 54 U/L (12-16-20 @ 13:05)  Creatine Kinase, Serum: 57 U/L (12-16-20 @ 01:31)                D-Dimer Assay, Quantitative: 199 ng/mL DDU (12-09-20 @ 06:40)        MICROBIOLOGY:              RADIOLOGY & ADDITIONAL STUDIES:

## 2020-12-22 LAB — SARS-COV-2 RNA SPEC QL NAA+PROBE: DETECTED

## 2020-12-22 PROCEDURE — 99232 SBSQ HOSP IP/OBS MODERATE 35: CPT | Mod: CS,GC

## 2020-12-22 RX ADMIN — PANTOPRAZOLE SODIUM 40 MILLIGRAM(S): 20 TABLET, DELAYED RELEASE ORAL at 05:50

## 2020-12-22 RX ADMIN — Medication 600 MILLIGRAM(S): at 18:24

## 2020-12-22 RX ADMIN — AMLODIPINE BESYLATE 5 MILLIGRAM(S): 2.5 TABLET ORAL at 05:50

## 2020-12-22 RX ADMIN — Medication 600 MILLIGRAM(S): at 05:50

## 2020-12-22 RX ADMIN — Medication 25 MICROGRAM(S): at 05:50

## 2020-12-22 RX ADMIN — LAMOTRIGINE 25 MILLIGRAM(S): 25 TABLET, ORALLY DISINTEGRATING ORAL at 18:24

## 2020-12-22 RX ADMIN — LAMOTRIGINE 25 MILLIGRAM(S): 25 TABLET, ORALLY DISINTEGRATING ORAL at 05:50

## 2020-12-22 RX ADMIN — ENOXAPARIN SODIUM 40 MILLIGRAM(S): 100 INJECTION SUBCUTANEOUS at 13:12

## 2020-12-22 RX ADMIN — CITALOPRAM 10 MILLIGRAM(S): 10 TABLET, FILM COATED ORAL at 21:29

## 2020-12-22 RX ADMIN — Medication 0.25 MILLIGRAM(S): at 05:58

## 2020-12-22 RX ADMIN — Medication 0.25 MILLIGRAM(S): at 18:24

## 2020-12-22 NOTE — PROGRESS NOTE ADULT - SUBJECTIVE AND OBJECTIVE BOX
[facility] DIVISION of INFECTIOUS DISEASE  Salvatore Dennis MD PhD, Suzanne Hughes MD, Elvira Valdez MD, Fidel Rollins MD  and providing coverage with Mary Ann Lucas MD and Loren Fritz MD  Providing Infectious Disease Consultations at [facility], [facility], [facility]'s      Office# [phone] to schedule follow up appointments  Answering Service for urgent calls or New Consults [phone]  Cell# to text for urgent issues Salvatore Denins [phone]     Infectious diseases progress note:    LINN WHITE is a 80y y. o. Female patient    COVID Patient    Allergies    cephalexin (Hives)  cephalexin (Unknown)    Intolerances    No seafood (Unknown)      ANTIBIOTICS/RELEVANT:  antimicrobials    immunologic:    OTHER:  acetaminophen   Tablet .. 650 milliGRAM(s) Oral every 6 hours PRN  ALBUTerol    90 MICROgram(s) HFA Inhaler 2 Puff(s) Inhalation every 6 hours PRN  ALPRAZolam 0.25 milliGRAM(s) Oral two times a day  aluminum hydroxide/magnesium hydroxide/simethicone Suspension 30 milliLiter(s) Oral every 6 hours PRN  amLODIPine   Tablet 5 milliGRAM(s) Oral daily  citalopram 10 milliGRAM(s) Oral daily  enoxaparin Injectable 40 milliGRAM(s) SubCutaneous daily  guaiFENesin  milliGRAM(s) Oral every 12 hours  lamoTRIgine 25 milliGRAM(s) Oral two times a day  levothyroxine 25 MICROGram(s) Oral daily  ondansetron Injectable 4 milliGRAM(s) IV Push every 6 hours PRN  pantoprazole    Tablet 40 milliGRAM(s) Oral before breakfast      Objective:  Vital Signs Last 24 Hrs  T(C): 36.8 (22 Dec 2020 08:44), Max: 37.3 (21 Dec 2020 15:39)  T(F): 98.3 (22 Dec 2020 08:44), Max: 99.1 (21 Dec 2020 15:39)  HR: 73 (22 Dec 2020 08:44) (67 - 92)  BP: 114/67 (22 Dec 2020 08:44) (91/63 - 118/68)  BP(mean): --  RR: 19 (22 Dec 2020 08:44) (17 - 19)  SpO2: 95% (22 Dec 2020 08:44) (93% - 95%)    T(C): 36.8 (12-22-20 @ 08:44), Max: 37.3 (12-21-20 @ 15:39)  T(C): 36.8 (12-22-20 @ 08:44), Max: 37.3 (12-21-20 @ 15:39)  T(C): 36.8 (12-22-20 @ 08:44), Max: 37.3 (12-19-20 @ 08:34)    PHYSICAL EXAM:  HEENT: NC atraumatic  Neck: supple  Respiratory: no accessory muscle use, breathing comfortably  Cardiovascular: distant  Gastrointestinal: normal appearing, nondistended  Extremities: no clubbing, no cyanosis,      LABS:                          11.8   8.21  )-----------( 267      ( 21 Dec 2020 10:32 )             36.6       8.21 12-21 @ 10:32  7.59 12-20 @ 07:46  12.67 12-16 @ 13:05      12-21    142  |  106  |  36<H>  ----------------------------<  132<H>  4.3   |  30  |  1.00    Ca    9.0      21 Dec 2020 10:32    TPro  7.1  /  Alb  3.1<L>  /  TBili  0.4  /  DBili  x   /  AST  26  /  ALT  45  /  AlkPhos  65  12-21      Creatinine, Serum: 1.00 mg/dL (12-21-20 @ 10:32)  Creatinine, Serum: 1.00 mg/dL (12-20-20 @ 07:46)  Creatinine, Serum: 1.20 mg/dL (12-16-20 @ 13:05)                COVID RISK SCORE  Auto Neutrophil #: 8.86 K/uL (12-16-20 @ 13:05)  Auto Lymphocyte #: 2.32 K/uL (12-16-20 @ 13:05)  Auto Neutrophil #: 7.70 K/uL (12-15-20 @ 09:53)  Auto Lymphocyte #: 2.22 K/uL (12-15-20 @ 09:53)  Auto Neutrophil #: 10.70 K/uL (12-14-20 @ 09:58)  Auto Lymphocyte #: 4.22 K/uL (12-14-20 @ 09:58)  Auto Neutrophil #: 8.27 K/uL (12-13-20 @ 07:52)  Auto Lymphocyte #: 2.14 K/uL (12-13-20 @ 07:52)  Auto Neutrophil #: 6.00 K/uL (12-11-20 @ 06:55)  Auto Lymphocyte #: 2.77 K/uL (12-11-20 @ 06:55)  Auto Neutrophil #: 5.58 K/uL (12-09-20 @ 06:40)  Auto Lymphocyte #: 1.83 K/uL (12-09-20 @ 06:40)      Auto Eosinophil #: 0.09 K/uL (12-16-20 @ 13:05)  Auto Eosinophil #: 0.11 K/uL (12-15-20 @ 09:53)  Auto Eosinophil #: 0.07 K/uL (12-14-20 @ 09:58)  Auto Eosinophil #: 0.06 K/uL (12-13-20 @ 07:52)  Auto Eosinophil #: 0.14 K/uL (12-11-20 @ 06:55)  Auto Eosinophil #: 0.00 K/uL (12-09-20 @ 06:40)      Sedimentation Rate, Erythrocyte: 38 mm/hr (12-09-20 @ 06:40)      Troponin I, Serum: .041 ng/mL (12-16-20 @ 13:05)  Troponin I, Serum: .077 ng/mL (12-16-20 @ 01:31)    Creatine Kinase, Serum: 54 U/L (12-16-20 @ 13:05)  Creatine Kinase, Serum: 57 U/L (12-16-20 @ 01:31)                D-Dimer Assay, Quantitative: 199 ng/mL DDU (12-09-20 @ 06:40)        MICROBIOLOGY:              RADIOLOGY & ADDITIONAL STUDIES:

## 2020-12-22 NOTE — PROGRESS NOTE ADULT - SUBJECTIVE AND OBJECTIVE BOX
Date/Time Patient Seen:  		  Referring MD:   Data Reviewed	       Patient is a 80y old  Female who presents with a chief complaint of COVID 19 Pneumonia (21 Dec 2020 11:17)      Subjective/HPI     PAST MEDICAL & SURGICAL HISTORY:  Vertigo    Depression    Hypothyroidism    Vertigo    Hypothyroidism    Depression    UTI (urinary tract infection)    No significant past surgical history  RIGHT KNEE ARTHROSCOPY    No significant past surgical history          Medication list         MEDICATIONS  (STANDING):  ALPRAZolam 0.25 milliGRAM(s) Oral two times a day  amLODIPine   Tablet 5 milliGRAM(s) Oral daily  citalopram 10 milliGRAM(s) Oral daily  enoxaparin Injectable 40 milliGRAM(s) SubCutaneous daily  guaiFENesin  milliGRAM(s) Oral every 12 hours  lamoTRIgine 25 milliGRAM(s) Oral two times a day  levothyroxine 25 MICROGram(s) Oral daily  pantoprazole    Tablet 40 milliGRAM(s) Oral before breakfast    MEDICATIONS  (PRN):  acetaminophen   Tablet .. 650 milliGRAM(s) Oral every 6 hours PRN Temp greater or equal to 38C (100.4F), Mild Pain (1 - 3)  ALBUTerol    90 MICROgram(s) HFA Inhaler 2 Puff(s) Inhalation every 6 hours PRN Bronchospasm  aluminum hydroxide/magnesium hydroxide/simethicone Suspension 30 milliLiter(s) Oral every 6 hours PRN Dyspepsia  ondansetron Injectable 4 milliGRAM(s) IV Push every 6 hours PRN Nausea and/or Vomiting         Vitals log        ICU Vital Signs Last 24 Hrs  T(C): 36.9 (22 Dec 2020 04:32), Max: 37.3 (21 Dec 2020 15:39)  T(F): 98.4 (22 Dec 2020 04:32), Max: 99.1 (21 Dec 2020 15:39)  HR: 67 (22 Dec 2020 04:32) (67 - 92)  BP: 115/64 (22 Dec 2020 04:32) (91/63 - 118/68)  BP(mean): --  ABP: --  ABP(mean): --  RR: 18 (22 Dec 2020 04:32) (17 - 18)  SpO2: 93% (22 Dec 2020 04:32) (93% - 94%)           Input and Output:  I&O's Detail      Lab Data                        11.8   8.21  )-----------( 267      ( 21 Dec 2020 10:32 )             36.6     12-21    142  |  106  |  36<H>  ----------------------------<  132<H>  4.3   |  30  |  1.00    Ca    9.0      21 Dec 2020 10:32    TPro  7.1  /  Alb  3.1<L>  /  TBili  0.4  /  DBili  x   /  AST  26  /  ALT  45  /  AlkPhos  65  12-21            Review of Systems	      Objective     Physical Examination    heart s1s2  lung dec BS  abd soft      Pertinent Lab findings & Imaging      Pam:  NO   Adequate UO     I&O's Detail           Discussed with:     Cultures:	        Radiology

## 2020-12-22 NOTE — PROGRESS NOTE ADULT - ASSESSMENT
81yo female with pmhx of hypothyroid, depression, subdural hematoma 8/2020 2/2 fall adm with COVID+ suggested day of onset 11/25    RECOMMENDATIONS  12/4 NLR ~3, sats<94% on RA, rec REMDESIVIR, STEROIDs, caution with anticoagulation with prior subdural  12/5 NLR <3, NC-2L pt appears stable and comfortable  12/6 NLR <3 btwn NC and RA-cont current Rx  12/7 on RA-continue Rx, 12/8 last day of remdesivir and may be able to consider dc after Rx  12/8 btwn RA and 2L NC, last day of remdesivir, suggested day 13 of illness and can look at potential dc (COVID PCR+)  12/9 NLR 5.58/1.83=<3, 97% on RA, can look at dc planning  12/10 remains stable on RA  12/11 remains stable on RA  12/14-stable on RA, sp steroids, can repeat COVID PCRs if required for dc planning   12/17 -COVID PCR+  12/19 - remains on RA, afebrile, pending negative COVID PCR, planning for inpt psych facility  12/20 - on RA, repeat COVID today +  -repeat PCRs until negatives    When pt is doing well and  past the critical second week when decompensation can occur and has started to improve, fine to consider discharge planning with for early but safe discharge. Discharge with oxygen (4L or less and able to keep sats>90) and even persistent fever at time of discharge is reasonable. With high risk for thromboembolic disease we usually consider dc on  rivaroxaban (Xarelto) or Eliquis (apixaban) but with prior subdural rec ASA 325mg PO Q-day as discussed w primary team  For outpt mild disease considered noninfectious at day 10 after onset of illness but for hospitalized patients day 20. If pt going to facility or to dc isolation in house then will require 2 negative PCR tests  by a minimum of 24 hours and fever free without antipyretics for >24hrs and more than 10 days from first positive test.

## 2020-12-22 NOTE — PROGRESS NOTE ADULT - SUBJECTIVE AND OBJECTIVE BOX
Patient is a 80y old  Female who presents with a chief complaint of COVID 19 Pneumonia (22 Dec 2020 10:37)        HPI:  79yo female with pmhx of hypothyroid, depression, subdural hematoma 8/2020 2/2 fall presents to [facility] with C/C of cough, low grade temp.  History obtained by patient. Patient reports her symptoms started less than one week ago. She reports productive cough, sore throat, low grade temps. Patient reported visiting [facility] whom dx pt with covid + on rapid test and advised pt to come to [facility] December 2 for evaluation.   Patient was  seen in [place] on December 2 and ultimatel dced home. Since initial discharge, patient shortness of breath worsened, therefore she returned to [facility] [facility]. Patient reports she did not take medication for her symptoms. Patient lives alone with home health aid ,although uunsure of covid exposure.  on ROS: patient denies fever, cp, n/v/d, abd pain, rash.    ER course:  Patient initially seen in [facility]. hypoxemic to 81 on room air. improved to 96 on 2 liters  labs: benign  ddimer 290   (04 Dec 2020 18:13)      SUBJECTIVE & OBJECTIVE: Pt seen and examined at bedside. decrease luli entr blaine hte bases     PHYSICAL EXAM:  T(C): 36.8 (12-22-20 @ 08:44), Max: 37.3 (12-21-20 @ 15:39)  HR: 73 (12-22-20 @ 08:44) (67 - 92)  BP: 114/67 (12-22-20 @ 08:44) (91/63 - 118/68)  RR: 19 (12-22-20 @ 08:44) (17 - 19)  SpO2: 95% (12-22-20 @ 08:44) (93% - 95%)  Wt(kg): --   GENERAL: NAD, well-groomed, well-developed  HEAD:  Atraumatic, Normocephalic  NERVOUS SYSTEM:  Alert & Oriented X3,   CHEST/LUNG: Clear to auscultation bilaterally; No rales, rhonchi, wheezing, or rubs  HEART: Regular rate and rhythm; No murmurs, rubs, or gallops  ABDOMEN: Soft, Nontender, Nondistended; Bowel sounds present  EXTREMITIES:  2+ Peripheral Pulses, No clubbing, cyanosis, or edema        MEDICATIONS  (STANDING):  ALPRAZolam 0.25 milliGRAM(s) Oral two times a day  amLODIPine   Tablet 5 milliGRAM(s) Oral daily  citalopram 10 milliGRAM(s) Oral daily  enoxaparin Injectable 40 milliGRAM(s) SubCutaneous daily  guaiFENesin  milliGRAM(s) Oral every 12 hours  lamoTRIgine 25 milliGRAM(s) Oral two times a day  levothyroxine 25 MICROGram(s) Oral daily  pantoprazole    Tablet 40 milliGRAM(s) Oral before breakfast    MEDICATIONS  (PRN):  acetaminophen   Tablet .. 650 milliGRAM(s) Oral every 6 hours PRN Temp greater or equal to 38C (100.4F), Mild Pain (1 - 3)  ALBUTerol    90 MICROgram(s) HFA Inhaler 2 Puff(s) Inhalation every 6 hours PRN Bronchospasm  aluminum hydroxide/magnesium hydroxide/simethicone Suspension 30 milliLiter(s) Oral every 6 hours PRN Dyspepsia  ondansetron Injectable 4 milliGRAM(s) IV Push every 6 hours PRN Nausea and/or Vomiting      LABS:                        11.8   8.21  )-----------( 267      ( 21 Dec 2020 10:32 )             36.6     12-21    142  |  106  |  36<H>  ----------------------------<  132<H>  4.3   |  30  |  1.00    Ca    9.0      21 Dec 2020 10:32    TPro  7.1  /  Alb  3.1<L>  /  TBili  0.4  /  DBili  x   /  AST  26  /  ALT  45  /  AlkPhos  65  12-21          CAPILLARY BLOOD GLUCOSE          CAPILLARY BLOOD GLUCOSE        CAPILLARY BLOOD GLUCOSE                RECENT CULTURES:      RADIOLOGY & ADDITIONAL TESTS:                        DVT/GI ppx  Discussed with pt @ bedside

## 2020-12-23 RX ADMIN — LAMOTRIGINE 25 MILLIGRAM(S): 25 TABLET, ORALLY DISINTEGRATING ORAL at 17:29

## 2020-12-23 RX ADMIN — ENOXAPARIN SODIUM 40 MILLIGRAM(S): 100 INJECTION SUBCUTANEOUS at 12:29

## 2020-12-23 RX ADMIN — Medication 25 MICROGRAM(S): at 05:35

## 2020-12-23 RX ADMIN — CITALOPRAM 10 MILLIGRAM(S): 10 TABLET, FILM COATED ORAL at 21:31

## 2020-12-23 RX ADMIN — PANTOPRAZOLE SODIUM 40 MILLIGRAM(S): 20 TABLET, DELAYED RELEASE ORAL at 05:35

## 2020-12-23 RX ADMIN — Medication 0.25 MILLIGRAM(S): at 05:35

## 2020-12-23 RX ADMIN — LAMOTRIGINE 25 MILLIGRAM(S): 25 TABLET, ORALLY DISINTEGRATING ORAL at 05:35

## 2020-12-23 RX ADMIN — Medication 600 MILLIGRAM(S): at 17:29

## 2020-12-23 RX ADMIN — Medication 600 MILLIGRAM(S): at 05:35

## 2020-12-23 RX ADMIN — Medication 0.25 MILLIGRAM(S): at 17:29

## 2020-12-23 NOTE — PROGRESS NOTE ADULT - SUBJECTIVE AND OBJECTIVE BOX
[facility] DIVISION of INFECTIOUS DISEASE  Salvatore Dennis MD PhD, Suzanne Hughes MD, Elvira Valdez MD, Fidel Rollins MD  and providing coverage with Mary Ann Lucas MD and Loren Fritz MD  Providing Infectious Disease Consultations at [facility], [facility], [facility]'s      Office# [phone] to schedule follow up appointments  Answering Service for urgent calls or New Consults [phone]  Cell# to text for urgent issues Salvatore Dennis [phone]     Infectious diseases progress note:    LINN WHITE is a 80y y. o. Female patient    COVID Patient    Allergies    cephalexin (Hives)  cephalexin (Unknown)    Intolerances    No seafood (Unknown)      ANTIBIOTICS/RELEVANT:  antimicrobials    immunologic:    OTHER:  acetaminophen   Tablet .. 650 milliGRAM(s) Oral every 6 hours PRN  ALBUTerol    90 MICROgram(s) HFA Inhaler 2 Puff(s) Inhalation every 6 hours PRN  ALPRAZolam 0.25 milliGRAM(s) Oral two times a day  aluminum hydroxide/magnesium hydroxide/simethicone Suspension 30 milliLiter(s) Oral every 6 hours PRN  amLODIPine   Tablet 5 milliGRAM(s) Oral daily  citalopram 10 milliGRAM(s) Oral daily  enoxaparin Injectable 40 milliGRAM(s) SubCutaneous daily  guaiFENesin  milliGRAM(s) Oral every 12 hours  lamoTRIgine 25 milliGRAM(s) Oral two times a day  levothyroxine 25 MICROGram(s) Oral daily  ondansetron Injectable 4 milliGRAM(s) IV Push every 6 hours PRN  pantoprazole    Tablet 40 milliGRAM(s) Oral before breakfast      Objective:  Vital Signs Last 24 Hrs  T(C): 36.9 (23 Dec 2020 07:58), Max: 37 (22 Dec 2020 15:50)  T(F): 98.4 (23 Dec 2020 07:58), Max: 98.6 (22 Dec 2020 15:50)  HR: 63 (23 Dec 2020 07:58) (63 - 82)  BP: 94/56 (23 Dec 2020 07:58) (94/56 - 106/63)  BP(mean): --  RR: 19 (23 Dec 2020 07:58) (17 - 19)  SpO2: 93% (23 Dec 2020 07:58) (93% - 95%)    T(C): 36.9 (12-23-20 @ 07:58), Max: 37.3 (12-21-20 @ 15:39)  T(C): 36.9 (12-23-20 @ 07:58), Max: 37.3 (12-21-20 @ 15:39)  T(C): 36.9 (12-23-20 @ 07:58), Max: 37.3 (12-21-20 @ 15:39)    PHYSICAL EXAM:  HEENT: NC atraumatic  Neck: supple  Respiratory: no accessory muscle use, breathing comfortably  Cardiovascular: distant  Gastrointestinal: normal appearing, nondistended  Extremities: no clubbing, no cyanosis,      LABS:        8.21 12-21 @ 10:32  7.59 12-20 @ 07:46  12.67 12-16 @ 13:05              Creatinine, Serum: 1.00 mg/dL (12-21-20 @ 10:32)  Creatinine, Serum: 1.00 mg/dL (12-20-20 @ 07:46)  Creatinine, Serum: 1.20 mg/dL (12-16-20 @ 13:05)                COVID RISK SCORE  Auto Neutrophil #: 8.86 K/uL (12-16-20 @ 13:05)  Auto Lymphocyte #: 2.32 K/uL (12-16-20 @ 13:05)  Auto Neutrophil #: 7.70 K/uL (12-15-20 @ 09:53)  Auto Lymphocyte #: 2.22 K/uL (12-15-20 @ 09:53)  Auto Neutrophil #: 10.70 K/uL (12-14-20 @ 09:58)  Auto Lymphocyte #: 4.22 K/uL (12-14-20 @ 09:58)  Auto Neutrophil #: 8.27 K/uL (12-13-20 @ 07:52)  Auto Lymphocyte #: 2.14 K/uL (12-13-20 @ 07:52)  Auto Neutrophil #: 6.00 K/uL (12-11-20 @ 06:55)  Auto Lymphocyte #: 2.77 K/uL (12-11-20 @ 06:55)      Auto Eosinophil #: 0.09 K/uL (12-16-20 @ 13:05)  Auto Eosinophil #: 0.11 K/uL (12-15-20 @ 09:53)  Auto Eosinophil #: 0.07 K/uL (12-14-20 @ 09:58)  Auto Eosinophil #: 0.06 K/uL (12-13-20 @ 07:52)  Auto Eosinophil #: 0.14 K/uL (12-11-20 @ 06:55)          Troponin I, Serum: .041 ng/mL (12-16-20 @ 13:05)  Troponin I, Serum: .077 ng/mL (12-16-20 @ 01:31)    Creatine Kinase, Serum: 54 U/L (12-16-20 @ 13:05)  Creatine Kinase, Serum: 57 U/L (12-16-20 @ 01:31)                      MICROBIOLOGY:              RADIOLOGY & ADDITIONAL STUDIES:

## 2020-12-23 NOTE — PROGRESS NOTE ADULT - SUBJECTIVE AND OBJECTIVE BOX
Patient seen and examined at bedside. initially more plesant today  Spoke to psychiatry. Patient not a candidate for acceptance for psych transfer.    Vitals stable afebrile. patient on room air. PT cleared patient for home dc    Initiated discussion for discharge in afternoon. post discussion patient concerned on going home. worried she is going to infect her son. does not want to go home unless covid negative    Review of Systems:  General:denies fever chills, headache, weakness  HEENT: denies blurry vision,diffculty swallowing, difficulty hearing, tinnitus  Cardiovascular: denies chest pain  ,palpitations  Pulmonary:denies shortness of breath, cough, wheezing, hemoptysis  Gastrointestinal: denies abdominal pain, constipation, diarrhea,nausea , vomiting, hematochezia  : denies hematuria, dysuria, or incontinence  Neurological: denies weakness, numbness , tingling, dizziness, tremors  MSK: denies muscle pain, difficulty ambulating, swelling, back pain  skin: denies skin rash, itching, burning, or  skin lesions  Psychiatrical: denies mood disturbances, anxierty, feeling depressed, depression , or difficulty sleeping    Objective:  Vitals  T(C): 36.9 (12-23-20 @ 07:58), Max: 37 (12-22-20 @ 15:50)  HR: 63 (12-23-20 @ 07:58) (63 - 82)  BP: 94/56 (12-23-20 @ 07:58) (94/56 - 106/63)  RR: 19 (12-23-20 @ 07:58) (17 - 19)  SpO2: 93% (12-23-20 @ 07:58) (93% - 95%)    Physical Exam:  General: comfortable, no acute distress, well nourished  HEENT: Atraumatic, no LAD, trachea midline, PERRLA  Cardiovascular: normal s1s2, no murmurs, gallops or fricition rubs  Pulmonary: clear to ausculation Bilaterally, no wheezing , rhonchi  Gastrointestinal: soft non tender non distended, no masses felt, no organomegally  Muscloskeletal: no lower extremity edema, intact bilateral lower extremity pulses  Neurological: CN II-12 intact. No focal weakness  Psychiatrical: normal mood, cooperative  SKIN: no rash, lesions or ulcers    Labs:                    Active Medications  MEDICATIONS  (STANDING):  ALPRAZolam 0.25 milliGRAM(s) Oral two times a day  amLODIPine   Tablet 5 milliGRAM(s) Oral daily  citalopram 10 milliGRAM(s) Oral daily  enoxaparin Injectable 40 milliGRAM(s) SubCutaneous daily  guaiFENesin  milliGRAM(s) Oral every 12 hours  lamoTRIgine 25 milliGRAM(s) Oral two times a day  levothyroxine 25 MICROGram(s) Oral daily  pantoprazole    Tablet 40 milliGRAM(s) Oral before breakfast    MEDICATIONS  (PRN):  acetaminophen   Tablet .. 650 milliGRAM(s) Oral every 6 hours PRN Temp greater or equal to 38C (100.4F), Mild Pain (1 - 3)  ALBUTerol    90 MICROgram(s) HFA Inhaler 2 Puff(s) Inhalation every 6 hours PRN Bronchospasm  aluminum hydroxide/magnesium hydroxide/simethicone Suspension 30 milliLiter(s) Oral every 6 hours PRN Dyspepsia  ondansetron Injectable 4 milliGRAM(s) IV Push every 6 hours PRN Nausea and/or Vomiting

## 2020-12-23 NOTE — PROGRESS NOTE ADULT - ASSESSMENT
Acute hypoxic respiratory failure 2/2 COVID 19 pneumonia:  completed Remdesivir, and Decadron 6mg IV daily.   on ROOM AIR  Albuterol inh Q6h PRN and Guaifenesin ER 600mg PO Q12h.    ID and Pulmonary f/u . COVID PCR + 12/22    Hx of subdural hematoma:  continue Lamictal 25mg PO BID  HTN : continue Norvasc 5mg PO daily  Hypothyroidism: continue Synthroid 25mcg PO daily      Depression, anxiety, PTSD:   f/u psych. No accepting psych facilites at this time   pt evaled patient:; no recommended PT needs.  continue Celexa 10mg PO daily, psych consulted, no objection to d/c, started on low dose benzo. Patient with continuous episodes of ""catatonia""  psych called to evaluate patient marco a, but she is not speaking much to him as well.      -VTE ppx:  Lovenox 40mg SQ daily  further emotional support and GOC and discharge planning to be discussed with  patient

## 2020-12-23 NOTE — PROGRESS NOTE ADULT - SUBJECTIVE AND OBJECTIVE BOX
Date/Time Patient Seen:  		  Referring MD:   Data Reviewed	       Patient is a 80y old  Female who presents with a chief complaint of COVID 19 Pneumonia (22 Dec 2020 10:43)      Subjective/HPI     PAST MEDICAL & SURGICAL HISTORY:  Vertigo    Depression    Hypothyroidism    Vertigo    Hypothyroidism    Depression    UTI (urinary tract infection)    No significant past surgical history  RIGHT KNEE ARTHROSCOPY    No significant past surgical history          Medication list         MEDICATIONS  (STANDING):  ALPRAZolam 0.25 milliGRAM(s) Oral two times a day  amLODIPine   Tablet 5 milliGRAM(s) Oral daily  citalopram 10 milliGRAM(s) Oral daily  enoxaparin Injectable 40 milliGRAM(s) SubCutaneous daily  guaiFENesin  milliGRAM(s) Oral every 12 hours  lamoTRIgine 25 milliGRAM(s) Oral two times a day  levothyroxine 25 MICROGram(s) Oral daily  pantoprazole    Tablet 40 milliGRAM(s) Oral before breakfast    MEDICATIONS  (PRN):  acetaminophen   Tablet .. 650 milliGRAM(s) Oral every 6 hours PRN Temp greater or equal to 38C (100.4F), Mild Pain (1 - 3)  ALBUTerol    90 MICROgram(s) HFA Inhaler 2 Puff(s) Inhalation every 6 hours PRN Bronchospasm  aluminum hydroxide/magnesium hydroxide/simethicone Suspension 30 milliLiter(s) Oral every 6 hours PRN Dyspepsia  ondansetron Injectable 4 milliGRAM(s) IV Push every 6 hours PRN Nausea and/or Vomiting         Vitals log        ICU Vital Signs Last 24 Hrs  T(C): 36.9 (23 Dec 2020 07:58), Max: 37 (22 Dec 2020 15:50)  T(F): 98.4 (23 Dec 2020 07:58), Max: 98.6 (22 Dec 2020 15:50)  HR: 63 (23 Dec 2020 07:58) (63 - 82)  BP: 94/56 (23 Dec 2020 07:58) (94/56 - 114/67)  BP(mean): --  ABP: --  ABP(mean): --  RR: 19 (23 Dec 2020 07:58) (17 - 19)  SpO2: 93% (23 Dec 2020 07:58) (93% - 95%)           Input and Output:  I&O's Detail    22 Dec 2020 07:01  -  23 Dec 2020 07:00  --------------------------------------------------------  IN:  Total IN: 0 mL    OUT:    Voided (mL): 2 mL  Total OUT: 2 mL    Total NET: -2 mL          Lab Data                        11.8   8.21  )-----------( 267      ( 21 Dec 2020 10:32 )             36.6     12-21    142  |  106  |  36<H>  ----------------------------<  132<H>  4.3   |  30  |  1.00    Ca    9.0      21 Dec 2020 10:32    TPro  7.1  /  Alb  3.1<L>  /  TBili  0.4  /  DBili  x   /  AST  26  /  ALT  45  /  AlkPhos  65  12-21            Review of Systems	      Objective     Physical Examination    heart s1s2  lung dec BS  abd soft  on RA      Pertinent Lab findings & Imaging      Pam:  NO   Adequate UO     I&O's Detail    22 Dec 2020 07:01  -  23 Dec 2020 07:00  --------------------------------------------------------  IN:  Total IN: 0 mL    OUT:    Voided (mL): 2 mL  Total OUT: 2 mL    Total NET: -2 mL               Discussed with:     Cultures:	        Radiology

## 2020-12-23 NOTE — CHART NOTE - NSCHARTNOTEFT_GEN_A_CORE
Assessment: patient seen for follow up. patient reports is tired , exhausted states lunch tray at bedside.  12/22 BM patient with % of meals taken per flow sheet.     Factors impacting intake: [ ] none [ ] nausea  [ ] vomiting [ ] diarrhea [ ] constipation  [ ]chewing problems [ ] swallowing issues  [x ] other: fluctuating PO intake    Diet Presciption: Diet, Soft:   Supplement Feeding Modality:  Oral  Ensure Enlive Cans or Servings Per Day:  1       Frequency:  Three Times a day (12-08-20 @ 13:17)    Intake: %    Current Weight: 120.5 # 12/23 115# at admit no edema noted  % Weight Change    Pertinent Medications: MEDICATIONS  (STANDING):  ALPRAZolam 0.25 milliGRAM(s) Oral two times a day  amLODIPine   Tablet 5 milliGRAM(s) Oral daily  citalopram 10 milliGRAM(s) Oral daily  enoxaparin Injectable 40 milliGRAM(s) SubCutaneous daily  guaiFENesin  milliGRAM(s) Oral every 12 hours  lamoTRIgine 25 milliGRAM(s) Oral two times a day  levothyroxine 25 MICROGram(s) Oral daily  pantoprazole    Tablet 40 milliGRAM(s) Oral before breakfast    MEDICATIONS  (PRN):  acetaminophen   Tablet .. 650 milliGRAM(s) Oral every 6 hours PRN Temp greater or equal to 38C (100.4F), Mild Pain (1 - 3)  ALBUTerol    90 MICROgram(s) HFA Inhaler 2 Puff(s) Inhalation every 6 hours PRN Bronchospasm  aluminum hydroxide/magnesium hydroxide/simethicone Suspension 30 milliLiter(s) Oral every 6 hours PRN Dyspepsia  ondansetron Injectable 4 milliGRAM(s) IV Push every 6 hours PRN Nausea and/or Vomiting    Pertinent Labs: 12-21 Na142 mmol/L Glu 132 mg/dL<H> K+ 4.3 mmol/L Cr  1.00 mg/dL BUN 36 mg/dL<H> 12-21 Alb 3.1 g/dL<L>        Skin: no pressure injury    Estimated Needs:   [x ] no change since previous assessment  [ ] recalculated:     Previous Nutrition Diagnosis:   [ ] Inadequate Energy Intake [x ]Inadequate Oral Intake [ ] Excessive Energy Intake   [ ] Underweight [x ] Increased Nutrient Needs [ ] Overweight/Obesity   [ ] Altered GI Function [ ] Unintended Weight Loss [ ] Food & Nutrition Related Knowledge Deficit [ ] Malnutrition     Nutrition Diagnosis is [x ] ongoing being addressed with supplement (chocolate ensure enlive)  [ ] resolved [ ] not applicable     New Nutrition Diagnosis: [x ] not applicable       Interventions:   Recommend  [ ] Change Diet To:  [ ] Nutrition Supplement  [ ] Nutrition Support  [x ] Other: continue diet as ordered recommend add MVI continue to follow weights and PO intake     Monitoring and Evaluation:   [x ] PO intake [ x ] Tolerance to diet prescription [ x ] weights [ x ] labs[ x ] follow up per protocol  [ ] other:

## 2020-12-24 LAB
ALBUMIN SERPL ELPH-MCNC: 2.6 G/DL — LOW (ref 3.3–5)
ALP SERPL-CCNC: 69 U/L — SIGNIFICANT CHANGE UP (ref 40–120)
ALT FLD-CCNC: 36 U/L — SIGNIFICANT CHANGE UP (ref 12–78)
ANION GAP SERPL CALC-SCNC: 6 MMOL/L — SIGNIFICANT CHANGE UP (ref 5–17)
AST SERPL-CCNC: 19 U/L — SIGNIFICANT CHANGE UP (ref 15–37)
BILIRUB SERPL-MCNC: 0.3 MG/DL — SIGNIFICANT CHANGE UP (ref 0.2–1.2)
BUN SERPL-MCNC: 34 MG/DL — HIGH (ref 7–23)
CALCIUM SERPL-MCNC: 8.5 MG/DL — SIGNIFICANT CHANGE UP (ref 8.5–10.1)
CHLORIDE SERPL-SCNC: 104 MMOL/L — SIGNIFICANT CHANGE UP (ref 96–108)
CO2 SERPL-SCNC: 31 MMOL/L — SIGNIFICANT CHANGE UP (ref 22–31)
CREAT SERPL-MCNC: 1.1 MG/DL — SIGNIFICANT CHANGE UP (ref 0.5–1.3)
GLUCOSE SERPL-MCNC: 133 MG/DL — HIGH (ref 70–99)
HCT VFR BLD CALC: 31.9 % — LOW (ref 34.5–45)
HGB BLD-MCNC: 10.3 G/DL — LOW (ref 11.5–15.5)
MCHC RBC-ENTMCNC: 30.9 PG — SIGNIFICANT CHANGE UP (ref 27–34)
MCHC RBC-ENTMCNC: 32.3 GM/DL — SIGNIFICANT CHANGE UP (ref 32–36)
MCV RBC AUTO: 95.8 FL — SIGNIFICANT CHANGE UP (ref 80–100)
NRBC # BLD: 0 /100 WBCS — SIGNIFICANT CHANGE UP (ref 0–0)
PLATELET # BLD AUTO: 187 K/UL — SIGNIFICANT CHANGE UP (ref 150–400)
POTASSIUM SERPL-MCNC: 4 MMOL/L — SIGNIFICANT CHANGE UP (ref 3.5–5.3)
POTASSIUM SERPL-SCNC: 4 MMOL/L — SIGNIFICANT CHANGE UP (ref 3.5–5.3)
PROT SERPL-MCNC: 6.1 G/DL — SIGNIFICANT CHANGE UP (ref 6–8.3)
RBC # BLD: 3.33 M/UL — LOW (ref 3.8–5.2)
RBC # FLD: 14.3 % — SIGNIFICANT CHANGE UP (ref 10.3–14.5)
SARS-COV-2 RNA SPEC QL NAA+PROBE: DETECTED
SODIUM SERPL-SCNC: 141 MMOL/L — SIGNIFICANT CHANGE UP (ref 135–145)
WBC # BLD: 5.97 K/UL — SIGNIFICANT CHANGE UP (ref 3.8–10.5)
WBC # FLD AUTO: 5.97 K/UL — SIGNIFICANT CHANGE UP (ref 3.8–10.5)

## 2020-12-24 PROCEDURE — 99232 SBSQ HOSP IP/OBS MODERATE 35: CPT | Mod: CS,GC

## 2020-12-24 RX ADMIN — CITALOPRAM 10 MILLIGRAM(S): 10 TABLET, FILM COATED ORAL at 21:16

## 2020-12-24 RX ADMIN — ENOXAPARIN SODIUM 40 MILLIGRAM(S): 100 INJECTION SUBCUTANEOUS at 13:31

## 2020-12-24 RX ADMIN — LAMOTRIGINE 25 MILLIGRAM(S): 25 TABLET, ORALLY DISINTEGRATING ORAL at 05:25

## 2020-12-24 RX ADMIN — Medication 0.25 MILLIGRAM(S): at 18:23

## 2020-12-24 RX ADMIN — LAMOTRIGINE 25 MILLIGRAM(S): 25 TABLET, ORALLY DISINTEGRATING ORAL at 18:23

## 2020-12-24 RX ADMIN — Medication 600 MILLIGRAM(S): at 05:25

## 2020-12-24 RX ADMIN — Medication 0.25 MILLIGRAM(S): at 05:25

## 2020-12-24 RX ADMIN — AMLODIPINE BESYLATE 5 MILLIGRAM(S): 2.5 TABLET ORAL at 05:25

## 2020-12-24 RX ADMIN — PANTOPRAZOLE SODIUM 40 MILLIGRAM(S): 20 TABLET, DELAYED RELEASE ORAL at 05:25

## 2020-12-24 RX ADMIN — Medication 25 MICROGRAM(S): at 05:25

## 2020-12-24 RX ADMIN — Medication 600 MILLIGRAM(S): at 18:23

## 2020-12-24 NOTE — PROGRESS NOTE ADULT - SUBJECTIVE AND OBJECTIVE BOX
Date/Time Patient Seen:  		  Referring MD:   Data Reviewed	       Patient is a 80y old  Female who presents with a chief complaint of COVID 19 Pneumonia (23 Dec 2020 15:00)      Subjective/HPI     PAST MEDICAL & SURGICAL HISTORY:  Vertigo    Depression    Hypothyroidism    Vertigo    Hypothyroidism    Depression    UTI (urinary tract infection)    No significant past surgical history  RIGHT KNEE ARTHROSCOPY    No significant past surgical history          Medication list         MEDICATIONS  (STANDING):  ALPRAZolam 0.25 milliGRAM(s) Oral two times a day  amLODIPine   Tablet 5 milliGRAM(s) Oral daily  citalopram 10 milliGRAM(s) Oral daily  enoxaparin Injectable 40 milliGRAM(s) SubCutaneous daily  guaiFENesin  milliGRAM(s) Oral every 12 hours  lamoTRIgine 25 milliGRAM(s) Oral two times a day  levothyroxine 25 MICROGram(s) Oral daily  pantoprazole    Tablet 40 milliGRAM(s) Oral before breakfast    MEDICATIONS  (PRN):  acetaminophen   Tablet .. 650 milliGRAM(s) Oral every 6 hours PRN Temp greater or equal to 38C (100.4F), Mild Pain (1 - 3)  ALBUTerol    90 MICROgram(s) HFA Inhaler 2 Puff(s) Inhalation every 6 hours PRN Bronchospasm  aluminum hydroxide/magnesium hydroxide/simethicone Suspension 30 milliLiter(s) Oral every 6 hours PRN Dyspepsia  ondansetron Injectable 4 milliGRAM(s) IV Push every 6 hours PRN Nausea and/or Vomiting         Vitals log        ICU Vital Signs Last 24 Hrs  T(C): 36.3 (24 Dec 2020 07:55), Max: 37 (23 Dec 2020 15:45)  T(F): 97.4 (24 Dec 2020 07:55), Max: 98.6 (23 Dec 2020 15:45)  HR: 72 (24 Dec 2020 07:55) (72 - 91)  BP: 109/63 (24 Dec 2020 07:55) (103/59 - 118/67)  BP(mean): --  ABP: --  ABP(mean): --  RR: 19 (24 Dec 2020 07:55) (18 - 19)  SpO2: 95% (24 Dec 2020 07:55) (93% - 95%)           Input and Output:  I&O's Detail    23 Dec 2020 07:01  -  24 Dec 2020 07:00  --------------------------------------------------------  IN:    Oral Fluid: 300 mL  Total IN: 300 mL    OUT:  Total OUT: 0 mL    Total NET: 300 mL          Lab Data                        10.3   5.97  )-----------( 187      ( 24 Dec 2020 07:05 )             31.9     12-24    141  |  104  |  34<H>  ----------------------------<  133<H>  4.0   |  31  |  1.10    Ca    8.5      24 Dec 2020 07:05    TPro  6.1  /  Alb  2.6<L>  /  TBili  0.3  /  DBili  x   /  AST  19  /  ALT  36  /  AlkPhos  69  12-24            Review of Systems	      Objective     Physical Examination    heart s1s2  lung dec BS  abd soft      Pertinent Lab findings & Imaging      Pam:  NO   Adequate UO     I&O's Detail    23 Dec 2020 07:01  -  24 Dec 2020 07:00  --------------------------------------------------------  IN:    Oral Fluid: 300 mL  Total IN: 300 mL    OUT:  Total OUT: 0 mL    Total NET: 300 mL               Discussed with:     Cultures:	        Radiology

## 2020-12-24 NOTE — PROGRESS NOTE ADULT - SUBJECTIVE AND OBJECTIVE BOX
Patient is a 80y old  Female who presents with a chief complaint of COVID 19 Pneumonia (24 Dec 2020 09:03)        HPI:  79yo female with pmhx of hypothyroid, depression, subdural hematoma 2020 fall presents to \[facility]\"" with C/C of cough, low grade temp.  History obtained by patient. Patient reports her symptoms started less than one week ago. She reports productive cough, sore throat, low grade temps. Patient reported visiting [facility] whom dx pt with covid + on rapid test and advised pt to come to [facility]  for evaluation.   Patient was  seen in [facility] on  and ultimatel dced home. Since initial discharge, patient shortness of breath worsened, therefore she returned to [facility] ED. Patient reports she did not take medication for her symptoms. Patient lives alone with home health aid ,although uunsure of covid exposure.  on ROS: patient denies fever, cp, n/v/d, abd pain, rash.    ER course:  Patient initially seen in [facility]. hypoxemic to 81 on room air. improved to 96 on 2 liters  labs: benign  ddimer [id number]   (04 Dec 2020 18:13)      SUBJECTIVE & OBJECTIVE: Pt seen and examined at bedside.  air entry at [facility] bases     PHYSICAL EXAM:  T(C): 36.3 (20 @ 07:55), Max: 37 (20 @ 15:45)  HR: 72 (20 @ 07:55) (72 - 91)  BP: 109/63 (20 @ 07:55) (103/59 - 118/67)  RR: 19 (20 @ 07:55) (18 - 19)  SpO2: 95% (20 @ 07:55) (93% - 95%)  Wt(kg): --   GENERAL: NAD, well-groomed, well-developed  NERVOUS SYSTEM:  Alert & Oriented X3,  CHEST/LUNG: Clear to auscultation bilaterally; No rales, rhonchi, wheezing, or rubs  HEART: Regular rate and rhythm; No murmurs, rubs, or gallops  ABDOMEN: Soft, Nontender, Nondistended; Bowel sounds present  EXTREMITIES:  2+ Peripheral Pulses, No clubbing, cyanosis, or edema        MEDICATIONS  (STANDING):  ALPRAZolam 0.25 milliGRAM(s) Oral two times a day  amLODIPine   Tablet 5 milliGRAM(s) Oral daily  citalopram 10 milliGRAM(s) Oral daily  enoxaparin Injectable 40 milliGRAM(s) SubCutaneous daily  guaiFENesin  milliGRAM(s) Oral every 12 hours  lamoTRIgine 25 milliGRAM(s) Oral two times a day  levothyroxine 25 MICROGram(s) Oral daily  pantoprazole    Tablet 40 milliGRAM(s) Oral before breakfast    MEDICATIONS  (PRN):  acetaminophen   Tablet .. 650 milliGRAM(s) Oral every 6 hours PRN Temp greater or equal to 38C (100.4F), Mild Pain (1 - 3)  ALBUTerol    90 MICROgram(s) HFA Inhaler 2 Puff(s) Inhalation every 6 hours PRN Bronchospasm  aluminum hydroxide/magnesium hydroxide/simethicone Suspension 30 milliLiter(s) Oral every 6 hours PRN Dyspepsia  ondansetron Injectable 4 milliGRAM(s) IV Push every 6 hours PRN Nausea and/or Vomiting      LABS:                        10.3   5.97  )-----------( 187      ( 24 Dec 2020 07:05 )             31.9     12-24    141  |  104  |  34<H>  ----------------------------<  133<H>  4.0   |  31  |  1.10    Ca    8.5      24 Dec 2020 07:05    TPro  6.1  /  Alb  2.6<L>  /  TBili  0.3  /  DBili  x   /  AST  19  /  ALT  36  /  AlkPhos  69  12-24          CAPILLARY BLOOD GLUCOSE          CAPILLARY BLOOD GLUCOSE        CAPILLARY BLOOD GLUCOSE                RECENT CULTURES:      RADIOLOGY & ADDITIONAL TESTS:                        DVT/GI ppx  Discussed with pt @ bedside

## 2020-12-25 LAB — SARS-COV-2 RNA SPEC QL NAA+PROBE: SIGNIFICANT CHANGE UP

## 2020-12-25 PROCEDURE — 99232 SBSQ HOSP IP/OBS MODERATE 35: CPT | Mod: CS,GC

## 2020-12-25 RX ADMIN — Medication 25 MICROGRAM(S): at 05:06

## 2020-12-25 RX ADMIN — Medication 600 MILLIGRAM(S): at 05:06

## 2020-12-25 RX ADMIN — PANTOPRAZOLE SODIUM 40 MILLIGRAM(S): 20 TABLET, DELAYED RELEASE ORAL at 05:07

## 2020-12-25 RX ADMIN — Medication 0.25 MILLIGRAM(S): at 05:06

## 2020-12-25 RX ADMIN — LAMOTRIGINE 25 MILLIGRAM(S): 25 TABLET, ORALLY DISINTEGRATING ORAL at 17:22

## 2020-12-25 RX ADMIN — CITALOPRAM 10 MILLIGRAM(S): 10 TABLET, FILM COATED ORAL at 21:18

## 2020-12-25 RX ADMIN — Medication 0.25 MILLIGRAM(S): at 17:22

## 2020-12-25 RX ADMIN — ENOXAPARIN SODIUM 40 MILLIGRAM(S): 100 INJECTION SUBCUTANEOUS at 12:42

## 2020-12-25 RX ADMIN — LAMOTRIGINE 25 MILLIGRAM(S): 25 TABLET, ORALLY DISINTEGRATING ORAL at 05:06

## 2020-12-25 RX ADMIN — Medication 600 MILLIGRAM(S): at 17:22

## 2020-12-25 NOTE — PROGRESS NOTE ADULT - SUBJECTIVE AND OBJECTIVE BOX
Date/Time Patient Seen:  		  Referring MD:   Data Reviewed	       Patient is a 80y old  Female who presents with a chief complaint of COVID 19 Pneumonia (24 Dec 2020 10:26)      Subjective/HPI     PAST MEDICAL & SURGICAL HISTORY:  Vertigo    Depression    Hypothyroidism    Vertigo    Hypothyroidism    Depression    UTI (urinary tract infection)    No significant past surgical history  RIGHT KNEE ARTHROSCOPY    No significant past surgical history          Medication list         MEDICATIONS  (STANDING):  ALPRAZolam 0.25 milliGRAM(s) Oral two times a day  amLODIPine   Tablet 5 milliGRAM(s) Oral daily  citalopram 10 milliGRAM(s) Oral daily  enoxaparin Injectable 40 milliGRAM(s) SubCutaneous daily  guaiFENesin  milliGRAM(s) Oral every 12 hours  lamoTRIgine 25 milliGRAM(s) Oral two times a day  levothyroxine 25 MICROGram(s) Oral daily  pantoprazole    Tablet 40 milliGRAM(s) Oral before breakfast    MEDICATIONS  (PRN):  acetaminophen   Tablet .. 650 milliGRAM(s) Oral every 6 hours PRN Temp greater or equal to 38C (100.4F), Mild Pain (1 - 3)  ALBUTerol    90 MICROgram(s) HFA Inhaler 2 Puff(s) Inhalation every 6 hours PRN Bronchospasm  aluminum hydroxide/magnesium hydroxide/simethicone Suspension 30 milliLiter(s) Oral every 6 hours PRN Dyspepsia  ondansetron Injectable 4 milliGRAM(s) IV Push every 6 hours PRN Nausea and/or Vomiting         Vitals log        ICU Vital Signs Last 24 Hrs  T(C): 36.7 (25 Dec 2020 07:32), Max: 36.9 (24 Dec 2020 15:39)  T(F): 98.1 (25 Dec 2020 07:32), Max: 98.5 (24 Dec 2020 15:39)  HR: 74 (25 Dec 2020 07:32) (70 - 85)  BP: 105/58 (25 Dec 2020 07:32) (89/47 - 109/67)  BP(mean): --  ABP: --  ABP(mean): --  RR: 19 (25 Dec 2020 07:32) (17 - 19)  SpO2: 93% (25 Dec 2020 07:32) (91% - 94%)           Input and Output:  I&O's Detail      Lab Data                        10.3   5.97  )-----------( 187      ( 24 Dec 2020 07:05 )             31.9     12-24    141  |  104  |  34<H>  ----------------------------<  133<H>  4.0   |  31  |  1.10    Ca    8.5      24 Dec 2020 07:05    TPro  6.1  /  Alb  2.6<L>  /  TBili  0.3  /  DBili  x   /  AST  19  /  ALT  36  /  AlkPhos  69  12-24            Review of Systems	      Objective     Physical Examination    heart s1s2  lung dec BS  abd soft  on RA      Pertinent Lab findings & Imaging      Pam:  NO   Adequate UO     I&O's Detail           Discussed with:     Cultures:	        Radiology

## 2020-12-25 NOTE — PROGRESS NOTE ADULT - ASSESSMENT
Acute hypoxic respiratory failure 2/2 COVID 19 pneumonia:  completed Remdesivir, and Decadron 6mg IV daily.   on ROOM AIR  Albuterol inh Q6h PRN and Guaifenesin ER 600mg PO Q12h.    ID and Pulmonary f/u . COVID PCR + 12/25    Hx of subdural hematoma:  continue Lamictal 25mg PO BID  HTN : continue Norvasc 5mg PO daily  Hypothyroidism: continue Synthroid 25mcg PO daily      Depression, anxiety, PTSD:   f/u psych. No accepting psych facilites at this time   continue Celexa 10mg PO daily, psych consulted, no objection to d/c, started on low dose benzo. Patient with continuous episodes of ""catatonia""  psych called to evaluate patient marco a, but she is not speaking much to him as well.      -VTE ppx:  Lovenox 40mg SQ daily  further emotional support and GOC and discharge planning to be discussed with  patient

## 2020-12-25 NOTE — PROGRESS NOTE ADULT - SUBJECTIVE AND OBJECTIVE BOX
Patient is a 80y old  Female who presents with a chief complaint of COVID 19 Pneumonia (25 Dec 2020 08:58)        HPI:  79yo female with pmhx of hypothyroid, depression, subdural hematoma 8/2020 2/2 fall [facility] [place] with C/C of cough, low grade temp.  History obtained by patient. Patient reports her symptoms started less than one week ago. She reports productive cough, sore throat, low grade temps. Patient reported [place] [facility] whom dx pt with covid + on rapid test and advised pt to come to [facility] December 2 for evaluation.   Patient was  seen in [facility] on December 2 and ultimatel dced home. Since initial discharge, patient shortness of breath worsened, therefore she returned to [facility] ED. Patient reports she did not take medication for her symptoms. Patient lives alone with home health aid ,although uunsure of covid exposure.  on ROS: patient denies fever, cp, n/v/d, abd pain, rash.    ER course:  Patient initially seen in [facility]. hypoxemic to 81 on room air. improved to 96 on 2 liters  labs: benign  ddimer [id number]   (04 Dec 2020 18:13)      SUBJECTIVE & OBJECTIVE: Pt seen and examined at bedside. awaiting negative covid    PHYSICAL EXAM:  T(C): 36.7 (12-25-20 @ 07:32), Max: 36.9 (12-24-20 @ 15:39)  HR: 74 (12-25-20 @ 07:32) (70 - 85)  BP: 105/58 (12-25-20 @ 07:32) (89/47 - 109/67)  RR: 19 (12-25-20 @ 07:32) (17 - 19)  SpO2: 93% (12-25-20 @ 07:32) (91% - 94%)  Wt(kg): --   GENERAL: NAD, well-groomed, well-developed  NERVOUS SYSTEM:  Alert & Oriented X3,  CHEST/LUNG: Clear to auscultation bilaterally; No rales, rhonchi, wheezing, or rubs  HEART: Regular rate and rhythm; No murmurs, rubs, or gallops  ABDOMEN: Soft, Nontender, Nondistended; Bowel sounds present  EXTREMITIES:  2+ Peripheral Pulses, No clubbing, cyanosis, or edema        MEDICATIONS  (STANDING):  ALPRAZolam 0.25 milliGRAM(s) Oral two times a day  amLODIPine   Tablet 5 milliGRAM(s) Oral daily  citalopram 10 milliGRAM(s) Oral daily  enoxaparin Injectable 40 milliGRAM(s) SubCutaneous daily  guaiFENesin  milliGRAM(s) Oral every 12 hours  lamoTRIgine 25 milliGRAM(s) Oral two times a day  levothyroxine 25 MICROGram(s) Oral daily  pantoprazole    Tablet 40 milliGRAM(s) Oral before breakfast    MEDICATIONS  (PRN):  acetaminophen   Tablet .. 650 milliGRAM(s) Oral every 6 hours PRN Temp greater or equal to 38C (100.4F), Mild Pain (1 - 3)  ALBUTerol    90 MICROgram(s) HFA Inhaler 2 Puff(s) Inhalation every 6 hours PRN Bronchospasm  aluminum hydroxide/magnesium hydroxide/simethicone Suspension 30 milliLiter(s) Oral every 6 hours PRN Dyspepsia  ondansetron Injectable 4 milliGRAM(s) IV Push every 6 hours PRN Nausea and/or Vomiting      LABS:                        10.3   5.97  )-----------( 187      ( 24 Dec 2020 07:05 )             31.9     12-24    141  |  104  |  34<H>  ----------------------------<  133<H>  4.0   |  31  |  1.10    Ca    8.5      24 Dec 2020 07:05    TPro  6.1  /  Alb  2.6<L>  /  TBili  0.3  /  DBili  x   /  AST  19  /  ALT  36  /  AlkPhos  69  12-24          CAPILLARY BLOOD GLUCOSE          CAPILLARY BLOOD GLUCOSE        CAPILLARY BLOOD GLUCOSE                RECENT CULTURES:      RADIOLOGY & ADDITIONAL TESTS:                        DVT/GI ppx  Discussed with pt @ bedside

## 2020-12-26 LAB
ALBUMIN SERPL ELPH-MCNC: 2.9 G/DL — LOW (ref 3.3–5)
ALP SERPL-CCNC: 70 U/L — SIGNIFICANT CHANGE UP (ref 40–120)
ALT FLD-CCNC: 57 U/L — SIGNIFICANT CHANGE UP (ref 12–78)
ANION GAP SERPL CALC-SCNC: 6 MMOL/L — SIGNIFICANT CHANGE UP (ref 5–17)
AST SERPL-CCNC: 34 U/L — SIGNIFICANT CHANGE UP (ref 15–37)
BILIRUB SERPL-MCNC: 0.3 MG/DL — SIGNIFICANT CHANGE UP (ref 0.2–1.2)
BUN SERPL-MCNC: 31 MG/DL — HIGH (ref 7–23)
CALCIUM SERPL-MCNC: 8.7 MG/DL — SIGNIFICANT CHANGE UP (ref 8.5–10.1)
CHLORIDE SERPL-SCNC: 105 MMOL/L — SIGNIFICANT CHANGE UP (ref 96–108)
CO2 SERPL-SCNC: 30 MMOL/L — SIGNIFICANT CHANGE UP (ref 22–31)
CREAT SERPL-MCNC: 1.1 MG/DL — SIGNIFICANT CHANGE UP (ref 0.5–1.3)
GLUCOSE SERPL-MCNC: 109 MG/DL — HIGH (ref 70–99)
HCT VFR BLD CALC: 36.6 % — SIGNIFICANT CHANGE UP (ref 34.5–45)
HGB BLD-MCNC: 11.8 G/DL — SIGNIFICANT CHANGE UP (ref 11.5–15.5)
MCHC RBC-ENTMCNC: 30.7 PG — SIGNIFICANT CHANGE UP (ref 27–34)
MCHC RBC-ENTMCNC: 32.2 GM/DL — SIGNIFICANT CHANGE UP (ref 32–36)
MCV RBC AUTO: 95.3 FL — SIGNIFICANT CHANGE UP (ref 80–100)
NRBC # BLD: 0 /100 WBCS — SIGNIFICANT CHANGE UP (ref 0–0)
PLATELET # BLD AUTO: 205 K/UL — SIGNIFICANT CHANGE UP (ref 150–400)
POTASSIUM SERPL-MCNC: 4.1 MMOL/L — SIGNIFICANT CHANGE UP (ref 3.5–5.3)
POTASSIUM SERPL-SCNC: 4.1 MMOL/L — SIGNIFICANT CHANGE UP (ref 3.5–5.3)
PROT SERPL-MCNC: 7 G/DL — SIGNIFICANT CHANGE UP (ref 6–8.3)
RBC # BLD: 3.84 M/UL — SIGNIFICANT CHANGE UP (ref 3.8–5.2)
RBC # FLD: 14.6 % — HIGH (ref 10.3–14.5)
SARS-COV-2 RNA SPEC QL NAA+PROBE: SIGNIFICANT CHANGE UP
SODIUM SERPL-SCNC: 141 MMOL/L — SIGNIFICANT CHANGE UP (ref 135–145)
WBC # BLD: 6.44 K/UL — SIGNIFICANT CHANGE UP (ref 3.8–10.5)
WBC # FLD AUTO: 6.44 K/UL — SIGNIFICANT CHANGE UP (ref 3.8–10.5)

## 2020-12-26 PROCEDURE — 93010 ELECTROCARDIOGRAM REPORT: CPT

## 2020-12-26 PROCEDURE — 99233 SBSQ HOSP IP/OBS HIGH 50: CPT | Mod: CS

## 2020-12-26 RX ADMIN — PANTOPRAZOLE SODIUM 40 MILLIGRAM(S): 20 TABLET, DELAYED RELEASE ORAL at 05:18

## 2020-12-26 RX ADMIN — Medication 0.25 MILLIGRAM(S): at 18:41

## 2020-12-26 RX ADMIN — LAMOTRIGINE 25 MILLIGRAM(S): 25 TABLET, ORALLY DISINTEGRATING ORAL at 05:18

## 2020-12-26 RX ADMIN — LAMOTRIGINE 25 MILLIGRAM(S): 25 TABLET, ORALLY DISINTEGRATING ORAL at 18:41

## 2020-12-26 RX ADMIN — Medication 600 MILLIGRAM(S): at 18:41

## 2020-12-26 RX ADMIN — ENOXAPARIN SODIUM 40 MILLIGRAM(S): 100 INJECTION SUBCUTANEOUS at 12:48

## 2020-12-26 RX ADMIN — Medication 0.25 MILLIGRAM(S): at 05:18

## 2020-12-26 RX ADMIN — Medication 600 MILLIGRAM(S): at 05:18

## 2020-12-26 RX ADMIN — AMLODIPINE BESYLATE 5 MILLIGRAM(S): 2.5 TABLET ORAL at 05:18

## 2020-12-26 RX ADMIN — CITALOPRAM 10 MILLIGRAM(S): 10 TABLET, FILM COATED ORAL at 21:56

## 2020-12-26 RX ADMIN — Medication 25 MICROGRAM(S): at 05:18

## 2020-12-26 NOTE — CHART NOTE - NSCHARTNOTEFT_GEN_A_CORE
S: Called by RN for evaluation of L sided pressure like chest pain. Seen and evaluated at the bedside, patient reports continued chest pain, worse with cough, described as pressure like without radiation. Patient reprots she has had the pain earlier in his hospitalization and was evaluated by cardio, deemed to be pleuritic at that time. She denies any associated palpitations, SOB, n/v/d, fever, chills, dizziness. Labs, VS reviewed. Patient talking fast, pressured speech. Appears to have some anxiety component, as she faces uncertainty about living arrangements and the health of her family members at home.     ROS as per HPI    O:  Allergies    cephalexin (Hives)  cephalexin (Unknown)    Intolerances    No seafood (Unknown)      Vitals  Vital Signs Last 24 Hrs  T(C): 36.4 (26 Dec 2020 21:07), Max: 36.9 (26 Dec 2020 16:39)  T(F): 97.5 (26 Dec 2020 21:07), Max: 98.4 (26 Dec 2020 16:39)  HR: 85 (26 Dec 2020 21:07) (67 - 85)  BP: 115/63 (26 Dec 2020 21:07) (100/57 - 116/64)  BP(mean): --  RR: 18 (26 Dec 2020 21:07) (18 - 18)  SpO2: 94% (26 Dec 2020 21:07) (94% - 95%)      General: frail, elderly female, NAD  Respiratory: CTA B/L  CV: RRR, S1S2  Abdominal: Soft, NT, ND +BS,  Extremities: No edema of LE  Neurology: A&Ox3, STRANGE x 4       LABS:                        11.8   6.44  )-----------( 205      ( 26 Dec 2020 11:12 )             36.6     12-26    141  |  105  |  31<H>  ----------------------------<  109<H>  4.1   |  30  |  1.10    Ca    8.7      26 Dec 2020 11:12    TPro  7.0  /  Alb  2.9<L>  /  TBili  0.3  /  DBili  x   /  AST  34  /  ALT  57  /  AlkPhos  70  12-26              RADIOLOGY & ADDITIONAL TESTS: No new imaging    A/P: 80y year old Female with a past medical history of subdural hematoma, HTN, hypothyroidism, depression admitted for COVID-19, called by RN for evaluation of L sided chest pain  -Likely pleuritic chest pain as she has had cardiac workup earlier in her course; VS stable  -Check STAT EKG; if ischemic changes will check STAT cardiac enzymes and D-dimer, though she is on lovenox, so less concern for PE  -Likely anxiety component, patient calmed, redirected at the bedside  -RN to call with updates  -COVID neg  x2, for DC home tomorrow    Júnior Ford MD PGY-3

## 2020-12-26 NOTE — PROVIDER CONTACT NOTE (OTHER) - ACTION/TREATMENT ORDERED:
MD Ford made aware and  evaluated pt at bedside. EKG ordered and completed. strip given to MD Ford. will continue to assess and monitor pt.
at 1347 Pt began crying in pain stating the burning was 10/10, she then began screaming that she couldn't breathe,  O2 sat was 94% on 2 L nasal cannula.  Rapid response called
No orders at this time. Dr. Sparks to follow up with day team.
Pt now states pain is zero.
Pt resting quietly in bed at this time.  denies any c/o

## 2020-12-26 NOTE — PROGRESS NOTE ADULT - SUBJECTIVE AND OBJECTIVE BOX
Patient is a 80y old  Female who presents with a chief complaint of SOB and low-grade fever.    Pt interviewed and examined with LORENZO Thorpe as chaperone.   INTERVAL HPI/OVERNIGHT EVENTS: Pt reports cough and occasional SOB. Denies fever, chills, CP, palpitations, abd pain.    MEDICATIONS  (STANDING):  amLODIPine   Tablet 5 milliGRAM(s) Oral daily  citalopram 10 milliGRAM(s) Oral daily  enoxaparin Injectable 40 milliGRAM(s) SubCutaneous daily  guaiFENesin  milliGRAM(s) Oral every 12 hours  lamoTRIgine 25 milliGRAM(s) Oral two times a day  levothyroxine 25 MICROGram(s) Oral daily  pantoprazole    Tablet 40 milliGRAM(s) Oral before breakfast    MEDICATIONS  (PRN):  acetaminophen   Tablet .. 650 milliGRAM(s) Oral every 6 hours PRN Temp greater or equal to 38C (100.4F), Mild Pain (1 - 3)  ALBUTerol    90 MICROgram(s) HFA Inhaler 2 Puff(s) Inhalation every 6 hours PRN Bronchospasm  aluminum hydroxide/magnesium hydroxide/simethicone Suspension 30 milliLiter(s) Oral every 6 hours PRN Dyspepsia  ondansetron Injectable 4 milliGRAM(s) IV Push every 6 hours PRN Nausea and/or Vomiting      Allergies    cephalexin (Hives)  cephalexin (Unknown)    Intolerances    No seafood (Unknown)      REVIEW OF SYSTEMS:  CONSTITUTIONAL: No fever or chills  HEENT:  No headache, no sore throat  RESPIRATORY: +cough, +occasional shortness of breath  CARDIOVASCULAR: No chest pain, palpitations  GASTROINTESTINAL: No abd pain, nausea, vomiting, or diarrhea  GENITOURINARY: No dysuria, frequency, or hematuria  NEUROLOGICAL: no focal weakness or dizziness  MUSCULOSKELETAL: no myalgias     Vital Signs Last 24 Hrs  T(C): 36.4 (26 Dec 2020 08:23), Max: 37 (25 Dec 2020 19:42)  T(F): 97.5 (26 Dec 2020 08:23), Max: 98.6 (25 Dec 2020 19:42)  HR: 67 (26 Dec 2020 08:23) (67 - 92)  BP: 103/62 (26 Dec 2020 08:23) (101/59 - 116/64)  BP(mean): --  RR: 18 (26 Dec 2020 08:23) (18 - 19)  SpO2: 95% (26 Dec 2020 08:23) (92% - 95%)    PHYSICAL EXAM:  GENERAL: NAD at rest  HEENT:  anicteric, moist mucous membranes  CHEST/LUNG:  diminished breath sounds in bases  HEART:  RRR, S1, S2  ABDOMEN:  BS+, soft, nontender, nondistended  EXTREMITIES: no edema, cyanosis, or calf tenderness  NERVOUS SYSTEM: answers questions and follows commands appropriately  PSYCH: appears anxious    LABS:                        11.8   6.44  )-----------( 205      ( 26 Dec 2020 11:12 )             36.6     CBC Full  -  ( 26 Dec 2020 11:12 )  WBC Count : 6.44 K/uL  Hemoglobin : 11.8 g/dL  Hematocrit : 36.6 %  Platelet Count - Automated : 205 K/uL  Mean Cell Volume : 95.3 fl  Mean Cell Hemoglobin : 30.7 pg  Mean Cell Hemoglobin Concentration : 32.2 gm/dL  Auto Neutrophil # : x  Auto Lymphocyte # : x  Auto Monocyte # : x  Auto Eosinophil # : x  Auto Basophil # : x  Auto Neutrophil % : x  Auto Lymphocyte % : x  Auto Monocyte % : x  Auto Eosinophil % : x  Auto Basophil % : x    26 Dec 2020 11:12    141    |  105    |  31     ----------------------------<  109    4.1     |  30     |  1.10     Ca    8.7        26 Dec 2020 11:12    TPro  7.0    /  Alb  2.9    /  TBili  0.3    /  DBili  x      /  AST  34     /  ALT  57     /  AlkPhos  70     26 Dec 2020 11:12        CAPILLARY BLOOD GLUCOSE              RADIOLOGY & ADDITIONAL TESTS:    Personally reviewed.     Consultant(s) Notes Reviewed:  [x] YES  [ ] NO     Patient is a 80y old  Female who presents with a chief complaint of SOB and low-grade fever.     Pt interviewed and examined with LORENZO Thorpe as chaperone.   INTERVAL HPI/OVERNIGHT EVENTS: Pt reports cough and occasional SOB. Denies fever, chills, CP, palpitations, abd pain.    MEDICATIONS  (STANDING):  amLODIPine   Tablet 5 milliGRAM(s) Oral daily  citalopram 10 milliGRAM(s) Oral daily  enoxaparin Injectable 40 milliGRAM(s) SubCutaneous daily  guaiFENesin  milliGRAM(s) Oral every 12 hours  lamoTRIgine 25 milliGRAM(s) Oral two times a day  levothyroxine 25 MICROGram(s) Oral daily  pantoprazole    Tablet 40 milliGRAM(s) Oral before breakfast    MEDICATIONS  (PRN):  acetaminophen   Tablet .. 650 milliGRAM(s) Oral every 6 hours PRN Temp greater or equal to 38C (100.4F), Mild Pain (1 - 3)  ALBUTerol    90 MICROgram(s) HFA Inhaler 2 Puff(s) Inhalation every 6 hours PRN Bronchospasm  aluminum hydroxide/magnesium hydroxide/simethicone Suspension 30 milliLiter(s) Oral every 6 hours PRN Dyspepsia  ondansetron Injectable 4 milliGRAM(s) IV Push every 6 hours PRN Nausea and/or Vomiting      Allergies    cephalexin (Hives)  cephalexin (Unknown)    Intolerances    No seafood (Unknown)      REVIEW OF SYSTEMS:  CONSTITUTIONAL: No fever or chills  HEENT:  No headache, no sore throat  RESPIRATORY: +cough, +occasional shortness of breath  CARDIOVASCULAR: No chest pain, palpitations  GASTROINTESTINAL: No abd pain, nausea, vomiting, or diarrhea  GENITOURINARY: No dysuria, frequency, or hematuria  NEUROLOGICAL: no focal weakness or dizziness  MUSCULOSKELETAL: no myalgias     Vital Signs Last 24 Hrs  T(C): 36.4 (26 Dec 2020 08:23), Max: 37 (25 Dec 2020 19:42)  T(F): 97.5 (26 Dec 2020 08:23), Max: 98.6 (25 Dec 2020 19:42)  HR: 67 (26 Dec 2020 08:23) (67 - 92)  BP: 103/62 (26 Dec 2020 08:23) (101/59 - 116/64)  BP(mean): --  RR: 18 (26 Dec 2020 08:23) (18 - 19)  SpO2: 95% (26 Dec 2020 08:23) (92% - 95%)    PHYSICAL EXAM:  GENERAL: NAD at rest  HEENT:  anicteric, moist mucous membranes  CHEST/LUNG:  diminished breath sounds in bases  HEART:  RRR, S1, S2  ABDOMEN:  BS+, soft, nontender, nondistended  EXTREMITIES: no edema, cyanosis, or calf tenderness  NERVOUS SYSTEM: answers questions and follows commands appropriately  PSYCH: appears anxious    LABS:                        11.8   6.44  )-----------( 205      ( 26 Dec 2020 11:12 )             36.6     CBC Full  -  ( 26 Dec 2020 11:12 )  WBC Count : 6.44 K/uL  Hemoglobin : 11.8 g/dL  Hematocrit : 36.6 %  Platelet Count - Automated : 205 K/uL  Mean Cell Volume : 95.3 fl  Mean Cell Hemoglobin : 30.7 pg  Mean Cell Hemoglobin Concentration : 32.2 gm/dL  Auto Neutrophil # : x  Auto Lymphocyte # : x  Auto Monocyte # : x  Auto Eosinophil # : x  Auto Basophil # : x  Auto Neutrophil % : x  Auto Lymphocyte % : x  Auto Monocyte % : x  Auto Eosinophil % : x  Auto Basophil % : x    26 Dec 2020 11:12    141    |  105    |  31     ----------------------------<  109    4.1     |  30     |  1.10     Ca    8.7        26 Dec 2020 11:12    TPro  7.0    /  Alb  2.9    /  TBili  0.3    /  DBili  x      /  AST  34     /  ALT  57     /  AlkPhos  70     26 Dec 2020 11:12        CAPILLARY BLOOD GLUCOSE              RADIOLOGY & ADDITIONAL TESTS:    Personally reviewed.     Consultant(s) Notes Reviewed:  [x] YES  [ ] NO

## 2020-12-26 NOTE — PROGRESS NOTE ADULT - SUBJECTIVE AND OBJECTIVE BOX
Date/Time Patient Seen:  		  Referring MD:   Data Reviewed	       Patient is a 80y old  Female who presents with a chief complaint of COVID 19 Pneumonia (25 Dec 2020 10:48)      Subjective/HPI     PAST MEDICAL & SURGICAL HISTORY:  Vertigo    Depression    Hypothyroidism    Vertigo    Hypothyroidism    Depression    UTI (urinary tract infection)    No significant past surgical history  RIGHT KNEE ARTHROSCOPY    No significant past surgical history          Medication list         MEDICATIONS  (STANDING):  ALPRAZolam 0.25 milliGRAM(s) Oral two times a day  amLODIPine   Tablet 5 milliGRAM(s) Oral daily  citalopram 10 milliGRAM(s) Oral daily  enoxaparin Injectable 40 milliGRAM(s) SubCutaneous daily  guaiFENesin  milliGRAM(s) Oral every 12 hours  lamoTRIgine 25 milliGRAM(s) Oral two times a day  levothyroxine 25 MICROGram(s) Oral daily  pantoprazole    Tablet 40 milliGRAM(s) Oral before breakfast    MEDICATIONS  (PRN):  acetaminophen   Tablet .. 650 milliGRAM(s) Oral every 6 hours PRN Temp greater or equal to 38C (100.4F), Mild Pain (1 - 3)  ALBUTerol    90 MICROgram(s) HFA Inhaler 2 Puff(s) Inhalation every 6 hours PRN Bronchospasm  aluminum hydroxide/magnesium hydroxide/simethicone Suspension 30 milliLiter(s) Oral every 6 hours PRN Dyspepsia  ondansetron Injectable 4 milliGRAM(s) IV Push every 6 hours PRN Nausea and/or Vomiting         Vitals log        ICU Vital Signs Last 24 Hrs  T(C): 36.4 (26 Dec 2020 08:23), Max: 37 (25 Dec 2020 19:42)  T(F): 97.5 (26 Dec 2020 08:23), Max: 98.6 (25 Dec 2020 19:42)  HR: 67 (26 Dec 2020 08:23) (67 - 92)  BP: 103/62 (26 Dec 2020 08:23) (101/59 - 116/64)  BP(mean): --  ABP: --  ABP(mean): --  RR: 18 (26 Dec 2020 08:23) (18 - 19)  SpO2: 95% (26 Dec 2020 08:23) (92% - 95%)           Input and Output:  I&O's Detail      Lab Data                        11.8   6.44  )-----------( 205      ( 26 Dec 2020 11:12 )             36.6     12-26    141  |  105  |  31<H>  ----------------------------<  109<H>  4.1   |  30  |  1.10    Ca    8.7      26 Dec 2020 11:12    TPro  7.0  /  Alb  2.9<L>  /  TBili  0.3  /  DBili  x   /  AST  34  /  ALT  57  /  AlkPhos  70  12-26            Review of Systems	      Objective     Physical Examination  heart s1s2  lung dc BS  abd soft  head nc  on room air        Pertinent Lab findings & Imaging      Pam:  NO   Adequate UO     I&O's Detail           Discussed with:     Cultures:	        Radiology

## 2020-12-26 NOTE — PROVIDER CONTACT NOTE (OTHER) - REASON
Patient complaining of vaginal burning
Pt sitting in chair with arm and legs stretched out stiff, repeating "I have covid" over and over, physically shaking
pt c/o burning sensation to chest area, from throat doawn to chest area
pt c/o left sided chest tightness while sitting in chair at rest
Pt complains of chest pain and shortness of breath

## 2020-12-26 NOTE — CHART NOTE - NSCHARTNOTESELECT_GEN_ALL_CORE
Nutrition Services
Nutrition Services
Event Note
Event Note
Nutrition Services
PGY-3/Event Note
Rapid Response

## 2020-12-26 NOTE — PROVIDER CONTACT NOTE (OTHER) - ASSESSMENT
orders received for maalox EKG and stat cardiac enzymes.  EKG completed and Dr. Khan notified
Discussed with Dr. Jeong
Patient is a&ox4, no altered mental status, no fever, no elevated WBC count. Denies burning when voiding. Complains of voiding when laying down.
stat dose of xanax administered as ordered.  Immediately after xanax administered pt asked to walk to the BR and did so with standby asst and walker, carloz well
Pt is alert and awake.   /63  HR 85  O2 94%  T 97.5
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
## 2020-12-26 NOTE — PROVIDER CONTACT NOTE (OTHER) - SITUATION
OB, resp rate 22, O2 sat 97% on room air, skin warm and dry.  dr. Russ notified and in to evuate pt
Patient in bed complaining of vaginal burning.
Pt sitting in bedside chair, c/o chest tightness 1 on a scale of 1 to 10.  No SOB, no resp distress, skin warm and dry.  Denies any c/o nauseau, no vomiting
pt sitting in bed eating lunch, rang call bell c/o burning sensation as above.  Pt vomited undigested food.  Pt lying in bed, skin warm and dry.  /83 HR 68 resp 18  O2 sat 97% on 2 L nasal cannu
Pt complains of chest pain and shortness of breath. Pt states " I feel like something heavy is sitting on the left side of my upper chest". pt states that she is very anxious.

## 2020-12-26 NOTE — PROGRESS NOTE ADULT - ASSESSMENT
81yo F with PMH of hypothyroid, depression, PTSD, anxiety, subdural hematoma 8/2020 s/p fall, presents with cough, SOB and low grade fever, a/w COVID-19 PNA.     #Acute hypoxic respiratory failure 2/2 COVID 19 pneumonia:  -completed 5-day course of Remdesivir, and 10-day course of Decadron 6mg daily.   -has now been stable and saturating >90% on ROOM AIR  -Albuterol inh Q6h PRN and Guaifenesin ER 600mg PO Q12h.    -ID ([facility]), recs reviewed and appreciated  -Pulmonary (Roni), recs appreciated   -COVID PCR negative on 12/25 ; will recheck today    #Depression, anxiety, PTSD:   -psych (Ramos), recs appreciated - given pt voicing suicidality on multiple occasions during this hospitalization, psychiatry rec inpatient psych facility on d/c   -will attempt to d/c to inpt psych with [facility] after 2 negative COVID PCR  -continue Celexa 10mg PO daily  -of note, patient with occasional episodes of "catatonia" during this hospitalization that are self-limiting    #Hx of subdural hematoma:   -was due to trauma from fall  -fall precautions  -on d/c will use ASA 325mg po daily for VTE ppx per ID recs as opposed to a DOAC  -continue Lamictal     #HTN:   -continue Norvasc 5mg PO daily    #Hypothyroidism:   -continue Synthroid 25mcg PO daily    -VTE ppx:  Lovenox 40mg SQ daily

## 2020-12-27 LAB
ANION GAP SERPL CALC-SCNC: 7 MMOL/L — SIGNIFICANT CHANGE UP (ref 5–17)
BASOPHILS # BLD AUTO: 0.01 K/UL — SIGNIFICANT CHANGE UP (ref 0–0.2)
BASOPHILS NFR BLD AUTO: 0.2 % — SIGNIFICANT CHANGE UP (ref 0–2)
BUN SERPL-MCNC: 41 MG/DL — HIGH (ref 7–23)
CALCIUM SERPL-MCNC: 8.8 MG/DL — SIGNIFICANT CHANGE UP (ref 8.5–10.1)
CHLORIDE SERPL-SCNC: 103 MMOL/L — SIGNIFICANT CHANGE UP (ref 96–108)
CK SERPL-CCNC: 53 U/L — SIGNIFICANT CHANGE UP (ref 26–192)
CO2 SERPL-SCNC: 29 MMOL/L — SIGNIFICANT CHANGE UP (ref 22–31)
CREAT SERPL-MCNC: 1.2 MG/DL — SIGNIFICANT CHANGE UP (ref 0.5–1.3)
EOSINOPHIL # BLD AUTO: 0.16 K/UL — SIGNIFICANT CHANGE UP (ref 0–0.5)
EOSINOPHIL NFR BLD AUTO: 2.6 % — SIGNIFICANT CHANGE UP (ref 0–6)
GLUCOSE SERPL-MCNC: 123 MG/DL — HIGH (ref 70–99)
HCT VFR BLD CALC: 36.7 % — SIGNIFICANT CHANGE UP (ref 34.5–45)
HGB BLD-MCNC: 11.6 G/DL — SIGNIFICANT CHANGE UP (ref 11.5–15.5)
IMM GRANULOCYTES NFR BLD AUTO: 0.5 % — SIGNIFICANT CHANGE UP (ref 0–1.5)
LYMPHOCYTES # BLD AUTO: 1.37 K/UL — SIGNIFICANT CHANGE UP (ref 1–3.3)
LYMPHOCYTES # BLD AUTO: 22.5 % — SIGNIFICANT CHANGE UP (ref 13–44)
MCHC RBC-ENTMCNC: 30.4 PG — SIGNIFICANT CHANGE UP (ref 27–34)
MCHC RBC-ENTMCNC: 31.6 GM/DL — LOW (ref 32–36)
MCV RBC AUTO: 96.1 FL — SIGNIFICANT CHANGE UP (ref 80–100)
MONOCYTES # BLD AUTO: 0.53 K/UL — SIGNIFICANT CHANGE UP (ref 0–0.9)
MONOCYTES NFR BLD AUTO: 8.7 % — SIGNIFICANT CHANGE UP (ref 2–14)
NEUTROPHILS # BLD AUTO: 3.98 K/UL — SIGNIFICANT CHANGE UP (ref 1.8–7.4)
NEUTROPHILS NFR BLD AUTO: 65.5 % — SIGNIFICANT CHANGE UP (ref 43–77)
NRBC # BLD: 0 /100 WBCS — SIGNIFICANT CHANGE UP (ref 0–0)
PLATELET # BLD AUTO: 205 K/UL — SIGNIFICANT CHANGE UP (ref 150–400)
POTASSIUM SERPL-MCNC: 4.1 MMOL/L — SIGNIFICANT CHANGE UP (ref 3.5–5.3)
POTASSIUM SERPL-SCNC: 4.1 MMOL/L — SIGNIFICANT CHANGE UP (ref 3.5–5.3)
RBC # BLD: 3.82 M/UL — SIGNIFICANT CHANGE UP (ref 3.8–5.2)
RBC # FLD: 14.7 % — HIGH (ref 10.3–14.5)
SODIUM SERPL-SCNC: 139 MMOL/L — SIGNIFICANT CHANGE UP (ref 135–145)
TROPONIN I SERPL-MCNC: <.015 NG/ML — SIGNIFICANT CHANGE UP (ref 0.01–0.04)
WBC # BLD: 6.08 K/UL — SIGNIFICANT CHANGE UP (ref 3.8–10.5)
WBC # FLD AUTO: 6.08 K/UL — SIGNIFICANT CHANGE UP (ref 3.8–10.5)

## 2020-12-27 PROCEDURE — 99233 SBSQ HOSP IP/OBS HIGH 50: CPT | Mod: CS

## 2020-12-27 RX ORDER — ALPRAZOLAM 0.25 MG
0.25 TABLET ORAL
Refills: 0 | Status: DISCONTINUED | OUTPATIENT
Start: 2020-12-27 | End: 2020-12-28

## 2020-12-27 RX ADMIN — LAMOTRIGINE 25 MILLIGRAM(S): 25 TABLET, ORALLY DISINTEGRATING ORAL at 18:40

## 2020-12-27 RX ADMIN — Medication 0.25 MILLIGRAM(S): at 18:40

## 2020-12-27 RX ADMIN — Medication 25 MICROGRAM(S): at 05:41

## 2020-12-27 RX ADMIN — Medication 600 MILLIGRAM(S): at 05:41

## 2020-12-27 RX ADMIN — LAMOTRIGINE 25 MILLIGRAM(S): 25 TABLET, ORALLY DISINTEGRATING ORAL at 06:30

## 2020-12-27 RX ADMIN — CITALOPRAM 10 MILLIGRAM(S): 10 TABLET, FILM COATED ORAL at 22:10

## 2020-12-27 RX ADMIN — Medication 0.25 MILLIGRAM(S): at 08:54

## 2020-12-27 RX ADMIN — Medication 600 MILLIGRAM(S): at 18:40

## 2020-12-27 RX ADMIN — PANTOPRAZOLE SODIUM 40 MILLIGRAM(S): 20 TABLET, DELAYED RELEASE ORAL at 05:42

## 2020-12-27 RX ADMIN — ENOXAPARIN SODIUM 40 MILLIGRAM(S): 100 INJECTION SUBCUTANEOUS at 11:35

## 2020-12-27 NOTE — PROGRESS NOTE ADULT - SUBJECTIVE AND OBJECTIVE BOX
Patient is a 80y old  Female who presents with a chief complaint of SOB and low-grade fever.     Pt interviewed and examined with RN Carmen as chaperone.   INTERVAL HPI/OVERNIGHT EVENTS: Pt reports occasional cough. Denies SOB, fever, chills, CP, palpitations, abd pain.    MEDICATIONS  (STANDING):  amLODIPine   Tablet 5 milliGRAM(s) Oral daily  citalopram 10 milliGRAM(s) Oral daily  enoxaparin Injectable 40 milliGRAM(s) SubCutaneous daily  guaiFENesin  milliGRAM(s) Oral every 12 hours  lamoTRIgine 25 milliGRAM(s) Oral two times a day  levothyroxine 25 MICROGram(s) Oral daily  pantoprazole    Tablet 40 milliGRAM(s) Oral before breakfast    MEDICATIONS  (PRN):  acetaminophen   Tablet .. 650 milliGRAM(s) Oral every 6 hours PRN Temp greater or equal to 38C (100.4F), Mild Pain (1 - 3)  ALBUTerol    90 MICROgram(s) HFA Inhaler 2 Puff(s) Inhalation every 6 hours PRN Bronchospasm  aluminum hydroxide/magnesium hydroxide/simethicone Suspension 30 milliLiter(s) Oral every 6 hours PRN Dyspepsia  ondansetron Injectable 4 milliGRAM(s) IV Push every 6 hours PRN Nausea and/or Vomiting      Allergies    cephalexin (Hives)  cephalexin (Unknown)    Intolerances    No seafood (Unknown)      REVIEW OF SYSTEMS:  CONSTITUTIONAL: No fever or chills  HEENT:  No headache, no sore throat  RESPIRATORY: +cough, denies shortness of breath  CARDIOVASCULAR: No chest pain, palpitations  GASTROINTESTINAL: No abd pain, nausea, vomiting, or diarrhea  GENITOURINARY: No dysuria, frequency, or hematuria  NEUROLOGICAL: no focal weakness or dizziness  MUSCULOSKELETAL: no myalgias   PSYCH: anxious ; denies SI/HI    Vital Signs Last 24 Hrs  T(C): 36.8 (27 Dec 2020 08:21), Max: 36.9 (26 Dec 2020 16:39)  T(F): 98.2 (27 Dec 2020 08:21), Max: 98.4 (26 Dec 2020 16:39)  HR: 71 (27 Dec 2020 08:21) (71 - 85)  BP: 109/63 (27 Dec 2020 08:21) (100/57 - 115/63)  BP(mean): --  RR: 18 (27 Dec 2020 08:21) (17 - 18)  SpO2: 95% (27 Dec 2020 08:21) (94% - 95%)    PHYSICAL EXAM:  GENERAL: NAD at rest  HEENT:  anicteric, moist mucous membranes  CHEST/LUNG:  diminished breath sounds in bases  HEART:  RRR, S1, S2  ABDOMEN:  BS+, soft, nontender, nondistended  EXTREMITIES: no edema, cyanosis, or calf tenderness  NERVOUS SYSTEM: answers questions and follows commands appropriately  PSYCH: appears mildly anxious ; denies SI/HI    LABS:                                   11.6   6.08  )-----------( 205      ( 27 Dec 2020 09:53 )             36.7     CBC Full  -  ( 27 Dec 2020 09:53 )  WBC Count : 6.08 K/uL  Hemoglobin : 11.6 g/dL  Hematocrit : 36.7 %  Platelet Count - Automated : 205 K/uL  Mean Cell Volume : 96.1 fl  Mean Cell Hemoglobin : 30.4 pg  Mean Cell Hemoglobin Concentration : 31.6 gm/dL  Auto Neutrophil # : 3.98 K/uL  Auto Lymphocyte # : 1.37 K/uL  Auto Monocyte # : 0.53 K/uL  Auto Eosinophil # : 0.16 K/uL  Auto Basophil # : 0.01 K/uL  Auto Neutrophil % : 65.5 %  Auto Lymphocyte % : 22.5 %  Auto Monocyte % : 8.7 %  Auto Eosinophil % : 2.6 %  Auto Basophil % : 0.2 %    12-27    139  |  103  |  41<H>  ----------------------------<  123<H>  4.1   |  29  |  1.20    Ca    8.8      27 Dec 2020 09:53    TPro  7.0  /  Alb  2.9<L>  /  TBili  0.3  /  DBili  x   /  AST  34  /  ALT  57  /  AlkPhos  70  12-26          CAPILLARY BLOOD GLUCOSE              RADIOLOGY & ADDITIONAL TESTS:    Personally reviewed.     Consultant(s) Notes Reviewed:  [x] YES  [ ] NO     Patient is a 80y old  Female who presents with a chief complaint of SOB and low-grade fever.      Pt interviewed and examined with RN Carmen as chaperone.   INTERVAL HPI/OVERNIGHT EVENTS: Pt reports occasional cough. Denies SOB, fever, chills, CP, palpitations, abd pain.    MEDICATIONS  (STANDING):  amLODIPine   Tablet 5 milliGRAM(s) Oral daily  citalopram 10 milliGRAM(s) Oral daily  enoxaparin Injectable 40 milliGRAM(s) SubCutaneous daily  guaiFENesin  milliGRAM(s) Oral every 12 hours  lamoTRIgine 25 milliGRAM(s) Oral two times a day  levothyroxine 25 MICROGram(s) Oral daily  pantoprazole    Tablet 40 milliGRAM(s) Oral before breakfast    MEDICATIONS  (PRN):  acetaminophen   Tablet .. 650 milliGRAM(s) Oral every 6 hours PRN Temp greater or equal to 38C (100.4F), Mild Pain (1 - 3)  ALBUTerol    90 MICROgram(s) HFA Inhaler 2 Puff(s) Inhalation every 6 hours PRN Bronchospasm  aluminum hydroxide/magnesium hydroxide/simethicone Suspension 30 milliLiter(s) Oral every 6 hours PRN Dyspepsia  ondansetron Injectable 4 milliGRAM(s) IV Push every 6 hours PRN Nausea and/or Vomiting      Allergies    cephalexin (Hives)  cephalexin (Unknown)    Intolerances    No seafood (Unknown)      REVIEW OF SYSTEMS:  CONSTITUTIONAL: No fever or chills  HEENT:  No headache, no sore throat  RESPIRATORY: +cough, denies shortness of breath  CARDIOVASCULAR: No chest pain, palpitations  GASTROINTESTINAL: No abd pain, nausea, vomiting, or diarrhea  GENITOURINARY: No dysuria, frequency, or hematuria  NEUROLOGICAL: no focal weakness or dizziness  MUSCULOSKELETAL: no myalgias   PSYCH: anxious ; denies SI/HI    Vital Signs Last 24 Hrs  T(C): 36.8 (27 Dec 2020 08:21), Max: 36.9 (26 Dec 2020 16:39)  T(F): 98.2 (27 Dec 2020 08:21), Max: 98.4 (26 Dec 2020 16:39)  HR: 71 (27 Dec 2020 08:21) (71 - 85)  BP: 109/63 (27 Dec 2020 08:21) (100/57 - 115/63)  BP(mean): --  RR: 18 (27 Dec 2020 08:21) (17 - 18)  SpO2: 95% (27 Dec 2020 08:21) (94% - 95%)    PHYSICAL EXAM:  GENERAL: NAD at rest  HEENT:  anicteric, moist mucous membranes  CHEST/LUNG:  diminished breath sounds in bases  HEART:  RRR, S1, S2  ABDOMEN:  BS+, soft, nontender, nondistended  EXTREMITIES: no edema, cyanosis, or calf tenderness  NERVOUS SYSTEM: answers questions and follows commands appropriately  PSYCH: appears mildly anxious ; denies SI/HI    LABS:                                   11.6   6.08  )-----------( 205      ( 27 Dec 2020 09:53 )             36.7     CBC Full  -  ( 27 Dec 2020 09:53 )  WBC Count : 6.08 K/uL  Hemoglobin : 11.6 g/dL  Hematocrit : 36.7 %  Platelet Count - Automated : 205 K/uL  Mean Cell Volume : 96.1 fl  Mean Cell Hemoglobin : 30.4 pg  Mean Cell Hemoglobin Concentration : 31.6 gm/dL  Auto Neutrophil # : 3.98 K/uL  Auto Lymphocyte # : 1.37 K/uL  Auto Monocyte # : 0.53 K/uL  Auto Eosinophil # : 0.16 K/uL  Auto Basophil # : 0.01 K/uL  Auto Neutrophil % : 65.5 %  Auto Lymphocyte % : 22.5 %  Auto Monocyte % : 8.7 %  Auto Eosinophil % : 2.6 %  Auto Basophil % : 0.2 %    12-27    139  |  103  |  41<H>  ----------------------------<  123<H>  4.1   |  29  |  1.20    Ca    8.8      27 Dec 2020 09:53    TPro  7.0  /  Alb  2.9<L>  /  TBili  0.3  /  DBili  x   /  AST  34  /  ALT  57  /  AlkPhos  70  12-26          CAPILLARY BLOOD GLUCOSE              RADIOLOGY & ADDITIONAL TESTS:    Personally reviewed.     Consultant(s) Notes Reviewed:  [x] YES  [ ] NO

## 2020-12-27 NOTE — PROGRESS NOTE ADULT - ASSESSMENT
79yo F with PMH of hypothyroid, depression, PTSD, anxiety, subdural hematoma 8/2020 s/p fall, presents with cough, SOB and low grade fever, a/w COVID-19 PNA.     #Acute hypoxic respiratory failure 2/2 COVID 19 pneumonia:  -completed 5-day course of Remdesivir, and 10-day course of Decadron 6mg daily.   -has now been stable and saturating >90% on ROOM AIR  -Albuterol inh Q6h PRN and Guaifenesin ER 600mg PO Q12h.    -ID ([facility]), recs reviewed and appreciated  -Pulmonary (Roni), recs appreciated   -COVID PCR negative x2 on 12/25 and 12/26    #Depression, anxiety, PTSD:   -psych (Ramos), recs appreciated - pt had been voicing suicidality earlier during this hospitalization, psychiatry at that time rec inpatient psych facility on d/c   -pt and her son feel that she has improved greatly and she is no longer endorsing any suicidality   -continue Celexa 10mg PO daily  -of note, patient had occasional episodes of "catatonia" during this hospitalization that were self-limiting, but are no longer occuring    #Hx of subdural hematoma:   -was due to trauma from fall  -fall precautions  -on d/c will use ASA 325mg po daily for VTE ppx per ID recs as opposed to a DOAC  -continue Lamictal     #HTN:   -continue Norvasc 5mg PO daily    #Hypothyroidism:   -continue Synthroid 25mcg PO daily    #VTE ppx:    -Lovenox 40mg SQ daily    #Disp:   -pt had been planned for inpatient psych as had endorsed suicidality earlier in admission  -now seems to have improved psychiatrically  -will have psych reassess whether pt still requires inpt psych hospitalization  -if not, pt and her son wish that she goes to live in [place] with him.

## 2020-12-27 NOTE — PHARMACOTHERAPY INTERVENTION NOTE - COMMENTS
Patient's medication auto-discontinued after 7 day course. Reached out to MD (Dr. Knox) to discuss reordering. Dose was due at 0600. Recommended stat dose to be given. MD approved and order was entered with stat dose and hold parameters for lethargy and sedation.
COVID-19 Positive patient, patient meets following criteria for remdesivir     • SARS-CoV-2 infection confirmed by PCR within 7 days  • SpO2 < 94% on room air, OR requiring supplemental oxygen, OR requiring invasive mechanical ventilation, OR requiring ECMO  • eGFR > 30 mL/min  • ALT < 5X ULN    Requested by Dr. Menon, approved by associate medical director Dr. Delcid. Fact sheet for patient/caregiver provided to nursing upon dispensing of medication.

## 2020-12-27 NOTE — PROGRESS NOTE ADULT - SUBJECTIVE AND OBJECTIVE BOX
Date/Time Patient Seen:  		  Referring MD:   Data Reviewed	       Patient is a 80y old  Female who presents with a chief complaint of COVID-19 Pneumonia (26 Dec 2020 15:10)      Subjective/HPI     PAST MEDICAL & SURGICAL HISTORY:  Vertigo    Depression    Hypothyroidism    Vertigo    Hypothyroidism    Depression    UTI (urinary tract infection)    No significant past surgical history  RIGHT KNEE ARTHROSCOPY    No significant past surgical history          Medication list         MEDICATIONS  (STANDING):  amLODIPine   Tablet 5 milliGRAM(s) Oral daily  citalopram 10 milliGRAM(s) Oral daily  enoxaparin Injectable 40 milliGRAM(s) SubCutaneous daily  guaiFENesin  milliGRAM(s) Oral every 12 hours  lamoTRIgine 25 milliGRAM(s) Oral two times a day  levothyroxine 25 MICROGram(s) Oral daily  pantoprazole    Tablet 40 milliGRAM(s) Oral before breakfast    MEDICATIONS  (PRN):  acetaminophen   Tablet .. 650 milliGRAM(s) Oral every 6 hours PRN Temp greater or equal to 38C (100.4F), Mild Pain (1 - 3)  ALBUTerol    90 MICROgram(s) HFA Inhaler 2 Puff(s) Inhalation every 6 hours PRN Bronchospasm  aluminum hydroxide/magnesium hydroxide/simethicone Suspension 30 milliLiter(s) Oral every 6 hours PRN Dyspepsia  ondansetron Injectable 4 milliGRAM(s) IV Push every 6 hours PRN Nausea and/or Vomiting         Vitals log        ICU Vital Signs Last 24 Hrs  T(C): 36.8 (27 Dec 2020 08:21), Max: 36.9 (26 Dec 2020 16:39)  T(F): 98.2 (27 Dec 2020 08:21), Max: 98.4 (26 Dec 2020 16:39)  HR: 71 (27 Dec 2020 08:21) (71 - 85)  BP: 109/63 (27 Dec 2020 08:21) (100/57 - 115/63)  BP(mean): --  ABP: --  ABP(mean): --  RR: 18 (27 Dec 2020 08:21) (17 - 18)  SpO2: 95% (27 Dec 2020 08:21) (94% - 95%)           Input and Output:  I&O's Detail    26 Dec 2020 07:01  -  27 Dec 2020 07:00  --------------------------------------------------------  IN:  Total IN: 0 mL    OUT:    Voided (mL): 2 mL  Total OUT: 2 mL    Total NET: -2 mL          Lab Data                        11.8   6.44  )-----------( 205      ( 26 Dec 2020 11:12 )             36.6     12-26    141  |  105  |  31<H>  ----------------------------<  109<H>  4.1   |  30  |  1.10    Ca    8.7      26 Dec 2020 11:12    TPro  7.0  /  Alb  2.9<L>  /  TBili  0.3  /  DBili  x   /  AST  34  /  ALT  57  /  AlkPhos  70  12-26            Review of Systems	      Objective     Physical Examination    heart s1s2  lung dec BS  abd soft  on room air      Pertinent Lab findings & Imaging      Pam:  NO   Adequate UO     I&O's Detail    26 Dec 2020 07:01  -  27 Dec 2020 07:00  --------------------------------------------------------  IN:  Total IN: 0 mL    OUT:    Voided (mL): 2 mL  Total OUT: 2 mL    Total NET: -2 mL               Discussed with:     Cultures:	        Radiology

## 2020-12-27 NOTE — PROGRESS NOTE ADULT - ATTENDING COMMENTS
Note written by attending, see above.  Time spent: 40min. More than 50% of the visit was spent counseling the patient on medical condition - COVID-19 PNA, COVID PCR and protocol for discharge to a facility.
Seen/examined. agree with above.
The patient was personally seen and examined, in addition to being examined and evaluated by NP.  All elements of the note were edited where appropriate.
Fidel Rollins M.D.  [facility], Division of Infectious Diseases  [phone]  After 5pm on weekdays and all day on weekends - please call [phone]
Fidel Rollins M.D.  [facility], Division of Infectious Diseases  [phone]  After 5pm on weekdays and all day on weekends - please call [phone]
Discussed with son regarding patient's current plan of care. As per RN, patient with episodes of catatonia, has been interacting well with some other staff members. As per son, he spoke to her today, was able to have a conversation, feels she was a bit better than this AM. Patient not safe to discharge home in this condition currently. Discussed with psych, started on low dose benzo, will re-assess patient's status in AM, and if doing better, can proceed with D/C planning.
Note written by attending, see above.  Time spent: 40min. More than 50% of the visit was spent counseling the patient on medical condition - COVID-19 PNA, COVID PCR and protocol for discharge to a facility, suicidality.

## 2020-12-28 ENCOUNTER — TRANSCRIPTION ENCOUNTER (OUTPATIENT)
Age: 80
End: 2020-12-28

## 2020-12-28 VITALS
DIASTOLIC BLOOD PRESSURE: 59 MMHG | SYSTOLIC BLOOD PRESSURE: 98 MMHG | RESPIRATION RATE: 19 BRPM | OXYGEN SATURATION: 93 % | HEART RATE: 78 BPM | TEMPERATURE: 99 F

## 2020-12-28 PROCEDURE — 99232 SBSQ HOSP IP/OBS MODERATE 35: CPT

## 2020-12-28 PROCEDURE — 99239 HOSP IP/OBS DSCHRG MGMT >30: CPT | Mod: CS

## 2020-12-28 RX ORDER — ASPIRIN/CALCIUM CARB/MAGNESIUM 324 MG
1 TABLET ORAL
Qty: 28 | Refills: 0
Start: 2020-12-28 | End: 2021-01-24

## 2020-12-28 RX ORDER — RIVAROXABAN 15 MG-20MG
1 KIT ORAL
Qty: 28 | Refills: 0
Start: 2020-12-28

## 2020-12-28 RX ORDER — AMLODIPINE BESYLATE 2.5 MG/1
1 TABLET ORAL
Qty: 0 | Refills: 0 | DISCHARGE
Start: 2020-12-28

## 2020-12-28 RX ADMIN — PANTOPRAZOLE SODIUM 40 MILLIGRAM(S): 20 TABLET, DELAYED RELEASE ORAL at 05:19

## 2020-12-28 RX ADMIN — LAMOTRIGINE 25 MILLIGRAM(S): 25 TABLET, ORALLY DISINTEGRATING ORAL at 05:19

## 2020-12-28 RX ADMIN — Medication 0.25 MILLIGRAM(S): at 05:19

## 2020-12-28 RX ADMIN — Medication 25 MICROGRAM(S): at 05:19

## 2020-12-28 RX ADMIN — ENOXAPARIN SODIUM 40 MILLIGRAM(S): 100 INJECTION SUBCUTANEOUS at 12:16

## 2020-12-28 RX ADMIN — Medication 600 MILLIGRAM(S): at 05:19

## 2020-12-28 NOTE — PROGRESS NOTE BEHAVIORAL HEALTH - NSBHCHARTREVIEWVS_PSY_A_CORE FT
Vital Signs Last 24 Hrs  T(C): 36.9 (14 Dec 2020 08:25), Max: 36.9 (13 Dec 2020 16:19)  T(F): 98.5 (14 Dec 2020 08:25), Max: 98.5 (14 Dec 2020 08:25)  HR: 86 (14 Dec 2020 08:25) (71 - 98)  BP: 120/60 (14 Dec 2020 08:25) (111/55 - 126/70)  BP(mean): --  RR: 18 (14 Dec 2020 08:25) (17 - 18)  SpO2: 91% (14 Dec 2020 08:25) (91% - 96%)
Vital Signs Last 24 Hrs  T(C): 36.6 (28 Dec 2020 07:32), Max: 36.9 (27 Dec 2020 16:24)  T(F): 97.9 (28 Dec 2020 07:32), Max: 98.4 (27 Dec 2020 16:24)  HR: 69 (28 Dec 2020 07:32) (69 - 82)  BP: 102/66 (28 Dec 2020 07:32) (102/66 - 106/66)  BP(mean): --  RR: 19 (28 Dec 2020 07:32) (17 - 19)  SpO2: 95% (28 Dec 2020 07:32) (95% - 95%)
Vital Signs Last 24 Hrs  T(C): 36.4 (17 Dec 2020 08:48), Max: 37.2 (16 Dec 2020 15:26)  T(F): 97.6 (17 Dec 2020 08:48), Max: 98.9 (16 Dec 2020 15:26)  HR: 97 (17 Dec 2020 08:48) (77 - 97)  BP: 125/69 (17 Dec 2020 08:48) (114/68 - 125/74)  BP(mean): --  RR: 18 (17 Dec 2020 08:48) (18 - 18)  SpO2: 95% (17 Dec 2020 08:48) (91% - 95%)

## 2020-12-28 NOTE — PROGRESS NOTE ADULT - PROBLEM SELECTOR PROBLEM 1
COVID-19
Pneumonia due to COVID-19 virus
COVID-19
Pneumonia due to COVID-19 virus
Pneumonia due to COVID-19 virus
COVID-19
COVID-19
Pneumonia due to COVID-19 virus
COVID-19
Pneumonia due to COVID-19 virus

## 2020-12-28 NOTE — DISCHARGE NOTE NURSING/CASE MANAGEMENT/SOCIAL WORK - PATIENT PORTAL LINK FT
You can access the FollowMyHealth Patient Portal offered by [facility] by registering at the following website: [email or web address]. By joining [facility]’s FollowMyHealth portal, you will also be able to view your health information using other applications (apps) compatible with our system.

## 2020-12-28 NOTE — PROGRESS NOTE ADULT - PROVIDER SPECIALTY LIST ADULT
Hospitalist
Infectious Disease
Pulmonology
Hospitalist
Infectious Disease
Infectious Disease
Pulmonology
Pulmonology
Cardiology
Cardiology
Hospitalist
Infectious Disease
Pulmonology
Pulmonology
Cardiology
Cardiology
Hospitalist
Pulmonology
Hospitalist
Hospitalist
Infectious Disease
Infectious Disease
Pulmonology
Hospitalist
Pulmonology
Infectious Disease
Infectious Disease

## 2020-12-28 NOTE — PROGRESS NOTE ADULT - SUBJECTIVE AND OBJECTIVE BOX
Date/Time Patient Seen:  		  Referring MD:   Data Reviewed	       Patient is a 80y old  Female who presents with a chief complaint of COVID-19 Pneumonia (27 Dec 2020 16:15)      Subjective/HPI     PAST MEDICAL & SURGICAL HISTORY:  Vertigo    Depression    Hypothyroidism    Vertigo    Hypothyroidism    Depression    UTI (urinary tract infection)    No significant past surgical history  RIGHT KNEE ARTHROSCOPY    No significant past surgical history          Medication list         MEDICATIONS  (STANDING):  ALPRAZolam 0.25 milliGRAM(s) Oral two times a day  amLODIPine   Tablet 5 milliGRAM(s) Oral daily  citalopram 10 milliGRAM(s) Oral daily  enoxaparin Injectable 40 milliGRAM(s) SubCutaneous daily  guaiFENesin  milliGRAM(s) Oral every 12 hours  lamoTRIgine 25 milliGRAM(s) Oral two times a day  levothyroxine 25 MICROGram(s) Oral daily  pantoprazole    Tablet 40 milliGRAM(s) Oral before breakfast    MEDICATIONS  (PRN):  acetaminophen   Tablet .. 650 milliGRAM(s) Oral every 6 hours PRN Temp greater or equal to 38C (100.4F), Mild Pain (1 - 3)  ALBUTerol    90 MICROgram(s) HFA Inhaler 2 Puff(s) Inhalation every 6 hours PRN Bronchospasm  aluminum hydroxide/magnesium hydroxide/simethicone Suspension 30 milliLiter(s) Oral every 6 hours PRN Dyspepsia  ondansetron Injectable 4 milliGRAM(s) IV Push every 6 hours PRN Nausea and/or Vomiting         Vitals log        ICU Vital Signs Last 24 Hrs  T(C): 36.7 (28 Dec 2020 04:33), Max: 36.9 (27 Dec 2020 16:24)  T(F): 98.1 (28 Dec 2020 04:33), Max: 98.4 (27 Dec 2020 16:24)  HR: 72 (28 Dec 2020 04:33) (72 - 82)  BP: 105/58 (28 Dec 2020 04:33) (105/58 - 106/66)  BP(mean): --  ABP: --  ABP(mean): --  RR: 17 (28 Dec 2020 04:33) (17 - 18)  SpO2: 95% (28 Dec 2020 04:33) (95% - 95%)           Input and Output:  I&O's Detail    27 Dec 2020 07:01  -  28 Dec 2020 07:00  --------------------------------------------------------  IN:  Total IN: 0 mL    OUT:    Voided (mL): 2 mL  Total OUT: 2 mL    Total NET: -2 mL          Lab Data                        11.6   6.08  )-----------( 205      ( 27 Dec 2020 09:53 )             36.7     12-27    139  |  103  |  41<H>  ----------------------------<  123<H>  4.1   |  29  |  1.20    Ca    8.8      27 Dec 2020 09:53    TPro  7.0  /  Alb  2.9<L>  /  TBili  0.3  /  DBili  x   /  AST  34  /  ALT  57  /  AlkPhos  70  12-26      CARDIAC MARKERS ( 27 Dec 2020 09:53 )  <.015 ng/mL / x     / 53 U/L / x     / x            Review of Systems	      Objective     Physical Examination    heart s1s2  lung dc BS  abd soft  on room air      Pertinent Lab findings & Imaging      Pam:  NO   Adequate UO     I&O's Detail    27 Dec 2020 07:01  -  28 Dec 2020 07:00  --------------------------------------------------------  IN:  Total IN: 0 mL    OUT:    Voided (mL): 2 mL  Total OUT: 2 mL    Total NET: -2 mL               Discussed with:     Cultures:	        Radiology

## 2020-12-28 NOTE — PROGRESS NOTE BEHAVIORAL HEALTH - NSBHFUPINTERVALCCFT_PSY_A_CORE
I am fine. I was hoping for a COVID test. Do I still have COVID?
I am doing great. I am ready to go home.
She was suicidal

## 2020-12-28 NOTE — PROGRESS NOTE BEHAVIORAL HEALTH - SUMMARY
81 y/o DW female, domiciled, no prior psychiatric hospitalizations, history of chronic depression, with outpatient treatment for 30 years, with pmhx of hypothyroid, depression, subdural hematoma 8/2020 2/2 fall presents to [facility] with C/C of cough, low grade temp.     IMP: MDD recurrent currently in remmision    REC: At this time patient is psychiatrically stable
81 y/o DW female, domiciled, no prior psychiatric hospitalizations, history of chronic depression, with outpatient treatment for 30 years, with pmhx of hypothyroid, depression, subdural hematoma 8/2020 2/2 fall presents to [facility] with C/C of cough, low grade temp.     IMP: MDD recurrent currently in remmision    REC: At this time patient is psychiatrically stable
79 y/o DW female, domiciled, no prior psychiatric hospitalizations, history of chronic depression, with outpatient treatment for 30 years, with pmhx of hypothyroid, depression, subdural hematoma 8/2020 2/2 fall presents to [facility] with C/C of cough, low grade temp.     IMP: MDD recurrent currently in remmision    REC: At this time patient is psychiatrically stable

## 2020-12-28 NOTE — PROGRESS NOTE BEHAVIORAL HEALTH - PRIMARY DX
Severe episode of recurrent major depressive disorder, without psychotic features
MDD (major depressive disorder), recurrent, in full remission
Severe episode of recurrent major depressive disorder, without psychotic features

## 2020-12-28 NOTE — PROGRESS NOTE ADULT - PROBLEM SELECTOR PLAN 1
12 17 2020 Covid PCR positive -   81yo female with pmhx of hypothyroid, depression, subdural hematoma 8/2020 2/2 fall presents with cough x 1 week. Transferred from [place] [facility] to [facility] for COVID pna 19  on Lovenox for DVT p   completed remdesivir and s/p decadron  oral and skin care  old records reviewed  tylenol PRN for pain - aches - fever -   nutrition  isolation precs  ct head reviewed - cxr reviewed -.
CXR - atelectasis - 12 16 2020  Cardio eval noted on 12 16 2020  81yo female with pmhx of hypothyroid, depression, subdural hematoma 8/2020 2/2 fall presents with cough x 1 week. Transferred from [place] [facility] to [facility] for COVID pna 19  on Lovenox for DVT p   completed remdesivir and s/p decadron  oral and skin care  old records reviewed  tylenol PRN for pain - aches - fever -   nutrition  isolation precs  ct head reviewed - cxr reviewed -.
Current Meds   1. ALPRAZolam 0.25 MG Oral Tablet; TAKE 1-2 TABLETS AT BEDTIME AS NEEDED; Last   Rx:[id number] Ordered   2. Ambien 10 MG Oral Tablet; TAKE 1 TABLET AT BEDTIME AS NEEDED FOR SLEEP;   Therapy: (Recorded:[id number]) to Recorded   3. Omeprazole 20 MG Oral Capsule Delayed Release; TAKE 1 CAPSULE DAILY;   Therapy: [id number] to (Last Rx:03May2014) Ordered   4. SUMAtriptan Succinate 50 MG Oral Tablet; TAKE ONE TABLET BY MOUTH AS NEEDED   AND AS DIRECTED;   Therapy: [id number] to (Evaluate:[id number])  Requested for: 21May2016; Last   Rx:[id number] Ordered   5. [employer] Allergy 25 MG Oral Tablet; TAKE ONE TO TWO TABLETS BY MOUTH EVERY   NIGHT AT BEDTIME AS NEEDED;   Therapy: 12Mar2013 to (Evaluate:[id number])  Requested for: 12Mar2013; Last   Rx:12Mar2013 Ordered   6. Warfarin Sodium 5 MG Oral Tablet; TAKE 2 TABLETS BY MOUTH EVERY DAY;   Therapy: 30Oct2014 to (Evaluate:06Nov2017)  Requested for: 11Nov2016; Last   Rx:[id number] Ordered    Allergies  No Known Allergies    Results/Data    01May2017 10:29AM   prc PROTHROMBIN TIME, IN-OFFICE FINGERSTICK   PROTHROMBIN TIME, FINGERSTICK: 31.1   INR, FINGERSTICK: 2.6   CURRENT DOSE: pt is taking 10mgs of coumadin x 5 days  and 5mgs of coumadin x 2 days     Plan   1. prc PROTHROMBIN TIME, IN-OFFICE FINGERSTICK; Status:Active; Requested   for:01May2017;    2. prc PROTHROMBIN TIME, IN-OFFICE FINGERSTICK; Status:Resulted - Requires   Verification;   Done: 01May2017 10:29AM    Signatures   Electronically signed by : JULIO Ochoa; May  1 2017 10:30AM CST    
as above
as above  COVID-19-high risk for decompensation EARLY INFLAMMATORY PHASE (7-14 days post symptom onset),    REMDESIVIR-5 day course consider within 10 days of symptom onset and prior to need for high flow oxygen or mechanical ventilation, Criteria for use include:• SpO2 < 94% on room air, or requiring supplemental oxygen • eGFR > 30 mL/min • ALT and AST < 5X ULN -weak evidence supporting minimal benefit  STEROIDs recommended AFTER 1st week of symptom onset for any patients that are hypoxic  and  with dexamethasone 6mg  Q-day x 10 days (equivalent to solumedrol 32mg IV or Prednisone 40mg)-higher doses to be considered in more severe disease,   ANTICOAGULATION- prophylactic dosing universally recommended LMWH 0.5mg/kg SQ BID (adj for low GFR) and then full dose to be considered in patients with increased risk for thromboembolic complications if bleeding risks are considered acceptable - track efficacy and periodic D-dimers from day of increased oxygen requirement and for high risk patients consider discharge on oral anticoagulation with rivaroxaban (Xarelto) 10mg PO QD or Eliquis (apixaban) 2.5-5mg PO BID  x 6 weeks, ASA in some contexts.   TOCILIZUMAB role still under investigation (limited benefit without pretreatment with steroids) but may be considered for patients progressing after start of steroids (criteria per Lucy: Ferritin>700, D-dimer >1,000, CRP increase by >30%) w some evidence to suggest reduced progression to mechanical ventilation and shortening of hospital stay, caution with AST/ALT >1.5 ULN, would avoid without steroids    -daily, BMP, CBC w diff to follow NLR and in some contexts daily or periodic D-dimer levels, -other labs to risk stratify or with any decompensation  Procalcitonin,  Ferritin,  CRP, ESR, PT/PTT but limit excessive testing -antibiotics only if there is concern for a bacterial process (noted to be an issue in only a minority on presentation,  (consider proning and tolerating lower oxygen saturation to avoid intubation).
as above  COVID-19-high risk for decompensation EARLY INFLAMMATORY PHASE (7-14 days post symptom onset),    REMDESIVIR-5 day course consider within 10 days of symptom onset and prior to need for high flow oxygen or mechanical ventilation, Criteria for use include:• SpO2 < 94% on room air, or requiring supplemental oxygen • eGFR > 30 mL/min • ALT and AST < 5X ULN -weak evidence supporting minimal benefit  STEROIDs recommended AFTER 1st week of symptom onset for any patients that are hypoxic  and  with dexamethasone 6mg  Q-day x 10 days (equivalent to solumedrol 32mg IV or Prednisone 40mg)-higher doses to be considered in more severe disease,   ANTICOAGULATION- prophylactic dosing universally recommended LMWH 0.5mg/kg SQ BID (adj for low GFR) and then full dose to be considered in patients with increased risk for thromboembolic complications if bleeding risks are considered acceptable - track efficacy and periodic D-dimers from day of increased oxygen requirement and for high risk patients consider discharge on oral anticoagulation with rivaroxaban (Xarelto) 10mg PO QD or Eliquis (apixaban) 2.5-5mg PO BID  x 6 weeks, ASA in some contexts.   TOCILIZUMAB role still under investigation (limited benefit without pretreatment with steroids) but may be considered for patients progressing after start of steroids (criteria per Lucy: Ferritin>700, D-dimer >1,000, CRP increase by >30%) w some evidence to suggest reduced progression to mechanical ventilation and shortening of hospital stay, caution with AST/ALT >1.5 ULN, would avoid without steroids    -daily, BMP, CBC w diff to follow NLR and in some contexts daily or periodic D-dimer levels, -other labs to risk stratify or with any decompensation  Procalcitonin,  Ferritin,  CRP, ESR, PT/PTT but limit excessive testing -antibiotics only if there is concern for a bacterial process (noted to be an issue in only a minority on presentation,  (consider proning and tolerating lower oxygen saturation to avoid intubation).
covid pcr neg on 12 25 2020  81yo female with pmhx of hypothyroid, depression, subdural hematoma 8/2020 2/2 fall presents with cough x 1 week. Transferred from [place] [facility] to [facility] for COVID pna 19  on Lovenox for DVT p   completed remdesivir and s/p decadron  oral and skin care  old records reviewed  tylenol PRN for pain - aches - fever -   nutrition  isolation precs  ct head reviewed - cxr reviewed -.
CP eval noted - overnight -   covid pcr neg x 2 neg most recently -   planned for Home DC   on RA - vs noted  81yo female with pmhx of hypothyroid, depression, subdural hematoma 8/2020 2/2 fall presents with cough x 1 week. Transferred from [place] [facility] to [facility] for COVID pna 19  on Lovenox for DVT p   completed remdesivir and s/p decadron  oral and skin care  old records reviewed  tylenol PRN for pain - aches - fever -   nutrition  isolation precs  ct head reviewed - cxr reviewed -.
covid pcr positive - 12 20 2020  79yo female with pmhx of hypothyroid, depression, subdural hematoma 8/2020 2/2 fall presents with cough x 1 week. Transferred from [place] [facility] to [facility] for COVID pna 19  on Lovenox for DVT p   completed remdesivir and s/p decadron  oral and skin care  old records reviewed  tylenol PRN for pain - aches - fever -   nutrition  isolation precs  ct head reviewed - cxr reviewed -.
covid pcr testing in progress - when neg pcr - for dc - poss [facility] -   last pcr on 12 17 2020 - positive  79yo female with pmhx of hypothyroid, depression, subdural hematoma 8/2020 2/2 fall presents with cough x 1 week. Transferred from [place] [facility] to [facility] for COVID pna 19  on Lovenox for DVT p   completed remdesivir and s/p decadron  oral and skin care  old records reviewed  tylenol PRN for pain - aches - fever -   nutrition  isolation precs  ct head reviewed - cxr reviewed -.
as above
as above  COVID-19-high risk for decompensation EARLY INFLAMMATORY PHASE (7-14 days post symptom onset),    REMDESIVIR-5 day course consider within 10 days of symptom onset and prior to need for high flow oxygen or mechanical ventilation, Criteria for use include:• SpO2 < 94% on room air, or requiring supplemental oxygen • eGFR > 30 mL/min • ALT and AST < 5X ULN -weak evidence supporting minimal benefit  STEROIDs recommended AFTER 1st week of symptom onset for any patients that are hypoxic  and  with dexamethasone 6mg  Q-day x 10 days (equivalent to solumedrol 32mg IV or Prednisone 40mg)-higher doses to be considered in more severe disease,   ANTICOAGULATION- prophylactic dosing universally recommended LMWH 0.5mg/kg SQ BID (adj for low GFR) and then full dose to be considered in patients with increased risk for thromboembolic complications if bleeding risks are considered acceptable - track efficacy and periodic D-dimers from day of increased oxygen requirement and for high risk patients consider discharge on oral anticoagulation with rivaroxaban (Xarelto) 10mg PO QD or Eliquis (apixaban) 2.5-5mg PO BID  x 6 weeks, ASA in some contexts.   TOCILIZUMAB role still under investigation (limited benefit without pretreatment with steroids) but may be considered for patients progressing after start of steroids (criteria per Lucy: Ferritin>700, D-dimer >1,000, CRP increase by >30%) w some evidence to suggest reduced progression to mechanical ventilation and shortening of hospital stay, caution with AST/ALT >1.5 ULN, would avoid without steroids    -daily, BMP, CBC w diff to follow NLR and in some contexts daily or periodic D-dimer levels, -other labs to risk stratify or with any decompensation  Procalcitonin,  Ferritin,  CRP, ESR, PT/PTT but limit excessive testing -antibiotics only if there is concern for a bacterial process (noted to be an issue in only a minority on presentation,  (consider proning and tolerating lower oxygen saturation to avoid intubation).
covid pcr pos on 12 19 2020  79yo female with pmhx of hypothyroid, depression, subdural hematoma 8/2020 2/2 fall presents with cough x 1 week. Transferred from [place] [facility] to [facility] for COVID pna 19  on Lovenox for DVT p   completed remdesivir and s/p decadron  oral and skin care  old records reviewed  tylenol PRN for pain - aches - fever -   nutrition  isolation precs  ct head reviewed - cxr reviewed -.
79yo female with pmhx of hypothyroid, depression, subdural hematoma 8/2020 2/2 fall presents with cough x 1 week. Transferred from [place] [facility] to [facility] for COVID pna 19  on Lovenox for DVT p   completed remdesivir and s/p decadron  oral and skin care  old records reviewed  tylenol PRN for pain - aches - fever -   nutrition  isolation precs  ct head reviewed - cxr reviewed -.
as above  COVID-19-high risk for decompensation EARLY INFLAMMATORY PHASE (7-14 days post symptom onset),    REMDESIVIR-5 day course consider within 10 days of symptom onset and prior to need for high flow oxygen or mechanical ventilation, Criteria for use include:• SpO2 < 94% on room air, or requiring supplemental oxygen • eGFR > 30 mL/min • ALT and AST < 5X ULN -weak evidence supporting minimal benefit  STEROIDs recommended AFTER 1st week of symptom onset for any patients that are hypoxic  and  with dexamethasone 6mg  Q-day x 10 days (equivalent to solumedrol 32mg IV or Prednisone 40mg)-higher doses to be considered in more severe disease,   ANTICOAGULATION- prophylactic dosing universally recommended LMWH 0.5mg/kg SQ BID (adj for low GFR) and then full dose to be considered in patients with increased risk for thromboembolic complications if bleeding risks are considered acceptable - track efficacy and periodic D-dimers from day of increased oxygen requirement and for high risk patients consider discharge on oral anticoagulation with rivaroxaban (Xarelto) 10mg PO QD or Eliquis (apixaban) 2.5-5mg PO BID  x 6 weeks, ASA in some contexts.   TOCILIZUMAB role still under investigation (limited benefit without pretreatment with steroids) but may be considered for patients progressing after start of steroids (criteria per Lucy: Ferritin>700, D-dimer >1,000, CRP increase by >30%) w some evidence to suggest reduced progression to mechanical ventilation and shortening of hospital stay, caution with AST/ALT >1.5 ULN, would avoid without steroids    -daily, BMP, CBC w diff to follow NLR and in some contexts daily or periodic D-dimer levels, -other labs to risk stratify or with any decompensation  Procalcitonin,  Ferritin,  CRP, ESR, PT/PTT but limit excessive testing -antibiotics only if there is concern for a bacterial process (noted to be an issue in only a minority on presentation,  (consider proning and tolerating lower oxygen saturation to avoid intubation).
81yo female with pmhx of hypothyroid, depression, subdural hematoma 8/2020 2/2 fall presents with cough x 1 week. Transferred from [place] [facility] to [facility] for COVID pna 19  on Lovenox for DVT p   completed remdesivir and s/p decadron  oral and skin care  old records reviewed  tylenol PRN for pain - aches - fever -   nutrition  isolation precs  ct head reviewed - cxr reviewed -.
CP reported - Cardio cx in progress -   Covid PCR cont testing -   79yo female with pmhx of hypothyroid, depression, subdural hematoma 8/2020 2/2 fall presents with cough x 1 week. Transferred from [place] [facility] to [facility] for COVID pna 19  on Lovenox for DVT p   completed remdesivir and s/p decadron  oral and skin care  old records reviewed  tylenol PRN for pain - aches - fever -   nutrition  isolation precs  ct head reviewed - cxr reviewed -.
Psych and ID f/u noted - seen to be in better mood this am -   Xanax added for BID use -   on RA - vs noted  CM notes reviewed - dc planning under way -   81yo female with pmhx of hypothyroid, depression, subdural hematoma 8/2020 2/2 fall presents with cough x 1 week. Transferred from [place] [facility] to [facility] for COVID pna 19  on Lovenox for DVT p   completed remdesivir and on decadron  oral and skin care  old records reviewed  tylenol PRN for pain - aches - fever -   nutrition  isolation precs  ct head reviewed - cxr reviewed -.
am LABS reviewed -   psych eval - pending - pt with changes in personality and MS -   Xanax added for BID use -   on RA - vs noted  CM notes reviewed - dc planning under way -   81yo female with pmhx of hypothyroid, depression, subdural hematoma 8/2020 2/2 fall presents with cough x 1 week. Transferred from [place] [facility] to [facility] for COVID pna 19  on Lovenox for DVT p   completed remdesivir and on decadron  oral and skin care  old records reviewed  tylenol PRN for pain - aches - fever -   nutrition  isolation precs  ct head reviewed - cxr reviewed -.
as above  Thank you for consulting us and involving us in the management of this most interesting and challenging case.  Please call us at [phone] or text me directly on my cell#[phone] with any concerns or further questions.
covid pcr positive 12 22 2020  79yo female with pmhx of hypothyroid, depression, subdural hematoma 8/2020 2/2 fall presents with cough x 1 week. Transferred from [place] [facility] to [facility] for COVID pna 19  on Lovenox for DVT p   completed remdesivir and s/p decadron  oral and skin care  old records reviewed  tylenol PRN for pain - aches - fever -   nutrition  isolation precs  ct head reviewed - cxr reviewed -.
covid pcr positive on 12 24 2020 - planned for Home DC when PCR neg  81yo female with pmhx of hypothyroid, depression, subdural hematoma 8/2020 2/2 fall presents with cough x 1 week. Transferred from [place] [facility] to [facility] for COVID pna 19  on Lovenox for DVT p   completed remdesivir and s/p decadron  oral and skin care  old records reviewed  tylenol PRN for pain - aches - fever -   nutrition  isolation precs  ct head reviewed - cxr reviewed -.
dc planning -   covid pcr positive on 12 14 2020  CM notes reviewed - dc planning under way -   79yo female with pmhx of hypothyroid, depression, subdural hematoma 8/2020 2/2 fall presents with cough x 1 week. Transferred from [place] [facility] to [facility] for COVID pna 19  on Lovenox for DVT p   completed remdesivir and on decadron  oral and skin care  old records reviewed  tylenol PRN for pain - aches - fever -   nutrition  isolation precs  ct head reviewed - cxr reviewed -.
emotionally labile - cm notes reviewed -   on RA - vs noted - labs reviewed - covid Positive on 12 8 2020  81yo female with pmhx of hypothyroid, depression, subdural hematoma 8/2020 2/2 fall presents with cough x 1 week. Transferred from [place] [facility] to [facility] for COVID pna 19  on Lovenox for DVT p   completed remdesivir and on decadron  oral and skin care  old records reviewed  tylenol PRN for pain - aches - fever -   nutrition  isolation precs  ct head reviewed - cxr reviewed -.
on RA - VS noted - seen and examined  overnight events noted - vaginal discomfort - itching -   81yo female with pmhx of hypothyroid, depression, subdural hematoma 8/2020 2/2 fall presents with cough x 1 week. Transferred from [place] [facility] to [facility] for COVID pna 19  needs DVT p   on remdesivir and decadron  oral and skin care  old records reviewed  tylenol PRN for pain - aches - fever -   nutrition  isolation precs  ct head reviewed - cxr reviewed -.
on RA - vs noted  CM notes reviewed - dc planning under way -   79yo female with pmhx of hypothyroid, depression, subdural hematoma 8/2020 2/2 fall presents with cough x 1 week. Transferred from [place] [facility] to [facility] for COVID pna 19  on Lovenox for DVT p   completed remdesivir and on decadron  oral and skin care  old records reviewed  tylenol PRN for pain - aches - fever -   nutrition  isolation precs  ct head reviewed - cxr reviewed -.
on RA - vs noted - labs reviewed - covid Positive on 12 8 2020  79yo female with pmhx of hypothyroid, depression, subdural hematoma 8/2020 2/2 fall presents with cough x 1 week. Transferred from [place] [facility] to [facility] for COVID pna 19  on Lovenox for DVT p   completed remdesivir and on decadron  oral and skin care  old records reviewed  tylenol PRN for pain - aches - fever -   nutrition  isolation precs  ct head reviewed - cxr reviewed -.
psych eval - pending - pt with changes in personality and MS -   Xanax added for BID use -   on RA - vs noted  CM notes reviewed - dc planning under way -   81yo female with pmhx of hypothyroid, depression, subdural hematoma 8/2020 2/2 fall presents with cough x 1 week. Transferred from [place] [facility] to [facility] for COVID pna 19  on Lovenox for DVT p   completed remdesivir and on decadron  oral and skin care  old records reviewed  tylenol PRN for pain - aches - fever -   nutrition  isolation precs  ct head reviewed - cxr reviewed -.
seen and examined - vs noted - on NC - o2 suppor t- RRT notes reviewed -   anxious -   81yo female with pmhx of hypothyroid, depression, subdural hematoma 8/2020 2/2 fall presents with cough x 1 week. Transferred from [place] [facility] to [facility] for COVID pna 19  needs DVT p   on remdesivir and decadron  on o2 support - keep sat > 88 pct - fio2 support titration  oral and skin care  old records reviewed  tylenol PRN for pain - aches - fever -   nutrition  isolation precs  ct head reviewed - cxr reviewed -.
vs reviewed - on and off o2 support -   79yo female with pmhx of hypothyroid, depression, subdural hematoma 8/2020 2/2 fall presents with cough x 1 week. Transferred from [place] [facility] to [facility] for COVID pna 19  on Lovenox for DVT p   on remdesivir and decadron  oral and skin care  old records reviewed  tylenol PRN for pain - aches - fever -   nutrition  isolation precs  ct head reviewed - cxr reviewed -.
VSS  planned for follow up with Psych and likely dc home  covid pcr neg x 2   79yo female with pmhx of hypothyroid, depression, subdural hematoma 8/2020 2/2 fall presents with cough x 1 week. Transferred from [place] [facility] to [facility] for COVID pna 19  on Lovenox for DVT p   completed remdesivir and s/p decadron  oral and skin care  old records reviewed  tylenol PRN for pain - aches - fever -   nutrition  isolation precs
as above  COVID-19-high risk for decompensation EARLY INFLAMMATORY PHASE (7-14 days post symptom onset),    REMDESIVIR-5 day course consider within 10 days of symptom onset and prior to need for high flow oxygen or mechanical ventilation, Criteria for use include:• SpO2 < 94% on room air, or requiring supplemental oxygen • eGFR > 30 mL/min • ALT and AST < 5X ULN -weak evidence supporting minimal benefit  STEROIDs recommended AFTER 1st week of symptom onset for any patients that are hypoxic  and  with dexamethasone 6mg  Q-day x 10 days (equivalent to solumedrol 32mg IV or Prednisone 40mg)-higher doses to be considered in more severe disease,   ANTICOAGULATION- prophylactic dosing universally recommended LMWH 0.5mg/kg SQ BID (adj for low GFR) and then full dose to be considered in patients with increased risk for thromboembolic complications if bleeding risks are considered acceptable - track efficacy and periodic D-dimers from day of increased oxygen requirement and for high risk patients consider discharge on oral anticoagulation with rivaroxaban (Xarelto) 10mg PO QD or Eliquis (apixaban) 2.5-5mg PO BID  x 6 weeks, ASA in some contexts.   TOCILIZUMAB role still under investigation (limited benefit without pretreatment with steroids) but may be considered for patients progressing after start of steroids (criteria per Lucy: Ferritin>700, D-dimer >1,000, CRP increase by >30%) w some evidence to suggest reduced progression to mechanical ventilation and shortening of hospital stay, caution with AST/ALT >1.5 ULN, would avoid without steroids    -daily, BMP, CBC w diff to follow NLR and in some contexts daily or periodic D-dimer levels, -other labs to risk stratify or with any decompensation  Procalcitonin,  Ferritin,  CRP, ESR, PT/PTT but limit excessive testing -antibiotics only if there is concern for a bacterial process (noted to be an issue in only a minority on presentation,  (consider proning and tolerating lower oxygen saturation to avoid intubation).

## 2020-12-28 NOTE — PROGRESS NOTE BEHAVIORAL HEALTH - AXIS III
hypothyroidism, subdural hematoma

## 2020-12-28 NOTE — PROGRESS NOTE BEHAVIORAL HEALTH - NSBHCHARTREVIEWLAB_PSY_A_CORE FT
11.1   11.83 )-----------( 494      ( 13 Dec 2020 07:52 )             33.2   12-13    137  |  99  |  38<H>  ----------------------------<  93  4.3   |  30  |  1.00    Ca    9.1      13 Dec 2020 07:52    TPro  6.8  /  Alb  2.9<L>  /  TBili  0.4  /  DBili  .10  /  AST  24  /  ALT  34  /  AlkPhos  69  12-13
12.4   12.67 )-----------( 411      ( 16 Dec 2020 13:05 )             37.9   12-16    140  |  103  |  42<H>  ----------------------------<  134<H>  4.3   |  28  |  1.20    Ca    8.6      16 Dec 2020 13:05
11.6   6.08  )-----------( 205      ( 27 Dec 2020 09:53 )             36.7   12-27    139  |  103  |  41<H>  ----------------------------<  123<H>  4.1   |  29  |  1.20    Ca    8.8      27 Dec 2020 09:53    TPro  7.0  /  Alb  2.9<L>  /  TBili  0.3  /  DBili  x   /  AST  34  /  ALT  57  /  AlkPhos  70  12-26

## 2020-12-28 NOTE — PROGRESS NOTE ADULT - REASON FOR ADMISSION
COVID 19 Pneumonia
COVID-19 Pneumonia
COVID-19 Pneumonia
COVID 19 Pneumonia

## 2020-12-28 NOTE — PROGRESS NOTE BEHAVIORAL HEALTH - NSBHFUPINTERVALHXFT_PSY_A_CORE
Patient seen, evaluated and chart reviewed. Patient was reportedly intermittently catatonic over the weekend. She is currently calm and cooperative, easily engages in a conversation. She reports that she is concerned about still having COVID and requests a tests confirming it. Patient admits that she is worried about going home and dying. No evidence of psychosis, ursula or depression.
Patient seen, evaluated and chart reviewed. Patient was reportedly intermittently catatonic over the last 24 hours. She reportedly expressed suicidal ideation on several occasions, expressing concern about being infected. Patient appears to be somewhat lethargic.
Patient seen, evaluated and chart reviewed. Patient received news of negative COVID test few days ago. She is now in good mood and future-oriented. Raises no concerns, denies symptoms of psychosis, ursula or depression.

## 2020-12-28 NOTE — PROGRESS NOTE BEHAVIORAL HEALTH - ADDITIONAL DETAILS / COMMENTS
Patient is 1) cognitively intact 2) Understands the nature of the medical condition, risks, benefits, alternatives and side-effects of treatment 3) Wants to make decisions voluntarily.  At this time patient has decision making capacity regarding medical decisions.

## 2021-01-06 ENCOUNTER — APPOINTMENT (OUTPATIENT)
Dept: UROGYNECOLOGY | Facility: CLINIC | Age: 81
End: 2021-01-06

## 2021-01-22 ENCOUNTER — APPOINTMENT (OUTPATIENT)
Dept: UROGYNECOLOGY | Facility: CLINIC | Age: 81
End: 2021-01-22
Payer: MEDICARE

## 2021-01-22 PROCEDURE — 99213 OFFICE O/P EST LOW 20 MIN: CPT

## 2021-01-22 NOTE — HISTORY OF PRESENT ILLNESS
[FreeTextEntry1] : Pt doing fine with pessary.  Denies any pain, discomfort, or vaginal bleeding.  Voiding and moving BM without problems.\par \par Recent hospitalization for 3 weeks at [facility] for Covid and pneumonia.  PT is doing much better.

## 2021-02-12 ENCOUNTER — EMERGENCY (EMERGENCY)
Facility: HOSPITAL | Age: 81
LOS: 1 days | Discharge: HOME | End: 2021-02-12
Attending: EMERGENCY MEDICINE | Admitting: EMERGENCY MEDICINE
Payer: MEDICARE

## 2021-02-12 VITALS
TEMPERATURE: 97 F | SYSTOLIC BLOOD PRESSURE: 142 MMHG | WEIGHT: 117.95 LBS | RESPIRATION RATE: 18 BRPM | HEIGHT: 64 IN | HEART RATE: 76 BPM | OXYGEN SATURATION: 96 % | DIASTOLIC BLOOD PRESSURE: 76 MMHG

## 2021-02-12 VITALS
OXYGEN SATURATION: 96 % | HEART RATE: 67 BPM | RESPIRATION RATE: 24 BRPM | TEMPERATURE: 98 F | SYSTOLIC BLOOD PRESSURE: 129 MMHG | DIASTOLIC BLOOD PRESSURE: 62 MMHG

## 2021-02-12 LAB
ALBUMIN SERPL ELPH-MCNC: 4 G/DL — SIGNIFICANT CHANGE UP (ref 3.3–5)
ALP SERPL-CCNC: 70 U/L — SIGNIFICANT CHANGE UP (ref 30–120)
ALT FLD-CCNC: 18 U/L DA — SIGNIFICANT CHANGE UP (ref 10–60)
ANION GAP SERPL CALC-SCNC: 7 MMOL/L — SIGNIFICANT CHANGE UP (ref 5–17)
APTT BLD: 32.2 SEC — SIGNIFICANT CHANGE UP (ref 27.5–35.5)
AST SERPL-CCNC: 16 U/L — SIGNIFICANT CHANGE UP (ref 10–40)
BASOPHILS # BLD AUTO: 0.02 K/UL — SIGNIFICANT CHANGE UP (ref 0–0.2)
BASOPHILS NFR BLD AUTO: 0.2 % — SIGNIFICANT CHANGE UP (ref 0–2)
BILIRUB SERPL-MCNC: 0.3 MG/DL — SIGNIFICANT CHANGE UP (ref 0.2–1.2)
BUN SERPL-MCNC: 23 MG/DL — SIGNIFICANT CHANGE UP (ref 7–23)
CALCIUM SERPL-MCNC: 9.8 MG/DL — SIGNIFICANT CHANGE UP (ref 8.4–10.5)
CHLORIDE SERPL-SCNC: 102 MMOL/L — SIGNIFICANT CHANGE UP (ref 96–108)
CO2 SERPL-SCNC: 28 MMOL/L — SIGNIFICANT CHANGE UP (ref 22–31)
CREAT SERPL-MCNC: 1.41 MG/DL — HIGH (ref 0.5–1.3)
D DIMER BLD IA.RAPID-MCNC: 249 NG/ML DDU — HIGH
EOSINOPHIL # BLD AUTO: 0.12 K/UL — SIGNIFICANT CHANGE UP (ref 0–0.5)
EOSINOPHIL NFR BLD AUTO: 1.2 % — SIGNIFICANT CHANGE UP (ref 0–6)
GLUCOSE SERPL-MCNC: 110 MG/DL — HIGH (ref 70–99)
HCT VFR BLD CALC: 40.1 % — SIGNIFICANT CHANGE UP (ref 34.5–45)
HGB BLD-MCNC: 12.6 G/DL — SIGNIFICANT CHANGE UP (ref 11.5–15.5)
IMM GRANULOCYTES NFR BLD AUTO: 0.3 % — SIGNIFICANT CHANGE UP (ref 0–1.5)
INR BLD: 1.03 RATIO — SIGNIFICANT CHANGE UP (ref 0.88–1.16)
LIDOCAIN IGE QN: 88 U/L — SIGNIFICANT CHANGE UP (ref 73–393)
LYMPHOCYTES # BLD AUTO: 2.22 K/UL — SIGNIFICANT CHANGE UP (ref 1–3.3)
LYMPHOCYTES # BLD AUTO: 21.4 % — SIGNIFICANT CHANGE UP (ref 13–44)
MCHC RBC-ENTMCNC: 30.1 PG — SIGNIFICANT CHANGE UP (ref 27–34)
MCHC RBC-ENTMCNC: 31.4 GM/DL — LOW (ref 32–36)
MCV RBC AUTO: 95.7 FL — SIGNIFICANT CHANGE UP (ref 80–100)
MONOCYTES # BLD AUTO: 0.91 K/UL — HIGH (ref 0–0.9)
MONOCYTES NFR BLD AUTO: 8.8 % — SIGNIFICANT CHANGE UP (ref 2–14)
NEUTROPHILS # BLD AUTO: 7.07 K/UL — SIGNIFICANT CHANGE UP (ref 1.8–7.4)
NEUTROPHILS NFR BLD AUTO: 68.1 % — SIGNIFICANT CHANGE UP (ref 43–77)
NRBC # BLD: 0 /100 WBCS — SIGNIFICANT CHANGE UP (ref 0–0)
NT-PROBNP SERPL-SCNC: 213 PG/ML — SIGNIFICANT CHANGE UP (ref 0–450)
PLATELET # BLD AUTO: 288 K/UL — SIGNIFICANT CHANGE UP (ref 150–400)
POTASSIUM SERPL-MCNC: 4 MMOL/L — SIGNIFICANT CHANGE UP (ref 3.5–5.3)
POTASSIUM SERPL-SCNC: 4 MMOL/L — SIGNIFICANT CHANGE UP (ref 3.5–5.3)
PROT SERPL-MCNC: 7.7 G/DL — SIGNIFICANT CHANGE UP (ref 6–8.3)
PROTHROM AB SERPL-ACNC: 12.5 SEC — SIGNIFICANT CHANGE UP (ref 10.6–13.6)
RBC # BLD: 4.19 M/UL — SIGNIFICANT CHANGE UP (ref 3.8–5.2)
RBC # FLD: 13.4 % — SIGNIFICANT CHANGE UP (ref 10.3–14.5)
SODIUM SERPL-SCNC: 137 MMOL/L — SIGNIFICANT CHANGE UP (ref 135–145)
TROPONIN I SERPL-MCNC: 0.01 NG/ML — LOW (ref 0.02–0.06)
WBC # BLD: 10.37 K/UL — SIGNIFICANT CHANGE UP (ref 3.8–10.5)
WBC # FLD AUTO: 10.37 K/UL — SIGNIFICANT CHANGE UP (ref 3.8–10.5)

## 2021-02-12 PROCEDURE — 71275 CT ANGIOGRAPHY CHEST: CPT | Mod: 26,MH

## 2021-02-12 PROCEDURE — 99285 EMERGENCY DEPT VISIT HI MDM: CPT

## 2021-02-12 PROCEDURE — 36415 COLL VENOUS BLD VENIPUNCTURE: CPT

## 2021-02-12 PROCEDURE — 96361 HYDRATE IV INFUSION ADD-ON: CPT

## 2021-02-12 PROCEDURE — 96374 THER/PROPH/DIAG INJ IV PUSH: CPT | Mod: XU

## 2021-02-12 PROCEDURE — 85730 THROMBOPLASTIN TIME PARTIAL: CPT

## 2021-02-12 PROCEDURE — 83880 ASSAY OF NATRIURETIC PEPTIDE: CPT

## 2021-02-12 PROCEDURE — 71275 CT ANGIOGRAPHY CHEST: CPT

## 2021-02-12 PROCEDURE — 74177 CT ABD & PELVIS W/CONTRAST: CPT | Mod: 26,MH

## 2021-02-12 PROCEDURE — 85025 COMPLETE CBC W/AUTO DIFF WBC: CPT

## 2021-02-12 PROCEDURE — 84484 ASSAY OF TROPONIN QUANT: CPT

## 2021-02-12 PROCEDURE — 93005 ELECTROCARDIOGRAM TRACING: CPT

## 2021-02-12 PROCEDURE — 99284 EMERGENCY DEPT VISIT MOD MDM: CPT | Mod: 25

## 2021-02-12 PROCEDURE — 80053 COMPREHEN METABOLIC PANEL: CPT

## 2021-02-12 PROCEDURE — 83690 ASSAY OF LIPASE: CPT

## 2021-02-12 PROCEDURE — 93010 ELECTROCARDIOGRAM REPORT: CPT

## 2021-02-12 PROCEDURE — 71045 X-RAY EXAM CHEST 1 VIEW: CPT

## 2021-02-12 PROCEDURE — 85379 FIBRIN DEGRADATION QUANT: CPT

## 2021-02-12 PROCEDURE — 85610 PROTHROMBIN TIME: CPT

## 2021-02-12 PROCEDURE — 74177 CT ABD & PELVIS W/CONTRAST: CPT

## 2021-02-12 PROCEDURE — 71045 X-RAY EXAM CHEST 1 VIEW: CPT | Mod: 26

## 2021-02-12 RX ORDER — KETOROLAC TROMETHAMINE 30 MG/ML
15 SYRINGE (ML) INJECTION ONCE
Refills: 0 | Status: DISCONTINUED | OUTPATIENT
Start: 2021-02-12 | End: 2021-02-12

## 2021-02-12 RX ORDER — SODIUM CHLORIDE 9 MG/ML
1000 INJECTION INTRAMUSCULAR; INTRAVENOUS; SUBCUTANEOUS ONCE
Refills: 0 | Status: COMPLETED | OUTPATIENT
Start: 2021-02-12 | End: 2021-02-12

## 2021-02-12 RX ADMIN — SODIUM CHLORIDE 1000 MILLILITER(S): 9 INJECTION INTRAMUSCULAR; INTRAVENOUS; SUBCUTANEOUS at 15:26

## 2021-02-12 RX ADMIN — Medication 15 MILLIGRAM(S): at 16:47

## 2021-02-12 RX ADMIN — SODIUM CHLORIDE 1000 MILLILITER(S): 9 INJECTION INTRAMUSCULAR; INTRAVENOUS; SUBCUTANEOUS at 16:47

## 2021-02-12 NOTE — ED PROVIDER NOTE - OBJECTIVE STATEMENT
Pt is a 81 yo female who presents to the ED with a cc of CP. PMHx of Depression, Hypothyroidism, UTI (urinary tract infection), Vertigo. Pt reports that she had COVID 19 at the end of December requiring hospitalization but has since recovered. She reports that she had been doing well and then approx 1 week ago pt noted that she developed exertional SOB with pleuritic chest pain. She also noted some discomfort to her upper back and bilateral shoulders. Pain has continued and so she began concerned and went to urgent care and was then sent to the ED for further work up. Denies fever, chills, N/V, abd pain, ext numbness or weakness. Denies cough. Denies noting calf pain or swelling.

## 2021-02-12 NOTE — ED PROVIDER NOTE - CARE PROVIDER_API CALL
Andrea Peterson  CARDIOVASCULAR DISEASE  [address], [address]  [place] [address]  Phone: [phone]  Fax: [phone]  Follow Up Time:

## 2021-02-12 NOTE — ED PROVIDER NOTE - PMH
Stable creatinine 1.27 potassium 4.4   Depression    Depression    Hypothyroidism    Hypothyroidism    UTI (urinary tract infection)    Vertigo    Vertigo

## 2021-02-12 NOTE — ED PROVIDER NOTE - CLINICAL SUMMARY MEDICAL DECISION MAKING FREE TEXT BOX
Pt is a 79 yo female who presents to the ED with a cc of CP. PMHx of Depression, Hypothyroidism, UTI (urinary tract infection), Vertigo. Pt reports that she had COVID 19 at the end of December requiring hospitalization but has since recovered. She reports that she had been doing well and then approx 1 week ago pt noted that she developed exertional SOB with pleuritic chest pain. She also noted some discomfort to her upper back and bilateral shoulders. Pain has continued and so she began concerned and went to urgent care and was then sent to the ED for further work up. Denies fever, chills, N/V, abd pain, ext numbness or weakness. Denies cough. Denies noting calf pain or swelling.

## 2021-02-12 NOTE — CONSULT NOTE ADULT - ASSESSMENT
The patient is an 80 year old female with a history of hypothyroidism, depression, recent COVID-19 who presents with chest pain.    Plan:  - Symptoms likely post-COVID pleurisy  - ECG with nonspecific abnormalities but no evidence of ischemia  - Given duration of symptoms, an acute MI and myocarditis is ruled out with one set of cardiac enzymes  - BNP normal  - D-dimer mildly elevated  - CTA chest pending  - If above work-up negative, patient can be discharged from a cardiac perspective and follow-up as outpatient

## 2021-02-12 NOTE — ED PROVIDER NOTE - NSFOLLOWUPINSTRUCTIONS_ED_ALL_ED_FT
aspirin daily  follow up with your doctors or dr Peterson  Chest Pain    Chest pain can be caused by many different conditions which may or may not be dangerous. Causes include heartburn, lung infections, heart attack, blood clot in lungs, skin infections, strain or damage to muscle, cartilage, or bones, etc. In addition to a history and physical examination, an electrocardiogram (ECG) or other lab tests may have been performed to determine the cause of your chest pain. Follow up with your primary care provider or with a cardiologist as instructed.     SEEK IMMEDIATE MEDICAL CARE IF YOU HAVE ANY OF THE FOLLOWING SYMPTOMS: worsening chest pain, coughing up blood, unexplained back/neck/jaw pain, severe abdominal pain, dizziness or lightheadedness, fainting, shortness of breath, sweaty or clammy skin, vomiting, or racing heart beat. These symptoms may represent a serious problem that is an emergency. Do not wait to see if the symptoms will go away. Get medical help right away. Call 911 and do not drive yourself to the hospital.    Pleurisy    WHAT YOU NEED TO KNOW:    Pleurisy happens when the pleura becomes irritated or swollen. The pleura is a thin piece of tissue made of 2 layers. One layer lines the inside of your chest cavity, and the other surrounds your lungs. There is a small amount of fluid between the layers that helps them move easily when you breathe. When the pleura is irritated or swollen, the layers rub together as you breathe.     The Lungs         DISCHARGE INSTRUCTIONS:    Call 911 for any of the following:   •You have sudden, intense chest pain that feels different from your symptoms.      •You are breathing fast, feel confused, or feel like you are going to faint.       Return to the emergency department if:   •You have a fever.      •You have shortness of breath.      •Your lips or fingernails turn dusky or blue.      Contact your healthcare provider if:   •Your pain gets worse, even after treatment.      •You begin to cough up yellow, green, gray, or bloody mucus.      •Your wound has redness, warmth, or drainage.      •You have questions or concerns about your condition or care.      Medicines: You may need any of the following:  •Cough medicine helps decrease your urge to cough. A cough suppressant may help if a dry cough is causing you pain.      •Antibiotics are used if your pleurisy is caused by bacteria.      •Steroids may be given to decrease inflammation.      •NSAIDs, such as ibuprofen, help decrease swelling, pain, and fever. This medicine is available with or without a doctor's order. NSAIDs can cause stomach bleeding or kidney problems in certain people. If you take blood thinner medicine, always ask if NSAIDs are safe for you. Always read the medicine label and follow directions. Do not give these medicines to children under 6 months of age without direction from your child's healthcare provider.      •Prescription pain medicine may be given. Ask your healthcare provider how to take this medicine safely. Some prescription pain medicines contain acetaminophen. Do not take other medicines that contain acetaminophen without talking to your healthcare provider. Too much acetaminophen may cause liver damage. Prescription pain medicine may cause constipation. Ask your healthcare provider how to prevent or treat constipation.       •Take your medicine as directed. Contact your healthcare provider if you think your medicine is not helping or if you have side effects. Tell him or her if you are allergic to any medicine. Keep a list of the medicines, vitamins, and herbs you take. Include the amounts, and when and why you take them. Bring the list or the pill bottles to follow-up visits. Carry your medicine list with you in case of an emergency.      Self-care:   •Find a comfortable position. You will need to rest to let your body heal. Find a position that allows you to decrease pain and breathe easier. You may find it comfortable to lie on the side that has the pleurisy. Change your position often to prevent complications, such as worsening pneumonia or a lung collapse.      •Use pressure to prevent pain. Hold a pillow against your chest when you cough or take a deep breath.      •Do not smoke. Nicotine and other chemicals in cigarettes and cigars can cause lung damage. Ask your healthcare provider for information if you currently smoke and need help to quit. E-cigarettes or smokeless tobacco still contain nicotine. Talk to your healthcare provider before you use these products.       Prevent pleurisy:   •Get early treatment for conditions that cause pleurisy.       •Ask about vaccines. Ask your healthcare provider if you should get a flu or pneumonia vaccine. These vaccines may prevent infections that cause pleurisy. The flu vaccine is given every year. The pneumonia vaccine is usually given every 5 years.      Follow up with your healthcare provider as directed: Write down your questions so you remember to ask them during your visits

## 2021-02-12 NOTE — ED PROVIDER NOTE - PATIENT PORTAL LINK FT
You can access the FollowMyHealth Patient Portal offered by [facility] by registering at the following website: [email or web address]. By joining [employer]’s FollowMyHealth portal, you will also be able to view your health information using other applications (apps) compatible with our system.

## 2021-02-12 NOTE — CONSULT NOTE ADULT - SUBJECTIVE AND OBJECTIVE BOX
History of Present Illness: The patient is an 80 year old female with a history of hypothyroidism, depression, recent COVID-19 who presents with chest pain. She was hospitalized for COVID-19 in December. Since then, she has had intermittent, pleuritic upper chest discomfort with dyspnea on exertion. She noted worsening of the pain today and more shortness of breath. No palpitations or dizziness. No lower extremity swelling.    Past Medical/Surgical History:  Hypothyroidism, depression    Medications:  Home Medications:  CeleXA 10 mg oral tablet: 1 tab(s) orally once a day (at bedtime) (12 Feb 2021 11:58)  LaMICtal 25 mg oral tablet:  (12 Feb 2021 11:58)  Synthroid 25 mcg (0.025 mg) oral tablet: 1 tab(s) orally once a day (12 Feb 2021 11:58)      Family History: Non-contributory family history of premature cardiovascular atherosclerotic disease    Social History: No tobacco, alcohol or drug use    Review of Systems:  General: No fevers, chills, weight loss or gain  Skin: No rashes, color changes  Cardiovascular: (+) chest pain, orthopnea  Respiratory: (+) shortness of breath, cough  Gastrointestinal: No nausea, abdominal pain  Genitourinary: No incontinence, pain with urination  Musculoskeletal: No pain, swelling, decreased range of motion  Neurological: No headache, weakness  Psychiatric: No depression, anxiety  Endocrine: No weight loss or gain, increased thirst  All other systems are comprehensively negative.    Physical Exam:  Vitals:        Vital Signs Last 24 Hrs  T(C): 36.4 (12 Feb 2021 14:30), Max: 36.4 (12 Feb 2021 14:30)  T(F): 97.5 (12 Feb 2021 14:30), Max: 97.5 (12 Feb 2021 14:30)  HR: 75 (12 Feb 2021 14:30) (75 - 76)  BP: 141/75 (12 Feb 2021 14:30) (141/75 - 142/76)  BP(mean): --  RR: 18 (12 Feb 2021 14:30) (18 - 18)  SpO2: 98% (12 Feb 2021 14:30) (96% - 98%)  General: NAD  HEENT: MMM  Neck: No JVD, no carotid bruit  Lungs: CTAB  CV: RRR, nl S1/S2, no M/R/G  Abdomen: S/NT/ND, +BS  Extremities: No LE edema, no cyanosis  Neuro: AAOx3, non-focal  Skin: No rash    Labs:                        12.6   10.37 )-----------( 288      ( 12 Feb 2021 12:50 )             40.1     02-12    137  |  102  |  23  ----------------------------<  110<H>  4.0   |  28  |  1.41<H>    Ca    9.8      12 Feb 2021 12:50    TPro  7.7  /  Alb  4.0  /  TBili  0.3  /  DBili  x   /  AST  16  /  ALT  18  /  AlkPhos  70  02-12    CARDIAC MARKERS ( 12 Feb 2021 12:50 )  .008 ng/mL / x     / x     / x     / x          PT/INR - ( 12 Feb 2021 12:50 )   PT: 12.5 sec;   INR: 1.03 ratio         PTT - ( 12 Feb 2021 12:50 )  PTT:32.2 sec    ECG: NSR, LAD, borderline LVH with secondary repolarization abnormality, poor R wave progression

## 2021-02-19 ENCOUNTER — NON-APPOINTMENT (OUTPATIENT)
Age: 81
End: 2021-02-19

## 2021-02-20 ENCOUNTER — EMERGENCY (EMERGENCY)
Facility: HOSPITAL | Age: 81
LOS: 1 days | Discharge: HOME | End: 2021-02-20
Attending: EMERGENCY MEDICINE | Admitting: EMERGENCY MEDICINE
Payer: MEDICARE

## 2021-02-20 VITALS
OXYGEN SATURATION: 97 % | HEART RATE: 88 BPM | RESPIRATION RATE: 18 BRPM | SYSTOLIC BLOOD PRESSURE: 111 MMHG | DIASTOLIC BLOOD PRESSURE: 75 MMHG | TEMPERATURE: 97 F | HEIGHT: 64 IN | WEIGHT: 115.08 LBS

## 2021-02-20 VITALS
DIASTOLIC BLOOD PRESSURE: 78 MMHG | HEART RATE: 72 BPM | SYSTOLIC BLOOD PRESSURE: 129 MMHG | OXYGEN SATURATION: 95 % | RESPIRATION RATE: 18 BRPM

## 2021-02-20 LAB
APPEARANCE UR: ABNORMAL
APTT BLD: 29.9 SEC — SIGNIFICANT CHANGE UP (ref 27.5–35.5)
BASOPHILS # BLD AUTO: 0.02 K/UL — SIGNIFICANT CHANGE UP (ref 0–0.2)
BASOPHILS NFR BLD AUTO: 0.3 % — SIGNIFICANT CHANGE UP (ref 0–2)
BILIRUB UR-MCNC: NEGATIVE — SIGNIFICANT CHANGE UP
CK MB BLD-MCNC: 2.4 % — SIGNIFICANT CHANGE UP (ref 0–3.5)
CK MB CFR SERPL CALC: 2.2 NG/ML — SIGNIFICANT CHANGE UP (ref 0–3.6)
CK SERPL-CCNC: 92 U/L — SIGNIFICANT CHANGE UP (ref 26–192)
COLOR SPEC: ABNORMAL
DIFF PNL FLD: ABNORMAL
EOSINOPHIL # BLD AUTO: 0.11 K/UL — SIGNIFICANT CHANGE UP (ref 0–0.5)
EOSINOPHIL NFR BLD AUTO: 1.7 % — SIGNIFICANT CHANGE UP (ref 0–6)
GLUCOSE UR QL: NEGATIVE — SIGNIFICANT CHANGE UP
HCT VFR BLD CALC: 38.3 % — SIGNIFICANT CHANGE UP (ref 34.5–45)
HGB BLD-MCNC: 12.4 G/DL — SIGNIFICANT CHANGE UP (ref 11.5–15.5)
IMM GRANULOCYTES NFR BLD AUTO: 0.3 % — SIGNIFICANT CHANGE UP (ref 0–1.5)
INR BLD: 1.1 RATIO — SIGNIFICANT CHANGE UP (ref 0.88–1.16)
KETONES UR-MCNC: ABNORMAL
LEUKOCYTE ESTERASE UR-ACNC: ABNORMAL
LYMPHOCYTES # BLD AUTO: 1.7 K/UL — SIGNIFICANT CHANGE UP (ref 1–3.3)
LYMPHOCYTES # BLD AUTO: 25.6 % — SIGNIFICANT CHANGE UP (ref 13–44)
MCHC RBC-ENTMCNC: 30.5 PG — SIGNIFICANT CHANGE UP (ref 27–34)
MCHC RBC-ENTMCNC: 32.4 GM/DL — SIGNIFICANT CHANGE UP (ref 32–36)
MCV RBC AUTO: 94.1 FL — SIGNIFICANT CHANGE UP (ref 80–100)
MONOCYTES # BLD AUTO: 0.58 K/UL — SIGNIFICANT CHANGE UP (ref 0–0.9)
MONOCYTES NFR BLD AUTO: 8.7 % — SIGNIFICANT CHANGE UP (ref 2–14)
NEUTROPHILS # BLD AUTO: 4.22 K/UL — SIGNIFICANT CHANGE UP (ref 1.8–7.4)
NEUTROPHILS NFR BLD AUTO: 63.4 % — SIGNIFICANT CHANGE UP (ref 43–77)
NITRITE UR-MCNC: NEGATIVE — SIGNIFICANT CHANGE UP
NRBC # BLD: 0 /100 WBCS — SIGNIFICANT CHANGE UP (ref 0–0)
PH UR: 5 — SIGNIFICANT CHANGE UP (ref 5–8)
PLATELET # BLD AUTO: 265 K/UL — SIGNIFICANT CHANGE UP (ref 150–400)
PROT UR-MCNC: 100
PROTHROM AB SERPL-ACNC: 12.8 SEC — SIGNIFICANT CHANGE UP (ref 10.6–13.6)
RBC # BLD: 4.07 M/UL — SIGNIFICANT CHANGE UP (ref 3.8–5.2)
RBC # FLD: 13.3 % — SIGNIFICANT CHANGE UP (ref 10.3–14.5)
SP GR SPEC: 1.02 — SIGNIFICANT CHANGE UP (ref 1.01–1.02)
TROPONIN I SERPL-MCNC: <.015 NG/ML — SIGNIFICANT CHANGE UP (ref 0.01–0.04)
UROBILINOGEN FLD QL: NEGATIVE — SIGNIFICANT CHANGE UP
WBC # BLD: 6.65 K/UL — SIGNIFICANT CHANGE UP (ref 3.8–10.5)
WBC # FLD AUTO: 6.65 K/UL — SIGNIFICANT CHANGE UP (ref 3.8–10.5)

## 2021-02-20 PROCEDURE — 84484 ASSAY OF TROPONIN QUANT: CPT

## 2021-02-20 PROCEDURE — 71045 X-RAY EXAM CHEST 1 VIEW: CPT | Mod: 26

## 2021-02-20 PROCEDURE — 86850 RBC ANTIBODY SCREEN: CPT

## 2021-02-20 PROCEDURE — 93005 ELECTROCARDIOGRAM TRACING: CPT

## 2021-02-20 PROCEDURE — 81001 URINALYSIS AUTO W/SCOPE: CPT

## 2021-02-20 PROCEDURE — 85730 THROMBOPLASTIN TIME PARTIAL: CPT

## 2021-02-20 PROCEDURE — 76856 US EXAM PELVIC COMPLETE: CPT

## 2021-02-20 PROCEDURE — 99284 EMERGENCY DEPT VISIT MOD MDM: CPT | Mod: 25

## 2021-02-20 PROCEDURE — 82550 ASSAY OF CK (CPK): CPT

## 2021-02-20 PROCEDURE — 85610 PROTHROMBIN TIME: CPT

## 2021-02-20 PROCEDURE — 76856 US EXAM PELVIC COMPLETE: CPT | Mod: 26

## 2021-02-20 PROCEDURE — 82553 CREATINE MB FRACTION: CPT

## 2021-02-20 PROCEDURE — 93010 ELECTROCARDIOGRAM REPORT: CPT

## 2021-02-20 PROCEDURE — 86900 BLOOD TYPING SEROLOGIC ABO: CPT

## 2021-02-20 PROCEDURE — 71045 X-RAY EXAM CHEST 1 VIEW: CPT

## 2021-02-20 PROCEDURE — 86901 BLOOD TYPING SEROLOGIC RH(D): CPT

## 2021-02-20 PROCEDURE — 80053 COMPREHEN METABOLIC PANEL: CPT

## 2021-02-20 PROCEDURE — 36415 COLL VENOUS BLD VENIPUNCTURE: CPT

## 2021-02-20 PROCEDURE — 85025 COMPLETE CBC W/AUTO DIFF WBC: CPT

## 2021-02-20 PROCEDURE — 99285 EMERGENCY DEPT VISIT HI MDM: CPT

## 2021-02-20 RX ORDER — ACETAMINOPHEN 500 MG
650 TABLET ORAL ONCE
Refills: 0 | Status: COMPLETED | OUTPATIENT
Start: 2021-02-20 | End: 2021-02-20

## 2021-02-20 RX ORDER — AZTREONAM 2 G
1 VIAL (EA) INJECTION
Qty: 20 | Refills: 0
Start: 2021-02-20 | End: 2021-03-01

## 2021-02-20 RX ORDER — SODIUM CHLORIDE 9 MG/ML
1000 INJECTION INTRAMUSCULAR; INTRAVENOUS; SUBCUTANEOUS ONCE
Refills: 0 | Status: COMPLETED | OUTPATIENT
Start: 2021-02-20 | End: 2021-02-20

## 2021-02-20 RX ADMIN — Medication 650 MILLIGRAM(S): at 15:02

## 2021-02-20 RX ADMIN — SODIUM CHLORIDE 1000 MILLILITER(S): 9 INJECTION INTRAMUSCULAR; INTRAVENOUS; SUBCUTANEOUS at 12:25
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
## 2021-02-20 NOTE — ED PROVIDER NOTE - PROVIDER TOKENS
PROVIDER:[TOKEN:[[id number]],FOLLOWUP:[1-3 Days]],FREE:[LAST:[Urologist Dr Leyva],PHONE:[(   )    -],FAX:[(   )    -],FOLLOWUP:[1-3 Days],ESTABLISHEDPATIENT:[T]]

## 2021-02-20 NOTE — ED PROVIDER NOTE - ATTENDING CONTRIBUTION TO CARE
81 yo F p/w some blood in urine., ? vaginal bleeding x past few days. no fver/chills. no abd pain. no n/.v/.d. pt with some gen weakness. no chest pain. no sob. no neck / back pain. no known covid exposure / prior infxn. no dizziness. no agg/allev factors. no other inj or co.  exam; MMMoist .;neck supple. no meningeal signs. abd soft NT. no hsm. no cvat.   Check labs, CT, IV, reeval 79 yo F p/w some blood in urine., ? vaginal bleeding x past few days. no fever/chills. no abd pain. no n/.v/.d. pt with some gen weakness. no chest pain. Occasional palpitations, dyspnea also with exertion . no sob. no neck / back pain. no known covid exposure / prior infxn. no dizziness. no agg/allev factors. no other inj or co.  exam; MM Moist .neck supple. no meningeal signs. abd soft, min tend suprapubic. no hsm. no cvat. no c/c/e. non-toxic, well appearing.  Check labs, CT, IV, reeval

## 2021-02-20 NOTE — ED ADULT TRIAGE NOTE - RESPIRATORY RATE (BREATHS/MIN)
Please advise pt that his latest tests and ultrasound were normal, except that his liver tests remain abnormal.  Is he taking any non-steroidal anti-inflammatory medications such as ibuprofen and naproxen?  If so these may be causing his abnormal liver tests.       18

## 2021-02-20 NOTE — ED PROVIDER NOTE - CLINICAL SUMMARY MEDICAL DECISION MAKING FREE TEXT BOX
vaginal bleeding, hx of pessary, urinary frequency, generalized weakness since covid in December, will obtain pelvic exam, labs, ekg, cxr, give ivf, contact urologist Dr day vaginal bleeding, hx of pessary, urinary frequency, generalized weakness since covid in December, will obtain pelvic exam, labs, ekg, cxr, give ivf, contact urologist Dr day, cardio consult

## 2021-02-20 NOTE — ED PROVIDER NOTE - CARE PROVIDER_API CALL
Andrea Peterson  CARDIOVASCULAR DISEASE  [address], [address]  [place] [address]  Phone: [phone]  Fax: [phone]  Follow Up Time: 1-3 Days    Urologist Dr Leyva,   Phone: (   )    -  Fax: (   )    -  Established Patient  Follow Up Time: 1-3 Days

## 2021-02-20 NOTE — ED PROVIDER NOTE - PATIENT PORTAL LINK FT
You can access the FollowMyHealth Patient Portal offered by [facility] by registering at the following website: [email or web address]. By joining [employer]’s FollowMyHealth portal, you will also be able to view your health information using other applications (apps) compatible with our system.

## 2021-02-20 NOTE — ED PROVIDER NOTE - CARE PLAN
Principal Discharge DX:	Vaginal bleeding problems   Principal Discharge DX:	Vaginal bleeding problems  Secondary Diagnosis:	UTI (urinary tract infection)

## 2021-02-20 NOTE — ED PROVIDER NOTE - GENITOURINARY SPECULUM EXAM
scant blood in vaginal vault,  white vaginal instrument noted on vaginal exam, cervix not visualized
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
## 2021-02-20 NOTE — CONSULT NOTE ADULT - ASSESSMENT
The patient is an 80 year old female with a history of hypothyroidism, depression, recent COVID-19 who presents with generalized weakness and vaginal bleeding.     Plan:  - ECG with more pronounced TWI compared to ECG last week with wider QRS  - Cardiac enzymes negative ruling out an acute MI  - Not anemic on labs The patient is an 80 year old female with a history of hypothyroidism, depression, recent COVID-19 who presents with generalized weakness and vaginal bleeding.     Plan:  - ECG with more pronounced TWI compared to ECG last week with wider QRS  - Cardiac enzymes negative ruling out an acute MI  - Not anemic on labs  - Unclear why patient is confused today - psychiatric vs. infection?  - No current chest pain and patient does not recall her prior chest pain  - Recommend outpatient echo; if patient admitted can do as inpatient

## 2021-02-20 NOTE — CONSULT NOTE ADULT - SUBJECTIVE AND OBJECTIVE BOX
History of Present Illness: The patient is an 80 year old female with a history of hypothyroidism, depression, recent COVID-19 who presents with generalized weakness and vaginal bleeding. She recently presented to [facility] ED last week for pleuritic chest discomfort.    Past Medical/Surgical History:  Hypothyroidism, depression, recent COVID-19    Medications:  Home Medications:  CeleXA 10 mg oral tablet: 1 tab(s) orally once a day (at bedtime) (12 Feb 2021 11:58)  LaMICtal 25 mg oral tablet:  (12 Feb 2021 11:58)  Synthroid 25 mcg (0.025 mg) oral tablet: 1 tab(s) orally once a day (12 Feb 2021 11:58)      Family History: Non-contributory family history of premature cardiovascular atherosclerotic disease    Social History: No tobacco, alcohol or drug use    Review of Systems:  General: No fevers, chills, weight loss or gain  Skin: No rashes, color changes  Cardiovascular: No chest pain, orthopnea  Respiratory: No shortness of breath, cough  Gastrointestinal: No nausea, abdominal pain  Genitourinary: No incontinence, pain with urination  Musculoskeletal: No pain, swelling, decreased range of motion  Neurological: No headache, weakness  Psychiatric: No depression, anxiety  Endocrine: No weight loss or gain, increased thirst  All other systems are comprehensively negative.    Physical Exam:  Vitals:        Vital Signs Last 24 Hrs  T(C): 37.1 (20 Feb 2021 12:20), Max: 37.1 (20 Feb 2021 12:20)  T(F): 98.7 (20 Feb 2021 12:20), Max: 98.7 (20 Feb 2021 12:20)  HR: 88 (20 Feb 2021 12:01) (88 - 88)  BP: 111/75 (20 Feb 2021 12:01) (111/75 - 111/75)  BP(mean): --  RR: 18 (20 Feb 2021 12:01) (18 - 18)  SpO2: 97% (20 Feb 2021 12:01) (97% - 97%)  General: NAD  HEENT: MMM  Neck: No JVD, no carotid bruit  Lungs: CTAB  CV: RRR, nl S1/S2, no M/R/G  Abdomen: S/NT/ND, +BS  Extremities: No LE edema, no cyanosis  Neuro: AAOx3, non-focal  Skin: No rash    Labs:                        12.4   6.65  )-----------( 265      ( 20 Feb 2021 12:50 )             38.3           CARDIAC MARKERS ( 20 Feb 2021 13:33 )  x     / x     / 92 U/L / x     / 2.2 ng/mL  CARDIAC MARKERS ( 20 Feb 2021 12:50 )  <.015 ng/mL / x     / x     / x     / x          PT/INR - ( 20 Feb 2021 12:50 )   PT: 12.8 sec;   INR: 1.10 ratio         PTT - ( 20 Feb 2021 12:50 )  PTT:29.9 sec    ECG: NSR, normal axis, poor R wave progression, lateral and inferior TWI, borderline LBBB     History of Present Illness: The patient is an 80 year old female with a history of hypothyroidism, depression, recent COVID-19 who presents with generalized weakness and vaginal bleeding. She recently presented to [facility] ED last week for pleuritic chest discomfort. She does not recall going for this. She states she is very confused. She currently has no chest pain or shortness of breath. She states last night she developed vaginal bleeding and was weak as if she was going to collapse.    Past Medical/Surgical History:  Hypothyroidism, depression, recent COVID-19    Medications:  Home Medications:  CeleXA 10 mg oral tablet: 1 tab(s) orally once a day (at bedtime) (12 Feb 2021 11:58)  LaMICtal 25 mg oral tablet:  (12 Feb 2021 11:58)  Synthroid 25 mcg (0.025 mg) oral tablet: 1 tab(s) orally once a day (12 Feb 2021 11:58)      Family History: Non-contributory family history of premature cardiovascular atherosclerotic disease    Social History: No tobacco, alcohol or drug use    Review of Systems:  General: No fevers, chills, weight loss or gain  Skin: No rashes, color changes  Cardiovascular: No chest pain, orthopnea  Respiratory: No shortness of breath, cough  Gastrointestinal: No nausea, abdominal pain  Genitourinary: No incontinence, pain with urination  Musculoskeletal: No pain, swelling, decreased range of motion  Neurological: No headache, weakness  Psychiatric: No depression, anxiety  Endocrine: No weight loss or gain, increased thirst  All other systems are comprehensively negative.    Physical Exam:  Vitals:        Vital Signs Last 24 Hrs  T(C): 37.1 (20 Feb 2021 12:20), Max: 37.1 (20 Feb 2021 12:20)  T(F): 98.7 (20 Feb 2021 12:20), Max: 98.7 (20 Feb 2021 12:20)  HR: 88 (20 Feb 2021 12:01) (88 - 88)  BP: 111/75 (20 Feb 2021 12:01) (111/75 - 111/75)  BP(mean): --  RR: 18 (20 Feb 2021 12:01) (18 - 18)  SpO2: 97% (20 Feb 2021 12:01) (97% - 97%)  General: NAD  HEENT: MMM  Neck: No JVD, no carotid bruit  Lungs: CTAB  CV: RRR, nl S1/S2, no M/R/G  Abdomen: S/NT/ND, +BS  Extremities: No LE edema, no cyanosis  Neuro: AAOx3, non-focal  Skin: No rash    Labs:                        12.4   6.65  )-----------( 265      ( 20 Feb 2021 12:50 )             38.3           CARDIAC MARKERS ( 20 Feb 2021 13:33 )  x     / x     / 92 U/L / x     / 2.2 ng/mL  CARDIAC MARKERS ( 20 Feb 2021 12:50 )  <.015 ng/mL / x     / x     / x     / x          PT/INR - ( 20 Feb 2021 12:50 )   PT: 12.8 sec;   INR: 1.10 ratio         PTT - ( 20 Feb 2021 12:50 )  PTT:29.9 sec    ECG: NSR, normal axis, poor R wave progression, lateral and inferior TWI, borderline LBBB

## 2021-02-20 NOTE — ED ADULT NURSE NOTE - NS TRANSFER PATIENT BELONGINGS
Clothing two red stone rings, wallet, two twenty dollar bills and some singlles and fives/Jewelry/Money (specify)/Clothing
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
## 2021-02-20 NOTE — ED ADULT NURSE NOTE - ED STAT RN HANDOFF DETAILS
Patient was discharged by Marni Bowling RN.  Patient escorted to waiting room and she will travel home by cab

## 2021-02-20 NOTE — ED ADULT NURSE NOTE - OBJECTIVE STATEMENT
Patient states she had some vaginal bleeding last night which stopped, but started again this morning.  She complains of lower abdominal pain.

## 2021-02-20 NOTE — ED PROVIDER NOTE - OBJECTIVE STATEMENT
80 y presents with vaginal bleeding states began last night, has lower suprapubic pain, states she noticed the bleeding when she was urinating, she has noted urinary frequency.  states she has hx of vaginal pessary,  urologist Dr Leyva,  states she sees him every few months , has pessary changed last visit was few months ago.  states she has generalized weakness since having covid in December.  nonsmoker, denies etoh.  PMD Dr Salcedo  PMH:  Depression    Hypothyroidism    UTI (urinary tract infection)    Vertigo

## 2021-02-20 NOTE — ED ADULT NURSE REASSESSMENT NOTE - NS ED NURSE REASSESS COMMENT FT1
Pt requesting patient relations, Elizabet VENTURA escalated to myself. Elizabet told me that the patient had an issue with the bathroom. I went to room to offer assistance to bathroom and to discuss with patient any concerns about care.  She told me she didn't want to waste her time talking to me and wanted patient relations. I offered to call nursing supervisor and she stated that she doesn't care what a supervisor has to say. I offered again to assist patient to bathroom and she told me to "shut the f up and to close my mouth and not say another word". I silently stood for an extended period of time while the patient told me of her multiple masters degree as well as her past hospital experiences.  I then asked again if I could assist the patient to the bathroom and she agreed. She then stated how nice I was and that she was sorry for what she said to me.  I then asked if I could know what was her complaint and offer to help in anyway and she stated again to me to "shut my mouth and don't speak" I walked patient to bathroom and called nursing supervision Zenaida to attempt to speak to patient about complaint.

## 2021-02-20 NOTE — ED ADULT NURSE NOTE - NSIMPLEMENTINTERV_GEN_ALL_ED
Implemented All Universal Safety Interventions:  [place] to call system. Call bell, personal items and telephone within reach. Instruct patient to call for assistance. Room bathroom lighting operational. Non-slip footwear when patient is off stretcher. Physically safe environment: no spills, clutter or unnecessary equipment. Stretcher in lowest position, wheels locked, appropriate side rails in place.

## 2021-02-20 NOTE — ED PROVIDER NOTE - CHPI ED SYMPTOMS NEG
no abdominal distension/no blood in stool/no diarrhea/no dysuria/no fever/no hematuria/no vomiting/no chills

## 2021-02-23 ENCOUNTER — NON-APPOINTMENT (OUTPATIENT)
Age: 81
End: 2021-02-23

## 2021-02-24 ENCOUNTER — APPOINTMENT (OUTPATIENT)
Dept: UROGYNECOLOGY | Facility: CLINIC | Age: 81
End: 2021-02-24
Payer: MEDICARE

## 2021-02-24 VITALS — TEMPERATURE: 206.42 F

## 2021-02-24 DIAGNOSIS — N95.0 POSTMENOPAUSAL BLEEDING: ICD-10-CM

## 2021-02-24 PROCEDURE — 99213 OFFICE O/P EST LOW 20 MIN: CPT

## 2021-02-24 NOTE — DISCUSSION/SUMMARY
[FreeTextEntry1] : Pessary left out due to vaginal irritation and recent vaginal bleeding.  Patient with TVUS in October 2019 with thin endometrial lining, and recent sono 2/19/21 patient reports is 'normal'.  Patient to get ultrasound report and bring to office for next visit.  To return to care in 2 weeks for examination and possible pessary reinsertion.  If pt have any problems/concern to call office.  PT aware and agrees.

## 2021-02-24 NOTE — PROCEDURE
[Pessary Washed] : washed [H2O] : H2O [Vaginal Bleeding] : vaginal bleeding [Good Fit] : fits well [Erosion] : evidence of erosion [Erythema] : erythema noted [Pessary Out] : removed [None] : no bleeding [Refit] : refit is not needed [Discharge] : no vaginal discharge [Infection] : no evidence of infection [FreeTextEntry1] : Cube # 4 [de-identified] : superficial erosions and irritation on lateral vaginal walls and cervix [FreeTextEntry8] : Reinforced daily pericare.

## 2021-02-24 NOTE — PHYSICAL EXAM
[No Acute Distress] : in no acute distress [Well Nourished] : ~L well nourished [Good Hygeine] : demonstrates good hygeine [Oriented x3] : oriented to person, place, and time [Normal Mood/Affect] : mood and affect are normal [Soft, Nontender] : the abdomen was soft and nontender [Warm and Dry] : was warm and dry to touch [Normal Gait] : gait was normal [Vulvar Atrophy] : vulvar atrophy [Atrophy] : atrophy [Cystocele] : a cystocele [Uterine Prolapse] : uterine prolapse [Discharge] : a  ~M vaginal discharge was present [Scant] : scant [No Bleeding] : there was no active vaginal bleeding [Clear] : clear [White] : white

## 2021-02-24 NOTE — HISTORY OF PRESENT ILLNESS
[FreeTextEntry1] : Yudelka is an 81 yo with POP managed with a cube #4.  She is presenting for follow up and pessary care.  She called the office on 2/19 for vaginal bleeding  and was sent to the emergency department.  She presented to [facility] ED and was found to have a pinched nerve in her left shoulder, did not see GYN but had a Abdominal ultrasound of her uterus for the vaginal bleeding which she said was 'normal'.  Since she has not had vaginal bleeding, pain, difficulty voiding or with BM.  Other than recent vaginal bleeding she is happy with pessary and feels it manages her symptoms of prolapse well.

## 2021-03-11 ENCOUNTER — NON-APPOINTMENT (OUTPATIENT)
Age: 81
End: 2021-03-11

## 2021-03-11 NOTE — DISCHARGE NOTE NURSING/CASE MANAGEMENT/SOCIAL WORK - NSDCVIVACCINE_GEN_ALL_CORE_FT
No Vaccines Administered.
Detail Level: Simple
Additional Notes: Patient consent was obtained to proceed with the visit and recommended plan of care after discussion of all risks and benefits, including the risks of COVID-19 exposure.

## 2021-03-12 ENCOUNTER — APPOINTMENT (OUTPATIENT)
Dept: UROGYNECOLOGY | Facility: CLINIC | Age: 81
End: 2021-03-12

## 2021-04-15 ENCOUNTER — APPOINTMENT (OUTPATIENT)
Dept: UROGYNECOLOGY | Facility: CLINIC | Age: 81
End: 2021-04-15
Payer: MEDICARE

## 2021-04-15 VITALS
HEART RATE: 72 BPM | SYSTOLIC BLOOD PRESSURE: 107 MMHG | DIASTOLIC BLOOD PRESSURE: 60 MMHG | TEMPERATURE: 206.42 F | OXYGEN SATURATION: 96 %

## 2021-04-15 DIAGNOSIS — N39.46 MIXED INCONTINENCE: ICD-10-CM

## 2021-04-15 PROCEDURE — 99213 OFFICE O/P EST LOW 20 MIN: CPT

## 2021-04-15 NOTE — HISTORY OF PRESENT ILLNESS
[FreeTextEntry1] : Yudelka, 80 y/o female RTO follow up of her pelvic prolapse.  Last visit, pt had pessary left out due to excoriation and irritations.  She needed pelvic rest.  Denies any pain or discomfort.  Prolapse have descended and protruding out of vagina.  Urinating and moving BM without problems. \par Wants to have pessary reinserted.

## 2021-04-15 NOTE — PROCEDURE
[Good Fit] : fit is not good [Refit] : refit is not needed [Erythema] : no erythema [Discharge] : no vaginal discharge [Infection] : no evidence of infection [Pessary Out] : removed [None] : no bleeding [FreeTextEntry1] : Reinserted cube # 4. [de-identified] : after valsalva with BM, pessary fell out. [de-identified] : At this time, pt declined to have any pessary inserted. [FreeTextEntry8] : Reinforced daily pericare.

## 2021-04-15 NOTE — PHYSICAL EXAM
[No Acute Distress] : in no acute distress [Well developed] : well developed [Well Nourished] : ~L well nourished [Good Hygeine] : demonstrates good hygeine [Oriented x3] : oriented to person, place, and time [Normal Memory] : ~T memory was ~L unimpaired [Normal Mood/Affect] : mood and affect are normal [Soft, Nontender] : the abdomen was soft and nontender [Warm and Dry] : was warm and dry to touch [Normal Gait] : gait was normal [Vulvar Atrophy] : vulvar atrophy [Labia Majora] : were normal [Labia Minora] : were normal [Normal] : was normal [Atrophy] : atrophy [Dry Mucosa] : dry mucosa [Cystocele] : a cystocele [Uterine Prolapse] : uterine prolapse [No Bleeding] : there was no active vaginal bleeding [de-identified] : Mild prolapse protruding out of vagina. [FreeTextEntry4] : Old irritation site healed

## 2021-04-20 ENCOUNTER — NON-APPOINTMENT (OUTPATIENT)
Age: 81
End: 2021-04-20

## 2021-04-21 ENCOUNTER — APPOINTMENT (OUTPATIENT)
Dept: UROGYNECOLOGY | Facility: CLINIC | Age: 81
End: 2021-04-21
Payer: MEDICARE

## 2021-04-21 VITALS
OXYGEN SATURATION: 95 % | HEART RATE: 78 BPM | SYSTOLIC BLOOD PRESSURE: 132 MMHG | DIASTOLIC BLOOD PRESSURE: 73 MMHG | TEMPERATURE: 208.4 F

## 2021-04-21 PROCEDURE — 99213 OFFICE O/P EST LOW 20 MIN: CPT

## 2021-04-21 NOTE — PHYSICAL EXAM
[No Acute Distress] : in no acute distress [Well developed] : well developed [Well Nourished] : ~L well nourished [Good Hygeine] : demonstrates good hygeine [Oriented x3] : oriented to person, place, and time [Normal Memory] : ~T memory was ~L unimpaired [Normal Mood/Affect] : mood and affect are normal [Soft, Nontender] : the abdomen was soft and nontender [Warm and Dry] : was warm and dry to touch [Normal Gait] : gait was normal [Vulvar Atrophy] : vulvar atrophy [Labia Majora] : were normal [Labia Minora] : were normal [Normal] : was normal [Atrophy] : atrophy [Dry Mucosa] : dry mucosa [Cystocele] : a cystocele [Uterine Prolapse] : uterine prolapse [No Bleeding] : there was no active vaginal bleeding [FreeTextEntry4] : Old irritation site healed [de-identified] : Mild prolapse protruding out of vagina.

## 2021-04-21 NOTE — HISTORY OF PRESENT ILLNESS
[FreeTextEntry1] : Yudelka, 82 y/o female RTO follow up of her pelvic prolapse.  Last visit, pt had pessary left out due to excoriation and irritations.  She needed pelvic rest.  Denies any pain or discomfort.  Prolapse have descended and protruding out of vagina.  Urinating and moving BM without problems. \par Wants to have pessary reinserted even though at last visit Cube # 4 fell out.  She wants to have another one inserted as she is uncomfortable without one.

## 2021-04-21 NOTE — PROCEDURE
[Refit] : refit needed [Pessary Inserted] : inserted [None] : no bleeding [Erythema] : no erythema [Discharge] : no vaginal discharge [Infection] : no evidence of infection [FreeTextEntry1] : Fitted with Cube # 5 as #4 fell out at last visit. [de-identified] : Cube # 5. [FreeTextEntry8] : Reinforced daily pericare.

## 2021-04-21 NOTE — DISCUSSION/SUMMARY
[FreeTextEntry1] : Pelvic Prolapse:\par -Fitted with Cube # 5.\par -Precaution and instructions were reviewed.\par \par Follow up in 1 month or sooner to check prolapse and pessary check..  IF pt have any problems/concern to call office.  PT aware and agrees.

## 2021-04-23 ENCOUNTER — NON-APPOINTMENT (OUTPATIENT)
Age: 81
End: 2021-04-23

## 2021-04-26 ENCOUNTER — APPOINTMENT (OUTPATIENT)
Dept: UROGYNECOLOGY | Facility: CLINIC | Age: 81
End: 2021-04-26
Payer: MEDICARE

## 2021-04-26 VITALS — DIASTOLIC BLOOD PRESSURE: 76 MMHG | SYSTOLIC BLOOD PRESSURE: 128 MMHG | HEART RATE: 70 BPM | TEMPERATURE: 207.14 F

## 2021-04-26 PROCEDURE — 51701 INSERT BLADDER CATHETER: CPT

## 2021-04-26 PROCEDURE — 99213 OFFICE O/P EST LOW 20 MIN: CPT | Mod: 25

## 2021-04-26 NOTE — HISTORY OF PRESENT ILLNESS
[FreeTextEntry1] : Patient with known hx of POP managed with Cube #5 presents to office with complaints of irritations from pessary string and urinary complaints.  She was seen on 4/21/21 for pessary maintenance and refit with Cube #5. She states the string from the cube has been hanging out causing her irritations. States she tried to push it in but was unsuccessiful. States she also noticed she has been having increased urinary frequency, urgency and burning.  States she feels like she has a UTI. Denies bowel complaints.  Denies fever, chills, back pain, or blood in urine.

## 2021-04-26 NOTE — PROCEDURE
[Straight Catheterization] : insertion of a straight catheter [Urinary Frequency] : urinary frequency [Patient] : the patient [___ Fr Straight Tip] : a [unfilled] in [place] straight tip catheter [Culture] : culture [Urinalysis] : urinalysis [No Complications] : no complications [Tolerated Well] : the patient tolerated the procedure well [Post procedure instructions and information given] : Post procedure instructions and information were given and reviewed with patient. [None] : no bleeding [Clear] : clear [Erythema] : erythema noted [Pessary Out] : removed [H2O] : H2O [Refit] : refit is not needed [Erosion] : no evidence of erosion [Discharge] : no vaginal discharge [Infection] : no evidence of infection [FreeTextEntry1] : Cube # 5  [FreeTextEntry8] : Reinforced daily pericare.

## 2021-04-26 NOTE — DISCUSSION/SUMMARY
[FreeTextEntry1] : 1. Dysuria, Frequency, Urgency\par - POCT Udip neg nit, neg cleopatra, positive trace blood; Formal UA/CS sent to lab. Patient will wait for results for treatment. \par \par 2. Pelvic Prolapse:\par - Patient opted to leave Pessary left out at this visit for pelvic rest due to irritations. \par - Advised to continue to use barrier cream for irritations\par - Advised to monitor for urination, advised IF unable to urinate to call office asap or go to ER for evaluation. \par \par Will RTO in 2-3 weeks for pessary insertion or sooner if needed. patient agrees to call office with any questions or concerns, verbalized understanding. \par

## 2021-04-27 LAB
APPEARANCE: CLEAR
BACTERIA: NEGATIVE
BILIRUBIN URINE: NEGATIVE
BLOOD URINE: NEGATIVE
COLOR: NORMAL
GLUCOSE QUALITATIVE U: NEGATIVE
HYALINE CASTS: 1 /LPF
KETONES URINE: NEGATIVE
LEUKOCYTE ESTERASE URINE: NEGATIVE
MICROSCOPIC-UA: NORMAL
NITRITE URINE: NEGATIVE
PH URINE: 6
PROTEIN URINE: NEGATIVE
RED BLOOD CELLS URINE: 2 /HPF
SPECIFIC GRAVITY URINE: 1.02
SQUAMOUS EPITHELIAL CELLS: 1 /HPF
UROBILINOGEN URINE: NORMAL
WHITE BLOOD CELLS URINE: 2 /HPF

## 2021-04-30 DIAGNOSIS — N39.0 URINARY TRACT INFECTION, SITE NOT SPECIFIED: ICD-10-CM

## 2021-04-30 RX ORDER — CIPROFLOXACIN HYDROCHLORIDE 250 MG/1
250 TABLET, FILM COATED ORAL
Qty: 6 | Refills: 0 | Status: COMPLETED | COMMUNITY
Start: 2021-04-30 | End: 2021-04-30

## 2021-05-07 NOTE — H&P ADULT - NSHPLABSRESULTS_GEN_ALL_CORE
I have personally evaluated and examined the patient. The Attending was available to me as a supervising provider if needed.
11.0   13.14 )-----------( 246      ( 12 Aug 2020 22:47 )             33.7       08-12    141  |  102  |  21.0<H>       EXAM: CT BRAIN     PROCEDURE DATE: 08/12/2020         INTERPRETATION: CLINICAL INFORMATION: Reevaluate subdural hematoma     TECHNIQUE: Noncontrast axial CT images of the brain were acquired from the base of skull to vertex.     COMPARISON: CT head 8/12/2020 from 8:00 PM.     FINDINGS: Stable size and appearance of a left holohemispheric subdural hematoma measuring up to 9 mm in thickness. No significant mass effect. No new hemorrhage is seen. No midline shift.     The grey-white differentiation is maintained. Mild periventricular and subcortical white matter hypoattenuation without mass effect is noted, non-specific, but likely related to chronic small vessel ischemic changes.     . Cerebral volume loss is present with proportional prominence of the sulci and ventricles. Basal cisterns are not effaced.     The visualized paranasal sinuses and tympanomastoid cavities are clear.     No osseous abnormality seen.     IMPRESSION: Stable size and appearance of a left hemispheric subdural hematoma measuring up to 9 mm in thickness without significant mass effect or midline shift.

## 2021-05-19 ENCOUNTER — APPOINTMENT (OUTPATIENT)
Dept: UROGYNECOLOGY | Facility: CLINIC | Age: 81
End: 2021-05-19

## 2021-05-24 ENCOUNTER — APPOINTMENT (OUTPATIENT)
Dept: UROGYNECOLOGY | Facility: CLINIC | Age: 81
End: 2021-05-24
Payer: MEDICARE

## 2021-05-24 VITALS — DIASTOLIC BLOOD PRESSURE: 68 MMHG | HEART RATE: 66 BPM | TEMPERATURE: 207.5 F | SYSTOLIC BLOOD PRESSURE: 118 MMHG

## 2021-05-24 DIAGNOSIS — R30.0 DYSURIA: ICD-10-CM

## 2021-05-24 DIAGNOSIS — Z46.89 ENCOUNTER FOR FITTING AND ADJUSTMENT OF OTHER SPECIFIED DEVICES: ICD-10-CM

## 2021-05-24 LAB
BILIRUB UR QL STRIP: NEGATIVE
CLARITY UR: CLEAR
COLLECTION METHOD: NORMAL
GLUCOSE UR-MCNC: NORMAL
HCG UR QL: 0.2 EU/DL
HGB UR QL STRIP.AUTO: NORMAL
KETONES UR-MCNC: NEGATIVE
LEUKOCYTE ESTERASE UR QL STRIP: NEGATIVE
NITRITE UR QL STRIP: NEGATIVE
PH UR STRIP: 5.5
PROT UR STRIP-MCNC: NEGATIVE
SP GR UR STRIP: >= 1.03

## 2021-05-24 PROCEDURE — 81003 URINALYSIS AUTO W/O SCOPE: CPT | Mod: QW

## 2021-05-24 PROCEDURE — 99214 OFFICE O/P EST MOD 30 MIN: CPT

## 2021-05-24 RX ORDER — FOSFOMYCIN TROMETHAMINE 3 G/1
3 POWDER ORAL
Qty: 1 | Refills: 0 | Status: COMPLETED | COMMUNITY
Start: 2021-04-30 | End: 2021-05-24

## 2021-05-24 RX ORDER — SULFAMETHOXAZOLE AND TRIMETHOPRIM 800; 160 MG/1; MG/1
800-160 TABLET ORAL
Qty: 6 | Refills: 0 | Status: COMPLETED | COMMUNITY
Start: 2019-08-05 | End: 2021-05-24

## 2021-05-24 NOTE — HISTORY OF PRESENT ILLNESS
[FreeTextEntry1] : Patient with known hx of POP.   Her pessary was supported by a cube #5 until 4/26/2021 when she opted to leave her pessary out.  \par \par She presented today with 2 days of urinary frequency and burning.  States she also has some back pain.  No fevers, chills, or blood in the urine.  Additionally, she complaints of worsening prolapse and wants the cube put back in.

## 2021-05-24 NOTE — DISCUSSION/SUMMARY
[FreeTextEntry1] : # Dysuria, Frequency:\par - POCT Udip neg nit, neg cleopatra, positive trace blood; Formal UA/CS sent to lab. Patient will wait for results for treatment. \par \par # Pelvic Prolapse:\par - Cube replaced\par - Advised to monitor for urination, advised IF unable to urinate to call office asap or go to ER for evaluation. \par \par Will RTO in 2 weeks or sooner sooner if needed. Patient agrees to call office with any questions or concerns, verbalized understanding. \par

## 2021-05-24 NOTE — REASON FOR VISIT
[Follow-Up Visit_____] : a follow-up visit for [unfilled] [Urine Frequency] : urine frequency [Painful Urination] : painful urination [Pelvic Organ Prolapse] : pelvic organ prolapse [Pessary] : pessary

## 2021-05-24 NOTE — REVIEW OF SYSTEMS
[All Other ROS] : all other reviewed systems are negative [FreeTextEntry1] : urinary frequency, dysuria, worsneing prolapse

## 2021-05-24 NOTE — PROCEDURE
[Straight Catheterization] : insertion of a straight catheter [Urinary Frequency] : urinary frequency [Patient] : the patient [Allergies Reviewed] : Allergies reviewed [___ Fr Straight Tip] : a [unfilled] in [place] straight tip catheter [Clear] : clear [Culture] : culture [Urinalysis] : urinalysis [No Complications] : no complications [Tolerated Well] : the patient tolerated the procedure well [Post procedure instructions and information given] : Post procedure instructions and information were given and reviewed with patient. [Good Fit] : fits well [Pessary Inserted] : inserted [Pessary Washed] : washed [H2O] : H2O [None] : no bleeding [Refit] : refit is not needed [Erosion] : no evidence of erosion [Erythema] : no erythema [Discharge] : no vaginal discharge [Infection] : no evidence of infection [FreeTextEntry1] : Cube # 5  [FreeTextEntry8] : Reinforced daily pericare.

## 2021-05-24 NOTE — PHYSICAL EXAM
[No Acute Distress] : in no acute distress [Well developed] : well developed [Well Nourished] : ~L well nourished [Good Hygeine] : demonstrates good hygeine [Oriented x3] : oriented to person, place, and time [Normal Memory] : ~T memory was ~L unimpaired [Normal Mood/Affect] : mood and affect are normal [Soft, Nontender] : the abdomen was soft and nontender [Warm and Dry] : was warm and dry to touch [Normal Gait] : gait was normal [Vulvar Atrophy] : vulvar atrophy [Labia Majora] : were normal [Labia Minora] : were normal [Normal] : was normal [Atrophy] : atrophy [Dry Mucosa] : dry mucosa [Cystocele] : a cystocele [Uterine Prolapse] : uterine prolapse [No Bleeding] : there was no active vaginal bleeding [Exam Deferred] : was deferred [FreeTextEntry4] : pessary string hanging outside vaginal opening. Redness noted to vaginal opening.

## 2021-05-26 LAB — BACTERIA UR CULT: NORMAL

## 2021-05-27 ENCOUNTER — NON-APPOINTMENT (OUTPATIENT)
Age: 81
End: 2021-05-27

## 2021-05-27 LAB
APPEARANCE: CLEAR
BACTERIA: NEGATIVE
BILIRUBIN URINE: NEGATIVE
BLOOD URINE: NEGATIVE
COLOR: YELLOW
GLUCOSE QUALITATIVE U: NEGATIVE
HYALINE CASTS: 1 /LPF
KETONES URINE: NEGATIVE
LEUKOCYTE ESTERASE URINE: NEGATIVE
MICROSCOPIC-UA: NORMAL
NITRITE URINE: NEGATIVE
PH URINE: 6
PROTEIN URINE: NORMAL
RED BLOOD CELLS URINE: 3 /HPF
SPECIFIC GRAVITY URINE: 1.03
SQUAMOUS EPITHELIAL CELLS: 0 /HPF
UROBILINOGEN URINE: NORMAL
WHITE BLOOD CELLS URINE: 1 /HPF

## 2021-05-31 ENCOUNTER — NON-APPOINTMENT (OUTPATIENT)
Age: 81
End: 2021-05-31

## 2021-06-01 ENCOUNTER — APPOINTMENT (OUTPATIENT)
Dept: UROGYNECOLOGY | Facility: CLINIC | Age: 81
End: 2021-06-01
Payer: MEDICARE

## 2021-06-01 DIAGNOSIS — R35.0 FREQUENCY OF MICTURITION: ICD-10-CM

## 2021-06-01 PROCEDURE — 99213 OFFICE O/P EST LOW 20 MIN: CPT

## 2021-06-01 NOTE — HISTORY OF PRESENT ILLNESS
[FreeTextEntry1] : Patient with known hx of POP managed with Cube #5 presents to office with complaints of dysuria, increased urinary frequency and urgency x 4 days. States she feels like she has a UTI.  Denies fever, chills, back pain, or blood in urine. States she took a dose of Uribell with minimal relief. \par States she is doing well with the pessary. Denies pelvic pain, pressure or vaginal bleeding.  Denies bowel complaints. Patient stated she does not want pessary care today as she is coming back in a week to see David BANKS.

## 2021-06-01 NOTE — PROCEDURE
[Straight Catheterization] : insertion of a straight catheter [Urinary Frequency] : urinary frequency [Patient] : the patient [___ Fr Straight Tip] : a [unfilled] in [place] straight tip catheter [None] : there were no complications with the catheter insertion [Culture] : culture [Urinalysis] : urinalysis [No Complications] : no complications [Tolerated Well] : the patient tolerated the procedure well [Post procedure instructions and information given] : Post procedure instructions and information were given and reviewed with patient. [Other: ___] : [unfilled] [FreeTextEntry1] : urethra cleared, catheter passed. No CVAT

## 2021-06-01 NOTE — DISCUSSION/SUMMARY
[FreeTextEntry1] : 1. Dysuria, Frequency, Urgency\par - Unable to perform Udip as patient took Uribel and urine is blue in color. Formal UA/CS sent to lab\par - Due to patient symptoms will tx empirically with Macrobid 100mg BID x 5 days, educated patient to take with food and drink plenty of fluids\par - Advised to take OTC probiotics\par - May continue to take Uribell as needed for bladder discomfort\par \par 2. Pelvic Prolapse:\par - Patient refused pessary care today, she has apt 6/7/21 for pessary maintenance\par \par Will RTO in 1 weeks for follow up of POP or sooner if needed. patient agrees to call office with any questions or concerns, verbalized understanding. \par

## 2021-06-01 NOTE — PHYSICAL EXAM
[No Acute Distress] : in no acute distress [Well developed] : well developed [Well Nourished] : ~L well nourished [Good Hygeine] : demonstrates good hygeine [Oriented x3] : oriented to person, place, and time [Normal Memory] : ~T memory was ~L unimpaired [Normal Mood/Affect] : mood and affect are normal [None] : no CVA tenderness [Soft, Nontender] : the abdomen was soft and nontender [Warm and Dry] : was warm and dry to touch [Normal Gait] : gait was normal [Vulvar Atrophy] : vulvar atrophy [Labia Majora] : were normal [Labia Minora] : were normal [Normal] : was normal [FreeTextEntry4] : Pessary intact in vaginal vault.

## 2021-06-03 LAB
APPEARANCE: ABNORMAL
BACTERIA: NEGATIVE
BILIRUBIN URINE: NEGATIVE
BLOOD URINE: NEGATIVE
COLOR: ABNORMAL
GLUCOSE QUALITATIVE U: NEGATIVE
HYALINE CASTS: 0 /LPF
KETONES URINE: NEGATIVE
LEUKOCYTE ESTERASE URINE: NEGATIVE
MICROSCOPIC-UA: NORMAL
NITRITE URINE: NEGATIVE
PH URINE: 5.5
PROTEIN URINE: NEGATIVE
RED BLOOD CELLS URINE: 4 /HPF
SPECIFIC GRAVITY URINE: 1.02
SQUAMOUS EPITHELIAL CELLS: 1 /HPF
UROBILINOGEN URINE: NORMAL
WHITE BLOOD CELLS URINE: 2 /HPF

## 2021-06-03 PROCEDURE — 80053 COMPREHEN METABOLIC PANEL: CPT

## 2021-06-03 PROCEDURE — 85379 FIBRIN DEGRADATION QUANT: CPT

## 2021-06-03 PROCEDURE — 96374 THER/PROPH/DIAG INJ IV PUSH: CPT

## 2021-06-03 PROCEDURE — 71045 X-RAY EXAM CHEST 1 VIEW: CPT

## 2021-06-03 PROCEDURE — 83735 ASSAY OF MAGNESIUM: CPT

## 2021-06-03 PROCEDURE — 97164 PT RE-EVAL EST PLAN CARE: CPT

## 2021-06-03 PROCEDURE — U0003: CPT

## 2021-06-03 PROCEDURE — 82728 ASSAY OF FERRITIN: CPT

## 2021-06-03 PROCEDURE — 84100 ASSAY OF PHOSPHORUS: CPT

## 2021-06-03 PROCEDURE — 86769 SARS-COV-2 COVID-19 ANTIBODY: CPT

## 2021-06-03 PROCEDURE — 99285 EMERGENCY DEPT VISIT HI MDM: CPT

## 2021-06-03 PROCEDURE — 82962 GLUCOSE BLOOD TEST: CPT

## 2021-06-03 PROCEDURE — 93005 ELECTROCARDIOGRAM TRACING: CPT

## 2021-06-03 PROCEDURE — 82565 ASSAY OF CREATININE: CPT

## 2021-06-03 PROCEDURE — 80076 HEPATIC FUNCTION PANEL: CPT

## 2021-06-03 PROCEDURE — 84484 ASSAY OF TROPONIN QUANT: CPT

## 2021-06-03 PROCEDURE — 85025 COMPLETE CBC W/AUTO DIFF WBC: CPT

## 2021-06-03 PROCEDURE — 70450 CT HEAD/BRAIN W/O DYE: CPT

## 2021-06-03 PROCEDURE — 80048 BASIC METABOLIC PNL TOTAL CA: CPT

## 2021-06-03 PROCEDURE — 81001 URINALYSIS AUTO W/SCOPE: CPT

## 2021-06-03 PROCEDURE — 87086 URINE CULTURE/COLONY COUNT: CPT

## 2021-06-03 PROCEDURE — 86140 C-REACTIVE PROTEIN: CPT

## 2021-06-03 PROCEDURE — 96375 TX/PRO/DX INJ NEW DRUG ADDON: CPT

## 2021-06-03 PROCEDURE — 82553 CREATINE MB FRACTION: CPT

## 2021-06-03 PROCEDURE — 85027 COMPLETE CBC AUTOMATED: CPT

## 2021-06-03 PROCEDURE — 82550 ASSAY OF CK (CPK): CPT

## 2021-06-03 PROCEDURE — 85652 RBC SED RATE AUTOMATED: CPT

## 2021-06-03 PROCEDURE — 83615 LACTATE (LD) (LDH) ENZYME: CPT

## 2021-06-03 PROCEDURE — 97161 PT EVAL LOW COMPLEX 20 MIN: CPT

## 2021-06-03 PROCEDURE — 36415 COLL VENOUS BLD VENIPUNCTURE: CPT

## 2021-06-07 ENCOUNTER — APPOINTMENT (OUTPATIENT)
Dept: UROGYNECOLOGY | Facility: CLINIC | Age: 81
End: 2021-06-07

## 2021-06-21 ENCOUNTER — APPOINTMENT (OUTPATIENT)
Dept: UROGYNECOLOGY | Facility: CLINIC | Age: 81
End: 2021-06-21

## 2021-06-23 ENCOUNTER — NON-APPOINTMENT (OUTPATIENT)
Age: 81
End: 2021-06-23

## 2021-06-24 ENCOUNTER — APPOINTMENT (OUTPATIENT)
Dept: UROGYNECOLOGY | Facility: CLINIC | Age: 81
End: 2021-06-24
Payer: MEDICARE

## 2021-06-24 ENCOUNTER — RESULT CHARGE (OUTPATIENT)
Age: 81
End: 2021-06-24

## 2021-06-24 VITALS — TEMPERATURE: 207.14 F

## 2021-06-24 DIAGNOSIS — T83.89XS OTHER SPECIFIED NONINFLAMMATORY DISORDERS OF VAGINA: ICD-10-CM

## 2021-06-24 DIAGNOSIS — N89.8 OTHER SPECIFIED NONINFLAMMATORY DISORDERS OF VAGINA: ICD-10-CM

## 2021-06-24 LAB
BILIRUB UR QL STRIP: NORMAL
CLARITY UR: CLEAR
COLLECTION METHOD: NORMAL
GLUCOSE UR-MCNC: NEGATIVE
HCG UR QL: 0.2 EU/DL
HGB UR QL STRIP.AUTO: NORMAL
KETONES UR-MCNC: NORMAL
LEUKOCYTE ESTERASE UR QL STRIP: NORMAL
NITRITE UR QL STRIP: NEGATIVE
PH UR STRIP: 5
PROT UR STRIP-MCNC: NORMAL
SP GR UR STRIP: >=1.03

## 2021-06-24 PROCEDURE — 99214 OFFICE O/P EST MOD 30 MIN: CPT

## 2021-06-24 PROCEDURE — 81003 URINALYSIS AUTO W/O SCOPE: CPT | Mod: QW

## 2021-06-24 RX ORDER — NITROFURANTOIN (MONOHYDRATE/MACROCRYSTALS) 25; 75 MG/1; MG/1
100 CAPSULE ORAL
Qty: 10 | Refills: 0 | Status: COMPLETED | COMMUNITY
Start: 2019-09-11 | End: 2021-06-24

## 2021-06-27 LAB
APPEARANCE: ABNORMAL
BACTERIA: NEGATIVE
BILIRUBIN URINE: NEGATIVE
BLOOD URINE: ABNORMAL
COLOR: YELLOW
GLUCOSE QUALITATIVE U: NEGATIVE
HYALINE CASTS: 5 /LPF
KETONES URINE: NORMAL
LEUKOCYTE ESTERASE URINE: ABNORMAL
MICROSCOPIC-UA: NORMAL
NITRITE URINE: NEGATIVE
PH URINE: 5.5
PROTEIN URINE: ABNORMAL
RED BLOOD CELLS URINE: 13 /HPF
SPECIFIC GRAVITY URINE: 1.03
SQUAMOUS EPITHELIAL CELLS: 4 /HPF
URINE COMMENTS: NORMAL
UROBILINOGEN URINE: ABNORMAL
WHITE BLOOD CELLS URINE: 32 /HPF

## 2021-06-28 ENCOUNTER — NON-APPOINTMENT (OUTPATIENT)
Age: 81
End: 2021-06-28

## 2021-07-09 ENCOUNTER — APPOINTMENT (OUTPATIENT)
Dept: UROGYNECOLOGY | Facility: CLINIC | Age: 81
End: 2021-07-09

## 2021-07-30 ENCOUNTER — APPOINTMENT (OUTPATIENT)
Dept: UROGYNECOLOGY | Facility: CLINIC | Age: 81
End: 2021-07-30
Payer: MEDICARE

## 2021-07-30 VITALS — TEMPERATURE: 207.5 F | HEART RATE: 65 BPM | DIASTOLIC BLOOD PRESSURE: 65 MMHG | SYSTOLIC BLOOD PRESSURE: 110 MMHG

## 2021-07-30 LAB
BILIRUB UR QL STRIP: NEGATIVE
CLARITY UR: CLEAR
COLLECTION METHOD: NORMAL
GLUCOSE UR-MCNC: NEGATIVE
HCG UR QL: 0.2 EU/DL
HGB UR QL STRIP.AUTO: NORMAL
KETONES UR-MCNC: NEGATIVE
LEUKOCYTE ESTERASE UR QL STRIP: NEGATIVE
NITRITE UR QL STRIP: NEGATIVE
PH UR STRIP: 5.5
PROT UR STRIP-MCNC: NEGATIVE
SP GR UR STRIP: 1.02

## 2021-07-30 PROCEDURE — 81003 URINALYSIS AUTO W/O SCOPE: CPT | Mod: QW

## 2021-07-30 PROCEDURE — 99213 OFFICE O/P EST LOW 20 MIN: CPT

## 2021-08-02 LAB
APPEARANCE: CLEAR
BACTERIA UR CULT: NORMAL
BACTERIA: NEGATIVE
BILIRUBIN URINE: NEGATIVE
BLOOD URINE: NEGATIVE
COLOR: YELLOW
GLUCOSE QUALITATIVE U: NEGATIVE
HYALINE CASTS: 0 /LPF
KETONES URINE: NEGATIVE
LEUKOCYTE ESTERASE URINE: NEGATIVE
MICROSCOPIC-UA: NORMAL
NITRITE URINE: NEGATIVE
PH URINE: 5.5
PROTEIN URINE: NEGATIVE
RED BLOOD CELLS URINE: 1 /HPF
SPECIFIC GRAVITY URINE: 1.01
SQUAMOUS EPITHELIAL CELLS: 0 /HPF
UROBILINOGEN URINE: NORMAL
WHITE BLOOD CELLS URINE: 1 /HPF

## 2021-08-13 ENCOUNTER — APPOINTMENT (OUTPATIENT)
Dept: UROGYNECOLOGY | Facility: CLINIC | Age: 81
End: 2021-08-13
Payer: MEDICARE

## 2021-08-13 PROCEDURE — A4562: CPT

## 2021-08-13 PROCEDURE — 99213 OFFICE O/P EST LOW 20 MIN: CPT

## 2021-09-08 ENCOUNTER — APPOINTMENT (OUTPATIENT)
Dept: UROGYNECOLOGY | Facility: CLINIC | Age: 81
End: 2021-09-08

## 2021-09-20 ENCOUNTER — APPOINTMENT (OUTPATIENT)
Dept: UROGYNECOLOGY | Facility: CLINIC | Age: 81
End: 2021-09-20

## 2021-10-18 ENCOUNTER — APPOINTMENT (OUTPATIENT)
Dept: UROGYNECOLOGY | Facility: CLINIC | Age: 81
End: 2021-10-18

## 2021-10-22 ENCOUNTER — APPOINTMENT (OUTPATIENT)
Dept: UROGYNECOLOGY | Facility: CLINIC | Age: 81
End: 2021-10-22
Payer: MEDICARE

## 2021-10-22 ENCOUNTER — RESULT CHARGE (OUTPATIENT)
Age: 81
End: 2021-10-22

## 2021-10-22 DIAGNOSIS — N95.2 POSTMENOPAUSAL ATROPHIC VAGINITIS: ICD-10-CM

## 2021-10-22 DIAGNOSIS — R39.15 URGENCY OF URINATION: ICD-10-CM

## 2021-10-22 DIAGNOSIS — Z46.89 ENCOUNTER FOR FITTING AND ADJUSTMENT OF OTHER SPECIFIED DEVICES: ICD-10-CM

## 2021-10-22 DIAGNOSIS — N89.8 OTHER SPECIFIED NONINFLAMMATORY DISORDERS OF VAGINA: ICD-10-CM

## 2021-10-22 PROCEDURE — 99213 OFFICE O/P EST LOW 20 MIN: CPT

## 2021-10-22 PROCEDURE — 81003 URINALYSIS AUTO W/O SCOPE: CPT | Mod: QW

## 2021-10-22 RX ORDER — ESTRADIOL 0.1 MG/G
0.1 CREAM VAGINAL
Qty: 1 | Refills: 1 | Status: ACTIVE | COMMUNITY
Start: 2021-10-22 | End: 1900-01-01

## 2021-10-22 RX ORDER — PHENAZOPYRIDINE HYDROCHLORIDE 200 MG/1
200 TABLET ORAL 3 TIMES DAILY
Qty: 12 | Refills: 1 | Status: ACTIVE | COMMUNITY
Start: 2019-06-03 | End: 1900-01-01

## 2021-10-22 NOTE — HISTORY OF PRESENT ILLNESS
[FreeTextEntry1] : Patient with known hx of POP supported by pessary Cube #5 presents to the office today with complaints of dysuria, frequency or urgency x 1 week. Denies any fever, chills, back pain or blood in urine. She states she feels she may have a UTI. States a few weeks ago she went to urgent care and was tx with amoxicillin for UTI. \par \par She States she is doing well with the pessary. Denies any complaints with it. Denies pelvic pain, pressure or vaginal bleeding. She states she would like to have pessary care done today. Denies any bowel complaints.

## 2021-10-22 NOTE — PROCEDURE
[Refit] : refit is not needed [Erosion] : no evidence of erosion [Erythema] : no erythema [Discharge] : no vaginal discharge [Infection] : no evidence of infection [FreeTextEntry1] : Cube # 5  [FreeTextEntry8] : Reinforced daily pericare.

## 2021-10-22 NOTE — DISCUSSION/SUMMARY
[FreeTextEntry1] : # Dysuria, Frequency:\par - POCT Udip neg nit, neg cleopatra, positive trace blood; Formal UA/CS sent to lab. Patient will wait for results for treatment. \par - Advised patient to take Pyridium 200mg 1 tab q8h as needed for pain or discomfort\par \par # Pelvic Prolapse:\par - Patient doing well with pessary\par - Pessary precautions and instructions reviewed, verbalized understanding\par \par #Atrophy\par - Discussed with patient to start low dose vaginal estrogen, medication benefits and risks discussed. Patient would like to start. eRX sent to pharmacy on file\par \par Will RTO in 8 weeks for follow up or sooner if needed. Patient agrees to call office with any questions or concerns, verbalized understanding. \par

## 2021-10-25 LAB
APPEARANCE: CLEAR
BACTERIA: NEGATIVE
BILIRUBIN URINE: NEGATIVE
BLOOD URINE: NORMAL
COLOR: YELLOW
GLUCOSE QUALITATIVE U: NEGATIVE
HYALINE CASTS: 0 /LPF
KETONES URINE: NEGATIVE
LEUKOCYTE ESTERASE URINE: NEGATIVE
MICROSCOPIC-UA: NORMAL
NITRITE URINE: NEGATIVE
PH URINE: 6.5
PROTEIN URINE: NEGATIVE
RED BLOOD CELLS URINE: 2 /HPF
SPECIFIC GRAVITY URINE: 1.02
SQUAMOUS EPITHELIAL CELLS: 1 /HPF
UROBILINOGEN URINE: NORMAL
WHITE BLOOD CELLS URINE: 1 /HPF

## 2021-11-01 ENCOUNTER — NON-APPOINTMENT (OUTPATIENT)
Age: 81
End: 2021-11-01

## 2021-12-21 ENCOUNTER — APPOINTMENT (OUTPATIENT)
Dept: UROGYNECOLOGY | Facility: CLINIC | Age: 81
End: 2021-12-21
Payer: MEDICARE

## 2021-12-21 VITALS — TEMPERATURE: 209.48 F

## 2021-12-21 PROCEDURE — 99213 OFFICE O/P EST LOW 20 MIN: CPT

## 2021-12-21 RX ORDER — SOLIFENACIN SUCCINATE 5 MG/1
5 TABLET ORAL DAILY
Qty: 90 | Refills: 3 | Status: COMPLETED | COMMUNITY
Start: 2021-06-24 | End: 2021-12-21

## 2021-12-21 RX ORDER — NITROFURANTOIN (MONOHYDRATE/MACROCRYSTALS) 25; 75 MG/1; MG/1
100 CAPSULE ORAL TWICE DAILY
Qty: 10 | Refills: 0 | Status: COMPLETED | COMMUNITY
Start: 2019-08-07 | End: 2021-12-21

## 2022-01-03 ENCOUNTER — RX RENEWAL (OUTPATIENT)
Age: 82
End: 2022-01-03

## 2022-01-26 ENCOUNTER — APPOINTMENT (OUTPATIENT)
Dept: UROGYNECOLOGY | Facility: CLINIC | Age: 82
End: 2022-01-26

## 2022-02-28 ENCOUNTER — APPOINTMENT (OUTPATIENT)
Dept: UROGYNECOLOGY | Facility: CLINIC | Age: 82
End: 2022-02-28
Payer: MEDICARE

## 2022-02-28 VITALS
BODY MASS INDEX: 21.71 KG/M2 | SYSTOLIC BLOOD PRESSURE: 148 MMHG | HEIGHT: 62 IN | WEIGHT: 118 LBS | OXYGEN SATURATION: 95 % | TEMPERATURE: 205.16 F | HEART RATE: 74 BPM | DIASTOLIC BLOOD PRESSURE: 77 MMHG

## 2022-02-28 DIAGNOSIS — N36.41 HYPERMOBILITY OF URETHRA: ICD-10-CM

## 2022-02-28 PROCEDURE — 81003 URINALYSIS AUTO W/O SCOPE: CPT | Mod: QW

## 2022-02-28 PROCEDURE — 51701 INSERT BLADDER CATHETER: CPT

## 2022-02-28 PROCEDURE — 99214 OFFICE O/P EST MOD 30 MIN: CPT | Mod: 25

## 2022-02-28 NOTE — PHYSICAL EXAM
[Chaperone Present] : A chaperone was present in the examining room during all aspects of the physical examination [No Acute Distress] : in no acute distress [Well developed] : well developed [Well Nourished] : ~L well nourished [Normal Mood/Affect] : mood and affect are normal [Soft, Nontender] : the abdomen was soft and nontender [Vulvar Atrophy] : vulvar atrophy [Normal Appearance] : general appearance was normal [Atrophy] : atrophy [Rectocele] : a rectocele [Cystocele] : a cystocele [Uterine Prolapse] : uterine prolapse [Aa ____] : Aa [unfilled] [Ba ____] : Ba [unfilled] [C ____] : C [unfilled] [GH ____] : GH [unfilled] [TVL ____] : TVL  [unfilled] [Ap ____] : Ap [unfilled] [Bp ____] : Bp [unfilled] [D ____] : D [unfilled] [Normal] : normal [Post Void Residual ____ml] : post void residual was [unfilled] ml [Anxiety] : patient is not anxious [FreeTextEntry3] : + hypermobility

## 2022-02-28 NOTE — HISTORY OF PRESENT ILLNESS
[Urinary Frequency] : no [FreeTextEntry4] : with pessary [FreeTextEntry5] : daily  [de-identified] : sometimes [de-identified] : 2-3 [FreeTextEntry1] : pt here for f/u on POP - pessary has been out for the last 3 weeks.\par pt would like to discuss surgery.

## 2022-02-28 NOTE — DISCUSSION/SUMMARY
[FreeTextEntry1] : I reviewed the above findings with the patient with visual illustrations. Treatment options for the prolapse were discussed and included doing nothing, Kegel exercises and behavioral modification, a pessary, or surgical correction.Surgically we discussed the abdominal vs the vaginal routes, native tissue versus synthetics as well as reconstruction with closure.  We discussed vaginal closure with a colpocleisis.  Risks and benefits were discussed.  We discussed that if she is interested in surgical correction I would have her go for a pelvic ultrasound and return for urodynamic testing.  At this point in time she does not desire treatment and would like to do nothing.  We discussed risks of urinary retention as well as affecting ureters.  I have asked her to follow-up in the office in approximately 3 months.  All questions were answered.  I UG a patient information on pelvic organ prolapse and colpocleisis was given to her.

## 2022-05-20 ENCOUNTER — APPOINTMENT (OUTPATIENT)
Dept: UROGYNECOLOGY | Facility: CLINIC | Age: 82
End: 2022-05-20

## 2022-05-20 ENCOUNTER — NON-APPOINTMENT (OUTPATIENT)
Age: 82
End: 2022-05-20

## 2022-05-23 ENCOUNTER — APPOINTMENT (OUTPATIENT)
Dept: UROGYNECOLOGY | Facility: CLINIC | Age: 82
End: 2022-05-23

## 2022-07-28 ENCOUNTER — NON-APPOINTMENT (OUTPATIENT)
Age: 82
End: 2022-07-28

## 2022-10-25 NOTE — ED PROVIDER NOTE - CROS ED GI ALL NEG
negative... Graft Donor Site Bandage (Optional-Leave Blank If You Don't Want In Note): Vaseline, Telfa and tape

## 2022-11-07 ENCOUNTER — EMERGENCY (EMERGENCY)
Facility: HOSPITAL | Age: 82
LOS: 1 days | Discharge: HOME | End: 2022-11-07
Attending: EMERGENCY MEDICINE | Admitting: EMERGENCY MEDICINE
Payer: MEDICARE

## 2022-11-07 VITALS
OXYGEN SATURATION: 98 % | DIASTOLIC BLOOD PRESSURE: 72 MMHG | SYSTOLIC BLOOD PRESSURE: 132 MMHG | HEART RATE: 65 BPM | RESPIRATION RATE: 16 BRPM | TEMPERATURE: 98 F

## 2022-11-07 VITALS
OXYGEN SATURATION: 95 % | TEMPERATURE: 98 F | HEIGHT: 63 IN | HEART RATE: 67 BPM | WEIGHT: 115.08 LBS | DIASTOLIC BLOOD PRESSURE: 78 MMHG | RESPIRATION RATE: 15 BRPM | SYSTOLIC BLOOD PRESSURE: 136 MMHG

## 2022-11-07 PROCEDURE — 72125 CT NECK SPINE W/O DYE: CPT | Mod: MA

## 2022-11-07 PROCEDURE — 99284 EMERGENCY DEPT VISIT MOD MDM: CPT

## 2022-11-07 PROCEDURE — 72125 CT NECK SPINE W/O DYE: CPT | Mod: 26,MA

## 2022-11-07 PROCEDURE — 70450 CT HEAD/BRAIN W/O DYE: CPT | Mod: MA

## 2022-11-07 PROCEDURE — 99284 EMERGENCY DEPT VISIT MOD MDM: CPT | Mod: 25

## 2022-11-07 PROCEDURE — 70450 CT HEAD/BRAIN W/O DYE: CPT | Mod: 26,MA

## 2022-11-07 RX ORDER — LAMOTRIGINE 25 MG/1
1 TABLET, ORALLY DISINTEGRATING ORAL
Qty: 0 | Refills: 0 | DISCHARGE

## 2022-11-07 RX ORDER — LEVOTHYROXINE SODIUM 125 MCG
1 TABLET ORAL
Qty: 0 | Refills: 0 | DISCHARGE

## 2022-11-07 RX ORDER — CITALOPRAM 10 MG/1
1 TABLET, FILM COATED ORAL
Qty: 0 | Refills: 0 | DISCHARGE

## 2022-11-07 NOTE — ED PROVIDER NOTE - PATIENT PORTAL LINK FT
You can access the FollowMyHealth Patient Portal offered by [facility] by registering at the following website: [email or web address]. By joining [facility]’s FollowMyHealth portal, you will also be able to view your health information using other applications (apps) compatible with our system.

## 2022-11-07 NOTE — ED ADULT NURSE NOTE - OBJECTIVE STATEMENT
82 YOF A&OX3 presents for head injury. pt states rolled out of bed and hit head. pt rates head pain 3/10. ice pack applied. pt denies sob, chest pain, n/v/d. safety maintained.

## 2022-11-07 NOTE — ED PROVIDER NOTE - CLINICAL SUMMARY MEDICAL DECISION MAKING FREE TEXT BOX
82 female fell out of bed with head injury.  Physical exam unrevealing.  Plan is CT brain if negative discharged with head injury instructions.

## 2022-11-07 NOTE — ED PROVIDER NOTE - OBJECTIVE STATEMENT
82-year-old female with history of depression brought by ambulance from home complaining of head injury after she fell out of bed this morning.  Patient states she was on the phone with her doctor's office and they called 911.  Did not patient did not want to come to hospital and does not want to be admitted.  Denies other injury or symptom.  Denies LOC nausea vomiting disturbance neck or back pain or other symptom or problem.

## 2022-11-07 NOTE — ED PROVIDER NOTE - ENMT, MLM
Airway patent, Nasal mucosa clear. Mouth with normal mucosa. Throat has no vesicles, no oropharyngeal exudates and uvula is midline. Scalp is nontender atraumatic.  Neck is supple full range of motion intact.

## 2023-01-01 NOTE — PATIENT PROFILE ADULT - BRADEN MOISTURE
Exam at 1 hours of age:     1. Level of consciousness: 0-normal  2. Spontaneous activity: 0-normal  3. Muscle tone: 0-normal  4. Posture: 0-normal   5. Primitive reflexes: 0-normal suck and razia  6. Autonomic: 0-normal pupil response, heart rate, and respirations  Total Score: 0    This resuscitation and all procedures were performed by this provider/author of this note.   Valerie Tanner, NICK, CNP-BC 2023 11:58 PM    
NICU NOTE:     Cord gases:  Lab Results   Component Value Date    PHCA 7.08 (LL) 2023    PCO2CA 61 2023    PO2CA 21 2023    HCO3CA 18 2023    PHCV 7.31 2023    PCO2CV 40 2023    PO2CV 34 2023    HCO3CV 20 2023       Due to poor pH and base deficit (<7.15 and < -10mEqL), infant meets physiological criteria for complete neurologic examination to determine need for therapeutic hypothermia.       At six hours of life, complete neurologic exam completed by this practitioner.  No seizure activity noted. Sarnat scoring is as follows:     1. Level of consciousness: 0-normal  2. Spontaneous activity: 0-normal  3. Muscle tone: 0-normal  4. Posture: 0-normal   5. Primitive reflexes: 0-normal suck and razia  6. Autonomic: 0-normal pupil response, heart rate, and respirations  Total Score: 0     Per protocol infant, infant was serially assessed q1-2hr until 6 hours of age. All scores were 0. This infant does not meet criteria for therapeutic hypothermia. Baby to stay in NBN in [facility] with parents.    Keri Cooley PA-C 2023 4:12 AM            
NICU NOTE:     Cord gases:  Lab Results   Component Value Date    PHCA 7.08 (LL) 2023    PCO2CA 61 2023    PO2CA 21 2023    HCO3CA 18 2023    PHCV 7.31 2023    PCO2CV 40 2023    PO2CV 34 2023    HCO3CV 20 2023     Due to poor pH and base deficit (<7.15 and < -10mEqL), infant meets physiological criteria for complete neurologic examination to determine need for therapeutic hypothermia.       At three hours of life, complete neurologic exam completed by this practitioner.  No seizure activity noted. Sarnat scoring is as follows:     1. Level of consciousness: 0-normal  2. Spontaneous activity: 0-normal  3. Muscle tone: 0-normal  4. Posture: 0-normal   5. Primitive reflexes: 0-normal suck and razia  6. Autonomic: 0-normal pupil response, heart rate, and respirations  Total Score: 0     Per protocol infant will be serially assessed q1-2hr until 6 hours of age; next exam at 04:00 hours.     Keri Cooley PA-C 2023 1:05 AM     
(4) rarely moist

## 2023-01-19 ENCOUNTER — NON-APPOINTMENT (OUTPATIENT)
Age: 83
End: 2023-01-19

## 2023-02-01 NOTE — ED ADULT NURSE NOTE - NS_SISCREENINGSR_GEN_ALL_ED
RETURNED CALL TO MOTHER; MOTHER REQUEST TO CHANGE SPEECH THERAPY FROM RUS TO BEYOND THERAPY. NOTE SENT TO DR GABRIEL FOR APPROVAL.   Negative

## 2023-03-16 NOTE — ED ADULT NURSE NOTE - NSFALLRSKPASTHIST_ED_ALL_ED
no Orbicularis Oris Muscle Flap Text: The defect edges were debeveled with a #15 scalpel blade.  Given that the defect affected the competency of the oral sphincter an orbicularis oris muscle flap was deemed most appropriate to restore this competency and normal muscle function.  Using a sterile surgical marker, an appropriate flap was drawn incorporating the defect. The area thus outlined was incised with a #15 scalpel blade.

## 2023-05-16 NOTE — PATIENT PROFILE ADULT - ..
14-Aug-2020 22:43:08 Ketoconazole Pregnancy And Lactation Text: This medication is Pregnancy Category C and it isn't know if it is safe during pregnancy. It is also excreted in breast milk and breast feeding isn't recommended.

## 2023-05-28 ENCOUNTER — NON-APPOINTMENT (OUTPATIENT)
Age: 83
End: 2023-05-28

## 2023-06-04 ENCOUNTER — INPATIENT (INPATIENT)
Facility: HOSPITAL | Age: 83
LOS: 2 days | Discharge: HOME | DRG: 149 | End: 2023-06-07
Attending: INTERNAL MEDICINE | Admitting: INTERNAL MEDICINE
Payer: MEDICARE

## 2023-06-04 VITALS
RESPIRATION RATE: 18 BRPM | OXYGEN SATURATION: 96 % | DIASTOLIC BLOOD PRESSURE: 82 MMHG | WEIGHT: 149.91 LBS | TEMPERATURE: 99 F | HEART RATE: 80 BPM | SYSTOLIC BLOOD PRESSURE: 148 MMHG | HEIGHT: 63 IN

## 2023-06-04 DIAGNOSIS — H83.09 LABYRINTHITIS, UNSPECIFIED EAR: ICD-10-CM

## 2023-06-04 LAB
ALBUMIN SERPL ELPH-MCNC: 4.1 G/DL — SIGNIFICANT CHANGE UP (ref 3.3–5)
ALP SERPL-CCNC: 69 U/L — SIGNIFICANT CHANGE UP (ref 30–120)
ALT FLD-CCNC: 20 U/L DA — SIGNIFICANT CHANGE UP (ref 10–60)
ANION GAP SERPL CALC-SCNC: 8 MMOL/L — SIGNIFICANT CHANGE UP (ref 5–17)
APTT BLD: 26 SEC — LOW (ref 27.5–35.5)
AST SERPL-CCNC: 23 U/L — SIGNIFICANT CHANGE UP (ref 10–40)
BASOPHILS # BLD AUTO: 0.03 K/UL — SIGNIFICANT CHANGE UP (ref 0–0.2)
BASOPHILS NFR BLD AUTO: 0.3 % — SIGNIFICANT CHANGE UP (ref 0–2)
BILIRUB SERPL-MCNC: 0.4 MG/DL — SIGNIFICANT CHANGE UP (ref 0.2–1.2)
BUN SERPL-MCNC: 21 MG/DL — SIGNIFICANT CHANGE UP (ref 7–23)
CALCIUM SERPL-MCNC: 9.6 MG/DL — SIGNIFICANT CHANGE UP (ref 8.4–10.5)
CHLORIDE SERPL-SCNC: 101 MMOL/L — SIGNIFICANT CHANGE UP (ref 96–108)
CO2 SERPL-SCNC: 29 MMOL/L — SIGNIFICANT CHANGE UP (ref 22–31)
CREAT SERPL-MCNC: 1.21 MG/DL — SIGNIFICANT CHANGE UP (ref 0.5–1.3)
EGFR: 44 ML/MIN/1.73M2 — LOW
EOSINOPHIL # BLD AUTO: 0.1 K/UL — SIGNIFICANT CHANGE UP (ref 0–0.5)
EOSINOPHIL NFR BLD AUTO: 0.9 % — SIGNIFICANT CHANGE UP (ref 0–6)
GLUCOSE SERPL-MCNC: 134 MG/DL — HIGH (ref 70–99)
HCT VFR BLD CALC: 37.9 % — SIGNIFICANT CHANGE UP (ref 34.5–45)
HGB BLD-MCNC: 12.4 G/DL — SIGNIFICANT CHANGE UP (ref 11.5–15.5)
IMM GRANULOCYTES NFR BLD AUTO: 0.5 % — SIGNIFICANT CHANGE UP (ref 0–0.9)
INR BLD: 1.03 RATIO — SIGNIFICANT CHANGE UP (ref 0.88–1.16)
LYMPHOCYTES # BLD AUTO: 1.7 K/UL — SIGNIFICANT CHANGE UP (ref 1–3.3)
LYMPHOCYTES # BLD AUTO: 15.7 % — SIGNIFICANT CHANGE UP (ref 13–44)
MCHC RBC-ENTMCNC: 31.1 PG — SIGNIFICANT CHANGE UP (ref 27–34)
MCHC RBC-ENTMCNC: 32.7 GM/DL — SIGNIFICANT CHANGE UP (ref 32–36)
MCV RBC AUTO: 95 FL — SIGNIFICANT CHANGE UP (ref 80–100)
MONOCYTES # BLD AUTO: 0.62 K/UL — SIGNIFICANT CHANGE UP (ref 0–0.9)
MONOCYTES NFR BLD AUTO: 5.7 % — SIGNIFICANT CHANGE UP (ref 2–14)
NEUTROPHILS # BLD AUTO: 8.34 K/UL — HIGH (ref 1.8–7.4)
NEUTROPHILS NFR BLD AUTO: 76.9 % — SIGNIFICANT CHANGE UP (ref 43–77)
NRBC # BLD: 0 /100 WBCS — SIGNIFICANT CHANGE UP (ref 0–0)
PLATELET # BLD AUTO: 270 K/UL — SIGNIFICANT CHANGE UP (ref 150–400)
POTASSIUM SERPL-MCNC: 4.4 MMOL/L — SIGNIFICANT CHANGE UP (ref 3.5–5.3)
POTASSIUM SERPL-SCNC: 4.4 MMOL/L — SIGNIFICANT CHANGE UP (ref 3.5–5.3)
PROT SERPL-MCNC: 7.4 G/DL — SIGNIFICANT CHANGE UP (ref 6–8.3)
PROTHROM AB SERPL-ACNC: 12.1 SEC — SIGNIFICANT CHANGE UP (ref 10.5–13.4)
RBC # BLD: 3.99 M/UL — SIGNIFICANT CHANGE UP (ref 3.8–5.2)
RBC # FLD: 12.6 % — SIGNIFICANT CHANGE UP (ref 10.3–14.5)
SODIUM SERPL-SCNC: 138 MMOL/L — SIGNIFICANT CHANGE UP (ref 135–145)
TROPONIN I, HIGH SENSITIVITY RESULT: 13.5 NG/L — SIGNIFICANT CHANGE UP
WBC # BLD: 10.84 K/UL — HIGH (ref 3.8–10.5)
WBC # FLD AUTO: 10.84 K/UL — HIGH (ref 3.8–10.5)

## 2023-06-04 PROCEDURE — 71045 X-RAY EXAM CHEST 1 VIEW: CPT | Mod: 26

## 2023-06-04 PROCEDURE — 70496 CT ANGIOGRAPHY HEAD: CPT | Mod: 26,MA

## 2023-06-04 PROCEDURE — 99223 1ST HOSP IP/OBS HIGH 75: CPT | Mod: AI

## 2023-06-04 PROCEDURE — 70498 CT ANGIOGRAPHY NECK: CPT | Mod: 26,MA

## 2023-06-04 PROCEDURE — 99285 EMERGENCY DEPT VISIT HI MDM: CPT

## 2023-06-04 RX ORDER — ATORVASTATIN CALCIUM 80 MG/1
80 TABLET, FILM COATED ORAL AT BEDTIME
Refills: 0 | Status: DISCONTINUED | OUTPATIENT
Start: 2023-06-04 | End: 2023-06-07

## 2023-06-04 RX ORDER — LAMOTRIGINE 25 MG/1
0 TABLET, ORALLY DISINTEGRATING ORAL
Qty: 0 | Refills: 0 | DISCHARGE

## 2023-06-04 RX ORDER — ATORVASTATIN CALCIUM 80 MG/1
1 TABLET, FILM COATED ORAL
Qty: 0 | Refills: 0 | DISCHARGE

## 2023-06-04 RX ORDER — LAMOTRIGINE 25 MG/1
200 TABLET, ORALLY DISINTEGRATING ORAL DAILY
Refills: 0 | Status: DISCONTINUED | OUTPATIENT
Start: 2023-06-04 | End: 2023-06-04

## 2023-06-04 RX ORDER — MECLIZINE HCL 12.5 MG
25 TABLET ORAL ONCE
Refills: 0 | Status: COMPLETED | OUTPATIENT
Start: 2023-06-04 | End: 2023-06-04

## 2023-06-04 RX ORDER — CITALOPRAM 10 MG/1
0 TABLET, FILM COATED ORAL
Qty: 0 | Refills: 0 | DISCHARGE

## 2023-06-04 RX ORDER — LEVOTHYROXINE SODIUM 125 MCG
25 TABLET ORAL DAILY
Refills: 0 | Status: DISCONTINUED | OUTPATIENT
Start: 2023-06-05 | End: 2023-06-07

## 2023-06-04 RX ORDER — ASPIRIN/CALCIUM CARB/MAGNESIUM 324 MG
81 TABLET ORAL DAILY
Refills: 0 | Status: DISCONTINUED | OUTPATIENT
Start: 2023-06-04 | End: 2023-06-07

## 2023-06-04 RX ORDER — ENOXAPARIN SODIUM 100 MG/ML
40 INJECTION SUBCUTANEOUS EVERY 24 HOURS
Refills: 0 | Status: DISCONTINUED | OUTPATIENT
Start: 2023-06-04 | End: 2023-06-07

## 2023-06-04 RX ORDER — CITALOPRAM 10 MG/1
20 TABLET, FILM COATED ORAL AT BEDTIME
Refills: 0 | Status: DISCONTINUED | OUTPATIENT
Start: 2023-06-04 | End: 2023-06-07

## 2023-06-04 RX ORDER — LAMOTRIGINE 25 MG/1
100 TABLET, ORALLY DISINTEGRATING ORAL
Refills: 0 | Status: DISCONTINUED | OUTPATIENT
Start: 2023-06-04 | End: 2023-06-07

## 2023-06-04 RX ORDER — ONDANSETRON 8 MG/1
4 TABLET, FILM COATED ORAL ONCE
Refills: 0 | Status: COMPLETED | OUTPATIENT
Start: 2023-06-04 | End: 2023-06-04

## 2023-06-04 RX ADMIN — LAMOTRIGINE 100 MILLIGRAM(S): 25 TABLET, ORALLY DISINTEGRATING ORAL at 22:05

## 2023-06-04 RX ADMIN — CITALOPRAM 20 MILLIGRAM(S): 10 TABLET, FILM COATED ORAL at 22:05

## 2023-06-04 RX ADMIN — ONDANSETRON 4 MILLIGRAM(S): 8 TABLET, FILM COATED ORAL at 17:55

## 2023-06-04 RX ADMIN — ATORVASTATIN CALCIUM 80 MILLIGRAM(S): 80 TABLET, FILM COATED ORAL at 22:05

## 2023-06-04 RX ADMIN — ENOXAPARIN SODIUM 40 MILLIGRAM(S): 100 INJECTION SUBCUTANEOUS at 22:05

## 2023-06-04 RX ADMIN — Medication 81 MILLIGRAM(S): at 22:05

## 2023-06-04 RX ADMIN — Medication 25 MILLIGRAM(S): at 17:56

## 2023-06-04 NOTE — ED PROVIDER NOTE - NIH STROKE SCALE: 8. SENSORY, QM
Patient discharged. IV and tele removed, tele returned. Lockbox emptied, belongings collected. Patient wheeled to front entrance where  was waiting. (0) Normal; no sensory loss

## 2023-06-04 NOTE — ED ADULT TRIAGE NOTE - AS PAIN REST
Chief Complaint   Patient presents with     Physical       Initial BP (!) 140/96  Pulse 85  Temp 97.4  F (36.3  C) (Oral)  Ht 6' (1.829 m)  Wt 224 lb 12.8 oz (102 kg)  SpO2 97%  BMI 30.49 kg/m2 Estimated body mass index is 30.49 kg/(m^2) as calculated from the following:    Height as of this encounter: 6' (1.829 m).    Weight as of this encounter: 224 lb 12.8 oz (102 kg)..  BP completed using cuff size: trudy Rockwell CMA     0 (no pain/absence of nonverbal indicators of pain)

## 2023-06-04 NOTE — H&P ADULT - HISTORY OF PRESENT ILLNESS
82 yo female with hx of subdural hematoma, depression, hypothyroidism, ?vertigo, COVID 19 pneumonia (2020) who presents from home with vertigo/dizziness which started at 4AM and has been continuous associated with nausea and vomiting. Patient states she typically has had these symptoms for several years but usually are intermittent. She was recently treated for UTI with ciprofloxacin (urine cx as outpatient shows pseudomonas and citrobacter). Patient states she was recently seen at [facility] and was evaluated for vertigo there however she is a poor historian and was unable to talk further about the work up she received there. Denies any recent cough, fever, chills. Denies leg/arm weakness, denies numbness, denies facial asymmetry or change in speech, denies dysphagia. Patient in the ED was a code stroke but no major vessel obstruction noted on CTA and CT head without acute findings (possible ischemic area in the R cerebellum due to low attenuation). Medicine called to admit with plan for MRI and neurology evaluation.

## 2023-06-04 NOTE — ED PROVIDER NOTE - OBJECTIVE STATEMENT
83-year-old female with history of depression hypothyroid prior traumatic subdural hematoma complaining of dizziness nausea and vomiting since this morning.  Patient unable to quantify exact time of onset.  States she recently fell and has bumps on her head.  Her PCP is Dr. Yashira Rivera.  Denies fever cough shortness of breath visual disturbance neck or back pain or other injury or symptom.

## 2023-06-04 NOTE — H&P ADULT - NSHPPHYSICALEXAM_GEN_ALL_CORE
PHYSICAL EXAM:    General: in no acute distress  Eyes: PERRLA, EOMI; conjunctiva and sclera clear  Head: Normocephalic; atraumatic  ENMT: No nasal discharge; airway clear  Neck: Supple; non tender; no masses  Respiratory: No wheezes, rales or rhonchi  Cardiovascular: Regular rate and rhythm. S1 and S2 Normal; No murmurs, gallops or rubs  Gastrointestinal: Soft non-tender non-distended; Normal bowel sounds  Genitourinary: No costovertebral angle tenderness  Extremities: Normal range of motion, No clubbing, cyanosis or edema  Vascular: Peripheral pulses palpable 2+ bilaterally  Neurological: Alert and oriented x4; no focal deficit, sensation intact; CN grossly intact; no truncal ataxia, no ataxia on standing  Skin: Warm and dry. No acute rash  Psychiatric: Cooperative and appropriate; somewhat tangential speech

## 2023-06-04 NOTE — ED ADULT TRIAGE NOTE - CHIEF COMPLAINT QUOTE
" I was on bed and I started feeling dizzy and nauseous - started 2 hours ago " No slurred speech No arm drift No facial drooping noted

## 2023-06-04 NOTE — ED ADULT NURSE NOTE - NSFALLHARMRISKINTERV_ED_ALL_ED
Assistance OOB with selected safe patient handling equipment if applicable/Assistance with ambulation/Communicate risk of Fall with Harm to all staff, patient, and family/Encourage patient to sit up slowly, dangle for a short time, stand at bedside before walking/Monitor gait and stability/Orthostatic vital signs/Provide patient with walking aids/Provide visual cue: red socks, yellow wristband, yellow gown, etc/Reinforce activity limits and safety measures with patient and family/Bed in lowest position, wheels locked, appropriate side rails in place/Call bell, personal items and telephone in reach/Instruct patient to call for assistance before getting out of bed/chair/stretcher/Non-slip footwear applied when patient is off stretcher/[place] to call system/Physically safe environment - no spills, clutter or unnecessary equipment/Purposeful Proactive Rounding/Room/bathroom lighting operational, light cord in reach

## 2023-06-04 NOTE — H&P ADULT - NSHPREVIEWOFSYSTEMS_GEN_ALL_CORE
REVIEW OF SYSTEMS  General: denies weakness, malaise  Skin/Breast: no new rash  Ophthalmologic: no change in vision  ENMT: no dysphagia, throat pain or change in hearing  Respiratory and Thorax: no difficulty breathing or chest pain  Cardiovascular: no palpitations or PND, orthopnea  Gastrointestinal: no abdominal pain, normal bowel movement, no dark stools; +N/V  Genitourinary: no difficulty urinating, no burning urination  Musculoskeletal: no myalgia/arthrlagia  Neurological: no weakness, numbness, change in gait; +vertigo  Psychiatric: no depression, anxiety  Hematology/Lymphatics: denies easy bruising or bleeding  Endocrine: no polyuria, polydipsia

## 2023-06-04 NOTE — ED PROVIDER NOTE - RESPIRATORY NEGATIVE STATEMENT, MLM
Medication: ADDERALL XR 30 Mg 24 hr capsule  Last Date Filled 12/7/21  Last appointment addressing medication use: 8/26/21      Taken as prescribed from physician notes? YES    CSA in last year: YES    Random Utox in last year: NO  (if any of the above answer NO - schedule with PCP)     Opioids + benzodiazepines? NO  (if the above answer YES - schedule with PCP every 6 months)       All responses suggest: .        
Routing refill request to provider for review/approval because:  Controlled substance request    Last Written Prescription Date:  12/7/21  Last Fill Quantity: 30,  # refills: 0   Last office visit provider:  8/26/21     Requested Prescriptions   Pending Prescriptions Disp Refills     amphetamine-dextroamphetamine (ADDERALL XR) 30 MG 24 hr capsule 30 capsule 0     Sig: Take 1 capsule (30 mg) by mouth daily       There is no refill protocol information for this order          Anup Brunner RN 01/12/22 2:24 PM  
no chest pain, no cough, and no shortness of breath.

## 2023-06-04 NOTE — ED PROVIDER NOTE - CLINICAL SUMMARY MEDICAL DECISION MAKING FREE TEXT BOX
83-year-old female with history of depression hypothyroid prior traumatic subdural hematoma complaining of dizziness nausea and vomiting since this morning.  Patient unable to quantify exact time of onset.  States she recently fell and has bumps on her head.  Her PCP is Dr. Yashira Rivera.  Denies fever cough shortness of breath visual disturbance neck or back pain or other injury or symptom.    Physical exam is essentially normal.  NIHSS equals 0.  Dizziness with nausea and vomiting could be labyrinthitis versus benign positional vertigo.  We will do stroke work-up and give Zofran and meclizine.  May need neuro evaluation.

## 2023-06-04 NOTE — PATIENT PROFILE ADULT - FALL HARM RISK - HARM RISK INTERVENTIONS
Assistance with ambulation/Assistance OOB with selected safe patient handling equipment/Communicate Risk of Fall with Harm to all staff/Discuss with provider need for PT consult/Monitor gait and stability/Provide patient with walking aids - walker, cane, crutches/Reinforce activity limits and safety measures with patient and family/Sit up slowly, dangle for a short time, stand at bedside before walking/Tailored Fall Risk Interventions/Visual Cue: Yellow wristband and red socks/Bed in lowest position, wheels locked, appropriate side rails in place/Call bell, personal items and telephone in reach/Instruct patient to call for assistance before getting out of bed or chair/Non-slip footwear when patient is out of bed/[place] to call system/Physically safe environment - no spills, clutter or unnecessary equipment/Purposeful Proactive Rounding/Room/bathroom lighting operational, light cord in reach

## 2023-06-04 NOTE — H&P ADULT - ASSESSMENT
84 yo female with hx of subdural hematoma, depression, hypothyroidism, ?vertigo, COVID 19 pneumonia (2020) who presents from home with vertigo/dizziness which started at 4AM and has been continuous associated with nausea and vomiting. Patient states she typically has had these symptoms for several years but usually are intermittent. She was recently treated for UTI with ciprofloxacin (urine cx as outpatient shows pseudomonas and citrobacter). Patient states she was recently seen at [facility] and was evaluated for vertigo there however she is a poor historian and was unable to talk further about the work up she received there. Denies any recent cough, fever, chills. Denies leg/arm weakness, denies numbness, denies facial asymmetry or change in speech, denies dysphagia. Patient in the ED was a code stroke but no major vessel obstruction noted on CTA and CT head without acute findings (possible ischemic area in the R cerebellum due to low attenuation). Medicine called to admit with plan for MRI and neurology evaluation.    #r/o posterior circulation stroke/cerebellar stroke  - admit to medicine  - cardiac monitor  - vitals/neuro checks q4  - neurology called by ED - to be seen in the AM  - continue aspirin/statin  - lovenox for DVT ppx  - PT/OT  - A1c and lipid panel in the AM    #depression  - continue lamictal and celexa    #hypothyroidism  - continue levothyroxine    #DVT ppx  - as above    #Diet; DASH diet

## 2023-06-04 NOTE — ED PROVIDER NOTE - ENMT, MLM
Airway patent, Nasal mucosa clear. Mouth with normal mucosa. Throat has no vesicles, no oropharyngeal exudates and uvula is midline.  Scalp is atraumatic nontender nondeformed.  Neck is supple full range of motion intact nontender.

## 2023-06-05 ENCOUNTER — TRANSCRIPTION ENCOUNTER (OUTPATIENT)
Age: 83
End: 2023-06-05

## 2023-06-05 LAB
A1C WITH ESTIMATED AVERAGE GLUCOSE RESULT: 5.6 % — SIGNIFICANT CHANGE UP (ref 4–5.6)
ANION GAP SERPL CALC-SCNC: 8 MMOL/L — SIGNIFICANT CHANGE UP (ref 5–17)
APPEARANCE UR: CLEAR — SIGNIFICANT CHANGE UP
BACTERIA # UR AUTO: ABNORMAL /HPF
BILIRUB UR-MCNC: NEGATIVE — SIGNIFICANT CHANGE UP
BUN SERPL-MCNC: 21 MG/DL — SIGNIFICANT CHANGE UP (ref 7–23)
CALCIUM SERPL-MCNC: 8.8 MG/DL — SIGNIFICANT CHANGE UP (ref 8.4–10.5)
CHLORIDE SERPL-SCNC: 101 MMOL/L — SIGNIFICANT CHANGE UP (ref 96–108)
CHOLEST SERPL-MCNC: 244 MG/DL — HIGH
CO2 SERPL-SCNC: 27 MMOL/L — SIGNIFICANT CHANGE UP (ref 22–31)
COLOR SPEC: YELLOW — SIGNIFICANT CHANGE UP
CREAT SERPL-MCNC: 1.49 MG/DL — HIGH (ref 0.5–1.3)
DIFF PNL FLD: NEGATIVE — SIGNIFICANT CHANGE UP
EGFR: 34 ML/MIN/1.73M2 — LOW
EPI CELLS # UR: PRESENT
ESTIMATED AVERAGE GLUCOSE: 114 MG/DL — SIGNIFICANT CHANGE UP (ref 68–114)
GLUCOSE SERPL-MCNC: 97 MG/DL — SIGNIFICANT CHANGE UP (ref 70–99)
GLUCOSE UR QL: NEGATIVE MG/DL — SIGNIFICANT CHANGE UP
HDLC SERPL-MCNC: 69 MG/DL — SIGNIFICANT CHANGE UP
KETONES UR-MCNC: NEGATIVE MG/DL — SIGNIFICANT CHANGE UP
LEUKOCYTE ESTERASE UR-ACNC: ABNORMAL
LIPID PNL WITH DIRECT LDL SERPL: 158 MG/DL — HIGH
NITRITE UR-MCNC: NEGATIVE — SIGNIFICANT CHANGE UP
NON HDL CHOLESTEROL: 175 MG/DL — HIGH
PH UR: 6.5 — SIGNIFICANT CHANGE UP (ref 5–8)
POTASSIUM SERPL-MCNC: 4.5 MMOL/L — SIGNIFICANT CHANGE UP (ref 3.5–5.3)
POTASSIUM SERPL-SCNC: 4.5 MMOL/L — SIGNIFICANT CHANGE UP (ref 3.5–5.3)
PROT UR-MCNC: NEGATIVE MG/DL — SIGNIFICANT CHANGE UP
RBC CASTS # UR COMP ASSIST: 0 /HPF — SIGNIFICANT CHANGE UP (ref 0–4)
SODIUM SERPL-SCNC: 136 MMOL/L — SIGNIFICANT CHANGE UP (ref 135–145)
SP GR SPEC: 1.03 — HIGH (ref 1–1.03)
TRIGL SERPL-MCNC: 88 MG/DL — SIGNIFICANT CHANGE UP
UROBILINOGEN FLD QL: 1 MG/DL — SIGNIFICANT CHANGE UP (ref 0.2–1)
WBC UR QL: 4 /HPF — SIGNIFICANT CHANGE UP (ref 0–5)

## 2023-06-05 PROCEDURE — 99221 1ST HOSP IP/OBS SF/LOW 40: CPT

## 2023-06-05 PROCEDURE — 93306 TTE W/DOPPLER COMPLETE: CPT | Mod: 26

## 2023-06-05 PROCEDURE — 99232 SBSQ HOSP IP/OBS MODERATE 35: CPT

## 2023-06-05 RX ORDER — ONDANSETRON 8 MG/1
4 TABLET, FILM COATED ORAL EVERY 6 HOURS
Refills: 0 | Status: DISCONTINUED | OUTPATIENT
Start: 2023-06-05 | End: 2023-06-07

## 2023-06-05 RX ORDER — MECLIZINE HCL 12.5 MG
25 TABLET ORAL EVERY 8 HOURS
Refills: 0 | Status: DISCONTINUED | OUTPATIENT
Start: 2023-06-05 | End: 2023-06-07

## 2023-06-05 RX ADMIN — Medication 25 MICROGRAM(S): at 06:11

## 2023-06-05 RX ADMIN — LAMOTRIGINE 100 MILLIGRAM(S): 25 TABLET, ORALLY DISINTEGRATING ORAL at 17:39

## 2023-06-05 RX ADMIN — CITALOPRAM 20 MILLIGRAM(S): 10 TABLET, FILM COATED ORAL at 22:25

## 2023-06-05 RX ADMIN — LAMOTRIGINE 100 MILLIGRAM(S): 25 TABLET, ORALLY DISINTEGRATING ORAL at 06:11

## 2023-06-05 RX ADMIN — ONDANSETRON 4 MILLIGRAM(S): 8 TABLET, FILM COATED ORAL at 03:17

## 2023-06-05 RX ADMIN — ATORVASTATIN CALCIUM 80 MILLIGRAM(S): 80 TABLET, FILM COATED ORAL at 22:24

## 2023-06-05 RX ADMIN — ENOXAPARIN SODIUM 40 MILLIGRAM(S): 100 INJECTION SUBCUTANEOUS at 22:25

## 2023-06-05 RX ADMIN — Medication 81 MILLIGRAM(S): at 11:08

## 2023-06-05 RX ADMIN — Medication 25 MILLIGRAM(S): at 03:17

## 2023-06-05 NOTE — BH CONSULTATION LIAISON ASSESSMENT NOTE - RISK ASSESSMENT
2 84 y/o DWF, with limited support, long history of depression, currently future-oriented and worried about possibility of dying

## 2023-06-05 NOTE — DISCHARGE NOTE NURSING/CASE MANAGEMENT/SOCIAL WORK - NSDCPEFALRISK_GEN_ALL_CORE
For information on Fall & Injury Prevention, visit: [email or web address] OR  [email or web address] OR  https://www.cdc.gov/steadi/patient.html

## 2023-06-05 NOTE — PHYSICAL THERAPY INITIAL EVALUATION ADULT - ASR WT BEARING STATUS EVAL
Phone Number Called: [phone] (home)     Message: LVM to call back.     Left Message for patient to call back: yes         no weight-bearing restrictions

## 2023-06-05 NOTE — OCCUPATIONAL THERAPY INITIAL EVALUATION ADULT - ADDITIONAL COMMENTS
Pt lives alone in a apartment. Pt reports having an aid at home that works part time and helps Pt out with activities such as bathing. There are no stairs to enter and within the apartment. Pt has both a walk-in shower and tub shower, buts reports will use walk-in shower. Pt has shower bars in shower. Pt owns walker and cane and reports not needing a commode. Pt reports is ambidextrous depending on the activity and does not drive. Pt lives alone in a apartment. Pt reports having an aid at home that works part time and helps Pt out with activities such as bathing. Pt reports having someone that takes her to Dr jensen and does her good shopping. There are no stairs to enter and within the apartment. Pt has both a walk-in shower and tub shower, buts reports will use walk-in shower. Pt has shower bars in shower. Pt owns walker and cane and reports not needing a commode. Pt reports is ambidextrous depending on the activity and does not drive. Pt lives alone in a apartment. Pt reports having an aid at home that helps Pt out with activities such as bathing, takes her to Dr appointments, and does her food shopping. There are no stairs to enter and within the apartment. Pt has both a walk-in shower and tub shower, buts reports will use walk-in shower. Pt has shower bars in shower. Pt owns walker, SAC, and quad cane and reports not needing a commode. Pt reports is ambidextrous depending on the activity and does not drive. Pt reports would lie in bed most of the day due to feeling nervous that she would fall. Pt lives alone in a apartment. Pt reports having an aid at home that helps Pt out with activities such as bathing, takes her to Dr appointments, and does her food shopping. There are no stairs to enter and within the apartment. Pt has both a walk-in shower and tub shower, buts reports uses walk-in shower due to fear of falling. Pt has shower bars in shower. Pt owns walker, SAC, and quad cane and reports not needing a commode. Pt reports is ambidextrous depending on the activity and does not drive. Pt reports would lie in bed most of the day due to feeling nervous that she would fall.

## 2023-06-05 NOTE — CONSULT NOTE ADULT - SUBJECTIVE AND OBJECTIVE BOX
[facility], Division of Infectious Diseases  [facility] S. Shah, Y. Patel, G. [facility]  [phone]    LINN WHITE  83y, Female  [id number]    HPI--  HPI:  84 yo female with hx of subdural hematoma, depression, hypothyroidism, ?vertigo, COVID 19 pneumonia (2020) who presents from home with vertigo/dizziness which started at 4AM and has been continuous associated with nausea and vomiting. Patient states she typically has had these symptoms for several years but usually are intermittent. She was recently treated for UTI with ciprofloxacin (urine cx as outpatient shows pseudomonas and citrobacter). Patient states she was recently seen at [facility] and was evaluated for vertigo there however she is a poor historian and was unable to talk further about the work up she received there. Denies any recent cough, fever, chills. Denies leg/arm weakness, denies numbness, denies facial asymmetry or change in speech, denies dysphagia. Patient in the ED was a code stroke but no major vessel obstruction noted on CTA and CT head without acute findings (possible ischemic area in the R cerebellum due to low attenuation). Medicine called to admit with plan for MRI and neurology evaluation. (04 Jun 2023 19:51)    Pt admitted for further w/u  Pt w/ hx of recurrent UTIs, tx x2 in the past few months.     Today pt reporting that she is having increased frequency and slight dysuria, but not as bad as prior UTIs  No other complaints    Active Medications--  aspirin enteric coated 81 milliGRAM(s) Oral daily  atorvastatin 80 milliGRAM(s) Oral at bedtime  citalopram 20 milliGRAM(s) Oral at bedtime  enoxaparin Injectable 40 milliGRAM(s) SubCutaneous every 24 hours  lamoTRIgine 100 milliGRAM(s) Oral two times a day  levothyroxine 25 MICROGram(s) Oral daily  meclizine 25 milliGRAM(s) Oral every 8 hours PRN  ondansetron Injectable 4 milliGRAM(s) IV Push every 6 hours PRN    Antimicrobials:     Immunologic:     ROS:  CONSTITUTIONAL: No fevers or chills. No weakness or headache. No weight changes.  EYES/ENT: No visual or hearing changes. No sore throat or throat pain .  NECK: No pain or stiffness  RESPIRATORY: No cough, wheezing, or hemoptysis. No shortness of breath  CARDIOVASCULAR: No chest pain or palpitations  GASTROINTESTINAL: No abdominal pain. No nausea or vomiting. No diarrhea or constipation.  GENITOURINARY: No dysuria, frequency or hematuria  NEUROLOGICAL: No numbness or weakness  SKIN: No itching or rashes  PSYCHIATRIC: Pleasant. Appropriate affect    Allergies: cephalexin (Hives)  cephalexin (Unknown)    PMH -- UTI (urinary tract infection)    Depression    Hypothyroidism    Vertigo    Hypothyroidism    Depression    Vertigo      PSH -- No significant past surgical history    No significant past surgical history      FH -- No pertinent family history in first degree relatives      Social History --  EtOH: denies   Tobacco: denies   Drug Use: denies     Travel/Environmental/Occupational History:    Physical Exam--  Vital Signs Last 24 Hrs  T(F): 98.4 (05 Jun 2023 08:15), Max: 98.7 (04 Jun 2023 15:51)  HR: 62 (05 Jun 2023 08:15) (62 - 84)  BP: 142/62 (05 Jun 2023 08:15) (102/50 - 148/82)  RR: 16 (05 Jun 2023 08:15) (15 - 18)  SpO2: 98% (05 Jun 2023 08:15) (95% - 98%)  General: nontoxic-appearing, no acute distress  HEENT: NC/AT, EOMI, anicteric,  Lungs: decreased breath sounds  Heart: Regular rate and rhythm. No murmur, rub or gallop.  Abdomen: Soft. Nondistended. Nontender.   Extremities: No cyanosis or clubbing. No edema.   Skin: Warm. Dry. Good turgor.     Laboratory & Imaging Data:  CBC:                       12.4   10.84 )-----------( 270      ( 04 Jun 2023 16:35 )             37.9     CMP: 06-05    136  |  101  |  21  ----------------------------<  97  4.5   |  27  |  1.49<H>    Ca    8.8      05 Jun 2023 08:10    TPro  7.4  /  Alb  4.1  /  TBili  0.4  /  DBili  x   /  AST  23  /  ALT  20  /  AlkPhos  69  06-04    LIVER FUNCTIONS - ( 04 Jun 2023 16:35 )  Alb: 4.1 g/dL / Pro: 7.4 g/dL / ALK PHOS: 69 U/L / ALT: 20 U/L DA / AST: 23 U/L / GGT: x               Microbiology: reviewed        Radiology: reviewed

## 2023-06-05 NOTE — BH CONSULTATION LIAISON ASSESSMENT NOTE - NSBHCONSULTFOLLOWAFTERCARE_PSY_A_CORE FT
F-up with outpatient psychiatrist Cyclosporine Counseling:  I discussed with the patient the risks of cyclosporine including but not limited to hypertension, gingival hyperplasia,myelosuppression, immunosuppression, liver damage, kidney damage, neurotoxicity, lymphoma, and serious infections. The patient understands that monitoring is required including baseline blood pressure, CBC, CMP, lipid panel and uric acid, and then 1-2 times monthly CMP and blood pressure.

## 2023-06-05 NOTE — CONSULT NOTE ADULT - SUBJECTIVE AND OBJECTIVE BOX
History of Present Illness: The patient is an 83 year old female with a history of hypothyroidism, depression, SDH who presents with dizziness. Yesterday she had dizziness and balance issues. There was associated nausea and vomiting. No chest pain, shortness of breath, palpitations, weakness.    Past Medical/Surgical History:  hypothyroidism, depression, SDH     Medications:  Home Medications:  CeleXA 20 mg oral tablet: 1 tab(s) orally once a day (04 Jun 2023 19:55)  LaMICtal  mg oral tablet, extended release: 1 tab(s) orally once a day (04 Jun 2023 19:55)  levothyroxine 25 mcg (0.025 mg) oral tablet: 1 tab(s) orally once a day (04 Jun 2023 19:55)      Family History: Non-contributory family history of premature cardiovascular atherosclerotic disease    Social History: No tobacco, alcohol or drug use    Review of Systems:  General: No fevers, chills, weight gain  Skin: No rashes, color changes  Cardiovascular: No chest pain, orthopnea  Respiratory: No shortness of breath, cough  Gastrointestinal: No nausea, abdominal pain  Genitourinary: No incontinence, pain with urination  Musculoskeletal: No pain, swelling, decreased range of motion  Neurological: No headache, weakness  Psychiatric: No depression, anxiety  Endocrine: No weight gain, increased thirst  All other systems are comprehensively negative.    Physical Exam:  Vitals:        Vital Signs Last 24 Hrs  T(C): 37.1 (04 Jun 2023 23:30), Max: 37.1 (04 Jun 2023 15:51)  T(F): 98.7 (04 Jun 2023 23:30), Max: 98.7 (04 Jun 2023 15:51)  HR: 62 (04 Jun 2023 23:30) (62 - 84)  BP: 102/50 (04 Jun 2023 23:30) (102/50 - 148/82)  BP(mean): --  RR: 16 (04 Jun 2023 23:30) (15 - 18)  SpO2: 95% (04 Jun 2023 23:30) (95% - 97%)  Parameters below as of 04 Jun 2023 23:30  Patient On (Oxygen Delivery Method): room air  General: NAD  HEENT: MMM  Neck: No JVD, no carotid bruit  Lungs: CTAB  CV: RRR, nl S1/S2, no M/R/G  Abdomen: S/NT/ND, +BS  Extremities: No LE edema, no cyanosis  Neuro: AAOx3, non-focal  Skin: No rash    Labs:                        12.4   10.84 )-----------( 270      ( 04 Jun 2023 16:35 )             37.9     06-04    138  |  101  |  21  ----------------------------<  134<H>  4.4   |  29  |  1.21    Ca    9.6      04 Jun 2023 16:35    TPro  7.4  /  Alb  4.1  /  TBili  0.4  /  DBili  x   /  AST  23  /  ALT  20  /  AlkPhos  69  06-04        PT/INR - ( 04 Jun 2023 16:35 )   PT: 12.1 sec;   INR: 1.03 ratio         PTT - ( 04 Jun 2023 16:35 )  PTT:26.0 sec    ECG/Telemetry: NSR, IVCD

## 2023-06-05 NOTE — PHYSICAL THERAPY INITIAL EVALUATION ADULT - PERTINENT HX OF CURRENT PROBLEM, REHAB EVAL
82 yo female with hx of subdural hematoma, depression, hypothyroidism, ?vertigo, COVID 19 pneumonia (2020) who presents from home with vertigo/dizziness which started at 4AM and has been continuous associated with nausea and vomiting. Patient states she typically has had these symptoms for several years but usually are intermittent. She was recently treated for UTI with ciprofloxacin (urine cx as outpatient shows pseudomonas and citrobacter). Patient states she was recently seen at [facility] and was evaluated for vertigo there however she is a poor historian and was unable to talk further about the work up she received there. Denies any recent cough, fever, chills. Denies leg/arm weakness, denies numbness, denies facial asymmetry or change in speech, denies dysphagia. Patient in the ED was a code stroke but no major vessel obstruction noted on CTA and CT head without acute findings (possible ischemic area in the R cerebellum due to low attenuation). Medicine called to admit with plan for MRI and neurology evaluation.

## 2023-06-05 NOTE — CARE COORDINATION ASSESSMENT. - NSCAREPROVIDERS_GEN_ALL_CORE_FT
CARE PROVIDERS:  Accepting Physician: Stuart Gallegos  Administration: Agustin Bertrand  Administration: Ale Brown  Administration: Chance Rdz  Admitting: Stuart Gallegos  Attending: Stuart Gallegos  Consultant: Andrae Peterson  Consultant: Jude Delgado  Covering Team: Stuart Gallegos  ED Attending: Bassem Barrow  ED Nurse: Jorge Luis Harris  ED Nurse 2: Berta Angeles  Nurse: Brianda Boston  Nurse: Derrell Hahn  Nurse: Kristine Rodriguez  Outpatient Provider: Jude Delgaod  Override: Swahti Reyna  PCA/Nursing Assistant: Josefina Salguero  Physical Therapy: Marjorie Soliz  Primary Team: Sarah Beth Iqbal  Primary Team: Patti Bertrand  Quality Review: Franci Plascencia  Registered Dietitian: Hoa Mccain  Research: Andrea Peterson  Respiratory Therapy: Carlo Browning  : Leona Martin  Student: Atiya Rudd  Team: [facility] Hospitalists, Team

## 2023-06-05 NOTE — PATIENT CHOICE NOTE. - NSPTCHOICESTATE_GEN_ALL_CORE

## 2023-06-05 NOTE — SOCIAL WORK PROGRESS NOTE - NSSWPROGRESSNOTE_GEN_ALL_CORE
I referred patient to [facility] for sub acute rehab. Per staff she will be having MRI on Tuesday.  will continue to follow

## 2023-06-05 NOTE — BH CONSULTATION LIAISON ASSESSMENT NOTE - SUMMARY
82 y/o DW female, domiciled, no prior psychiatric hospitalizations, history of chronic depression, with outpatient treatment for 30 years,     IMP: MDD recurrent currently in remission    REC: At this time patient is psychiatrically stable

## 2023-06-05 NOTE — CONSULT NOTE ADULT - ASSESSMENT
84 yo female with hx of subdural hematoma, depression, hypothyroidism, ?vertigo, COVID 19 pneumonia (2020) who presents from home with vertigo/dizziness which started at 4AM and has been continuous associated with nausea and vomiting. Patient states she typically has had these symptoms for several years but usually are intermittent.   Patient in the ED was a code stroke but no major vessel obstruction noted on CTA and CT head without acute findings (possible ischemic area in the R cerebellum due to low attenuation). Medicine called to admit with plan for MRI and neurology evaluation.    Reviewed hx w/ pt, pt w/ hx of recurrent UTIs, tx x2 in the past few months tx w/ IV and PO ciprofloxacin  Today pt reporting that she is having increased frequency and slight dysuria, but not as bad as prior UTIs    Recommendations:   Repeat UA/UCx  Monitor off Abx  Appreciate neuro recs  Stroke w/u and additional management per primary team    D/w Dr. Iqbal    Infectious Diseases will continue to follow. Please call with any questions.   Elvira Valdez M.D.  [facility] Division of Infectious Diseases [phone]

## 2023-06-05 NOTE — PHYSICAL THERAPY INITIAL EVALUATION ADULT - ADDITIONAL COMMENTS
Pt lives in apartment with no stairs to negotiate. Pt states she has a h/o falls and is afraid to walk due to loss of balance. Pt opts to "stay in bed" because she is afraid of falling. Pt owns a SAC, Quad Cane, RW (which she does not like to use b/c she trips over back legs of RW). Pt has 2 sons living in [place] and [place].

## 2023-06-05 NOTE — OCCUPATIONAL THERAPY INITIAL EVALUATION ADULT - PERTINENT HX OF CURRENT PROBLEM, REHAB EVAL
Pt with hx of subdural hematoma, depression, hypothyroidism, ?vertigo, COVID 19 pneumonia (2020) who presents from home with vertigo/dizziness which started at 4AM and has been continuous associated with nausea and vomiting. Pt states she typically has had these symptoms for several years but usually are intermittent. Recently treated for UTI with ciprofloxacin (urine cx as outpatient shows pseudomonas and citrobacter). Patient states she was recently seen at [facility] and was evaluated for vertigo there however she is a poor historian and was unable to talk further about the work up she received there. Denies any recent cough, fever, chills. Denies leg/arm weakness, denies numbness, denies facial asymmetry or change in speech, denies dysphagia. Pt in the ED was a code stroke but no major vessel obstruction noted on CTA and CT head without acute findings (possible ischemic area in the R cerebellum due to low attenuation). Medicine called to admit with plan for MRI and neurology evaluation.

## 2023-06-05 NOTE — CARE COORDINATION ASSESSMENT. - OTHER PERTINENT DISCHARGE PLANNING INFORMATION:
met with this 83 year old female admitted from home 6/4/2023 rule out stroke. I reviewed name role availability and discharge planning. Patient resides alone and has aid she uses for transport and she can have her there for more hours. Patient is a retired . She seems to be coping well with admission she is waiting for neurology but may need rehab. She has been to [facility] and will only allow me to refer her there. I offered list but she declined and will go home if she cannot go to [facility] and hire help at home. I provided support and will follow   met with this 83 year old female admitted from home 6/4/2023 rule out stroke. I reviewed name role availability and discharge planning. Patient resides alone and has aid she uses for transport and she can have her there for more hours. Patient is a retired . She seems to be coping well with admission she is waiting for neurology but may need rehab. She has been to [facility] and will only allow me to refer her there. I offered list but she declined and will go home if she cannot go to [facility] and hire help at home. I provided support and will follow. Consult: vertigo/dizziness, r/o stroke marked as completed

## 2023-06-05 NOTE — CONSULT NOTE ADULT - ASSESSMENT
The patient is an 83 year old female with a history of hypothyroidism, depression, SDH who presents with dizziness.    Plan:  - ECG with nonspecific findings  - Check echo  - Monitor on telemetry  - CT head with subtle findings right cerebellum  - MRI head pending  - Continue aspirin 81 mg daily  - Continue atorvastatin 80 mg daily  - Neurology eval

## 2023-06-05 NOTE — OCCUPATIONAL THERAPY INITIAL EVALUATION ADULT - RANGE OF MOTION EXAMINATION, UPPER EXTREMITY
Pt able to independently perform bilateral shoulder flexion, elbow flexion/extension, wrist flexion/extension, digit opposition/bilateral UE Active ROM was WFL  (within functional limits)

## 2023-06-05 NOTE — BH CONSULTATION LIAISON ASSESSMENT NOTE - CURRENT MEDICATION
MEDICATIONS  (STANDING):  aspirin enteric coated 81 milliGRAM(s) Oral daily  atorvastatin 80 milliGRAM(s) Oral at bedtime  citalopram 20 milliGRAM(s) Oral at bedtime  enoxaparin Injectable 40 milliGRAM(s) SubCutaneous every 24 hours  lamoTRIgine 100 milliGRAM(s) Oral two times a day  levothyroxine 25 MICROGram(s) Oral daily    MEDICATIONS  (PRN):  meclizine 25 milliGRAM(s) Oral every 8 hours PRN Dizziness  ondansetron Injectable 4 milliGRAM(s) IV Push every 6 hours PRN Nausea

## 2023-06-05 NOTE — DISCHARGE NOTE NURSING/CASE MANAGEMENT/SOCIAL WORK - PATIENT PORTAL LINK FT
You can access the FollowMyHealth Patient Portal offered by [facility] by registering at the following website: [email or web address]. By joining [employer]’s FollowMyHealth portal, you will also be able to view your health information using other applications (apps) compatible with our system.

## 2023-06-05 NOTE — BH CONSULTATION LIAISON ASSESSMENT NOTE - NSBHCHARTREVIEWLAB_PSY_A_CORE FT
12.4   10.84 )-----------( 270      ( 04 Jun 2023 16:35 )             37.9   06-05    136  |  101  |  21  ----------------------------<  97  4.5   |  27  |  1.49<H>    Ca    8.8      05 Jun 2023 08:10    TPro  7.4  /  Alb  4.1  /  TBili  0.4  /  DBili  x   /  AST  23  /  ALT  20  /  AlkPhos  69  06-04

## 2023-06-05 NOTE — CARE COORDINATION ASSESSMENT. - NSPASTMEDSURGHISTORY_GEN_ALL_CORE_FT
PAST MEDICAL & SURGICAL HISTORY:  UTI (urinary tract infection)      Hypothyroidism      Depression      Vertigo      Vertigo      Depression      Hypothyroidism      No significant past surgical history  RIGHT KNEE ARTHROSCOPY

## 2023-06-05 NOTE — OCCUPATIONAL THERAPY INITIAL EVALUATION ADULT - ADL RETRAINING, OT EVAL
1. Pt will perform upper body dressing with supervision within 1-2 sessions. 2. Pt will perform lower body dressing with supervision within 1-2 sessions.

## 2023-06-05 NOTE — BH CONSULTATION LIAISON ASSESSMENT NOTE - NSBHCHARTREVIEWVS_PSY_A_CORE FT
Vital Signs Last 24 Hrs  T(C): 36.9 (05 Jun 2023 08:15), Max: 37.1 (04 Jun 2023 15:51)  T(F): 98.4 (05 Jun 2023 08:15), Max: 98.7 (04 Jun 2023 15:51)  HR: 62 (05 Jun 2023 08:15) (62 - 84)  BP: 142/62 (05 Jun 2023 08:15) (102/50 - 148/82)  BP(mean): --  RR: 16 (05 Jun 2023 08:15) (15 - 18)  SpO2: 98% (05 Jun 2023 08:15) (95% - 98%)    Parameters below as of 04 Jun 2023 23:30  Patient On (Oxygen Delivery Method): room air

## 2023-06-05 NOTE — BH CONSULTATION LIAISON ASSESSMENT NOTE - HPI (INCLUDE ILLNESS QUALITY, SEVERITY, DURATION, TIMING, CONTEXT, MODIFYING FACTORS, ASSOCIATED SIGNS AND SYMPTOMS)
Patient seen, evaluated and chart reviewed. 84 y/o DW female, domiciled, no prior psychiatric hospitalizations, history of chronic depression, with outpatient treatment for 30 years, with pmhx of hypothyroid, depression, subdural hematoma 8/2020 2/2 fall who presents from home with vertigo/dizziness which started at 4AM and has been continuous associated with nausea and vomiting. Patient states she typically has had these symptoms for several years but usually are intermittent. She was recently treated for UTI with ciprofloxacin (urine cx as outpatient shows pseudomonas and citrobacter). Patient states she was recently seen at [facility] and was evaluated for vertigo there however she is a poor historian and was unable to talk further about the work up she received there. Denies any recent cough, fever, chills. Denies leg/arm weakness, denies numbness, denies facial asymmetry or change in speech, denies dysphagia. Patient in the ED was a code stroke but no major vessel obstruction noted on CTA and CT head without acute findings (possible ischemic area in the R cerebellum due to low attenuation). Medicine called to admit with plan for MRI and neurology evaluation. Denies active symptoms of depression, ursula or psychosis at this time. The issue of capacity was raised. Patient appears to be lucid and cooperative, with no significant cognitive impairment.

## 2023-06-06 ENCOUNTER — OUTPATIENT (OUTPATIENT)
Dept: OUTPATIENT SERVICES | Facility: HOSPITAL | Age: 83
LOS: 1 days | End: 2023-06-06
Payer: COMMERCIAL

## 2023-06-06 DIAGNOSIS — H83.09 LABYRINTHITIS, UNSPECIFIED EAR: ICD-10-CM

## 2023-06-06 LAB
24R-OH-CALCIDIOL SERPL-MCNC: 15.8 NG/ML — LOW (ref 30–80)
ALBUMIN SERPL ELPH-MCNC: 3.8 G/DL — SIGNIFICANT CHANGE UP (ref 3.3–5)
ALP SERPL-CCNC: 70 U/L — SIGNIFICANT CHANGE UP (ref 30–120)
ALT FLD-CCNC: 14 U/L DA — SIGNIFICANT CHANGE UP (ref 10–60)
ANION GAP SERPL CALC-SCNC: 7 MMOL/L — SIGNIFICANT CHANGE UP (ref 5–17)
AST SERPL-CCNC: 20 U/L — SIGNIFICANT CHANGE UP (ref 10–40)
BASOPHILS # BLD AUTO: 0.04 K/UL — SIGNIFICANT CHANGE UP (ref 0–0.2)
BASOPHILS NFR BLD AUTO: 0.5 % — SIGNIFICANT CHANGE UP (ref 0–2)
BILIRUB SERPL-MCNC: 0.5 MG/DL — SIGNIFICANT CHANGE UP (ref 0.2–1.2)
BUN SERPL-MCNC: 20 MG/DL — SIGNIFICANT CHANGE UP (ref 7–23)
CALCIUM SERPL-MCNC: 8.9 MG/DL — SIGNIFICANT CHANGE UP (ref 8.4–10.5)
CHLORIDE SERPL-SCNC: 98 MMOL/L — SIGNIFICANT CHANGE UP (ref 96–108)
CO2 SERPL-SCNC: 30 MMOL/L — SIGNIFICANT CHANGE UP (ref 22–31)
CREAT SERPL-MCNC: 1.22 MG/DL — SIGNIFICANT CHANGE UP (ref 0.5–1.3)
EGFR: 44 ML/MIN/1.73M2 — LOW
EOSINOPHIL # BLD AUTO: 0.21 K/UL — SIGNIFICANT CHANGE UP (ref 0–0.5)
EOSINOPHIL NFR BLD AUTO: 2.6 % — SIGNIFICANT CHANGE UP (ref 0–6)
GLUCOSE SERPL-MCNC: 91 MG/DL — SIGNIFICANT CHANGE UP (ref 70–99)
HCT VFR BLD CALC: 37.1 % — SIGNIFICANT CHANGE UP (ref 34.5–45)
HGB BLD-MCNC: 12.1 G/DL — SIGNIFICANT CHANGE UP (ref 11.5–15.5)
IMM GRANULOCYTES NFR BLD AUTO: 0.3 % — SIGNIFICANT CHANGE UP (ref 0–0.9)
LYMPHOCYTES # BLD AUTO: 3.15 K/UL — SIGNIFICANT CHANGE UP (ref 1–3.3)
LYMPHOCYTES # BLD AUTO: 39.7 % — SIGNIFICANT CHANGE UP (ref 13–44)
MAGNESIUM SERPL-MCNC: 2.2 MG/DL — SIGNIFICANT CHANGE UP (ref 1.6–2.6)
MCHC RBC-ENTMCNC: 31.3 PG — SIGNIFICANT CHANGE UP (ref 27–34)
MCHC RBC-ENTMCNC: 32.6 GM/DL — SIGNIFICANT CHANGE UP (ref 32–36)
MCV RBC AUTO: 96.1 FL — SIGNIFICANT CHANGE UP (ref 80–100)
MONOCYTES # BLD AUTO: 0.76 K/UL — SIGNIFICANT CHANGE UP (ref 0–0.9)
MONOCYTES NFR BLD AUTO: 9.6 % — SIGNIFICANT CHANGE UP (ref 2–14)
NEUTROPHILS # BLD AUTO: 3.75 K/UL — SIGNIFICANT CHANGE UP (ref 1.8–7.4)
NEUTROPHILS NFR BLD AUTO: 47.3 % — SIGNIFICANT CHANGE UP (ref 43–77)
NRBC # BLD: 0 /100 WBCS — SIGNIFICANT CHANGE UP (ref 0–0)
PHOSPHATE SERPL-MCNC: 3.6 MG/DL — SIGNIFICANT CHANGE UP (ref 2.5–4.5)
PLATELET # BLD AUTO: 249 K/UL — SIGNIFICANT CHANGE UP (ref 150–400)
POTASSIUM SERPL-MCNC: 4.2 MMOL/L — SIGNIFICANT CHANGE UP (ref 3.5–5.3)
POTASSIUM SERPL-SCNC: 4.2 MMOL/L — SIGNIFICANT CHANGE UP (ref 3.5–5.3)
PROT SERPL-MCNC: 7 G/DL — SIGNIFICANT CHANGE UP (ref 6–8.3)
RBC # BLD: 3.86 M/UL — SIGNIFICANT CHANGE UP (ref 3.8–5.2)
RBC # FLD: 12.8 % — SIGNIFICANT CHANGE UP (ref 10.3–14.5)
SODIUM SERPL-SCNC: 135 MMOL/L — SIGNIFICANT CHANGE UP (ref 135–145)
T4 FREE SERPL-MCNC: 1.2 NG/DL — SIGNIFICANT CHANGE UP (ref 0.9–1.8)
TSH SERPL-MCNC: 1.02 UIU/ML — SIGNIFICANT CHANGE UP (ref 0.27–4.2)
VIT B12 SERPL-MCNC: 259 PG/ML — SIGNIFICANT CHANGE UP (ref 232–1245)
WBC # BLD: 7.93 K/UL — SIGNIFICANT CHANGE UP (ref 3.8–10.5)
WBC # FLD AUTO: 7.93 K/UL — SIGNIFICANT CHANGE UP (ref 3.8–10.5)

## 2023-06-06 PROCEDURE — 70551 MRI BRAIN STEM W/O DYE: CPT | Mod: 26,MH

## 2023-06-06 PROCEDURE — 99232 SBSQ HOSP IP/OBS MODERATE 35: CPT

## 2023-06-06 PROCEDURE — 70551 MRI BRAIN STEM W/O DYE: CPT

## 2023-06-06 RX ORDER — CHOLECALCIFEROL (VITAMIN D3) 125 MCG
2000 CAPSULE ORAL DAILY
Refills: 0 | Status: DISCONTINUED | OUTPATIENT
Start: 2023-06-06 | End: 2023-06-07

## 2023-06-06 RX ADMIN — Medication 25 MICROGRAM(S): at 05:36

## 2023-06-06 RX ADMIN — Medication 2000 UNIT(S): at 17:04

## 2023-06-06 RX ADMIN — ENOXAPARIN SODIUM 40 MILLIGRAM(S): 100 INJECTION SUBCUTANEOUS at 21:51

## 2023-06-06 RX ADMIN — Medication 81 MILLIGRAM(S): at 15:08

## 2023-06-06 RX ADMIN — CITALOPRAM 20 MILLIGRAM(S): 10 TABLET, FILM COATED ORAL at 21:51

## 2023-06-06 RX ADMIN — LAMOTRIGINE 100 MILLIGRAM(S): 25 TABLET, ORALLY DISINTEGRATING ORAL at 05:36

## 2023-06-06 RX ADMIN — ATORVASTATIN CALCIUM 80 MILLIGRAM(S): 80 TABLET, FILM COATED ORAL at 21:51

## 2023-06-06 RX ADMIN — LAMOTRIGINE 100 MILLIGRAM(S): 25 TABLET, ORALLY DISINTEGRATING ORAL at 17:04

## 2023-06-06 NOTE — CASE MANAGEMENT PROGRESS NOTE - NSCMPROGRESSNOTE_GEN_ALL_CORE
CM met with patient at bedside to discuss transition of care home as patient decided she would prefer to go home instead of rehab. CM introduced self and explained role in dc planning. CM offered to set up home care with SN/PT, patient declined and said that her son will set it up for her. CM asked for permission to contact her son to discuss home care, patient declined and stated "that's personal". CM asked how many hrs patient has aid at home, patient stated " how many hrs I need". Patient said " I don't need anything I want to sleep now". CM asked how patient will get home, patient stated she will take a cab. Patient closed her eyes and didn't wish to communicate with CM any further. CM will cont to be available to patient.

## 2023-06-06 NOTE — SOCIAL WORK PROGRESS NOTE - NSSWPROGRESSNOTE_GEN_ALL_CORE
Patient was referred to [facility] who can offer bed for 6/7/2023 ( 3 night stay). I met with patient and updated her. Patient will need MRI today.  requested dc packet and will arrange transport to [facility] once medically stable.  Patient was referred to [facility] who can offer bed for 6/7/2023 ( 3 night stay). I met with patient and updated her and she told  that she does not want to go to rehab and prefers going home. Patient will need MRI today.  requested dc packet in case she changes her mind and will arrange transport to [facility] once medically stable if she decides she wants rehab. I will update team that patient is now for possible home

## 2023-06-07 ENCOUNTER — TRANSCRIPTION ENCOUNTER (OUTPATIENT)
Age: 83
End: 2023-06-07

## 2023-06-07 VITALS
OXYGEN SATURATION: 91 % | HEART RATE: 57 BPM | DIASTOLIC BLOOD PRESSURE: 59 MMHG | RESPIRATION RATE: 17 BRPM | TEMPERATURE: 98 F | SYSTOLIC BLOOD PRESSURE: 106 MMHG

## 2023-06-07 PROCEDURE — 84100 ASSAY OF PHOSPHORUS: CPT

## 2023-06-07 PROCEDURE — 96372 THER/PROPH/DIAG INJ SC/IM: CPT | Mod: XU

## 2023-06-07 PROCEDURE — 70496 CT ANGIOGRAPHY HEAD: CPT | Mod: MA

## 2023-06-07 PROCEDURE — 97116 GAIT TRAINING THERAPY: CPT

## 2023-06-07 PROCEDURE — 36415 COLL VENOUS BLD VENIPUNCTURE: CPT

## 2023-06-07 PROCEDURE — 80053 COMPREHEN METABOLIC PANEL: CPT

## 2023-06-07 PROCEDURE — 80061 LIPID PANEL: CPT

## 2023-06-07 PROCEDURE — 99285 EMERGENCY DEPT VISIT HI MDM: CPT | Mod: 25

## 2023-06-07 PROCEDURE — 87086 URINE CULTURE/COLONY COUNT: CPT

## 2023-06-07 PROCEDURE — 84439 ASSAY OF FREE THYROXINE: CPT

## 2023-06-07 PROCEDURE — 82962 GLUCOSE BLOOD TEST: CPT

## 2023-06-07 PROCEDURE — 80048 BASIC METABOLIC PNL TOTAL CA: CPT

## 2023-06-07 PROCEDURE — 82306 VITAMIN D 25 HYDROXY: CPT

## 2023-06-07 PROCEDURE — 85730 THROMBOPLASTIN TIME PARTIAL: CPT

## 2023-06-07 PROCEDURE — 70498 CT ANGIOGRAPHY NECK: CPT | Mod: MA

## 2023-06-07 PROCEDURE — 97161 PT EVAL LOW COMPLEX 20 MIN: CPT

## 2023-06-07 PROCEDURE — 84484 ASSAY OF TROPONIN QUANT: CPT

## 2023-06-07 PROCEDURE — 85025 COMPLETE CBC W/AUTO DIFF WBC: CPT

## 2023-06-07 PROCEDURE — 97165 OT EVAL LOW COMPLEX 30 MIN: CPT

## 2023-06-07 PROCEDURE — 82607 VITAMIN B-12: CPT

## 2023-06-07 PROCEDURE — 83735 ASSAY OF MAGNESIUM: CPT

## 2023-06-07 PROCEDURE — 97530 THERAPEUTIC ACTIVITIES: CPT

## 2023-06-07 PROCEDURE — 85610 PROTHROMBIN TIME: CPT

## 2023-06-07 PROCEDURE — 81001 URINALYSIS AUTO W/SCOPE: CPT

## 2023-06-07 PROCEDURE — 70450 CT HEAD/BRAIN W/O DYE: CPT | Mod: MA

## 2023-06-07 PROCEDURE — 71045 X-RAY EXAM CHEST 1 VIEW: CPT

## 2023-06-07 PROCEDURE — 99238 HOSP IP/OBS DSCHRG MGMT 30/<: CPT

## 2023-06-07 PROCEDURE — 99231 SBSQ HOSP IP/OBS SF/LOW 25: CPT

## 2023-06-07 PROCEDURE — 96374 THER/PROPH/DIAG INJ IV PUSH: CPT

## 2023-06-07 PROCEDURE — 84443 ASSAY THYROID STIM HORMONE: CPT

## 2023-06-07 PROCEDURE — 83036 HEMOGLOBIN GLYCOSYLATED A1C: CPT

## 2023-06-07 PROCEDURE — 97535 SELF CARE MNGMENT TRAINING: CPT

## 2023-06-07 PROCEDURE — 93306 TTE W/DOPPLER COMPLETE: CPT

## 2023-06-07 RX ORDER — CHOLECALCIFEROL (VITAMIN D3) 125 MCG
2000 CAPSULE ORAL
Qty: 0 | Refills: 0
Start: 2023-06-07

## 2023-06-07 RX ORDER — MECLIZINE HCL 12.5 MG
1 TABLET ORAL
Qty: 90 | Refills: 0
Start: 2023-06-07 | End: 2023-07-06

## 2023-06-07 RX ORDER — MECLIZINE HCL 12.5 MG
1 TABLET ORAL
Qty: 0 | Refills: 0 | DISCHARGE
Start: 2023-06-07

## 2023-06-07 RX ORDER — ATORVASTATIN CALCIUM 80 MG/1
1 TABLET, FILM COATED ORAL
Qty: 30 | Refills: 0
Start: 2023-06-07 | End: 2023-07-06

## 2023-06-07 RX ORDER — CHOLECALCIFEROL (VITAMIN D3) 125 MCG
2 CAPSULE ORAL
Qty: 60 | Refills: 0
Start: 2023-06-07 | End: 2023-07-06

## 2023-06-07 RX ORDER — ATORVASTATIN CALCIUM 80 MG/1
1 TABLET, FILM COATED ORAL
Qty: 0 | Refills: 0 | DISCHARGE
Start: 2023-06-07

## 2023-06-07 RX ADMIN — LAMOTRIGINE 100 MILLIGRAM(S): 25 TABLET, ORALLY DISINTEGRATING ORAL at 05:56

## 2023-06-07 RX ADMIN — Medication 2000 UNIT(S): at 12:46

## 2023-06-07 RX ADMIN — Medication 30 MILLILITER(S): at 13:11

## 2023-06-07 RX ADMIN — Medication 81 MILLIGRAM(S): at 12:45

## 2023-06-07 RX ADMIN — Medication 25 MICROGRAM(S): at 05:57

## 2023-06-07 NOTE — DISCHARGE NOTE PROVIDER - HOSPITAL COURSE
Patient is a 83 F with history of subdural hematoma, depression, hypothyroidism, ?vertigo, COVID 19 pneumonia (2020) who presents from home with vertigo/dizziness. Patient initially presented as a code stroke but no major vessel obstruction noted on CTA and CTH without acute findings but with possible ischemic area in the R cerebellum due to low attenuation. Patient admitted for further evaluation.     Patient underwent MRI brain which showed Mild periventricular and scattered deep and subcortical white matter  ischemia. Mild pontine white matter ischemia.  Global atrophy most   prominent within the BILATERAL temporal lobes. Tiny old lacunar infarctions are seen in the BILATERAL basal ganglia. Small lentiform CSF  collection noted lateral to the LEFT cerebellum likely reflects a small subdural hygroma rather than an arachnoid cyst.    Patient was evaluated by PT and recommended [facility] due to gait instability and recurrent fall risks. In terms of living situation, patient lives by herself and had a few episodes at home when she felt dizzy and had fall at home. Most recently she was sent to [facility] for vertigo and she had no recollection of the event. She also had an episode where she called the police to report her car being stolen. Her car was eventually found to be parked a thrift store (which she forgot). She said she has a friend that sometimes will come over and help her shopping. Hospitalist and  informed the patient about her fall risks. But patient remains adamant that she wants to be discharged to home. She said she will call her son Brian who lives in [place] to help her. [facility] evaluated the patient and deemed her able to make her own medical decision.     Patient remains in stable condition to be discharged to home.

## 2023-06-07 NOTE — SOCIAL WORK PROGRESS NOTE - NSSWPROGRESSNOTE_GEN_ALL_CORE
Patient accepted to [facility] and is now insisting on going home. I met with patient who is adamant about going home. I explained benefits for rehab and that she came into hospital after multiple falls at home. She is refusing rehab. I met with patient again with attending and she wants to go home. I asked permission to call her son Brian who lives in [place] [phone]. I explained to him our concerns which he also had but said she has always been very independent. Per MD patient was seen by Dr. Beasley and can make her own decisions. I provided her with [facility] which she accepted. Plan for home when stable. I updated [facility] that we will not need bed. Patient wants to take taxi home. MD aware

## 2023-06-07 NOTE — PROGRESS NOTE ADULT - SUBJECTIVE AND OBJECTIVE BOX
Chief Complaint: Dizziness    Interval Events: No events overnight.    Review of Systems:  General: No fevers, chills, weight gain  Skin: No rashes, color changes  Cardiovascular: No chest pain, orthopnea  Respiratory: No shortness of breath, cough  Gastrointestinal: No nausea, abdominal pain  Genitourinary: No incontinence, pain with urination  Musculoskeletal: No pain, swelling, decreased range of motion  Neurological: No headache, weakness  Psychiatric: No depression, anxiety  Endocrine: No weight gain, increased thirst  All other systems are comprehensively negative.    Physical Exam:  Vital Signs Last 24 Hrs  T(C): 36.7 (07 Jun 2023 08:02), Max: 37.4 (06 Jun 2023 15:26)  T(F): 98 (07 Jun 2023 08:02), Max: 99.3 (06 Jun 2023 15:26)  HR: 57 (07 Jun 2023 08:02) (56 - 68)  BP: 106/59 (07 Jun 2023 08:02) (98/50 - 118/64)  BP(mean): --  RR: 17 (07 Jun 2023 08:02) (15 - 17)  SpO2: 91% (07 Jun 2023 08:02) (91% - 97%)  Parameters below as of 07 Jun 2023 08:02  Patient On (Oxygen Delivery Method): room air  General: NAD  HEENT: MMM  Neck: No JVD, no carotid bruit  Lungs: CTAB  CV: RRR, nl S1/S2, no M/R/G  Abdomen: S/NT/ND, +BS  Extremities: No LE edema, no cyanosis  Neuro: AAOx3, non-focal  Skin: No rash    Labs:    06-06    135  |  98  |  20  ----------------------------<  91  4.2   |  30  |  1.22    Ca    8.9      06 Jun 2023 06:00  Phos  3.6     06-06  Mg     2.2     06-06    TPro  7.0  /  Alb  3.8  /  TBili  0.5  /  DBili  x   /  AST  20  /  ALT  14  /  AlkPhos  70  06-06                        12.1   7.93  )-----------( 249      ( 06 Jun 2023 06:00 )             37.1       ECG/Telemetry: Sinus rhythm
Neurology follow up note    LINN [id number]      Interval History:    Patient feels ok no new complaints.    Allergies    cephalexin (Hives)  cephalexin (Unknown)    Intolerances    No seafood (Unknown)      MEDICATIONS    aspirin enteric coated 81 milliGRAM(s) Oral daily  atorvastatin 80 milliGRAM(s) Oral at bedtime  cholecalciferol 2000 Unit(s) Oral daily  citalopram 20 milliGRAM(s) Oral at bedtime  enoxaparin Injectable 40 milliGRAM(s) SubCutaneous every 24 hours  lamoTRIgine 100 milliGRAM(s) Oral two times a day  levothyroxine 25 MICROGram(s) Oral daily  meclizine 25 milliGRAM(s) Oral every 8 hours PRN  ondansetron Injectable 4 milliGRAM(s) IV Push every 6 hours PRN              Vital Signs Last 24 Hrs  T(C): 36.7 (2023 08:02), Max: 37.4 (2023 15:26)  T(F): 98 (2023 08:02), Max: 99.3 (2023 15:26)  HR: 57 (2023 08:02) (57 - 68)  BP: 106/59 (2023 08:02) (98/50 - 106/59)  BP(mean): --  RR: 17 (2023 08:02) (16 - 17)  SpO2: 91% (2023 08:02) (91% - 95%)    Parameters below as of 2023 08:02  Patient On (Oxygen Delivery Method): room air      REVIEW OF SYSTEMS:  The patient denies fevers, chills, or night sweats.  Head:  No headaches.  Eyes:  No double vision or blurry vision.  Ears:  No ringing in the ears.  Neck:  No neck pain.  Cardiovascular:  No chest pain.  Respiratory:  No shortness of breath.  Abdomen:  No nausea, vomiting, or abdominal pain.  Extremities/Neurological:  No numbness or tingling.  Musculoskeletal:  No joint pain.    PHYSICAL EXAMINATION:   HEENT:  Head:  Normocephalic and atraumatic.  Eyes:  No scleral icterus.  Ears:  Hearing bilaterally intact.  NECK:  Supple.  CARDIOVASCULAR:  S1 and S2 heard.  RESPIRATORY:  Air entry bilaterally.  ABDOMEN:  Soft and nontender.  EXTREMITIES:  No clubbing or cyanosis were noted.      NEUROLOGIC:  The patient awake, alert, and oriented x3.  Upon conversing with the patient, there are signs of forgetfulness noted.  Recall is 0/3 within three minutes, with prompting was 2/3, was able to name simple objects.  Extraocular movements were intact.  Speech was fluent.  Smile symmetric.  Motor:  Bilateral upper and lower 5/5.  Sensory:  Bilateral upper and lower intact to light touch.  No drift.  Finger-to-nose within normal limits.  Gait:  I stood the patient up, she felt lightheaded and vertigo.  I attempted to ambulate her, was slightly off balance.        LABS:  CBC Full  -  ( 2023 06:00 )  WBC Count : 7.93 K/uL  RBC Count : 3.86 M/uL  Hemoglobin : 12.1 g/dL  Hematocrit : 37.1 %  Platelet Count - Automated : 249 K/uL  Mean Cell Volume : 96.1 fl  Mean Cell Hemoglobin : 31.3 pg  Mean Cell Hemoglobin Concentration : 32.6 gm/dL  Auto Neutrophil # : 3.75 K/uL  Auto Lymphocyte # : 3.15 K/uL  Auto Monocyte # : 0.76 K/uL  Auto Eosinophil # : 0.21 K/uL  Auto Basophil # : 0.04 K/uL  Auto Neutrophil % : 47.3 %  Auto Lymphocyte % : 39.7 %  Auto Monocyte % : 9.6 %  Auto Eosinophil % : 2.6 %  Auto Basophil % : 0.5 %    Urinalysis Basic - ( 2023 17:21 )    Color: Yellow / Appearance: Clear / S.032 / pH: x  Gluc: x / Ketone: Negative mg/dL  / Bili: Negative / Urobili: 1.0 mg/dL   Blood: x / Protein: Negative mg/dL / Nitrite: Negative   Leuk Esterase: Trace / RBC: 0 /HPF / WBC 4 /HPF   Sq Epi: x / Non Sq Epi: x / Bacteria: Few /HPF      06-06    135  |  98  |  20  ----------------------------<  91  4.2   |  30  |  1.22    Ca    8.9      2023 06:00  Phos  3.6     06-06  Mg     2.2     06-06    TPro  7.0  /  Alb  3.8  /  TBili  0.5  /  DBili  x   /  AST  20  /  ALT  14  /  AlkPhos  70  06-06    Hemoglobin A1C:     LIVER FUNCTIONS - ( 2023 06:00 )  Alb: 3.8 g/dL / Pro: 7.0 g/dL / ALK PHOS: 70 U/L / ALT: 14 U/L DA / AST: 20 U/L / GGT: x           Vitamin B12         RADIOLOGY      ANALYSIS AND PLAN:  An 83-year-old with episode of vertigo;  For episode of vertigo, questionable this could be secondary to inner ear dysfunction.  We will recommend to rule out any type of posterior circulation.  Examination does not show any clear signs at present to suggest a new cerebrovascular accident has ensued.  MRI imaging of the brain.  Based on the MRI imaging was normal   For history of depression, continue home psychiatric medications.  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    Greater than 45 minutes of time was spent with the patient, plan of care, reviewing data, with greater than 50% of the visit was spent counseling and/or coordinating care with multidisciplinary healthcare team      
Neurology follow up note    LINN [id number]      Interval History:    Patient feels ok no new complaints.    Allergies    cephalexin (Hives)  cephalexin (Unknown)    Intolerances    No seafood (Unknown)      MEDICATIONS    aspirin enteric coated 81 milliGRAM(s) Oral daily  atorvastatin 80 milliGRAM(s) Oral at bedtime  citalopram 20 milliGRAM(s) Oral at bedtime  enoxaparin Injectable 40 milliGRAM(s) SubCutaneous every 24 hours  lamoTRIgine 100 milliGRAM(s) Oral two times a day  levothyroxine 25 MICROGram(s) Oral daily  meclizine 25 milliGRAM(s) Oral every 8 hours PRN  ondansetron Injectable 4 milliGRAM(s) IV Push every 6 hours PRN              Vital Signs Last 24 Hrs  T(C): 36.6 (2023 08:24), Max: 37.6 (2023 15:25)  T(F): 97.8 (2023 08:24), Max: 99.6 (2023 15:25)  HR: 56 (2023 08:24) (55 - 67)  BP: 118/64 (2023 08:24) (111/64 - 122/72)  BP(mean): --  RR: 15 (2023 08:24) (15 - 16)  SpO2: 97% (2023 08:24) (93% - 97%)    Parameters below as of 2023 08:24  Patient On (Oxygen Delivery Method): room air        REVIEW OF SYSTEMS:  The patient denies fevers, chills, or night sweats.  Head:  No headaches.  Eyes:  No double vision or blurry vision.  Ears:  No ringing in the ears.  Neck:  No neck pain.  Cardiovascular:  No chest pain.  Respiratory:  No shortness of breath.  Abdomen:  No nausea, vomiting, or abdominal pain.  Extremities/Neurological:  No numbness or tingling.  Musculoskeletal:  No joint pain.    PHYSICAL EXAMINATION:   HEENT:  Head:  Normocephalic and atraumatic.  Eyes:  No scleral icterus.  Ears:  Hearing bilaterally intact.  NECK:  Supple.  CARDIOVASCULAR:  S1 and S2 heard.  RESPIRATORY:  Air entry bilaterally.  ABDOMEN:  Soft and nontender.  EXTREMITIES:  No clubbing or cyanosis were noted.      NEUROLOGIC:  The patient awake, alert, and oriented x3.  Upon conversing with the patient, there are signs of forgetfulness noted.  Recall is 0/3 within three minutes, with prompting was 2/3, was able to name simple objects.  Extraocular movements were intact.  Speech was fluent.  Smile symmetric.  Motor:  Bilateral upper and lower 5/5.  Sensory:  Bilateral upper and lower intact to light touch.  No drift.  Finger-to-nose within normal limits.  Gait:  I stood the patient up, she felt lightheaded and vertigo.  I attempted to ambulate her, was slightly off balance.              LABS:  CBC Full  -  ( 2023 06:00 )  WBC Count : 7.93 K/uL  RBC Count : 3.86 M/uL  Hemoglobin : 12.1 g/dL  Hematocrit : 37.1 %  Platelet Count - Automated : 249 K/uL  Mean Cell Volume : 96.1 fl  Mean Cell Hemoglobin : 31.3 pg  Mean Cell Hemoglobin Concentration : 32.6 gm/dL  Auto Neutrophil # : 3.75 K/uL  Auto Lymphocyte # : 3.15 K/uL  Auto Monocyte # : 0.76 K/uL  Auto Eosinophil # : 0.21 K/uL  Auto Basophil # : 0.04 K/uL  Auto Neutrophil % : 47.3 %  Auto Lymphocyte % : 39.7 %  Auto Monocyte % : 9.6 %  Auto Eosinophil % : 2.6 %  Auto Basophil % : 0.5 %    Urinalysis Basic - ( 2023 17:21 )    Color: Yellow / Appearance: Clear / S.032 / pH: x  Gluc: x / Ketone: Negative mg/dL  / Bili: Negative / Urobili: 1.0 mg/dL   Blood: x / Protein: Negative mg/dL / Nitrite: Negative   Leuk Esterase: Trace / RBC: 0 /HPF / WBC 4 /HPF   Sq Epi: x / Non Sq Epi: x / Bacteria: Few /HPF      06-06    135  |  98  |  20  ----------------------------<  91  4.2   |  30  |  1.22    Ca    8.9      2023 06:00  Phos  3.6     06-06  Mg     2.2     06-06    TPro  7.0  /  Alb  3.8  /  TBili  0.5  /  DBili  x   /  AST  20  /  ALT  14  /  AlkPhos  70  06-06    Hemoglobin A1C:     LIVER FUNCTIONS - ( 2023 06:00 )  Alb: 3.8 g/dL / Pro: 7.0 g/dL / ALK PHOS: 70 U/L / ALT: 14 U/L DA / AST: 20 U/L / GGT: x           Vitamin B12   PT/INR - ( 2023 16:35 )   PT: 12.1 sec;   INR: 1.03 ratio         PTT - ( 2023 16:35 )  PTT:26.0 sec      RADIOLOGY    ANALYSIS AND PLAN:  An 83-year-old with episode of vertigo;  For episode of vertigo, questionable this could be secondary to inner ear dysfunction.  We will recommend to rule out any type of posterior circulation.  Examination does not show any clear signs at present to suggest a new cerebrovascular accident has ensued.  We will plan for MRI imaging of the brain.  Based on the MRI imaging, we will plan next step in management.  For history of depression, continue home psychiatric medications.  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    Spoke with son, Brian, at [phone], he understands the reasoning and thought process.    Greater than 45 minutes of time was spent with the patient, plan of care, reviewing data, with greater than 50% of the visit was spent counseling and/or coordinating care with multidisciplinary healthcare team  
CHIEF COMPLAINT: dizziness     Interval Events: No acute event overnight. Patient is seen and examined at the bedside this morning. SW and I had a long discussion with her about how we are concerned that she is not safe by herself at home due to recurrent falls. Patient is adamant that she does not want to go to rehab and does not want additional home aid hours.     REVIEW OF SYSTEMS:  Constitutional: No fever, or chills   HEENT: No dry eyes   CV: No chest pain, or palpitations   Resp: No cough, or sputum production. No shortness of breath   GI: No nausea or vomiting. No diarrhea   Musculoskeletal: No back pain   Skin: No rash   Neurological: No headache      OBJECTIVE:    Vital Signs Last 24 Hrs  T(C): 36.7 (07 Jun 2023 08:02), Max: 37.4 (06 Jun 2023 15:26)  T(F): 98 (07 Jun 2023 08:02), Max: 99.3 (06 Jun 2023 15:26)  HR: 57 (07 Jun 2023 08:02) (57 - 68)  BP: 106/59 (07 Jun 2023 08:02) (98/50 - 106/59)  BP(mean): --  RR: 17 (07 Jun 2023 08:02) (16 - 17)  SpO2: 91% (07 Jun 2023 08:02) (91% - 95%)    Parameters below as of 07 Jun 2023 08:02  Patient On (Oxygen Delivery Method): room air      CAPILLARY BLOOD GLUCOSE      POCT Blood Glucose.: 127 mg/dL (04 Jun 2023 16:23)      PHYSICAL EXAM:  General: Alert and cooperative. Not in acute stress.    Head: Normocephalic   Eyes: EOMI, no ptosis.    Neck:   no masses   Respiratory:  , non labored breathing   Cardiovascular:  there is no peripheral edema   Abdomen: Soft, non-tender, nondistended   Musculoskeletal: Adequately aligned spine   Extremities:  . No peripheral edema   Skin: Intact, no rash .  Neurological: AOAx4. No focal deficits  Psychiatry: The mental examination revealed the patient was oriented to person, place, and time     LINES:      MEDICATIONS  (STANDING):  aspirin enteric coated 81 milliGRAM(s) Oral daily  atorvastatin 80 milliGRAM(s) Oral at bedtime  cholecalciferol 2000 Unit(s) Oral daily  citalopram 20 milliGRAM(s) Oral at bedtime  enoxaparin Injectable 40 milliGRAM(s) SubCutaneous every 24 hours  lamoTRIgine 100 milliGRAM(s) Oral two times a day  levothyroxine 25 MICROGram(s) Oral daily    MEDICATIONS  (PRN):  meclizine 25 milliGRAM(s) Oral every 8 hours PRN Dizziness  ondansetron Injectable 4 milliGRAM(s) IV Push every 6 hours PRN Nausea      LABS:                                              12.1   7.93  )-----------( 249      ( 06 Jun 2023 06:00 )             37.1      06-06    135  |  98  |  20  ----------------------------<  91  4.2   |  30  |  1.22    Ca    8.9      06 Jun 2023 06:00  Phos  3.6     06-06  Mg     2.2     06-06    TPro  7.0  /  Alb  3.8  /  TBili  0.5  /  DBili  x   /  AST  20  /  ALT  14  /  AlkPhos  70  06-06         RADIOLOGY:   CT BRAIN WITHOUT CONTRAST    INDICATIONS:  Stroke Code    TECHNIQUE:  Serial axial images were obtained from the skull base to the   vertex without the use of contrast.    COMPARISON EXAM: 11/7/2022, 7/11/2015    FINDINGS:  Ventricles and sulci: Parenchymal volume loss is present which is   commensurate with patient age.  Intra-axial: Subtle decreased attenuation appreciated within the region   of the right brachium pontis and right cerebellum (axial 3-15 and 3-16).   No acute intracranial hemorrhage or midline shift is present.  Extra-axial: No extra-axial fluid collection is identified. Prominence of   the extra-axial space on the left within the posterior fossa region, as   on prior, may represent a subdural hygroma-relatively stable in   appearance compared with earlier examination dated 7/11/2015. Deposition   of calcified plaque at the level of the bilateral carotid siphons.  Calvarium: Intact.    Evidence of prior right cataract surgery. Bilateral optic globes are   intact. Imaged paranasal sinuses, bilateral mastoid air cells, middle ear   cavities are clear.    IMPRESSION: (Please see below.)    CTA OF THE [place] AND NECK:    INDICATIONS: Stroke Code.  SCT  No additional clinical history was provided.    TECHNIQUE:    CONTRAST/COMPLICATIONS:  IV Contrast: IV contrast documented in unlinked concurrent exam   (accession [id number]), Omnipaque 350 (accession [id number])  90 cc   administered   10 cc discarded  Complications: None reported at time of study completion    CTA [place]:  After the intravenous power injection of non-ionic contrast material,   serial thin sections were obtained through the intracranial circulation   on a multislice CT scanner.  Images were reformatted using a dedicated 3D   software package and viewed on a dedicated workstation in multiple planes.    CTA NECK:  After the intravenous power injection of non-ionic contrast material,   serial thin sections were obtained through the cervical circulation on a   multislice CT scanner.  Images were reformatted using a dedicated 3D   software package and viewed on a dedicated workstation in multiple planes.    COMPARISON EXAMINATION: None.    FINDINGS:      CTA NECK:      CAROTID STENOSIS REFERENCE: (NASCET = 100x1-(N/D)). N=greatest narrowing.   D=normal distal diameter - MILD = <50% stenosis. - MODERATE = 50-69%   stenosis. - SEVERE = 70-89% stenosis. - HAIRLINE/CRITICAL = 90-99%   stenosis. - OCCLUDED = 100% stenosis.    Imaging performed following administration of IV contrast. Reconstruction   performed in axial, coronal, and sagittal planes.    COMPARISON: None    FINDINGS:  The aortic arch and origins of the great vessels are within normal limits.    Right:  The right common carotid artery emanates from the brachiocephalic   artery. The right common carotid artery is normal in course and caliber   to the carotid bifurcation. Origin of the right internal carotid artery   is unremarkable based on NASCET criteria. The right internal carotid   artery has normal course and caliber to the intracranial circulation.    The right vertebral artery emanates from the right subclavian artery. The   right vertebral artery is normal in course and caliber to the   intracranial circulation and vertebrobasilar confluence.    Left: The left common carotid artery emanates from the aortic arch. The   left common carotid artery is normal in course and caliber to the carotid   bifurcation. Mild deposition of calcified plaque at the origin of the   left internal carotid artery, without significant stenosis based on   NASCET criteria. The left internal carotid artery has normal course and   caliber to the intracranial circulation.    The left vertebral artery emanates from the left subclavian artery. The   left vertebral artery is normal in course and caliber to the intracranial   circulation and vertebrobasilar confluence.      IMPRESSION: (Please see below)        CTA [place] WITH CONTRAST      COMPARISON EXAM: None    CTA COW:    Flow of contrast is appreciated within the bilateral petrous,   precavernous, cavernous, and supraclinoid portions of the bilateral   internal carotid arteries. Bilateral middle cerebral arteries and   bilateral anterior cerebral arteries are visualized.  No significant   stenosis appreciated.    Distal intracranial bilateral vertebral arteries are visualized. There is   tapering of the distal intracranial right vertebral artery following the   takeoff of the right posterior inferior cerebellar artery, a normal   anatomical variant. Good flow is appreciated within the basilar artery.   Bilateral superior cerebellar arteries emanate from the distal aspect of   the basilar artery. There are fetal origins of the bilateral posterior   cerebral arteries.    The vertebrobasilar confluence is unremarkable. There is good flow within   the basilar artery. Bilateral superior cerebellar arteries and bilateral   posterior cerebral arteries emanate from the distal aspect of the basilar   artery.    No aneurysm about the [place]. Tiny aneurysms can be beyond the   resolution of CTA technique.    IMPRESSIONS:      CT BRAIN WITHOUT CONTRAST:  1. Subtle decreased attenuation involving the right brachium pontis and   right cerebellum, cannot exclude an ischemic process in this location. No   acute intracranial hemorrhage.  2. Stable prominence of the extra-axial space within the left posterior   fossa, may represent a subdural hygroma.    Findings were communicated by Dr. Behr-Ventura to Dr. Corky Barrow   at approximately 5:03 PM on 6/4/2023.      CTA COW:  1. Ventricular radiology p.m. on the scrotal cavities are Distal tapering   of the right vertebral artery following the takeoff of the right   posterior inferior cerebellar artery, a normal anatomical variant.  2. Fetal origins of the bilateral posterior cerebral arteries, normal   anatomical variants.  3. No evidence of aneurysm formation at the level of the [place].    CTA NECK: Patent bilateral common carotid arteries and bilateral interanl   carotid arteries. No hemodynamically significant stenosis at the  ICA   origins based on NASCET criteria.  Bilateral vertebral arteries are patent without flow limiting stenosis.     If the patient has persistent symptoms, consideration could be given to   brain MRI brain and/or MRA if clinically warranted and if there are no   MRI contraindications.    Remainder findings were communicated by Dr. Behr-Ventura to Dr. Corky Barrow at approximately 5:21 PM on 6/4/2023.    --- End of Report ---    ***Please see the addendum at the top of this report. It may contain   additional important information or changes.****      MRI   FINDINGS:   No prior similar studies are available for review.    The brain demonstrates mild periventricular and scattered deep and   subcortical white matter ischemia. Mild pontine white matter ischemia.   Tiny old lacunar infarctions are seen in the BILATERAL basal ganglia.   Small lentiform CSF collection noted lateral to the LEFT cerebellum   likely reflects a small subdural hygroma rather than an arachnoid cyst.   No acute cerebral cortical infarct is found.   No intracranial hemorrhage   is recognized. No significant mass effect is found in the brain.    The ventricles, sulci and basal cisterns show global atrophy most   prominent within the BILATERAL temporal lobes.    The vertebral and internal carotid arteries demonstrate expected flow   voids indicating their patency.    The orbits are unremarkable.  The paranasal sinuses are clear.  The nasal   cavity appears intact.  The nasopharynx is symmetric.  The central skull   base and temporal bones are intact.  The calvarium appears unremarkable.    IMPRESSION:  Mild periventricular and scattered deep and subcortical white matter   ischemia. Mild pontine white matter ischemia.  Global atrophy most   prominent within the BILATERAL temporal lobes. Tiny old lacunar   infarctions are seen in the BILATERAL basal ganglia. Small lentiform CSF   collection noted lateral to the LEFT cerebellum likely reflects a small   subdural hygroma rather than an arachnoid cyst.  
CHIEF COMPLAINT: dizziness     Interval Events: No acute event overnight. Patient is seen and examined at the bedside this morning. Patient told me that she lives by herself and that she had a few episodes at home when she felt dizzy and had fall at home. Most recently she was sent to [facility] for vertigo and she had no recollection of the event. She also had an episode where she called the police to report her car being stolen. Her car was eventually found to be parked a thrift store (which she forgot). She said she has a friend that sometimes will come over and help her shopping.     She had some of her family members are in [place] and others in [place]. She sees Dr. Dunham at [facility] for medication management once per month.     REVIEW OF SYSTEMS:  Constitutional: No fever, or chills   HEENT: No dry eyes   CV: No chest pain, or palpitations   Resp: No cough, or sputum production. No shortness of breath   GI: No nausea or vomiting. No diarrhea   Musculoskeletal: No back pain   Skin: No rash   Neurological: No headache      OBJECTIVE:  Vital Signs Last 24 Hrs  T(C): 36.9 (05 Jun 2023 08:15), Max: 37.1 (04 Jun 2023 15:51)  T(F): 98.4 (05 Jun 2023 08:15), Max: 98.7 (04 Jun 2023 15:51)  HR: 62 (05 Jun 2023 08:15) (62 - 84)  BP: 142/62 (05 Jun 2023 08:15) (102/50 - 148/82)  BP(mean): --  RR: 16 (05 Jun 2023 08:15) (15 - 18)  SpO2: 98% (05 Jun 2023 08:15) (95% - 98%)    Parameters below as of 04 Jun 2023 23:30  Patient On (Oxygen Delivery Method): room air        CAPILLARY BLOOD GLUCOSE      POCT Blood Glucose.: 127 mg/dL (04 Jun 2023 16:23)      PHYSICAL EXAM:  General: Alert and cooperative. Not in acute stress.    Head: Normocephalic   Eyes: EOMI, no ptosis.    Neck:   no masses   Respiratory:  , non labored breathing   Cardiovascular:  there is no peripheral edema   Abdomen: Soft, non-tender, nondistended   Musculoskeletal: Adequately aligned spine   Extremities:  . No peripheral edema   Skin: Intact, no rash .  Neurological: AOAx4. No focal deficits  Psychiatry: The mental examination revealed the patient was oriented to person, place, and time     LINES:    HOSPITAL MEDICATIONS:  aspirin enteric coated 81 milliGRAM(s) Oral daily  enoxaparin Injectable 40 milliGRAM(s) SubCutaneous every 24 hours        atorvastatin 80 milliGRAM(s) Oral at bedtime  levothyroxine 25 MICROGram(s) Oral daily      citalopram 20 milliGRAM(s) Oral at bedtime  lamoTRIgine 100 milliGRAM(s) Oral two times a day  meclizine 25 milliGRAM(s) Oral every 8 hours PRN  ondansetron Injectable 4 milliGRAM(s) IV Push every 6 hours PRN                    LABS:                        12.4   10.84 )-----------( 270      ( 04 Jun 2023 16:35 )             37.9     Hgb Trend: 12.4<--  06-05    136  |  101  |  21  ----------------------------<  97  4.5   |  27  |  1.49<H>    Ca    8.8      05 Jun 2023 08:10    TPro  7.4  /  Alb  4.1  /  TBili  0.4  /  DBili  x   /  AST  23  /  ALT  20  /  AlkPhos  69  06-04    Creatinine Trend: 1.49<--, 1.21<--  PT/INR - ( 04 Jun 2023 16:35 )   PT: 12.1 sec;   INR: 1.03 ratio         PTT - ( 04 Jun 2023 16:35 )  PTT:26.0 sec         RADIOLOGY:   CT BRAIN WITHOUT CONTRAST    INDICATIONS:  Stroke Code    TECHNIQUE:  Serial axial images were obtained from the skull base to the   vertex without the use of contrast.    COMPARISON EXAM: 11/7/2022, 7/11/2015    FINDINGS:  Ventricles and sulci: Parenchymal volume loss is present which is   commensurate with patient age.  Intra-axial: Subtle decreased attenuation appreciated within the region   of the right brachium pontis and right cerebellum (axial 3-15 and 3-16).   No acute intracranial hemorrhage or midline shift is present.  Extra-axial: No extra-axial fluid collection is identified. Prominence of   the extra-axial space on the left within the posterior fossa region, as   on prior, may represent a subdural hygroma-relatively stable in   appearance compared with earlier examination dated 7/11/2015. Deposition   of calcified plaque at the level of the bilateral carotid siphons.  Calvarium: Intact.    Evidence of prior right cataract surgery. Bilateral optic globes are   intact. Imaged paranasal sinuses, bilateral mastoid air cells, middle ear   cavities are clear.    IMPRESSION: (Please see below.)    CTA OF THE [place] AND NECK:    INDICATIONS: Stroke Code.  SCT  No additional clinical history was provided.    TECHNIQUE:    CONTRAST/COMPLICATIONS:  IV Contrast: IV contrast documented in unlinked concurrent exam   (accession [id number]), Omnipaque 350 (accession [id number])  90 cc   administered   10 cc discarded  Complications: None reported at time of study completion    CTA [place]:  After the intravenous power injection of non-ionic contrast material,   serial thin sections were obtained through the intracranial circulation   on a multislice CT scanner.  Images were reformatted using a dedicated 3D   software package and viewed on a dedicated workstation in multiple planes.    CTA NECK:  After the intravenous power injection of non-ionic contrast material,   serial thin sections were obtained through the cervical circulation on a   multislice CT scanner.  Images were reformatted using a dedicated 3D   software package and viewed on a dedicated workstation in multiple planes.    COMPARISON EXAMINATION: None.    FINDINGS:      CTA NECK:      CAROTID STENOSIS REFERENCE: (NASCET = 100x1-(N/D)). N=greatest narrowing.   D=normal distal diameter - MILD = <50% stenosis. - MODERATE = 50-69%   stenosis. - SEVERE = 70-89% stenosis. - HAIRLINE/CRITICAL = 90-99%   stenosis. - OCCLUDED = 100% stenosis.    Imaging performed following administration of IV contrast. Reconstruction   performed in axial, coronal, and sagittal planes.    COMPARISON: None    FINDINGS:  The aortic arch and origins of the great vessels are within normal limits.    Right:  The right common carotid artery emanates from the brachiocephalic   artery. The right common carotid artery is normal in course and caliber   to the carotid bifurcation. Origin of the right internal carotid artery   is unremarkable based on NASCET criteria. The right internal carotid   artery has normal course and caliber to the intracranial circulation.    The right vertebral artery emanates from the right subclavian artery. The   right vertebral artery is normal in course and caliber to the   intracranial circulation and vertebrobasilar confluence.    Left: The left common carotid artery emanates from the aortic arch. The   left common carotid artery is normal in course and caliber to the carotid   bifurcation. Mild deposition of calcified plaque at the origin of the   left internal carotid artery, without significant stenosis based on   NASCET criteria. The left internal carotid artery has normal course and   caliber to the intracranial circulation.    The left vertebral artery emanates from the left subclavian artery. The   left vertebral artery is normal in course and caliber to the intracranial   circulation and vertebrobasilar confluence.      IMPRESSION: (Please see below)        CTA [place] WITH CONTRAST      COMPARISON EXAM: None    CTA COW:    Flow of contrast is appreciated within the bilateral petrous,   precavernous, cavernous, and supraclinoid portions of the bilateral   internal carotid arteries. Bilateral middle cerebral arteries and   bilateral anterior cerebral arteries are visualized.  No significant   stenosis appreciated.    Distal intracranial bilateral vertebral arteries are visualized. There is   tapering of the distal intracranial right vertebral artery following the   takeoff of the right posterior inferior cerebellar artery, a normal   anatomical variant. Good flow is appreciated within the basilar artery.   Bilateral superior cerebellar arteries emanate from the distal aspect of   the basilar artery. There are fetal origins of the bilateral posterior   cerebral arteries.    The vertebrobasilar confluence is unremarkable. There is good flow within   the basilar artery. Bilateral superior cerebellar arteries and bilateral   posterior cerebral arteries emanate from the distal aspect of the basilar   artery.    No aneurysm about the [place]. Tiny aneurysms can be beyond the   resolution of CTA technique.    IMPRESSIONS:      CT BRAIN WITHOUT CONTRAST:  1. Subtle decreased attenuation involving the right brachium pontis and   right cerebellum, cannot exclude an ischemic process in this location. No   acute intracranial hemorrhage.  2. Stable prominence of the extra-axial space within the left posterior   fossa, may represent a subdural hygroma.    Findings were communicated by Dr. Behr-Ventura to Dr. Corky Barrow   at approximately 5:03 PM on 6/4/2023.      CTA COW:  1. Ventricular radiology p.m. on the scrotal cavities are Distal tapering   of the right vertebral artery following the takeoff of the right   posterior inferior cerebellar artery, a normal anatomical variant.  2. Fetal origins of the bilateral posterior cerebral arteries, normal   anatomical variants.  3. No evidence of aneurysm formation at the level of the [place].    CTA NECK: Patent bilateral common carotid arteries and bilateral interanl   carotid arteries. No hemodynamically significant stenosis at the  ICA   origins based on NASCET criteria.  Bilateral vertebral arteries are patent without flow limiting stenosis.     If the patient has persistent symptoms, consideration could be given to   brain MRI brain and/or MRA if clinically warranted and if there are no   MRI contraindications.    Remainder findings were communicated by Dr. Behr-Ventura to Dr. Corky Barrow at approximately 5:21 PM on 6/4/2023.    --- End of Report ---    ***Please see the addendum at the top of this report. It may contain   additional important information or changes.****
CHIEF COMPLAINT: dizziness     Interval Events: No acute event overnight. Patient is seen and examined at the bedside this morning. Seen by PT yesterday, but patient says nobody from PT saw her. She is going to [place] for MRI this afternoon.      REVIEW OF SYSTEMS:  Constitutional: No fever, or chills   HEENT: No dry eyes   CV: No chest pain, or palpitations   Resp: No cough, or sputum production. No shortness of breath   GI: No nausea or vomiting. No diarrhea   Musculoskeletal: No back pain   Skin: No rash   Neurological: No headache      OBJECTIVE:   Vital Signs Last 24 Hrs  T(C): 36.6 (06 Jun 2023 08:24), Max: 37.6 (05 Jun 2023 15:25)  T(F): 97.8 (06 Jun 2023 08:24), Max: 99.6 (05 Jun 2023 15:25)  HR: 56 (06 Jun 2023 08:24) (55 - 67)  BP: 118/64 (06 Jun 2023 08:24) (111/64 - 122/72)  BP(mean): --  RR: 15 (06 Jun 2023 08:24) (15 - 16)  SpO2: 97% (06 Jun 2023 08:24) (93% - 97%)    Parameters below as of 06 Jun 2023 08:24  Patient On (Oxygen Delivery Method): room air            CAPILLARY BLOOD GLUCOSE      POCT Blood Glucose.: 127 mg/dL (04 Jun 2023 16:23)      PHYSICAL EXAM:  General: Alert and cooperative. Not in acute stress.    Head: Normocephalic   Eyes: EOMI, no ptosis.    Neck:   no masses   Respiratory:  , non labored breathing   Cardiovascular:  there is no peripheral edema   Abdomen: Soft, non-tender, nondistended   Musculoskeletal: Adequately aligned spine   Extremities:  . No peripheral edema   Skin: Intact, no rash .  Neurological: AOAx4. No focal deficits  Psychiatry: The mental examination revealed the patient was oriented to person, place, and time     LINES:     MEDICATIONS  (STANDING):  aspirin enteric coated 81 milliGRAM(s) Oral daily  atorvastatin 80 milliGRAM(s) Oral at bedtime  citalopram 20 milliGRAM(s) Oral at bedtime  enoxaparin Injectable 40 milliGRAM(s) SubCutaneous every 24 hours  lamoTRIgine 100 milliGRAM(s) Oral two times a day  levothyroxine 25 MICROGram(s) Oral daily    MEDICATIONS  (PRN):  meclizine 25 milliGRAM(s) Oral every 8 hours PRN Dizziness  ondansetron Injectable 4 milliGRAM(s) IV Push every 6 hours PRN Nausea                      LABS:                                          12.1   7.93  )-----------( 249      ( 06 Jun 2023 06:00 )             37.1     06-06    135  |  98  |  20  ----------------------------<  91  4.2   |  30  |  1.22    Ca    8.9      06 Jun 2023 06:00  Phos  3.6     06-06  Mg     2.2     06-06    TPro  7.0  /  Alb  3.8  /  TBili  0.5  /  DBili  x   /  AST  20  /  ALT  14  /  AlkPhos  70  06-06       PTT - ( 04 Jun 2023 16:35 )  PTT:26.0 sec         RADIOLOGY:   CT BRAIN WITHOUT CONTRAST    INDICATIONS:  Stroke Code    TECHNIQUE:  Serial axial images were obtained from the skull base to the   vertex without the use of contrast.    COMPARISON EXAM: 11/7/2022, 7/11/2015    FINDINGS:  Ventricles and sulci: Parenchymal volume loss is present which is   commensurate with patient age.  Intra-axial: Subtle decreased attenuation appreciated within the region   of the right brachium pontis and right cerebellum (axial 3-15 and 3-16).   No acute intracranial hemorrhage or midline shift is present.  Extra-axial: No extra-axial fluid collection is identified. Prominence of   the extra-axial space on the left within the posterior fossa region, as   on prior, may represent a subdural hygroma-relatively stable in   appearance compared with earlier examination dated 7/11/2015. Deposition   of calcified plaque at the level of the bilateral carotid siphons.  Calvarium: Intact.    Evidence of prior right cataract surgery. Bilateral optic globes are   intact. Imaged paranasal sinuses, bilateral mastoid air cells, middle ear   cavities are clear.    IMPRESSION: (Please see below.)    CTA OF THE [place] AND NECK:    INDICATIONS: Stroke Code.  SCT  No additional clinical history was provided.    TECHNIQUE:    CONTRAST/COMPLICATIONS:  IV Contrast: IV contrast documented in unlinked concurrent exam   (accession [id number]), Omnipaque 350 (accession [id number])  90 cc   administered   10 cc discarded  Complications: None reported at time of study completion    CTA [place]:  After the intravenous power injection of non-ionic contrast material,   serial thin sections were obtained through the intracranial circulation   on a multislice CT scanner.  Images were reformatted using a dedicated 3D   software package and viewed on a dedicated workstation in multiple planes.    CTA NECK:  After the intravenous power injection of non-ionic contrast material,   serial thin sections were obtained through the cervical circulation on a   multislice CT scanner.  Images were reformatted using a dedicated 3D   software package and viewed on a dedicated workstation in multiple planes.    COMPARISON EXAMINATION: None.    FINDINGS:      CTA NECK:      CAROTID STENOSIS REFERENCE: (NASCET = 100x1-(N/D)). N=greatest narrowing.   D=normal distal diameter - MILD = <50% stenosis. - MODERATE = 50-69%   stenosis. - SEVERE = 70-89% stenosis. - HAIRLINE/CRITICAL = 90-99%   stenosis. - OCCLUDED = 100% stenosis.    Imaging performed following administration of IV contrast. Reconstruction   performed in axial, coronal, and sagittal planes.    COMPARISON: None    FINDINGS:  The aortic arch and origins of the great vessels are within normal limits.    Right:  The right common carotid artery emanates from the brachiocephalic   artery. The right common carotid artery is normal in course and caliber   to the carotid bifurcation. Origin of the right internal carotid artery   is unremarkable based on NASCET criteria. The right internal carotid   artery has normal course and caliber to the intracranial circulation.    The right vertebral artery emanates from the right subclavian artery. The   right vertebral artery is normal in course and caliber to the   intracranial circulation and vertebrobasilar confluence.    Left: The left common carotid artery emanates from the aortic arch. The   left common carotid artery is normal in course and caliber to the carotid   bifurcation. Mild deposition of calcified plaque at the origin of the   left internal carotid artery, without significant stenosis based on   NASCET criteria. The left internal carotid artery has normal course and   caliber to the intracranial circulation.    The left vertebral artery emanates from the left subclavian artery. The   left vertebral artery is normal in course and caliber to the intracranial   circulation and vertebrobasilar confluence.      IMPRESSION: (Please see below)        CTA [place] WITH CONTRAST      COMPARISON EXAM: None    CTA COW:    Flow of contrast is appreciated within the bilateral petrous,   precavernous, cavernous, and supraclinoid portions of the bilateral   internal carotid arteries. Bilateral middle cerebral arteries and   bilateral anterior cerebral arteries are visualized.  No significant   stenosis appreciated.    Distal intracranial bilateral vertebral arteries are visualized. There is   tapering of the distal intracranial right vertebral artery following the   takeoff of the right posterior inferior cerebellar artery, a normal   anatomical variant. Good flow is appreciated within the basilar artery.   Bilateral superior cerebellar arteries emanate from the distal aspect of   the basilar artery. There are fetal origins of the bilateral posterior   cerebral arteries.    The vertebrobasilar confluence is unremarkable. There is good flow within   the basilar artery. Bilateral superior cerebellar arteries and bilateral   posterior cerebral arteries emanate from the distal aspect of the basilar   artery.    No aneurysm about the [place]. Tiny aneurysms can be beyond the   resolution of CTA technique.    IMPRESSIONS:      CT BRAIN WITHOUT CONTRAST:  1. Subtle decreased attenuation involving the right brachium pontis and   right cerebellum, cannot exclude an ischemic process in this location. No   acute intracranial hemorrhage.  2. Stable prominence of the extra-axial space within the left posterior   fossa, may represent a subdural hygroma.    Findings were communicated by Dr. Behr-Ventura to Dr. Corky Barrow   at approximately 5:03 PM on 6/4/2023.      CTA COW:  1. Ventricular radiology p.m. on the scrotal cavities are Distal tapering   of the right vertebral artery following the takeoff of the right   posterior inferior cerebellar artery, a normal anatomical variant.  2. Fetal origins of the bilateral posterior cerebral arteries, normal   anatomical variants.  3. No evidence of aneurysm formation at the level of the [place].    CTA NECK: Patent bilateral common carotid arteries and bilateral interanl   carotid arteries. No hemodynamically significant stenosis at the  ICA   origins based on NASCET criteria.  Bilateral vertebral arteries are patent without flow limiting stenosis.     If the patient has persistent symptoms, consideration could be given to   brain MRI brain and/or MRA if clinically warranted and if there are no   MRI contraindications.    Remainder findings were communicated by Dr. Behr-Ventura to Dr. Corky Barrow at approximately 5:21 PM on 6/4/2023.    --- End of Report ---    ***Please see the addendum at the top of this report. It may contain   additional important information or changes.****
Chief Complaint: Dizziness    Interval Events: No events overnight.    Review of Systems:  General: No fevers, chills, weight gain  Skin: No rashes, color changes  Cardiovascular: No chest pain, orthopnea  Respiratory: No shortness of breath, cough  Gastrointestinal: No nausea, abdominal pain  Genitourinary: No incontinence, pain with urination  Musculoskeletal: No pain, swelling, decreased range of motion  Neurological: No headache, weakness  Psychiatric: No depression, anxiety  Endocrine: No weight gain, increased thirst  All other systems are comprehensively negative.    Physical Exam:  Vitals:        Vital Signs Last 24 Hrs  T(C): 36.6 (06 Jun 2023 08:24), Max: 37.6 (05 Jun 2023 15:25)  T(F): 97.8 (06 Jun 2023 08:24), Max: 99.6 (05 Jun 2023 15:25)  HR: 56 (06 Jun 2023 08:24) (55 - 67)  BP: 118/64 (06 Jun 2023 08:24) (111/64 - 122/72)  BP(mean): --  RR: 15 (06 Jun 2023 08:24) (15 - 16)  SpO2: 97% (06 Jun 2023 08:24) (93% - 97%)  Parameters below as of 06 Jun 2023 08:24  Patient On (Oxygen Delivery Method): room air  General: NAD  HEENT: MMM  Neck: No JVD, no carotid bruit  Lungs: CTAB  CV: RRR, nl S1/S2, no M/R/G  Abdomen: S/NT/ND, +BS  Extremities: No LE edema, no cyanosis  Neuro: AAOx3, non-focal  Skin: No rash    Labs:                        12.1   7.93  )-----------( 249      ( 06 Jun 2023 06:00 )             37.1     06-06    135  |  98  |  20  ----------------------------<  91  4.2   |  30  |  1.22    Ca    8.9      06 Jun 2023 06:00  Phos  3.6     06-06  Mg     2.2     06-06    TPro  7.0  /  Alb  3.8  /  TBili  0.5  /  DBili  x   /  AST  20  /  ALT  14  /  AlkPhos  70  06-06        PT/INR - ( 04 Jun 2023 16:35 )   PT: 12.1 sec;   INR: 1.03 ratio         PTT - ( 04 Jun 2023 16:35 )  PTT:26.0 sec    ECG/Telemetry: Sinus rhythm
[facility], Division of Infectious Diseases  [facility], [facility]  [phone]    Name: LINN WHITE  Age: 83y  Gender: Female  MRN: [id number]    Interval History:  Patient seen and examined at bedside this morning  No acute overnight events. Afebrile  No complaints  Notes reviewed    Antibiotics:      Medications:  aspirin enteric coated 81 milliGRAM(s) Oral daily  atorvastatin 80 milliGRAM(s) Oral at bedtime  citalopram 20 milliGRAM(s) Oral at bedtime  enoxaparin Injectable 40 milliGRAM(s) SubCutaneous every 24 hours  lamoTRIgine 100 milliGRAM(s) Oral two times a day  levothyroxine 25 MICROGram(s) Oral daily  meclizine 25 milliGRAM(s) Oral every 8 hours PRN  ondansetron Injectable 4 milliGRAM(s) IV Push every 6 hours PRN      Review of Systems:  A 10-point review of systems was obtained.     Pertinent positives and negatives--  Constitutional: No fevers. No Chills. No Rigors.   Cardiovascular: No chest pain. No palpitations.  Respiratory: No shortness of breath. No cough.  Gastrointestinal: No nausea or vomiting. No diarrhea or constipation.   Psychiatric: Pleasant. Appropriate affect.    Review of systems otherwise negative except as previously noted.    Allergies: cephalexin (Hives)  cephalexin (Unknown)    For details regarding the patient's past medical history, social history, family history, and other miscellaneous elements, please refer the initial infectious diseases consultation and/or the admitting history and physical examination for this admission.    Objective:  Vitals:   T(C): 36.6 (23 @ 08:24), Max: 37.6 (23 @ 15:25)  HR: 56 (23 @ 08:24) (55 - 67)  BP: 118/64 (23 @ 08:24) (111/64 - 122/72)  RR: 15 (23 @ 08:24) (15 - 16)  SpO2: 97% (23 @ 08:24) (93% - 97%)    Physical Examination:  General: no acute distress  HEENT: NC/AT, EOMI,   Cardio: S1, S2 heard, RRR, no murmurs  Resp: breath sounds heard bilaterally, no rales, wheezes or rhonchi  Abd: soft, NT, ND  Ext: no edema or cyanosis  Skin: warm, dry, no visible rash      Laboratory Studies:  CBC:                       12.1   7.93  )-----------( 249      ( 2023 06:00 )             37.1     CMP:     135  |  98  |  20  ----------------------------<  91  4.2   |  30  |  1.22    Ca    8.9      2023 06:00  Phos  3.6     06-06  Mg     2.2     06-06    TPro  7.0  /  Alb  3.8  /  TBili  0.5  /  DBili  x   /  AST  20  /  ALT  14  /  AlkPhos  70  06-06    LIVER FUNCTIONS - ( 2023 06:00 )  Alb: 3.8 g/dL / Pro: 7.0 g/dL / ALK PHOS: 70 U/L / ALT: 14 U/L DA / AST: 20 U/L / GGT: x           Urinalysis Basic - ( 2023 17:21 )    Color: Yellow / Appearance: Clear / S.032 / pH: x  Gluc: x / Ketone: Negative mg/dL  / Bili: Negative / Urobili: 1.0 mg/dL   Blood: x / Protein: Negative mg/dL / Nitrite: Negative   Leuk Esterase: Trace / RBC: 0 /HPF / WBC 4 /HPF   Sq Epi: x / Non Sq Epi: x / Bacteria: Few /HPF        Microbiology: reviewed        Radiology: reviewed

## 2023-06-07 NOTE — BH CONSULTATION LIAISON PROGRESS NOTE - NSBHFUPINTERVALHXFT_PSY_A_CORE
Patient seen, evaluated and chart reviewed. The undersigned was requested to reevaluate the patient as she did not want to leave the hospital and did not want to go to the subacute rehab, so the issue of decision making capacity was raised. Patient appears to have poor short term memory but good understanding of her situation, with no evidence of psychosis, ursula or depression.

## 2023-06-07 NOTE — DISCHARGE NOTE PROVIDER - NSDCCAREPROVSEEN_GEN_ALL_CORE_FT
Ramos, Kamil Weil, Kandi Iqbal, Sarah Beth Dyer, Lenard Valdez, Elvira Gallegos, Stuart Bertrand, Patti Peterson, Andrea Delgado, Jude CR

## 2023-06-07 NOTE — DISCHARGE NOTE PROVIDER - NSDCMRMEDTOKEN_GEN_ALL_CORE_FT
atorvastatin 80 mg oral tablet: 1 tab(s) orally once a day (at bedtime)  CeleXA 20 mg oral tablet: 1 tab(s) orally once a day  LaMICtal  mg oral tablet, extended release: 1 tab(s) orally once a day  levothyroxine 25 mcg (0.025 mg) oral tablet: 1 tab(s) orally once a day  meclizine 25 mg oral tablet: 1 tab(s) orally every 8 hours As needed Dizziness   atorvastatin 80 mg oral tablet: 1 tab(s) orally once a day (at bedtime)  CeleXA 20 mg oral tablet: 1 tab(s) orally once a day  cholecalciferol 25 mcg (1000 intl units) oral tablet: 2 tab(s) orally once a day  LaMICtal  mg oral tablet, extended release: 1 tab(s) orally once a day  levothyroxine 25 mcg (0.025 mg) oral tablet: 1 tab(s) orally once a day  meclizine 25 mg oral tablet: 1 tab(s) orally every 8 hours as needed for  dizziness

## 2023-06-07 NOTE — DISCHARGE NOTE PROVIDER - CARE PROVIDER_API CALL
Raffaele Salcedo  Internal Medicine  [address]  [place] [address]  Phone: [phone]  Fax: [phone]  Follow Up Time:     Twan Alvarez  Psychiatry  [address]  [place] [address]  Phone: [phone]  Fax: [phone]  Follow Up Time:

## 2023-06-07 NOTE — BH CONSULTATION LIAISON PROGRESS NOTE - NSBHCHARTREVIEWVS_PSY_A_CORE FT
Vital Signs Last 24 Hrs  T(C): 36.7 (07 Jun 2023 08:02), Max: 37.4 (06 Jun 2023 15:26)  T(F): 98 (07 Jun 2023 08:02), Max: 99.3 (06 Jun 2023 15:26)  HR: 57 (07 Jun 2023 08:02) (57 - 68)  BP: 106/59 (07 Jun 2023 08:02) (98/50 - 106/59)  BP(mean): --  RR: 17 (07 Jun 2023 08:02) (16 - 17)  SpO2: 91% (07 Jun 2023 08:02) (91% - 95%)    Parameters below as of 07 Jun 2023 08:02  Patient On (Oxygen Delivery Method): room air

## 2023-06-07 NOTE — BH CONSULTATION LIAISON PROGRESS NOTE - CURRENT MEDICATION
MEDICATIONS  (STANDING):  aspirin enteric coated 81 milliGRAM(s) Oral daily  atorvastatin 80 milliGRAM(s) Oral at bedtime  cholecalciferol 2000 Unit(s) Oral daily  citalopram 20 milliGRAM(s) Oral at bedtime  enoxaparin Injectable 40 milliGRAM(s) SubCutaneous every 24 hours  lamoTRIgine 100 milliGRAM(s) Oral two times a day  levothyroxine 25 MICROGram(s) Oral daily    MEDICATIONS  (PRN):  aluminum hydroxide/magnesium hydroxide/simethicone Suspension 30 milliLiter(s) Oral every 4 hours PRN Dyspepsia  meclizine 25 milliGRAM(s) Oral every 8 hours PRN Dizziness  ondansetron Injectable 4 milliGRAM(s) IV Push every 6 hours PRN Nausea

## 2023-06-07 NOTE — PROGRESS NOTE ADULT - ASSESSMENT
82 yo female with hx of subdural hematoma, depression, hypothyroidism, ?vertigo, COVID 19 pneumonia (2020) who presents from home with vertigo/dizziness which started at 4AM and has been continuous associated with nausea and vomiting. Patient states she typically has had these symptoms for several years but usually are intermittent.   Patient in the ED was a code stroke but no major vessel obstruction noted on CTA and CT head without acute findings (possible ischemic area in the R cerebellum due to low attenuation). Medicine called to admit with plan for MRI and neurology evaluation.    Reviewed hx w/ pt, pt w/ hx of recurrent UTIs, tx x2 in the past few months tx w/ IV and PO ciprofloxacin  Today pt reporting that she is having increased frequency and slight dysuria, but not as bad as prior UTIs    Recommendations:   UA negative  Monitor off Abx  Appreciate neuro recs  Stroke w/u and additional management per primary team    Pt can call [phone] for follow-up and management of recurrent UTIs  Please call with any questions.   Elvira Valdez M.D.  [facility] [phone]  
Patient is a 83 F with history of subdural hematoma, depression, hypothyroidism, vertigo presented from home with vertigo and dizziness. Patient presented to the ED with code stroke, no major vessel obstruction noted on CTA and CTH.      # r/o posterior circulation stroke/cerebellar stroke  - CTH without acute findings, hypoattenuation noted with the cerebellum  - Neurology recs appreciated   - MRI brain shows mild periventricular and scattered deep and subcortical white matter   ischemia. Mild pontine white matter ischemia.  Global atrophy most   prominent within the BILATERAL temporal lobes. Tiny old lacunar   infarctions are seen in the BILATERAL basal ganglia. Small lentiform CSF   collection noted lateral to the LEFT cerebellum likely reflects a small   subdural hygroma rather than an arachnoid cyst.   - continue aspirin/statin  - lovenox for DVT ppx  - PT/OT evaluation--> rehab, but patient adamantly refuses     # Vertigo   - Differential include: BPPV vs meniere (patient having some ear pain)   - Neurology recs appreciated     # Memory loss  - Having recall issue   - Concerned about patient's home living situation   - PT recommends EDWIN, but patient adamantly refuses and psych determined patient has capacity to refuse     # Chronic UTI   - Repeat UA and urine culture pending   - ID recs appreciated     # Fall   - Patient had suffered from multiple falls at home   - Fall precaution   - Questionable safety at home by herself   - PT/OT evaluation     # Depression   - continue lamictal and celexa    #hypothyroidism  - continue levothyroxine  - TSH 1.02    # Vitamin D deficiency   - Vitamin D level 15.8  - Start vitamin D 2000 IU daily     #DVT ppx  - as above    # Dispo: Seen by PT recommending EDWIN, but patient adamantly refuses. Psych determined that she has capacity. She understands that we are concerned about her recurrent fall risks and patient said she will have someone come and help her. Patient understands the risk of her staying by herself at home.     
Patient is a 83 F with history of subdural hematoma, depression, hypothyroidism, vertigo presented from home with vertigo and dizziness. Patient presented to the ED with code stroke, no major vessel obstruction noted on CTA and CTH. Pending brain MRI for further evaluation.     # #r/o posterior circulation stroke/cerebellar stroke  - CTH without acute findings, hypoattenuation noted with the cerebellum  - Neurology recs appreciated   - Pending MRI brain for further evaluation   - continue aspirin/statin  - lovenox for DVT ppx  - PT/OT evaluation     # Vertigo   - Differential include: BPPV vs meniere (patient having some ear pain)   - Neurology recs appreciated     # Memory loss  - Having recall issue   - Concerned about patient's home living situation   - PT recommends EDWIN    # Chronic UTI   - Repeat UA and urine culture  - ID recs appreciated     # Fall   - Patient had suffered from multiple falls at home   - Fall precaution   - Questionable safety at home by herself   - PT/OT evaluation     # Depression   - continue lamictal and celexa    #hypothyroidism  - continue levothyroxine  - Check TSH and FT4     #DVT ppx  - as above    # Dispo: pending MRI brain   Seen by PT recommending EDWIN    
Patient is a 83 F with history of subdural hematoma, depression, hypothyroidism, vertigo presented from home with vertigo and dizziness. Patient presented to the ED with code stroke, no major vessel obstruction noted on CTA and CTH. Pending brain MRI for further evaluation.     # #r/o posterior circulation stroke/cerebellar stroke  - CTH without acute findings, hypoattenuation noted with the cerebellum  - Neurology recs appreciated   - Pending MRI brain for further evaluation   - continue aspirin/statin  - lovenox for DVT ppx  - PT/OT evaluation     # Vertigo   - Differential include: BPPV vs meniere (patient having some ear pain)   - Neurology recs appreciated     # Memory loss  - Having recall issue   - Concerned about patient's home living situation   - Psych to evaluate for patient's capacity     # Chronic UTI   - Repeat UA   - ID recs appreciated     # Fall   - Patient had suffered from multiple falls at home   - Fall precaution   - Questionable safety at home by herself   - PT/OT evaluation     # Depression   - continue lamictal and celexa    #hypothyroidism  - continue levothyroxine  - Check TSH and FT4     #DVT ppx  - as above    # Dispo: pending MRI brain and PT evaluation     
The patient is an 83 year old female with a history of hypothyroidism, depression, SDH who presents with dizziness.    Plan:  - ECG with nonspecific findings  - Echo with normal LV systolic function, no significant valve issues  - Monitor on telemetry  - CT head with subtle findings right cerebellum  - MRI head pending  - Continue aspirin 81 mg daily  - Continue atorvastatin 80 mg daily  - Neurology follow-up
The patient is an 83 year old female with a history of hypothyroidism, depression, SDH who presents with dizziness.    Plan:  - ECG with nonspecific findings  - Echo with normal LV systolic function, no significant valve issues  - Monitor on telemetry  - CT head with subtle findings right cerebellum  - MRI head with old infarct but no acute findings  - Continue aspirin 81 mg daily  - Continue atorvastatin 80 mg daily  - Neurology follow-up

## 2023-06-07 NOTE — DISCHARGE NOTE PROVIDER - NSDCCPCAREPLAN_GEN_ALL_CORE_FT
PRINCIPAL DISCHARGE DIAGNOSIS  Diagnosis: Vertigo  Assessment and Plan of Treatment:       SECONDARY DISCHARGE DIAGNOSES  Diagnosis: Risk for falls  Assessment and Plan of Treatment:

## 2023-06-07 NOTE — BH CONSULTATION LIAISON PROGRESS NOTE - NSBHCHARTREVIEWLAB_PSY_A_CORE FT
12.1   7.93  )-----------( 249      ( 06 Jun 2023 06:00 )             37.1   06-06    135  |  98  |  20  ----------------------------<  91  4.2   |  30  |  1.22    Ca    8.9      06 Jun 2023 06:00  Phos  3.6     06-06  Mg     2.2     06-06    TPro  7.0  /  Alb  3.8  /  TBili  0.5  /  DBili  x   /  AST  20  /  ALT  14  /  AlkPhos  70  06-06

## 2023-06-08 LAB
CULTURE RESULTS: SIGNIFICANT CHANGE UP
SPECIMEN SOURCE: SIGNIFICANT CHANGE UP

## 2023-06-19 NOTE — ED ADULT NURSE NOTE - CAS TRG GENERAL NORM CIRC DET
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

## 2023-07-13 ENCOUNTER — APPOINTMENT (OUTPATIENT)
Dept: UROGYNECOLOGY | Facility: CLINIC | Age: 83
End: 2023-07-13
Payer: MEDICARE

## 2023-07-13 DIAGNOSIS — R30.0 DYSURIA: ICD-10-CM

## 2023-07-13 DIAGNOSIS — N39.41 URGE INCONTINENCE: ICD-10-CM

## 2023-07-13 DIAGNOSIS — N81.11 CYSTOCELE, MIDLINE: ICD-10-CM

## 2023-07-13 LAB
BILIRUB UR QL STRIP: NEGATIVE
CLARITY UR: CLEAR
COLLECTION METHOD: NORMAL
GLUCOSE UR-MCNC: NEGATIVE
HCG UR QL: 0.2 EU/DL
HGB UR QL STRIP.AUTO: NEGATIVE
KETONES UR-MCNC: NEGATIVE
LEUKOCYTE ESTERASE UR QL STRIP: NEGATIVE
NITRITE UR QL STRIP: NEGATIVE
PH UR STRIP: 5.5
PROT UR STRIP-MCNC: NEGATIVE
SP GR UR STRIP: 1.02

## 2023-07-13 PROCEDURE — 51701 INSERT BLADDER CATHETER: CPT

## 2023-07-13 PROCEDURE — 81003 URINALYSIS AUTO W/O SCOPE: CPT | Mod: QW

## 2023-07-13 PROCEDURE — 99214 OFFICE O/P EST MOD 30 MIN: CPT | Mod: 25

## 2023-07-26 NOTE — ED ADULT NURSE REASSESSMENT NOTE - NS ED NURSE REASSESS COMMENT FT1
No
Received pt alert and orientated. Pt is complaining of weakness. Pt denies abd pain and chest pain just has SOB. Pt is on 2L NC Vital Signs taken and documented. Report given to Elvi pt is going to 334 telemetry. Call bell within reach. Pt was helped to commode and is back in the bed. Freq rounding performed. Safety/Comfort maintained.

## 2023-07-26 NOTE — ED ADULT NURSE NOTE - NS TRANSFER PATIENT BELONGINGS
"7/26/2023       RE: Sonia Bangura  [address] [facility] [address]     Dear Colleague,    Thank you for referring your patient, Sonia Bangura, to the [facility] [place] [facility]. Please see a copy of my visit note below.      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  Overall well.   No epistaxis.   Still with some RT facial swelling I suspect 2/2 lymphedema  Having some crusting of nasal cavity  Imaging performed yesteraday    Sino-Nasal Outcome Test (SNOT - 22)  [facility] Operative History  DATE OF PROCEDURE: 07/05/22     SURGEON: Riccardo Jones MD     RESIDENT SURGEON: Yanna Brasher MD     PRE-OPERATIVE DIAGNOSIS: Sinonasal malignancy     POST-OPERATIVE DIAGNOSIS: Same      PROCEDURE:  1) right endoscopic revision total ethmoidectomy with sphenoidotomy  2) computerized image-guidance, extradural    Review of systems: A 14-point review of systems has been conducted and is negative for any notable symptoms, except as dictated in the history of present illness.     Physical Exam:  Vital signs: /61 (BP Location: Left arm, Patient Position: Sitting, Cuff Size: Adult Regular)   Pulse 56   Temp 99.5  F (37.5  C) (Temporal)   Ht 1.715 m (5' 7.5\")   Wt 61.4 kg (135 lb 6.4 oz)   SpO2 100%   BMI 20.89 kg/m     General Appearance: No acute distress, appropriate demeanor, conversant  Eyes: moist conjunctivae; EOMI; pupils symmetric; visual acuity grossly intact; no proptosis  Head: normocephalic; overall symmetric appearance without deformity  Face: overall symmetric without deformity; HB I/VI  Nose: No external deformity; see endoscopy  Lungs: symmetric chest rise; no " wheezing  CV: Good distal perfusion; normal hear rate  Extremities: No deformity  Neurologic Exam: Cranial nerves II-XII are grossly intact; no focal deficit     Procedure Note  Procedure performed: Rigid nasal endoscopy  Indication: To evaluate for sinonasal pathology not visualized on routine anterior rhinoscopy  Anesthesia: 4% topical lidocaine with 0.05 % oxymetazoline  Description of procedure: A 30 degree, 3 mm rigid endoscope was inserted into bilateral nasal cavities and the nasal valves, nasal cavity, middle meatus, sphenoethmoid recess, nasopharynx were evaluated for evidence of obstruction, edema, purulence, polyps and/or mass/lesion.     Charity-Good Endoscopic Scoring System  Endoscopic observation Right Left   Polyps in middle meatus (0 = absent, 1 = restricted to middle meatus, 2 = Beyond middle meatus) 0 0   Discharge (0 = absent, 1 = thin and clear, 2 = thick, purulent) 0 0   Edema (0 = absent, 1 = mild-moderate, 2 = moderate-severe) 1 0   Crusting (0 = absent, 1 = mild-moderate, 2 = moderate-severe) 1 0   Scarring (0= absent, 1 = mild-moderate, 2 = moderate-severe) 1 0   Total 3 0     Findings  RT: post-radiation changes/scarring and mild crusting; no endoscopic evidence to suggest malignancy  LT: sinuses totally clear without concerning findings; including acute sinusitis    The patient tolerated the procedure well without complication.     Laboratory Review:  N/A    Imaging Review:  MR Sinus 7/25/2023:    Impression: Postsurgical changes of partial turbinectomy and  antrectomy. No new nodular enhancement about the resection site to  suggest local recurrence.  1.  Scattered mucosal thickening of the paranasal sinuses with stable  opacification of the right frontal sinus, mildly increased within the  maxillary sinuses and decreased in the left ethmoid air cells.     I have personally reviewed the examination and initial interpretation  and I agree with the findings.     CT neck  7/25/2023:  IMPRESSION: Stable changes status post resection of sinonasal tumor.  No evidence of local recurrence.   Primary: NI-RADS 1} Expected post-treatment changes in the neck  without evidence of recurrent disease at the primary site.  Neck: NI-RADS 1}  No abnormal lymph node.      NI-RADS CECT Surveillance Legend:     Primary  1: No evidence of recurrence: routine surveillance  2: Low suspicion    a) Superficial abnormality (skin, mucosal surface): direct visual  inspection    b) Ill-defined deep abnormality: short interval follow-up* or PET  3: High suspicion (new or enlarging discrete nodule/mass): biopsy  4: Definitive recurrence (path proven or clinical progression): no  biopsy needed     Nodes  1: No evidence of recurrence: routine surveillance  2: Low suspicion (ill-defined): short interval follow-up or PET  3: High suspicion (new or enlarging lymph node): biopsy if clinically  needed  4: Definitive recurrence (path proven or clinical progression): no  biopsy needed     *short interval follow-up: 3 months at our institution     RIVKA FERRARO MD      CT CAP 7/25/2023:  IMPRESSION:     1. No convincing evidence of metastatic disease.     2. L3 superior endplate anterior wedge compression deformity.     3. Left-sided renal calculus measuring 3 mm.     I have personally reviewed the examination and initial interpretation  and I agree with the findings.     *I have personally reviewed these images and agree with the radiologist's interpretation*      Pathology Review:  Specimens 7/5/22  A Sinus, right middle meatus  B Sinus, right middle turbinate - lateral attachment  C Sinus, right frontal recess  D Sinus, right middle turbinate - vertical attachment  .  Intraoperative Consultation  A(1). Sinus, right middle meatus:  AFS1:  - At least in situ poorly differentiated carcinoma  B(2). Sinus, right middle turbinate - lateral attachment:  BFS1:  - At least in situ poorly differentiated carcinoma  C(3). Sinus,  Clothing right frontal recess:  CFS1:  - Poorly differentiated carcinoma  D(4). Sinus, right middle turbinate - vertical attachment:  DFS1:  - Poorly differentiated carcinoma     Assessment/Medical Decision Making/Plan:  Chronic sinusitis  Atypical facial pain  Poorly differentiated diffuse sinonasal carcinoma, RT side         Plan:  Bilateral endoscopy performed today shows no evidence of disease recurrence. MRI has also been reviewed which supports this. She should continue nasal rinses and I can provide prescription for mupirocin ointment. Follow-up in 3-4 months with one of my skull base colleagues with surveillance imaging (MR and U/S neck)     Riccardo Jones MD    [facility]  Rhinology, Endoscopic Skull Base Surgery  [facility]  Department of Otolaryngology - Head & Neck Surgery    The above documentation was completed with the aid of voice recognition dictation software, and as a result, unexpected dictation errors may occur. Please don't hesitate to contact me for any clarification needed regarding this note.       Additional portions of the patient's history have been reviewed below.   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 Lizarraga    GALLBLADDER SURGERY      GI SURGERY  2014    EGD    HYSTEROSCOPY      HYSTEROSCOPY DIAGNOSTIC      INGUINAL HERNIA REPAIR Right     INSERT PORT VASCULAR ACCESS Right 2022    Procedure: SINGLE LUMEN POWER PORT INSERTION, VASCULAR ACCESS;  Surgeon: Cy Jones MD;  Location: [facility] OR    IR CHEST PORT PLACEMENT > 5 YRS OF AGE  2022    LIGATE VEIN      OPTICAL TRACKING SYSTEM ENDOSCOPIC SINUS SURGERY Bilateral 2022    Procedure: right image-guided endoscopic revision frontal sinusotomy, total ethmoidectomy, maxillary antrostomy with tissue removal,;  Surgeon: Riccardo Jones MD;  Location: [facility] OR        Family History   Problem Relation Age of Onset    Diabetes Mother         Type 2    Lupus Mother         SLE    Cancer Father         Stomach and lung    Pancreatic Cancer Paternal Aunt     Pancreatic Cancer Paternal Aunt     Throat cancer Paternal Uncle     Lung Cancer Paternal Uncle         Social History     Socioeconomic History    Marital status:    Tobacco Use    Smoking status: Former     Types: Cigarettes     Quit date:      Years since quittin.5    Smokeless tobacco: Never   Substance and Sexual Activity    Alcohol use: No    Drug use: No           Again, thank you for allowing me to participate in the care of your patient.      Sincerely,    Riccardo Jones MD

## 2023-08-05 ENCOUNTER — INPATIENT (INPATIENT)
Facility: HOSPITAL | Age: 83
LOS: 1 days | Discharge: HOME | DRG: 149 | End: 2023-08-07
Attending: INTERNAL MEDICINE | Admitting: INTERNAL MEDICINE
Payer: MEDICARE

## 2023-08-05 VITALS
WEIGHT: 115.08 LBS | SYSTOLIC BLOOD PRESSURE: 129 MMHG | RESPIRATION RATE: 18 BRPM | TEMPERATURE: 98 F | OXYGEN SATURATION: 92 % | DIASTOLIC BLOOD PRESSURE: 75 MMHG | HEART RATE: 70 BPM | HEIGHT: 61 IN

## 2023-08-05 LAB
ALBUMIN SERPL ELPH-MCNC: 3.9 G/DL — SIGNIFICANT CHANGE UP (ref 3.3–5)
ALP SERPL-CCNC: 62 U/L — SIGNIFICANT CHANGE UP (ref 40–120)
ALT FLD-CCNC: 17 U/L — SIGNIFICANT CHANGE UP (ref 12–78)
ANION GAP SERPL CALC-SCNC: 4 MMOL/L — LOW (ref 5–17)
APPEARANCE UR: CLEAR — SIGNIFICANT CHANGE UP
AST SERPL-CCNC: 16 U/L — SIGNIFICANT CHANGE UP (ref 15–37)
BACTERIA # UR AUTO: ABNORMAL /HPF
BASOPHILS # BLD AUTO: 0.03 K/UL — SIGNIFICANT CHANGE UP (ref 0–0.2)
BASOPHILS NFR BLD AUTO: 0.3 % — SIGNIFICANT CHANGE UP (ref 0–2)
BILIRUB SERPL-MCNC: 0.4 MG/DL — SIGNIFICANT CHANGE UP (ref 0.2–1.2)
BILIRUB UR-MCNC: NEGATIVE — SIGNIFICANT CHANGE UP
BUN SERPL-MCNC: 18 MG/DL — SIGNIFICANT CHANGE UP (ref 7–23)
CALCIUM SERPL-MCNC: 9.4 MG/DL — SIGNIFICANT CHANGE UP (ref 8.5–10.1)
CHLORIDE SERPL-SCNC: 101 MMOL/L — SIGNIFICANT CHANGE UP (ref 96–108)
CO2 SERPL-SCNC: 28 MMOL/L — SIGNIFICANT CHANGE UP (ref 22–31)
COLOR SPEC: YELLOW — SIGNIFICANT CHANGE UP
COMMENT - URINE: SIGNIFICANT CHANGE UP
CREAT SERPL-MCNC: 1.2 MG/DL — SIGNIFICANT CHANGE UP (ref 0.5–1.3)
DIFF PNL FLD: NEGATIVE — SIGNIFICANT CHANGE UP
EGFR: 45 ML/MIN/1.73M2 — LOW
EOSINOPHIL # BLD AUTO: 0.07 K/UL — SIGNIFICANT CHANGE UP (ref 0–0.5)
EOSINOPHIL NFR BLD AUTO: 0.8 % — SIGNIFICANT CHANGE UP (ref 0–6)
EPI CELLS # UR: PRESENT
GLUCOSE SERPL-MCNC: 111 MG/DL — HIGH (ref 70–99)
GLUCOSE UR QL: NEGATIVE MG/DL — SIGNIFICANT CHANGE UP
HCT VFR BLD CALC: 38.9 % — SIGNIFICANT CHANGE UP (ref 34.5–45)
HGB BLD-MCNC: 12.7 G/DL — SIGNIFICANT CHANGE UP (ref 11.5–15.5)
IMM GRANULOCYTES NFR BLD AUTO: 0.2 % — SIGNIFICANT CHANGE UP (ref 0–0.9)
KETONES UR-MCNC: NEGATIVE MG/DL — SIGNIFICANT CHANGE UP
LEUKOCYTE ESTERASE UR-ACNC: ABNORMAL
LYMPHOCYTES # BLD AUTO: 2.29 K/UL — SIGNIFICANT CHANGE UP (ref 1–3.3)
LYMPHOCYTES # BLD AUTO: 24.7 % — SIGNIFICANT CHANGE UP (ref 13–44)
MAGNESIUM SERPL-MCNC: 2.3 MG/DL — SIGNIFICANT CHANGE UP (ref 1.6–2.6)
MCHC RBC-ENTMCNC: 30.9 PG — SIGNIFICANT CHANGE UP (ref 27–34)
MCHC RBC-ENTMCNC: 32.6 GM/DL — SIGNIFICANT CHANGE UP (ref 32–36)
MCV RBC AUTO: 94.6 FL — SIGNIFICANT CHANGE UP (ref 80–100)
MONOCYTES # BLD AUTO: 0.64 K/UL — SIGNIFICANT CHANGE UP (ref 0–0.9)
MONOCYTES NFR BLD AUTO: 6.9 % — SIGNIFICANT CHANGE UP (ref 2–14)
NEUTROPHILS # BLD AUTO: 6.21 K/UL — SIGNIFICANT CHANGE UP (ref 1.8–7.4)
NEUTROPHILS NFR BLD AUTO: 67.1 % — SIGNIFICANT CHANGE UP (ref 43–77)
NITRITE UR-MCNC: NEGATIVE — SIGNIFICANT CHANGE UP
NRBC # BLD: 0 /100 WBCS — SIGNIFICANT CHANGE UP (ref 0–0)
PH UR: 7 — SIGNIFICANT CHANGE UP (ref 5–8)
PHOSPHATE SERPL-MCNC: 3 MG/DL — SIGNIFICANT CHANGE UP (ref 2.5–4.5)
PLATELET # BLD AUTO: 306 K/UL — SIGNIFICANT CHANGE UP (ref 150–400)
POTASSIUM SERPL-MCNC: 4.5 MMOL/L — SIGNIFICANT CHANGE UP (ref 3.5–5.3)
POTASSIUM SERPL-SCNC: 4.5 MMOL/L — SIGNIFICANT CHANGE UP (ref 3.5–5.3)
PROT SERPL-MCNC: 7.3 G/DL — SIGNIFICANT CHANGE UP (ref 6–8.3)
PROT UR-MCNC: NEGATIVE MG/DL — SIGNIFICANT CHANGE UP
RBC # BLD: 4.11 M/UL — SIGNIFICANT CHANGE UP (ref 3.8–5.2)
RBC # FLD: 12.9 % — SIGNIFICANT CHANGE UP (ref 10.3–14.5)
RBC CASTS # UR COMP ASSIST: 2 /HPF — SIGNIFICANT CHANGE UP (ref 0–4)
SODIUM SERPL-SCNC: 133 MMOL/L — LOW (ref 135–145)
SP GR SPEC: 1.01 — SIGNIFICANT CHANGE UP (ref 1–1.03)
TROPONIN I, HIGH SENSITIVITY RESULT: 9.3 NG/L — SIGNIFICANT CHANGE UP
UROBILINOGEN FLD QL: 0.2 MG/DL — SIGNIFICANT CHANGE UP (ref 0.2–1)
WBC # BLD: 9.26 K/UL — SIGNIFICANT CHANGE UP (ref 3.8–10.5)
WBC # FLD AUTO: 9.26 K/UL — SIGNIFICANT CHANGE UP (ref 3.8–10.5)
WBC UR QL: 25 /HPF — HIGH (ref 0–5)

## 2023-08-05 PROCEDURE — 70450 CT HEAD/BRAIN W/O DYE: CPT | Mod: 26,MA

## 2023-08-05 PROCEDURE — 99285 EMERGENCY DEPT VISIT HI MDM: CPT

## 2023-08-05 PROCEDURE — 93010 ELECTROCARDIOGRAM REPORT: CPT

## 2023-08-05 RX ORDER — SODIUM CHLORIDE 9 MG/ML
1000 INJECTION INTRAMUSCULAR; INTRAVENOUS; SUBCUTANEOUS ONCE
Refills: 0 | Status: COMPLETED | OUTPATIENT
Start: 2023-08-05 | End: 2023-08-05

## 2023-08-05 RX ORDER — MECLIZINE HCL 12.5 MG
25 TABLET ORAL ONCE
Refills: 0 | Status: COMPLETED | OUTPATIENT
Start: 2023-08-05 | End: 2023-08-05

## 2023-08-05 RX ADMIN — SODIUM CHLORIDE 1000 MILLILITER(S): 9 INJECTION INTRAMUSCULAR; INTRAVENOUS; SUBCUTANEOUS at 16:13

## 2023-08-05 RX ADMIN — Medication 25 MILLIGRAM(S): at 20:13

## 2023-08-05 NOTE — ED ADULT NURSE NOTE - OBJECTIVE STATEMENT
c/o feeling dizzy and feels like she's going to fall, further stated that she wants something to drink and eat because she hasn't eaten anything today, lives with her cousin and walks with walker

## 2023-08-05 NOTE — ED ADULT NURSE NOTE - NSFALLHARMRISKINTERV_ED_ALL_ED

## 2023-08-05 NOTE — ED PROVIDER NOTE - CLINICAL SUMMARY MEDICAL DECISION MAKING FREE TEXT BOX
82 y/o F hx of recurrent vertigo, Depression, hypothyroidism, lacunar strokes presenting with dizziness  Recurrent dizziness with reports of falls. Suspect Vertigo  Has had extensive neurological evaluation including CTA and MR brain suggestive of lacunar strokes (June 2023)  Also with Echo during that admission without notable findings.   Check screening labs, UA, orthostatics, CTH (CTA head & neck unrevealing two months)  Trial of meclizine; reassess.

## 2023-08-05 NOTE — ED PROVIDER NOTE - OBJECTIVE STATEMENT
84 y/o F hx of recurrent vertigo, Depression, hypothyroidism presenting with vertigo.     Patient notes symptoms recurred this morning with no associated chest pain, headache, focal weakness or ataxia. She reports poor PO intake and 'dehydration' secondary to dizziness. No nausea or vomiting. Hospitalized for similar symptoms in June '23 with MRI showing minor lacunar infarcts and not significant stenosis on CTA. She is not following with neurologist. Lives alone with HHA and at baseline ambulates with walker. 82 y/o F hx of recurrent vertigo, Depression, hypothyroidism presenting with vertigo.     Patient notes symptoms recurred this morning with no associated chest pain, headache, focal weakness or ataxia. She reports poor PO intake and 'dehydration' secondary to dizziness. Also endorsing multiple falls in the last few months. No nausea or vomiting. Hospitalized for similar symptoms in June '23 with MRI showing minor lacunar infarcts and not significant stenosis on CTA. She is not following with neurologist. Lives alone with HHA and at baseline ambulates with walker. 82 y/o F hx of recurrent vertigo, Depression, hypothyroidism, lacunar strokes presenting with dizziness    Patient notes symptoms recurred this morning with no associated chest pain, headache, focal weakness or ataxia. She reports poor PO intake and 'dehydration' secondary to dizziness. Also endorsing multiple falls in the last few months. No nausea or vomiting. Hospitalized for similar symptoms in June '23 with MRI showing minor lacunar infarcts and not significant stenosis on CTA. She is not following with neurologist. Lives alone with HHA and at baseline ambulates with walker.

## 2023-08-05 NOTE — ED PROVIDER NOTE - CARE PLAN
1 Principal Discharge DX:	Dizziness  Secondary Diagnosis:	Urinary tract infection  Secondary Diagnosis:	Frequent falls

## 2023-08-05 NOTE — ED PROVIDER NOTE - PHYSICAL EXAMINATION
GENERAL: no acute distress; well-developed  HEAD:  Atraumatic, Normocephalic  EYES: EOMI, PERRLA,   ENT: MMM; oropharynx clear  NECK: Supple, No JVD  CHEST/LUNG: Clear to auscultation bilaterally; No wheeze  HEART: Regular rate and rhythm; No murmurs, rubs, or gallops  ABDOMEN: Soft, Nontender, Nondistended; Bowel sounds present  EXTREMITIES:  2+ Peripheral Pulses, No clubbing, cyanosis, or edema  PSYCH: AAOx3  NEUROLOGY: no focal motor or sensory deficits. 5/5 muscle strength in all extremities. CN II-XII grossly intact. Negative Pronator Drift. Normal finger to nose  SKIN: No rashes or lesions

## 2023-08-06 DIAGNOSIS — I63.9 CEREBRAL INFARCTION, UNSPECIFIED: ICD-10-CM

## 2023-08-06 DIAGNOSIS — Z29.9 ENCOUNTER FOR PROPHYLACTIC MEASURES, UNSPECIFIED: ICD-10-CM

## 2023-08-06 DIAGNOSIS — E03.9 HYPOTHYROIDISM, UNSPECIFIED: ICD-10-CM

## 2023-08-06 DIAGNOSIS — R82.90 UNSPECIFIED ABNORMAL FINDINGS IN URINE: ICD-10-CM

## 2023-08-06 DIAGNOSIS — F32.9 MAJOR DEPRESSIVE DISORDER, SINGLE EPISODE, UNSPECIFIED: ICD-10-CM

## 2023-08-06 DIAGNOSIS — R42 DIZZINESS AND GIDDINESS: ICD-10-CM

## 2023-08-06 DIAGNOSIS — E87.1 HYPO-OSMOLALITY AND HYPONATREMIA: ICD-10-CM

## 2023-08-06 LAB
A1C WITH ESTIMATED AVERAGE GLUCOSE RESULT: 5.6 % — SIGNIFICANT CHANGE UP (ref 4–5.6)
ALBUMIN SERPL ELPH-MCNC: 3.2 G/DL — LOW (ref 3.3–5)
ALP SERPL-CCNC: 52 U/L — SIGNIFICANT CHANGE UP (ref 40–120)
ALT FLD-CCNC: 13 U/L — SIGNIFICANT CHANGE UP (ref 12–78)
ANION GAP SERPL CALC-SCNC: 5 MMOL/L — SIGNIFICANT CHANGE UP (ref 5–17)
AST SERPL-CCNC: 11 U/L — LOW (ref 15–37)
BASOPHILS # BLD AUTO: 0.02 K/UL — SIGNIFICANT CHANGE UP (ref 0–0.2)
BASOPHILS NFR BLD AUTO: 0.3 % — SIGNIFICANT CHANGE UP (ref 0–2)
BILIRUB SERPL-MCNC: 0.4 MG/DL — SIGNIFICANT CHANGE UP (ref 0.2–1.2)
BUN SERPL-MCNC: 18 MG/DL — SIGNIFICANT CHANGE UP (ref 7–23)
CALCIUM SERPL-MCNC: 7.9 MG/DL — LOW (ref 8.5–10.1)
CHLORIDE SERPL-SCNC: 105 MMOL/L — SIGNIFICANT CHANGE UP (ref 96–108)
CHOLEST SERPL-MCNC: 171 MG/DL — SIGNIFICANT CHANGE UP
CO2 SERPL-SCNC: 25 MMOL/L — SIGNIFICANT CHANGE UP (ref 22–31)
CREAT SERPL-MCNC: 1.1 MG/DL — SIGNIFICANT CHANGE UP (ref 0.5–1.3)
EGFR: 50 ML/MIN/1.73M2 — LOW
EOSINOPHIL # BLD AUTO: 0.18 K/UL — SIGNIFICANT CHANGE UP (ref 0–0.5)
EOSINOPHIL NFR BLD AUTO: 2.6 % — SIGNIFICANT CHANGE UP (ref 0–6)
ESTIMATED AVERAGE GLUCOSE: 114 MG/DL — SIGNIFICANT CHANGE UP (ref 68–114)
GLUCOSE SERPL-MCNC: 146 MG/DL — HIGH (ref 70–99)
HCT VFR BLD CALC: 32.3 % — LOW (ref 34.5–45)
HDLC SERPL-MCNC: 56 MG/DL — SIGNIFICANT CHANGE UP
HGB BLD-MCNC: 10.5 G/DL — LOW (ref 11.5–15.5)
IMM GRANULOCYTES NFR BLD AUTO: 0.1 % — SIGNIFICANT CHANGE UP (ref 0–0.9)
LIPID PNL WITH DIRECT LDL SERPL: 106 MG/DL — HIGH
LYMPHOCYTES # BLD AUTO: 2.67 K/UL — SIGNIFICANT CHANGE UP (ref 1–3.3)
LYMPHOCYTES # BLD AUTO: 39.1 % — SIGNIFICANT CHANGE UP (ref 13–44)
MCHC RBC-ENTMCNC: 30.6 PG — SIGNIFICANT CHANGE UP (ref 27–34)
MCHC RBC-ENTMCNC: 32.5 GM/DL — SIGNIFICANT CHANGE UP (ref 32–36)
MCV RBC AUTO: 94.2 FL — SIGNIFICANT CHANGE UP (ref 80–100)
MONOCYTES # BLD AUTO: 0.67 K/UL — SIGNIFICANT CHANGE UP (ref 0–0.9)
MONOCYTES NFR BLD AUTO: 9.8 % — SIGNIFICANT CHANGE UP (ref 2–14)
NEUTROPHILS # BLD AUTO: 3.28 K/UL — SIGNIFICANT CHANGE UP (ref 1.8–7.4)
NEUTROPHILS NFR BLD AUTO: 48.1 % — SIGNIFICANT CHANGE UP (ref 43–77)
NON HDL CHOLESTEROL: 115 MG/DL — SIGNIFICANT CHANGE UP
NRBC # BLD: 0 /100 WBCS — SIGNIFICANT CHANGE UP (ref 0–0)
PLATELET # BLD AUTO: 237 K/UL — SIGNIFICANT CHANGE UP (ref 150–400)
POTASSIUM SERPL-MCNC: 4.6 MMOL/L — SIGNIFICANT CHANGE UP (ref 3.5–5.3)
POTASSIUM SERPL-SCNC: 4.6 MMOL/L — SIGNIFICANT CHANGE UP (ref 3.5–5.3)
PROT SERPL-MCNC: 6.1 G/DL — SIGNIFICANT CHANGE UP (ref 6–8.3)
RBC # BLD: 3.43 M/UL — LOW (ref 3.8–5.2)
RBC # FLD: 12.9 % — SIGNIFICANT CHANGE UP (ref 10.3–14.5)
SODIUM SERPL-SCNC: 135 MMOL/L — SIGNIFICANT CHANGE UP (ref 135–145)
TRIGL SERPL-MCNC: 43 MG/DL — SIGNIFICANT CHANGE UP
WBC # BLD: 6.83 K/UL — SIGNIFICANT CHANGE UP (ref 3.8–10.5)
WBC # FLD AUTO: 6.83 K/UL — SIGNIFICANT CHANGE UP (ref 3.8–10.5)

## 2023-08-06 PROCEDURE — 99223 1ST HOSP IP/OBS HIGH 75: CPT | Mod: AI

## 2023-08-06 PROCEDURE — 12345: CPT | Mod: NC

## 2023-08-06 RX ORDER — LEVOTHYROXINE SODIUM 125 MCG
25 TABLET ORAL DAILY
Refills: 0 | Status: DISCONTINUED | OUTPATIENT
Start: 2023-08-06 | End: 2023-08-07

## 2023-08-06 RX ORDER — HEPARIN SODIUM 5000 [USP'U]/ML
5000 INJECTION INTRAVENOUS; SUBCUTANEOUS EVERY 12 HOURS
Refills: 0 | Status: DISCONTINUED | OUTPATIENT
Start: 2023-08-06 | End: 2023-08-07

## 2023-08-06 RX ORDER — CITALOPRAM 10 MG/1
20 TABLET, FILM COATED ORAL DAILY
Refills: 0 | Status: DISCONTINUED | OUTPATIENT
Start: 2023-08-06 | End: 2023-08-07

## 2023-08-06 RX ORDER — ACETAMINOPHEN 500 MG
650 TABLET ORAL EVERY 6 HOURS
Refills: 0 | Status: DISCONTINUED | OUTPATIENT
Start: 2023-08-06 | End: 2023-08-07

## 2023-08-06 RX ORDER — LAMOTRIGINE 25 MG/1
200 TABLET, ORALLY DISINTEGRATING ORAL DAILY
Refills: 0 | Status: DISCONTINUED | OUTPATIENT
Start: 2023-08-06 | End: 2023-08-07

## 2023-08-06 RX ORDER — ATORVASTATIN CALCIUM 80 MG/1
80 TABLET, FILM COATED ORAL AT BEDTIME
Refills: 0 | Status: DISCONTINUED | OUTPATIENT
Start: 2023-08-06 | End: 2023-08-07

## 2023-08-06 RX ORDER — SODIUM CHLORIDE 9 MG/ML
1000 INJECTION INTRAMUSCULAR; INTRAVENOUS; SUBCUTANEOUS
Refills: 0 | Status: DISCONTINUED | OUTPATIENT
Start: 2023-08-06 | End: 2023-08-07

## 2023-08-06 RX ORDER — METOCLOPRAMIDE HCL 10 MG
10 TABLET ORAL ONCE
Refills: 0 | Status: COMPLETED | OUTPATIENT
Start: 2023-08-06 | End: 2023-08-06

## 2023-08-06 RX ORDER — MECLIZINE HCL 12.5 MG
25 TABLET ORAL EVERY 8 HOURS
Refills: 0 | Status: DISCONTINUED | OUTPATIENT
Start: 2023-08-06 | End: 2023-08-07

## 2023-08-06 RX ORDER — LANOLIN ALCOHOL/MO/W.PET/CERES
3 CREAM (GRAM) TOPICAL AT BEDTIME
Refills: 0 | Status: DISCONTINUED | OUTPATIENT
Start: 2023-08-06 | End: 2023-08-07

## 2023-08-06 RX ORDER — CIPROFLOXACIN LACTATE 400MG/40ML
400 VIAL (ML) INTRAVENOUS ONCE
Refills: 0 | Status: COMPLETED | OUTPATIENT
Start: 2023-08-06 | End: 2023-08-06

## 2023-08-06 RX ADMIN — CITALOPRAM 20 MILLIGRAM(S): 10 TABLET, FILM COATED ORAL at 06:53

## 2023-08-06 RX ADMIN — SODIUM CHLORIDE 50 MILLILITER(S): 9 INJECTION INTRAMUSCULAR; INTRAVENOUS; SUBCUTANEOUS at 06:53

## 2023-08-06 RX ADMIN — LAMOTRIGINE 200 MILLIGRAM(S): 25 TABLET, ORALLY DISINTEGRATING ORAL at 06:53

## 2023-08-06 RX ADMIN — Medication 200 MILLIGRAM(S): at 02:53

## 2023-08-06 RX ADMIN — HEPARIN SODIUM 5000 UNIT(S): 5000 INJECTION INTRAVENOUS; SUBCUTANEOUS at 18:02

## 2023-08-06 RX ADMIN — Medication 10 MILLIGRAM(S): at 04:29

## 2023-08-06 RX ADMIN — ATORVASTATIN CALCIUM 80 MILLIGRAM(S): 80 TABLET, FILM COATED ORAL at 21:04

## 2023-08-06 NOTE — PROGRESS NOTE ADULT - PROBLEM SELECTOR PLAN 1
Patient presents with dizziness, lightheadedness, fatigue likely 2/2 vertigo  - patient recently admitted to [facility] for vertigo in 6/23  - prior MRI showing minor lacunar infarcts and no significant stenosis on CTA in June during last admission  - TTE in 6/23: Technical difficult, technical limited study, mild left ventricular hypertrophy with normal left ventricular   systolic function, aortic sclerosis, trace to mild aortic regurgitation, Mild tricuspid regurgitation.  - CT head: No acute intracranial injury. Mild microvascular disease. Left internal capsule/caudate and left external capsule chronic lacunar infarcts.  - In ED given meclizine 25mg x1, Reglan 10mg IVP x1, NS Bolus 1 L x1  - PRN meclizine ordered  - Check orthostatic vitals- positive on 8/5 (-->125 from laying to sitting), will check repeat  - Check TSH, A1c, lipid profile, monitor electrolytes  - Telemetry monitoring to r/o arrhythmia  - Fall precautions  - PT evaluation  - Neurology (Dr. Delgado) consulted, f/u recs  - Cardiology (Andrea Silva) consulted, f/u recs

## 2023-08-06 NOTE — H&P ADULT - NSHPPHYSICALEXAM_GEN_ALL_CORE
T(C): 36.6 (08-06-23 @ 06:15), Max: 36.7 (08-05-23 @ 15:28)  HR: 61 (08-06-23 @ 06:15) (60 - 70)  BP: 121/58 (08-06-23 @ 06:15) (121/58 - 132/73)  RR: 16 (08-06-23 @ 06:15) (14 - 18)  SpO2: 95% (08-06-23 @ 06:15) (92% - 96%)    GENERAL: no acute distress, appropriate  EYES: sclera clear, no exudates  ENMT: +dry mucus membranes, oropharynx clear without erythema, no exudates, conjunctiva and sclera clear, PERRLA, EOMI  NECK: supple, soft, no thyromegaly noted  LUNGS: good air entry bilaterally, clear to auscultation, symmetric breath sounds, no wheezing, rhonchi, or rales appreciated  HEART: soft S1/S2, regular rate and rhythm, no murmurs noted, no lower extremity edema  GASTROINTESTINAL: abdomen is soft, nontender, nondistended, normoactive bowel sounds, no palpable masses. No guarding, rebound or rigidity  VASCULAR: + DP pulses present, no varicose veins  INTEGUMENT: warm and dry, color normal, good skin turgor, no lesions noted  MUSCULOSKELETAL: no clubbing or cyanosis, no obvious deformity  NEUROLOGIC: AAOx3, CN 2-12 intact, good muscle tone in 4 extremities, motor Strength 4/5 in UE & LE B/L, no obvious sensory deficits  PSYCHIATRIC: mood is good, affect is congruent, linear and logical thought process  HEME/LYMPH: no obvious ecchymosis or petechiae

## 2023-08-06 NOTE — PROGRESS NOTE ADULT - SUBJECTIVE AND OBJECTIVE BOX
INTERVAL HPI/OVERNIGHT EVENTS:    MEDICATIONS  (STANDING):  atorvastatin 80 milliGRAM(s) Oral at bedtime  citalopram 20 milliGRAM(s) Oral daily  heparin   Injectable 5000 Unit(s) SubCutaneous every 12 hours  lamoTRIgine 200 milliGRAM(s) Oral daily  levothyroxine 25 MICROGram(s) Oral daily  sodium chloride 0.9%. 1000 milliLiter(s) (50 mL/Hr) IV Continuous <Continuous>    MEDICATIONS  (PRN):  acetaminophen     Tablet .. 650 milliGRAM(s) Oral every 6 hours PRN Temp greater or equal to 38C (100.4F), Mild Pain (1 - 3)  meclizine 25 milliGRAM(s) Oral every 8 hours PRN Dizziness  melatonin 3 milliGRAM(s) Oral at bedtime PRN Insomnia      Allergies    cephalexin (Unknown)  sulfa drugs (Unknown)  cephalexin (Hives)    Intolerances    No seafood (Unknown)      CONSTITUTIONAL: No weakness, fevers or chills  EYES/ENT: No visual changes;  No vertigo or throat pain   NECK: No pain or stiffness  RESPIRATORY: No cough, wheezing, hemoptysis; No shortness of breath  CARDIOVASCULAR: No chest pain or palpitations  GASTROINTESTINAL: No abdominal or epigastric pain. No nausea, vomiting, or hematemesis; No diarrhea or constipation. No melena or hematochezia.  GENITOURINARY: No dysuria, frequency or hematuria  NEUROLOGICAL: No numbness or weakness  SKIN: No itching, burning, rashes, or lesions   All other review of systems is negative unless indicated above.    Vital Signs Last 24 Hrs  T(C): 36.9 (06 Aug 2023 12:03), Max: 36.9 (06 Aug 2023 12:03)  T(F): 98.5 (06 Aug 2023 12:03), Max: 98.5 (06 Aug 2023 12:03)  HR: 65 (06 Aug 2023 12:03) (60 - 70)  BP: 106/56 (06 Aug 2023 12:03) (106/56 - 162/73)  BP(mean): --  RR: 17 (06 Aug 2023 12:03) (14 - 18)  SpO2: 95% (06 Aug 2023 12:03) (92% - 96%)    Parameters below as of 06 Aug 2023 12:03  Patient On (Oxygen Delivery Method): room air          General: WN/WD NAD  Neurology: A&Ox3, nonfocal, STRANGE x 4  Respiratory: CTA B/L  CV: RRR, S1S2, no murmurs, rubs or gallops  Abdominal: Soft, NT, ND +BS, Last BM  Extremities: No edema, + peripheral pulses      LABS:                        10.5   6.83  )-----------( 237      ( 06 Aug 2023 06:48 )             32.3     08-06    135  |  105  |  18  ----------------------------<  146<H>  4.6   |  25  |  1.10    Ca    7.9<L>      06 Aug 2023 06:48  Phos  3.0     08-05  Mg     2.3     08-05    TPro  6.1  /  Alb  3.2<L>  /  TBili  0.4  /  DBili  x   /  AST  11<L>  /  ALT  13  /  AlkPhos  52  08-06      Urinalysis Basic - ( 06 Aug 2023 06:48 )    Color: x / Appearance: x / SG: x / pH: x  Gluc: 146 mg/dL / Ketone: x  / Bili: x / Urobili: x   Blood: x / Protein: x / Nitrite: x   Leuk Esterase: x / RBC: x / WBC x   Sq Epi: x / Non Sq Epi: x / Bacteria: x        RADIOLOGY & ADDITIONAL TESTS: INTERVAL HPI/OVERNIGHT EVENTS: Patient seen and examined at bedside. States she is feeling a bit better, still with some dizziness. Denies any chest pain, SOB, abd pain.     ROS: All other review of systems is negative unless indicated above.    MEDICATIONS  (STANDING):  atorvastatin 80 milliGRAM(s) Oral at bedtime  citalopram 20 milliGRAM(s) Oral daily  heparin   Injectable 5000 Unit(s) SubCutaneous every 12 hours  lamoTRIgine 200 milliGRAM(s) Oral daily  levothyroxine 25 MICROGram(s) Oral daily  sodium chloride 0.9%. 1000 milliLiter(s) (50 mL/Hr) IV Continuous <Continuous>    MEDICATIONS  (PRN):  acetaminophen     Tablet .. 650 milliGRAM(s) Oral every 6 hours PRN Temp greater or equal to 38C (100.4F), Mild Pain (1 - 3)  meclizine 25 milliGRAM(s) Oral every 8 hours PRN Dizziness  melatonin 3 milliGRAM(s) Oral at bedtime PRN Insomnia      Allergies    cephalexin (Unknown)  sulfa drugs (Unknown)  cephalexin (Hives)    Intolerances    No seafood (Unknown)          Vital Signs Last 24 Hrs  T(C): 36.9 (06 Aug 2023 12:03), Max: 36.9 (06 Aug 2023 12:03)  T(F): 98.5 (06 Aug 2023 12:03), Max: 98.5 (06 Aug 2023 12:03)  HR: 65 (06 Aug 2023 12:03) (60 - 70)  BP: 106/56 (06 Aug 2023 12:03) (106/56 - 162/73)  BP(mean): --  RR: 17 (06 Aug 2023 12:03) (14 - 18)  SpO2: 95% (06 Aug 2023 12:03) (92% - 96%)    Parameters below as of 06 Aug 2023 12:03  Patient On (Oxygen Delivery Method): room air        Physical Exam:  General: laying in bed comfortably, NAD  Neurology: A&Ox3, nonfocal, STRANGE x 4  Respiratory: CTA B/L  CV: RRR, S1S2, +murmur  Abdominal: Soft, NT, ND +BS  Extremities: No edema, + peripheral pulses      LABS:                        10.5   6.83  )-----------( 237      ( 06 Aug 2023 06:48 )             32.3     08-06    135  |  105  |  18  ----------------------------<  146<H>  4.6   |  25  |  1.10    Ca    7.9<L>      06 Aug 2023 06:48  Phos  3.0     08-05  Mg     2.3     08-05    TPro  6.1  /  Alb  3.2<L>  /  TBili  0.4  /  DBili  x   /  AST  11<L>  /  ALT  13  /  AlkPhos  52  08-06      Urinalysis Basic - ( 06 Aug 2023 06:48 )    Color: x / Appearance: x / SG: x / pH: x  Gluc: 146 mg/dL / Ketone: x  / Bili: x / Urobili: x   Blood: x / Protein: x / Nitrite: x   Leuk Esterase: x / RBC: x / WBC x   Sq Epi: x / Non Sq Epi: x / Bacteria: x        RADIOLOGY & ADDITIONAL TESTS:

## 2023-08-06 NOTE — H&P ADULT - NSHPSOCIALHISTORY_GEN_ALL_CORE
Tobacco: denies  EtOH: denies  Recreational drug use: denies  Lives with: patient lives alone at home and uses home health Aide daily  Ambulates: with walker  ADLs: requires assistance from home health aide daily

## 2023-08-06 NOTE — H&P ADULT - ASSESSMENT
82 yo female with hx of subdural hematoma, CVA, depression, hypothyroidism, ?vertigo, COVID 19 pneumonia (2020), recurrent UTIs presents for intermittent dizziness. Admitted for dizziness.

## 2023-08-06 NOTE — H&P ADULT - HISTORY OF PRESENT ILLNESS
84 yo female with hx of subdural hematoma, CVA, depression, hypothyroidism, ?vertigo, COVID 19 pneumonia (2020), recurrent UTIs presents for intermittent dizziness. Patient notes symptoms recurred yest morning with no associated chest pain, SOB, headache, numbness, tingling, focal weakness or ataxia. Denies LOC or head trauma. She reports poor PO intake and 'dehydration' secondary to dizziness. Also endorsing multiple falls in the last few months. No nausea or vomiting. Denies dysuria, polyuria, hematuria at this time. Pt is a poor historian. As of note, patient was hospitalized at [facility] for similar symptoms in June '23 with MRI showing minor lacunar infarcts and no significant stenosis on CTA. She is not following with neurologist. As per patient she lives alone with HHA and at baseline ambulates with walker.      ED course:  Vitals: BP: 121/58, HR: 71, Temp: 97.9, RR: 16, SpO2: 95% on RA  Labs significant for: Na 133, eGFR 45  UA: Large Leuk esterase, Nitrites neg, few bacteria  EKG: NSR, LBBB, 62 BPM, QTc 503  In ED given Cipro 400mg x1, meclizine 25mg x1, Reglan 10mg IVP x1, NS Bolus 1 L x1    Imaging:  CT head:  No acute intracranial injury. Mild microvascular disease. Left internal capsule/caudate and left external capsule chronic lacunar infarcts.

## 2023-08-06 NOTE — CONSULT NOTE ADULT - CONSULT REQUESTED DATE/TIME
Virtual Visit: Established Patient   This visit was conducted via Zoom using secure and encrypted videoconferencing technology. The patient was in a private location in the state of [place].    The patient's identity was confirmed and verbal consent was obtained for this virtual visit.    Subjective:   CC:   Chief Complaint   Patient presents with   • Jaw Pain     (L) side only, worse with eating, locking up feeling       Tamara Benton is a 65 y.o. female presenting for evaluation and management of:    Jaw pain  New problem.   Having aching pain in the right jaw. Mostly the masseter.  Worst with chewing.   Has been going on for the past few weeks, eating worse, now having a hard time eating due to the pain with chewing.  No tenderness palpation.  She is on Fosamax.  Denies any tooth pain.  Has not been seen by the dentist anytime recently.      ROS   Denies any recent fevers or chills. No nausea or vomiting. No chest pains or shortness of breath.     Allergies   Allergen Reactions   • Amoxicillin Rash     Per patient       Current medicines (including changes today)  Current Outpatient Medications   Medication Sig Dispense Refill   • alendronate (FOSAMAX) 70 MG Tab Take 1 Tab by mouth every 7 days. 12 Tab 3   • amLODIPine (NORVASC) 5 MG Tab Take 1 Tab by mouth every day. 90 Tab 3   • omeprazole (PRILOSEC) 20 MG delayed-release capsule TAKE 1 CAPSULE BY MOUTH TWICE A DAY 90 Cap 0     No current facility-administered medications for this visit.        Patient Active Problem List    Diagnosis Date Noted   • Jaw pain 12/01/2020   • Acute pain of left shoulder 08/21/2020   • Health care maintenance 07/31/2020   • Rib pain 07/31/2020   • Epigastric pain 07/31/2020   • Illness 02/21/2020   • Right hip pain 11/22/2019   • Dry cough 11/22/2019   • Leukopenia 12/01/2016   • Age-related osteoporosis without current pathological fracture 11/16/2015   • Dyslipidemia 11/16/2015   • Migraines 08/04/2010   • Environmental allergies  "08/04/2010   • Seasonal allergies 08/04/2010   • Essential hypertension        Family History   Problem Relation Age of Onset   • Hypertension Mother    • Cancer Father         prostate, kidney   • Cancer Maternal Aunt         ovarian       She  has a past medical history of Environmental allergies, HTN (hypertension), Hypertension, and Migraine.  She  has a past surgical history that includes primary c section.       Objective:   Temp 37 °C (98.6 °F) (Temporal)   Ht 1.562 m (5' 1.5\")   Wt 49.9 kg (110 lb)   BMI 20.45 kg/m²     Physical Exam:  Constitutional: Alert, no distress, well-groomed.  Skin: No rashes in visible areas.  Eye: Round. Conjunctiva clear, lids normal. No icterus.   ENMT: Lips pink without lesions, good dentition, moist mucous membranes. Phonation normal.  Neck: No masses, no thyromegaly. Moves freely without pain.  Respiratory: Unlabored respiratory effort, no cough or audible wheeze  Psych: Alert and oriented x3, normal affect and mood.       Assessment and Plan:   The following treatment plan was discussed:     1. Jaw pain  New problem.  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  Advised this is rather common right now due to increased stress and clenching or even grinding of the teeth at night.  I advised that she talk with her dentist to have her teeth checked.  In the meantime, advised to do twice daily massages to the masseter and temporalis muscles as well as soft foods for the next week.  If no improvement, we could stop the Fosamax to ensure that that is not what is causing the issue.  We will plan to follow-up in around 2 weeks if no improvement.      Follow-up: Return in about 2 weeks (around 12/15/2020).           " 06-Aug-2023 07:57

## 2023-08-06 NOTE — CONSULT NOTE ADULT - SUBJECTIVE AND OBJECTIVE BOX
CARDIOLOGY CONSULT NOTE    Patient is a 83y Female with a known history of :  Dizziness [R42]    Depression [F32.9]    Hypothyroidism [E03.9]    CVA (cerebrovascular accident) [I63.9]    Need for prophylactic measure [Z29.9]    Hyponatremia [E87.1]    Abnormal urinalysis [R82.90]      HPI:  84 yo female with hx of subdural hematoma, CVA, depression, hypothyroidism, ?vertigo, COVID 19 pneumonia (2020), recurrent UTIs presents for intermittent dizziness. Patient notes symptoms recurred yest morning with no associated chest pain, SOB, headache, numbness, tingling, focal weakness or ataxia. Denies LOC or head trauma. She reports poor PO intake and 'dehydration' secondary to dizziness. Also endorsing multiple falls in the last few months. No nausea or vomiting. Denies dysuria, polyuria, hematuria at this time. Pt is a poor historian. As of note, patient was hospitalized at [facility] for similar symptoms in June '23 with MRI showing minor lacunar infarcts and no significant stenosis on CTA. She is not following with neurologist. As per patient she lives alone with HHA and at baseline ambulates with walker.      ED course:  Vitals: BP: 121/58, HR: 71, Temp: 97.9, RR: 16, SpO2: 95% on RA  Labs significant for: Na 133, eGFR 45  UA: Large Leuk esterase, Nitrites neg, few bacteria  EKG: NSR, LBBB, 62 BPM, QTc 503  In ED given Cipro 400mg x1, meclizine 25mg x1, Reglan 10mg IVP x1, NS Bolus 1 L x1    Imaging:  CT head:  No acute intracranial injury. Mild microvascular disease. Left internal capsule/caudate and left external capsule chronic lacunar infarcts. (06 Aug 2023 06:08)      REVIEW OF SYSTEMS:    CONSTITUTIONAL: No fever, weight loss, or fatigue  EYES: No eye pain, visual disturbances, or discharge  ENMT:  No difficulty hearing, tinnitus, vertigo; No sinus or throat pain  NECK: No pain or stiffness  BREASTS: No pain, masses, or nipple discharge  RESPIRATORY: No cough, wheezing, chills or hemoptysis; No shortness of breath  CARDIOVASCULAR: No chest pain, palpitations, dizziness, or leg swelling  GASTROINTESTINAL: No abdominal or epigastric pain. No nausea, vomiting, or hematemesis; No diarrhea or constipation. No melena or hematochezia.  GENITOURINARY: No dysuria, frequency, hematuria, or incontinence  NEUROLOGICAL: No headaches, memory loss, loss of strength, numbness, or tremors  SKIN: No itching, burning, rashes, or lesions   LYMPH NODES: No enlarged glands  ENDOCRINE: No heat or cold intolerance; No hair loss  MUSCULOSKELETAL: No joint pain or swelling; No muscle, back, or extremity pain  PSYCHIATRIC: No depression, anxiety, mood swings, or difficulty sleeping  HEME/LYMPH: No easy bruising, or bleeding gums  ALLERGY AND IMMUNOLOGIC: No hives or eczema    MEDICATIONS  (STANDING):  atorvastatin 80 milliGRAM(s) Oral at bedtime  citalopram 20 milliGRAM(s) Oral daily  heparin   Injectable 5000 Unit(s) SubCutaneous every 12 hours  lamoTRIgine 200 milliGRAM(s) Oral daily  levothyroxine 25 MICROGram(s) Oral daily  sodium chloride 0.9%. 1000 milliLiter(s) (50 mL/Hr) IV Continuous <Continuous>    MEDICATIONS  (PRN):  acetaminophen     Tablet .. 650 milliGRAM(s) Oral every 6 hours PRN Temp greater or equal to 38C (100.4F), Mild Pain (1 - 3)  meclizine 25 milliGRAM(s) Oral every 8 hours PRN Dizziness  melatonin 3 milliGRAM(s) Oral at bedtime PRN Insomnia      ALLERGIES: cephalexin (Unknown)  sulfa drugs (Unknown)  cephalexin (Hives)      FAMILY HISTORY:  No pertinent family history in first degree relatives        Social History:  Alochol:   Smoking:   Drug Use:   Marital Status:     I&O's Detail      PHYSICAL EXAMINATION:  -----------------------------  T(C): 36.6 (08-06-23 @ 06:15), Max: 36.7 (08-05-23 @ 15:28)  HR: 61 (08-06-23 @ 06:15) (60 - 70)  BP: 121/58 (08-06-23 @ 06:15) (121/58 - 132/73)  RR: 16 (08-06-23 @ 06:15) (14 - 18)  SpO2: 95% (08-06-23 @ 06:15) (92% - 96%)  Wt(kg): --    Height (cm): 154.9 (08-05 @ 15:28)  Weight (kg): 52.2 (08-05 @ 15:28)  BMI (kg/m2): 21.8 (08-05 @ 15:28)  BSA (m2): 1.49 (08-05 @ 15:28)    Constitutional: well developed, normal appearance, well groomed, well nourished, no deformities and no acute distress.   Eyes: the conjunctiva exhibited no abnormalities and the eyelids demonstrated no xanthelasmas.   HEENT: normal oral mucosa, no oral pallor and no oral cyanosis.   Neck: normal jugular venous A waves present, normal jugular venous V waves present and no jugular venous nava A waves.   Pulmonary: no respiratory distress, normal respiratory rhythm and effort, no accessory muscle use and lungs were clear to auscultation bilaterally.   Cardiovascular: heart rate and rhythm were normal, normal S1 and S2 and no murmur, gallop, rub, heave or thrill are present.   Musculoskeletal: the gait could not be assessed.   Extremities: no clubbing of the fingernails, no localized cyanosis, no petechial hemorrhages and no ischemic changes.   Skin: normal skin color and pigmentation, no rash, no venous stasis, no skin lesions, no skin ulcer and no xanthoma was observed.       LABS:   --------  08-06    135  |  105  |  18  ----------------------------<  146<H>  4.6   |  25  |  1.10    Ca    7.9<L>      06 Aug 2023 06:48  Phos  3.0     08-05  Mg     2.3     08-05    TPro  6.1  /  Alb  3.2<L>  /  TBili  0.4  /  DBili  x   /  AST  11<L>  /  ALT  13  /  AlkPhos  52  08-06                         10.5   6.83  )-----------( 237      ( 06 Aug 2023 06:48 )             32.3                   RADIOLOGY:  -----------------    < from: US Transthoracic Echocardiogram w/Doppler Complete (06.05.23 @ 09:54) >    ACC: [id number] EXAM:  US TTE W DOPPLER COMPLETE   ORDERED BY: OKSANA COYNE     PROCEDURE DATE:  06/05/2023          INTERPRETATION:  Indication: Dizziness    Technician: Mary Crow    Study Quality: Technical difficult, technical limited study  Height 5 feet 3 inches, weight 150 pounds, blood pressure 102/50 mmHg    Measurements: Aortic root size 2.4 cm, left atrial size 2.6 cm, right   atrial size 3.9 cm, right ventricular size 1.7 cm, left ventricular   end-diastolic diameter 2.9 cm, left ventricular end-systolic diameter 1.8   cm, septal wall thickness 1.1 cm, posterior thickness 1.1 cm, aortic   velocity 1.8 m/s, mitral E velocity 0.6 cm, ejection fraction 60-65%.    Mitral Valve: Mild mitral annular calcification thickened mitral valve   with mild mitral regurgitation .suboptimal color Doppler  Aortic Valve: Fibro-calcific aortic valve changes with the normal cusp   excursion with the mild aortic regurgitation  Left Atrium: Normal size  Left Ventricle: Normal size, mild left ventricular hypertrophy with the   normal left ventricular systolic function, indeterminate diastolic   function, suboptimal Doppler  Pericardium: No pericardial effusion  Tricuspid Valve: Not well-visualized but appears to be normal with mild   tricuspid regurgitation with estimated right ventricular systolic   pressure 34 mmHg. IVC size 1.3, less than 50% respiratory variation  Pulmonic Valve: Not well-visualized  Right Ventricle: Normal size with normal right ventricular systolic   function  RightAtrium: Normal size    Interatrial septal aneurysm without shunt on color Doppler    IMPRESSION:  1. Technical difficult, technical limited study  2. mild left ventricular hypertrophy with normal left ventricular   systolic function  3. aortic sclerosis, trace to mild aortic regurgitation.  4. Mild tricuspid regurgitation.    --- End of Report ---            [facility]  This document has been electronically signed. Jun 5 2023  3:51PM    < end of copied text >      ECG: NSR, LBBB-similar to 2/20/21

## 2023-08-06 NOTE — CONSULT NOTE ADULT - ASSESSMENT
82y/o seen at [facility] ER. Fair historian  History thyroid disease, vertigo, depression, sub-dural hematoma, s/p COVID-19    Seen for intermittent dizziness while standing yesterday and poor PO intake  No other associated symptoms  Presently feels well lying flat  Has had multiple falls in the last few months  6/23 was admitted to [facility] with similar complaints  Head CAT scan-unchanged with chronic lacunar infarcts  6/6/23 head MRI with small subdural hygroma    Impression  Recurrent dizziness with some degree of dehydration, possibly vertigo as above    Plan:  - Continue Atorvastatin-80mg HS  - ASA-81mg OD  - 6/5/23 TLS Echocardiogram with normal LV function-see above  - Increase nutrition  - Keep well hydrated  - Neurology consult

## 2023-08-06 NOTE — H&P ADULT - NSHPREVIEWOFSYSTEMS_GEN_ALL_CORE
CONSTITUTIONAL: Admits dizziness, lightheadedness, fatigue, and weakness. Denies fever, chills, diaphoresis  HEENT: denies blurred vision, sore throat, cough  SKIN: denies new lesions, rash, hives, itching  CARDIOVASCULAR: denies chest pain, chest pressure, palpitations  RESPIRATORY: denies shortness of breath, HAHN, wheezing, sputum production  GASTROINTESTINAL: denies nausea, vomiting, diarrhea, constipation, abdominal pain, hematochezia, melena  GENITOURINARY: denies dysuria, polyuria, hematuria, discharge  NEUROLOGICAL: denies syncope, LOC, numbness, headache, focal weakness  MUSCULOSKELETAL: denies new joint pain, joint swelling, muscle aches  HEMATOLOGIC: denies gross bleeding, bruising  LYMPHATICS: denies enlarged lymph nodes, extremity swelling  PSYCHIATRIC: denies recent changes in anxiety, depression  ENDOCRINOLOGIC: denies sweating, cold or heat intolerance

## 2023-08-06 NOTE — H&P ADULT - ATTENDING COMMENTS
82 yo female with hx of subdural hematoma, CVA, depression, hypothyroidism, ?vertigo, COVID 19 pneumonia (2020), recurrent UTIs presents for intermittent dizziness, seems to have worsened yesterday, to the point that she has been having difficulty moving around.  She denies LOC or head trauma. She reports poor PO intake and 'dehydration' secondary to dizziness. Also endorsing multiple falls in the last few months. No nausea or vomiting. Denies dysuria, polyuria, hematuria at this time. Pt is a poor historian. As of note, patient was hospitalized at [facility] for similar symptoms in June '23 with MRI showing minor lacunar infarcts and no significant stenosis on CTA. She is not following with neurologist. As per patient she lives alone with HHA and at baseline ambulates with walker.  CT head tonight showed  no acute intracranial injury. Mild microvascular disease. Left internal capsule/caudate and left external capsule chronic lacunar infarcts.  EKG: NSR, LBBB, 62 BPM, QTc 503  UA: Large Leuk esterase, Nitrites neg, few bacteria  In ED given Cipro 400mg x1, meclizine 25mg x1, Reglan 10mg IVP x1, NS Bolus 1 L x1  A/P: Dizziness, lightheadedness - pt with hx of CVA  Mild hyponatremia, mild dehydration 82 yo female with hx of subdural hematoma, CVA, depression, hypothyroidism, ?vertigo, COVID 19 pneumonia (2020), recurrent UTIs presents for intermittent dizziness, seems to have worsened yesterday, to the point that she has been having difficulty moving around.  She denies LOC or head trauma. She reports poor PO intake and 'dehydration' secondary to dizziness. Also endorsing multiple falls in the last few months. No nausea or vomiting. Denies dysuria, polyuria, hematuria at this time. Pt is a poor historian. As of note, patient was hospitalized at [facility] for similar symptoms in June '23 with MRI showing minor lacunar infarcts and no significant stenosis on CTA. She is not following with neurologist. As per patient she lives alone with HHA and at baseline ambulates with walker.  CT head tonight showed  no acute intracranial injury. Mild microvascular disease. Left internal capsule/caudate and left external capsule chronic lacunar infarcts.  EKG: NSR, LBBB, 62 BPM, QTc 503  UA: Large Leuk esterase, Nitrites neg, few bacteria  In ED given Cipro 400mg x1, meclizine 25mg x1, Reglan 10mg IVP x1, NS Bolus 1 L x1  A/P: Dizziness, lightheadedness - pt with hx of CVA  Mild hyponatremia, mild dehydration  ?UTI  admit- telemetry  ASA 81 mg/day  Cont Statin  Meclizine prn  Neuro consult  IV hydration  PT eval  FFup labs

## 2023-08-06 NOTE — H&P ADULT - PROBLEM SELECTOR PLAN 1
Patient presents with dizziness, lightheadeness, fatigue likely 2/2 vertigo  - patient recently admitted to [facility] for vertigo in 6/23  - prior MRI showing minor lacunar infarcts and no significant stenosis on CTA in June during last admission  - TTE in 6/23: Technical difficult, technical limited study, mild left ventricular hypertrophy with normal left ventricular   systolic function, aortic sclerosis, trace to mild aortic regurgitation, Mild tricuspid regurgitation.  - CT head: No acute intracranial injury. Mild microvascular disease. Left internal capsule/caudate and left external capsule chronic lacunar infarcts.  - Admit to telemetry   - In ED given meclizine 25mg x1, Reglan 10mg IVP x1, NS Bolus 1 L x1  - PRN meclizine ordered  - Check orthostatic vitals  - Check TSH, A1c, lipid profile, monitor electrolytes  - Telemetry monitoring to r/o arrhythmia  - Fall precautions  - PT evaluation  - Neurology (Dr. Delgado) consulted, f/u recs  - Cardiology (Andrea Silva) consulted, f/u recs

## 2023-08-06 NOTE — PROGRESS NOTE ADULT - PROBLEM SELECTOR PLAN 2
UA + for leuk esterase, otherwise unremarkable   - s/p cipro 400mg x1 in ED  - pt asx, low suspicion for UTI at this time  - monitor off abx for now  - f/u urine cx

## 2023-08-06 NOTE — H&P ADULT - PROBLEM SELECTOR PLAN 2
UA + for leuk esterase, otherwise unremarkable   - s/p cipro 400mg x1 in ED  - pt asx, low suspicion for UTI at this time  - monitor of abx for now  - f/u urine cx

## 2023-08-06 NOTE — H&P ADULT - PROBLEM SELECTOR PLAN 3
Na 133 on admission likely 2/2 to dehydration  - s/p 1L bolus in ED  - NS @ 50cc/hr for 12 hours  - f/u BMP

## 2023-08-07 ENCOUNTER — TRANSCRIPTION ENCOUNTER (OUTPATIENT)
Age: 83
End: 2023-08-07

## 2023-08-07 VITALS
OXYGEN SATURATION: 94 % | DIASTOLIC BLOOD PRESSURE: 63 MMHG | SYSTOLIC BLOOD PRESSURE: 97 MMHG | HEART RATE: 63 BPM | RESPIRATION RATE: 16 BRPM | TEMPERATURE: 98 F

## 2023-08-07 LAB
ANION GAP SERPL CALC-SCNC: 6 MMOL/L — SIGNIFICANT CHANGE UP (ref 5–17)
BUN SERPL-MCNC: 20 MG/DL — SIGNIFICANT CHANGE UP (ref 7–23)
CALCIUM SERPL-MCNC: 9.3 MG/DL — SIGNIFICANT CHANGE UP (ref 8.5–10.1)
CHLORIDE SERPL-SCNC: 106 MMOL/L — SIGNIFICANT CHANGE UP (ref 96–108)
CO2 SERPL-SCNC: 24 MMOL/L — SIGNIFICANT CHANGE UP (ref 22–31)
CREAT SERPL-MCNC: 1.2 MG/DL — SIGNIFICANT CHANGE UP (ref 0.5–1.3)
EGFR: 45 ML/MIN/1.73M2 — LOW
GLUCOSE SERPL-MCNC: 105 MG/DL — HIGH (ref 70–99)
HCT VFR BLD CALC: 35.4 % — SIGNIFICANT CHANGE UP (ref 34.5–45)
HGB BLD-MCNC: 11.8 G/DL — SIGNIFICANT CHANGE UP (ref 11.5–15.5)
MCHC RBC-ENTMCNC: 31.1 PG — SIGNIFICANT CHANGE UP (ref 27–34)
MCHC RBC-ENTMCNC: 33.3 GM/DL — SIGNIFICANT CHANGE UP (ref 32–36)
MCV RBC AUTO: 93.4 FL — SIGNIFICANT CHANGE UP (ref 80–100)
NRBC # BLD: 0 /100 WBCS — SIGNIFICANT CHANGE UP (ref 0–0)
PLATELET # BLD AUTO: 260 K/UL — SIGNIFICANT CHANGE UP (ref 150–400)
POTASSIUM SERPL-MCNC: 3.9 MMOL/L — SIGNIFICANT CHANGE UP (ref 3.5–5.3)
POTASSIUM SERPL-SCNC: 3.9 MMOL/L — SIGNIFICANT CHANGE UP (ref 3.5–5.3)
RBC # BLD: 3.79 M/UL — LOW (ref 3.8–5.2)
RBC # FLD: 12.8 % — SIGNIFICANT CHANGE UP (ref 10.3–14.5)
SODIUM SERPL-SCNC: 136 MMOL/L — SIGNIFICANT CHANGE UP (ref 135–145)
WBC # BLD: 8.18 K/UL — SIGNIFICANT CHANGE UP (ref 3.8–10.5)
WBC # FLD AUTO: 8.18 K/UL — SIGNIFICANT CHANGE UP (ref 3.8–10.5)

## 2023-08-07 PROCEDURE — 84100 ASSAY OF PHOSPHORUS: CPT

## 2023-08-07 PROCEDURE — 36415 COLL VENOUS BLD VENIPUNCTURE: CPT

## 2023-08-07 PROCEDURE — 85025 COMPLETE CBC W/AUTO DIFF WBC: CPT

## 2023-08-07 PROCEDURE — 80053 COMPREHEN METABOLIC PANEL: CPT

## 2023-08-07 PROCEDURE — 84484 ASSAY OF TROPONIN QUANT: CPT

## 2023-08-07 PROCEDURE — 83036 HEMOGLOBIN GLYCOSYLATED A1C: CPT

## 2023-08-07 PROCEDURE — 87086 URINE CULTURE/COLONY COUNT: CPT

## 2023-08-07 PROCEDURE — 97161 PT EVAL LOW COMPLEX 20 MIN: CPT

## 2023-08-07 PROCEDURE — 81001 URINALYSIS AUTO W/SCOPE: CPT

## 2023-08-07 PROCEDURE — 96374 THER/PROPH/DIAG INJ IV PUSH: CPT

## 2023-08-07 PROCEDURE — 83735 ASSAY OF MAGNESIUM: CPT

## 2023-08-07 PROCEDURE — 99285 EMERGENCY DEPT VISIT HI MDM: CPT | Mod: 25

## 2023-08-07 PROCEDURE — 85027 COMPLETE CBC AUTOMATED: CPT

## 2023-08-07 PROCEDURE — 93005 ELECTROCARDIOGRAM TRACING: CPT

## 2023-08-07 PROCEDURE — 70450 CT HEAD/BRAIN W/O DYE: CPT | Mod: MA

## 2023-08-07 PROCEDURE — 80048 BASIC METABOLIC PNL TOTAL CA: CPT

## 2023-08-07 PROCEDURE — 80061 LIPID PANEL: CPT

## 2023-08-07 RX ORDER — MECLIZINE HCL 12.5 MG
1 TABLET ORAL
Qty: 90 | Refills: 1
Start: 2023-08-07 | End: 2023-10-05

## 2023-08-07 RX ORDER — ALPRAZOLAM 0.25 MG
0.25 TABLET ORAL ONCE
Refills: 0 | Status: DISCONTINUED | OUTPATIENT
Start: 2023-08-07 | End: 2023-08-07

## 2023-08-07 RX ADMIN — CITALOPRAM 20 MILLIGRAM(S): 10 TABLET, FILM COATED ORAL at 14:07

## 2023-08-07 RX ADMIN — Medication 0.25 MILLIGRAM(S): at 01:14

## 2023-08-07 RX ADMIN — HEPARIN SODIUM 5000 UNIT(S): 5000 INJECTION INTRAVENOUS; SUBCUTANEOUS at 06:40

## 2023-08-07 RX ADMIN — LAMOTRIGINE 200 MILLIGRAM(S): 25 TABLET, ORALLY DISINTEGRATING ORAL at 14:08

## 2023-08-07 RX ADMIN — Medication 3 MILLIGRAM(S): at 01:14

## 2023-08-07 RX ADMIN — Medication 25 MICROGRAM(S): at 06:39

## 2023-08-07 NOTE — DISCHARGE NOTE PROVIDER - NSDCCPCAREPLAN_GEN_ALL_CORE_FT
PRINCIPAL DISCHARGE DIAGNOSIS  Diagnosis: Dizziness  Assessment and Plan of Treatment:       SECONDARY DISCHARGE DIAGNOSES  Diagnosis: Urinary tract infection  Assessment and Plan of Treatment:     Diagnosis: Frequent falls  Assessment and Plan of Treatment:     Diagnosis: Dehydration  Assessment and Plan of Treatment:     Diagnosis: Vertigo  Assessment and Plan of Treatment:

## 2023-08-07 NOTE — ED ADULT NURSE REASSESSMENT NOTE - NS ED NURSE REASSESS COMMENT FT1
Patient external catheter leaked, linen changes, patient repositioned in bed, new external catheter applied by EDtech.
Pt received A+O x 2/3 from LORENZO Ngo. Disoriented/forgetful to date/time. Repetitive questions. VSS on room air. Urin collected and sent to lab. Meal and PO fluids given to patient. Meclizine administered to patient for dizziness. will continue to monitor.
Pt received from LORENZO chawla. Pt is refusing vitals and is agitated. Pt will not answer any questions at this time. will continue to monitor.
Assumed patient care, patient AOx2, person and place, per report this is patient's baseline. Patient repositioned in bed. Patient denies complaints. Patient awaiting bed on the floor.

## 2023-08-07 NOTE — PROGRESS NOTE ADULT - ASSESSMENT
The patient is an 83 year old female with a history of hypothyroidism, depression, SDH who presents with dizziness.    Plan:  - ECG with nonspecific findings  - Echo 6/23 with normal LV systolic function, no significant valve issues  - Monitor on telemetry  - CT head with old CVA  - MRI head 6/23 with old infarct  - Continue aspirin 81 mg daily  - Continue atorvastatin 80 mg daily  - Neurology follow-up
84 yo female with hx of subdural hematoma, CVA, depression, hypothyroidism, ?vertigo, COVID 19 pneumonia (2020), recurrent UTIs presents for intermittent dizziness. Admitted for dizziness.

## 2023-08-07 NOTE — DISCHARGE NOTE PROVIDER - CARE PROVIDER_API CALL
Raffaele Salcedo  Internal Medicine  [address]  [place] [address]  Phone: [phone]  Fax: [phone]  Follow Up Time:

## 2023-08-07 NOTE — PROGRESS NOTE ADULT - SUBJECTIVE AND OBJECTIVE BOX
Chief Complaint: Dizziness     Interval Events: No events overnight. Sleeping.    Review of Systems:  General: No fevers, chills, weight gain  Skin: No rashes, color changes  Cardiovascular: No chest pain, orthopnea  Respiratory: No shortness of breath, cough  Gastrointestinal: No nausea, abdominal pain  Genitourinary: No incontinence, pain with urination  Musculoskeletal: No pain, swelling, decreased range of motion  Neurological: No headache, weakness  Psychiatric: No depression, anxiety  Endocrine: No weight gain, increased thirst  All other systems are comprehensively negative.    Physical Exam:  Vitals:        Vital Signs Last 24 Hrs  T(C): 36.6 (07 Aug 2023 07:57), Max: 37.1 (07 Aug 2023 00:43)  T(F): 97.9 (07 Aug 2023 07:57), Max: 98.7 (07 Aug 2023 00:43)  HR: 63 (07 Aug 2023 07:57) (60 - 83)  BP: 108/55 (07 Aug 2023 07:57) (106/56 - 178/83)  BP(mean): --  RR: 16 (07 Aug 2023 07:57) (16 - 17)  SpO2: 95% (07 Aug 2023 07:57) (94% - 95%)  Parameters below as of 07 Aug 2023 06:27  Patient On (Oxygen Delivery Method): room air  General: NAD  HEENT: MMM  Neck: No JVD, no carotid bruit  Lungs: CTAB  CV: RRR, nl S1/S2, no M/R/G  Abdomen: S/NT/ND, +BS  Extremities: No LE edema, no cyanosis  Neuro: AAOx3, non-focal  Skin: No rash    Labs:                        11.8   8.18  )-----------( 260      ( 07 Aug 2023 05:56 )             35.4     08-07    136  |  106  |  20  ----------------------------<  105<H>  3.9   |  24  |  1.20    Ca    9.3      07 Aug 2023 05:56  Phos  3.0     08-05  Mg     2.3     08-05    TPro  6.1  /  Alb  3.2<L>  /  TBili  0.4  /  DBili  x   /  AST  11<L>  /  ALT  13  /  AlkPhos  52  08-06            ECG/Telemetry: Sinus rhythm

## 2023-08-07 NOTE — CARE COORDINATION ASSESSMENT. - NSDCPLANSERVICES_GEN_ALL_CORE
Patient is admitted for dizziness and giddiness.  Patient lives at home, has a friend who is also her aide who stays with her as needed to assist.  The friend is not available today.  Patient states she travels by taxi or her friend provides transportation. Patient is a potential DC for today.   Was seen by PT / dc plan is home no needs.  Pt to f/u with outpt MD.   Her PCP is Dr Salcedo.  Upon DC patient will need a taxi.   MD (Dr Beavers) made aware./Durable Medical Equipment/Home Care Patient is admitted for dizziness and giddiness.  was using a cane or rw to ambulate.  Pt declines any needs  at home.   Patient has a friend who is also her aide who stays with her as needed to assist.  The friend is not available today.  Patient states she travels by taxi or her friend provides transportation. She states she has keys to enter her home.   Patient is a potential DC for today.   Was seen by PT / dc plan is home no needs.  Pt to f/u with outpt MD.   Her PCP is Dr Salcedo.  Upon DC patient will need a taxi/ MD (Dr Beavers) made aware./Durable Medical Equipment/Home Care

## 2023-08-07 NOTE — PATIENT PROFILE ADULT - FALL HARM RISK - HARM RISK INTERVENTIONS
Assistance with ambulation/Assistance OOB with selected safe patient handling equipment/Communicate Risk of Fall with Harm to all staff/Discuss with provider need for PT consult/Monitor gait and stability/Provide patient with walking aids - walker, cane, crutches/Reinforce activity limits and safety measures with patient and family/Sit up slowly, dangle for a short time, stand at bedside before walking/Tailored Fall Risk Interventions/Visual Cue: Yellow wristband and red socks/Bed in lowest position, wheels locked, appropriate side rails in place/Call bell, personal items and telephone in reach/Instruct patient to call for assistance before getting out of bed or chair/Non-slip footwear when patient is out of bed/[place] to call system/Physically safe environment - no spills, clutter or unnecessary equipment/Purposeful Proactive Rounding/Room/bathroom lighting operational, light cord in reach

## 2023-08-07 NOTE — DISCHARGE NOTE PROVIDER - REASON FOR ADMISSION
dizziness Graft Donor Site Bandage (Optional-Leave Blank If You Don't Want In Note): Mastisol, Steri-Strips, and a pressure bandage were applied to the donor site.

## 2023-08-07 NOTE — DISCHARGE NOTE NURSING/CASE MANAGEMENT/SOCIAL WORK - PATIENT PORTAL LINK FT
You can access the FollowMyHealth Patient Portal offered by [facility] by registering at the following website: [email or web address]. By joining [employer]’s FollowMyHealth portal, you will also be able to view your health information using other applications (apps) compatible with our system.

## 2023-08-07 NOTE — CARE COORDINATION ASSESSMENT. - NSCAREPROVIDERS_GEN_ALL_CORE_FT
CARE PROVIDERS:  Accepting Physician: Genny Carranza  Administration: Omero Lau  Administration: Che Stout  Administration: Jerry Layne  Administration: Mirta Mayfield  Admitting: Genny Carranza  Attending: Genny Carranza  Case Management: Christine Hays  Consultant: Yasmin Peterson  Consultant: Angelina Venegas  Consultant: Andrea Peterson  ED Attending: Jerry Sosa  ED Attending2: Gerald Colon  ED Nurse: Glenis Worley  Nurse: Lisset Serrano  Nurse: Alyce Epps  Nurse: Iliana Ruvalcaba  Ordered: ADM, User  Outpatient Provider: Jude Deglado  Outpatient Provider: Nimesh Jade  Override: Dario Hsieh  Override: Marta Joya  PCA/Nursing Assistant: Jing Carcamo  PCA/Nursing Assistant: Charlie Carrillo  PCA/Nursing Assistant: Perry Orr  PCA/Nursing Assistant: Abdiel Bhakta  Physical Therapy: Seferino Coates  Primary Team: Genny Carranza  Primary Team: Rafal Jeong  Primary Team: Maddy Tirado  Primary Team: Apolonia Mcghee  : Fide Foster   CARE PROVIDERS:  Accepting Physician: Genny Carranza  Administration: Omero Lau  Administration: Jerry Layne  Administration: Mirta Mayfield  Admitting: Genny Carranza  Attending: Genny Carranza  Case Management: Christine Hays  Consultant: Yasmin Peterson  Consultant: Angelina Venegas  Consultant: Andrea Peterson  ED Attending: Jerry Sosa  ED Attending2: Gerald Colon  ED Nurse: Glenis Worley  Nurse: Alyce Epps  Nurse: Lisset Serrano  Nurse: Iliana Ruvalcaba  Ordered: ADM, User  Outpatient Provider: Jude Delgado  Outpatient Provider: Nimesh Jade  Override: Dario Hsieh  Override: Marta Joya  PCA/Nursing Assistant: Charlie Carrillo  PCA/Nursing Assistant: Jing Carcamo  PCA/Nursing Assistant: Perry Orr  PCA/Nursing Assistant: Abdiel Bhakta  Physical Therapy: Seferino Coates  Primary Team: Rafal Jeong  Primary Team: Genny Carranza  Primary Team: Maddy Tirado  Primary Team: Apolonia Mcghee  : Fide Foster  : Peri Hunter

## 2023-08-07 NOTE — DISCHARGE NOTE PROVIDER - NSDCFUSCHEDAPPT_GEN_ALL_CORE_FT
[facility]  UROGYN [address]  Scheduled Appointment: 08/17/2023

## 2023-08-07 NOTE — PHYSICAL THERAPY INITIAL EVALUATION ADULT - ADDITIONAL COMMENTS
pt lives alone in a house, no steps. pt ambulates independently with RW and is mostly independent with ADLs. Pt has a private aide that she uses as needed for shopping and household errands.

## 2023-08-07 NOTE — DISCHARGE NOTE PROVIDER - HOSPITAL COURSE
84 yo female with hx of subdural hematoma, CVA, depression, hypothyroidism, ?vertigo, COVID 19 pneumonia (2020), recurrent UTIs presents for intermittent dizziness. Patient notes symptoms recurred yest morning with no associated chest pain, SOB, headache, numbness, tingling, focal weakness or ataxia. Denies LOC or head trauma. She reports poor PO intake and 'dehydration' secondary to dizziness. Also endorsing multiple falls in the last few months. No nausea or vomiting. Denies dysuria, polyuria, hematuria at this time. Pt is a poor historian. As of note, patient was hospitalized at [facility] for similar symptoms in June '23 with MRI showing minor lacunar infarcts and no significant stenosis on CTA. She is not following with neurologist. As per patient she lives alone with HHA and at baseline ambulates with walker. Pt was hydrated with improvement of symptoms. She was seen by cardiology and neurology and cleared for discharge with meclizine.   LABS:                        11.8   8.18  )-----------( 260      ( 07 Aug 2023 05:56 )             35.4     08-07    136  |  106  |  20  ----------------------------<  105<H>  3.9   |  24  |  1.20    Ca    9.3      07 Aug 2023 05:56  Phos  3.0     08-05  Mg     2.3     08-05    TPro  6.1  /  Alb  3.2<L>  /  TBili  0.4  /  DBili  x   /  AST  11<L>  /  ALT  13  /  AlkPhos  52  08-06    Urinalysis Basic - ( 07 Aug 2023 05:56 )    Color: x / Appearance: x / SG: x / pH: x  Gluc: 105 mg/dL / Ketone: x  / Bili: x / Urobili: x   Blood: x / Protein: x / Nitrite: x   Leuk Esterase: x / RBC: x / WBC x   Sq Epi: x / Non Sq Epi: x / Bacteria: x

## 2023-08-07 NOTE — DISCHARGE NOTE PROVIDER - NSDCMRMEDTOKEN_GEN_ALL_CORE_FT
atorvastatin 80 mg oral tablet: 1 tab(s) orally once a day (at bedtime)  CeleXA 20 mg oral tablet: 1 tab(s) orally once a day  cholecalciferol 25 mcg (1000 intl units) oral tablet: 2 tab(s) orally once a day  LaMICtal  mg oral tablet, extended release: 1 tab(s) orally once a day  levothyroxine 25 mcg (0.025 mg) oral tablet: 1 tab(s) orally once a day  meclizine 25 mg oral tablet: 1 tab(s) orally every 8 hours as needed for Dizziness MDD: 3

## 2023-08-07 NOTE — PHYSICAL THERAPY INITIAL EVALUATION ADULT - PERTINENT HX OF CURRENT PROBLEM, REHAB EVAL
No 82 yo female adm 8/6 with hx of subdural hematoma, CVA, depression, hypothyroidism, ?vertigo, COVID 19 pneumonia (2020), recurrent UTIs presents for intermittent dizziness. Admitted for dizziness. 82 yo female adm 8/6 with hx of subdural hematoma, CVA, depression, hypothyroidism, ?vertigo, COVID 19 pneumonia (2020), recurrent UTIs presents for intermittent dizziness. Admitted for dizziness. CT head: no acute events, + chronic L internal capsule/caudate and L external capsule lacunar infarcts

## 2023-08-08 LAB
CULTURE RESULTS: SIGNIFICANT CHANGE UP
SPECIMEN SOURCE: SIGNIFICANT CHANGE UP

## 2023-08-17 ENCOUNTER — APPOINTMENT (OUTPATIENT)
Dept: UROGYNECOLOGY | Facility: CLINIC | Age: 83
End: 2023-08-17
Payer: MEDICARE

## 2023-08-17 VITALS — DIASTOLIC BLOOD PRESSURE: 78 MMHG | SYSTOLIC BLOOD PRESSURE: 128 MMHG | OXYGEN SATURATION: 92 % | HEART RATE: 82 BPM

## 2023-08-17 DIAGNOSIS — R35.0 FREQUENCY OF MICTURITION: ICD-10-CM

## 2023-08-17 DIAGNOSIS — N81.2 INCOMPLETE UTEROVAGINAL PROLAPSE: ICD-10-CM

## 2023-08-17 DIAGNOSIS — N81.9 FEMALE GENITAL PROLAPSE, UNSPECIFIED: ICD-10-CM

## 2023-08-17 DIAGNOSIS — N81.6 RECTOCELE: ICD-10-CM

## 2023-08-17 PROCEDURE — 99214 OFFICE O/P EST MOD 30 MIN: CPT

## 2023-08-29 NOTE — PHYSICAL EXAM
[No Acute Distress] : in no acute distress [Well developed] : well developed [Well Nourished] : ~L well nourished [Good Hygeine] : demonstrates good hygeine [Oriented x3] : oriented to person, place, and time [Normal Memory] : ~T memory was ~L unimpaired [Normal Mood/Affect] : mood and affect are normal [Warm and Dry] : was warm and dry to touch [No Lesions] : no lesions were seen on the external genitalia [Vulvar Atrophy] : vulvar atrophy [Labia Majora] : were normal [Labia Minora] : were normal [Normal] : was normal [Atrophy] : atrophy [Erythematous] : erythema [Discharge] : a  ~M vaginal discharge was present [Scant] : scant [Tess] : yellow [Thin] : thin [No Bleeding] : there was no active vaginal bleeding [Post Void Residual ____ml] : post void residual was [unfilled] ml [Anxiety] : patient is not anxious [Normal Gait] : gait was abnormal [de-identified] : steady with walker

## 2023-08-29 NOTE — HISTORY OF PRESENT ILLNESS
[FreeTextEntry1] : Yudelka is an 83-year-old with known OAB on Solifenacin 10mg PO daily and POP.  Her POP is currently supported by a cube #5. She is happy with the support provided by the pessary.  She is voiding ad silvia and moving her bowels without any issues.  No urinary complaints.  No vaginal bleeding.

## 2023-08-29 NOTE — DISCUSSION/SUMMARY
[FreeTextEntry1] : #POP: -Continue with cube #5 -PSC precautions reviewed -RTO in 2 months or sooner if needed  #OAB: -Continue with Solifenacin; PVR is 0 ml via bladder scanner.   All questions answered to the patient's satisfaction.  She expressed understanding. She knows to contact the office with any questions or concerns.

## 2023-08-29 NOTE — PROCEDURE
[Good Fit] : fits well [Erythema] : erythema noted [Discharge] : there is vaginal discharge [Pessary Inserted] : inserted [Pessary Washed] : washed [None] : no bleeding [Medication Review] : Medicaiton Review: Patient verbalizes understanding of risks and benefits [Refit] : refit is not needed [Erosion] : no evidence of erosion [FreeTextEntry1] : cube #5 [de-identified] : posterior [FreeTextEntry8] : Routine perineal care

## 2023-08-29 NOTE — END OF VISIT
[FreeTextEntry3] : I have reviewed all relevant clinical information, including history, exam, assessment, and plan. I agree with the above note.

## 2023-10-19 ENCOUNTER — APPOINTMENT (OUTPATIENT)
Dept: UROGYNECOLOGY | Facility: CLINIC | Age: 83
End: 2023-10-19

## 2023-10-30 NOTE — ED PROVIDER NOTE - PRO INTERPRETER NEED 2
Possibly 2/2 ETOH abuse, withdrawal, and dehydration  On admission, HR: 125, RR; 24, Temp 96.6 and WBC: 21.91. Lactic acid 2.6-> 1.8. Continue IV fluids   Procalcitonin 1.76->1. 10. Continue to trend  [facility] x2 pending  Denies urinary or respiratory symptoms. Does endorse left sided abdominal pain. UA showing largely + for blood, ketones and protein. + nitrites, moderate bacteria and renal tube epithelial cells. Asymptomatic. CT chest/abdomen/pelvis showing distended gallbladder but no stones or other infectious source   Cefepime and Vancomycin given in ED, vancomycin discontinued with negative MRSA results.   Trend CBC, temperature curve  RUQ US pending English

## 2023-12-08 ENCOUNTER — INPATIENT (INPATIENT)
Facility: HOSPITAL | Age: 83
LOS: 4 days | Discharge: HOME | DRG: 914 | End: 2023-12-13
Attending: INTERNAL MEDICINE | Admitting: INTERNAL MEDICINE
Payer: MEDICARE

## 2023-12-08 VITALS
SYSTOLIC BLOOD PRESSURE: 101 MMHG | HEIGHT: 62 IN | OXYGEN SATURATION: 93 % | RESPIRATION RATE: 19 BRPM | WEIGHT: 115.08 LBS | DIASTOLIC BLOOD PRESSURE: 66 MMHG | HEART RATE: 66 BPM | TEMPERATURE: 98 F

## 2023-12-08 LAB
ALBUMIN SERPL ELPH-MCNC: 3.9 G/DL — SIGNIFICANT CHANGE UP (ref 3.3–5)
ALBUMIN SERPL ELPH-MCNC: 3.9 G/DL — SIGNIFICANT CHANGE UP (ref 3.3–5)
ALP SERPL-CCNC: 62 U/L — SIGNIFICANT CHANGE UP (ref 40–120)
ALP SERPL-CCNC: 62 U/L — SIGNIFICANT CHANGE UP (ref 40–120)
ALT FLD-CCNC: 20 U/L — SIGNIFICANT CHANGE UP (ref 12–78)
ALT FLD-CCNC: 20 U/L — SIGNIFICANT CHANGE UP (ref 12–78)
ANION GAP SERPL CALC-SCNC: 5 MMOL/L — SIGNIFICANT CHANGE UP (ref 5–17)
ANION GAP SERPL CALC-SCNC: 5 MMOL/L — SIGNIFICANT CHANGE UP (ref 5–17)
AST SERPL-CCNC: 17 U/L — SIGNIFICANT CHANGE UP (ref 15–37)
AST SERPL-CCNC: 17 U/L — SIGNIFICANT CHANGE UP (ref 15–37)
BILIRUB SERPL-MCNC: 0.4 MG/DL — SIGNIFICANT CHANGE UP (ref 0.2–1.2)
BILIRUB SERPL-MCNC: 0.4 MG/DL — SIGNIFICANT CHANGE UP (ref 0.2–1.2)
BUN SERPL-MCNC: 24 MG/DL — HIGH (ref 7–23)
BUN SERPL-MCNC: 24 MG/DL — HIGH (ref 7–23)
CALCIUM SERPL-MCNC: 9.2 MG/DL — SIGNIFICANT CHANGE UP (ref 8.5–10.1)
CALCIUM SERPL-MCNC: 9.2 MG/DL — SIGNIFICANT CHANGE UP (ref 8.5–10.1)
CHLORIDE SERPL-SCNC: 105 MMOL/L — SIGNIFICANT CHANGE UP (ref 96–108)
CHLORIDE SERPL-SCNC: 105 MMOL/L — SIGNIFICANT CHANGE UP (ref 96–108)
CO2 SERPL-SCNC: 29 MMOL/L — SIGNIFICANT CHANGE UP (ref 22–31)
CO2 SERPL-SCNC: 29 MMOL/L — SIGNIFICANT CHANGE UP (ref 22–31)
CREAT SERPL-MCNC: 1.2 MG/DL — SIGNIFICANT CHANGE UP (ref 0.5–1.3)
CREAT SERPL-MCNC: 1.2 MG/DL — SIGNIFICANT CHANGE UP (ref 0.5–1.3)
EGFR: 45 ML/MIN/1.73M2 — LOW
EGFR: 45 ML/MIN/1.73M2 — LOW
GLUCOSE SERPL-MCNC: 119 MG/DL — HIGH (ref 70–99)
GLUCOSE SERPL-MCNC: 119 MG/DL — HIGH (ref 70–99)
HCT VFR BLD CALC: 39 % — SIGNIFICANT CHANGE UP (ref 34.5–45)
HCT VFR BLD CALC: 39 % — SIGNIFICANT CHANGE UP (ref 34.5–45)
HGB BLD-MCNC: 12.9 G/DL — SIGNIFICANT CHANGE UP (ref 11.5–15.5)
HGB BLD-MCNC: 12.9 G/DL — SIGNIFICANT CHANGE UP (ref 11.5–15.5)
MCHC RBC-ENTMCNC: 31.5 PG — SIGNIFICANT CHANGE UP (ref 27–34)
MCHC RBC-ENTMCNC: 31.5 PG — SIGNIFICANT CHANGE UP (ref 27–34)
MCHC RBC-ENTMCNC: 33.1 GM/DL — SIGNIFICANT CHANGE UP (ref 32–36)
MCHC RBC-ENTMCNC: 33.1 GM/DL — SIGNIFICANT CHANGE UP (ref 32–36)
MCV RBC AUTO: 95.1 FL — SIGNIFICANT CHANGE UP (ref 80–100)
MCV RBC AUTO: 95.1 FL — SIGNIFICANT CHANGE UP (ref 80–100)
NRBC # BLD: 0 /100 WBCS — SIGNIFICANT CHANGE UP (ref 0–0)
NRBC # BLD: 0 /100 WBCS — SIGNIFICANT CHANGE UP (ref 0–0)
PLATELET # BLD AUTO: 239 K/UL — SIGNIFICANT CHANGE UP (ref 150–400)
PLATELET # BLD AUTO: 239 K/UL — SIGNIFICANT CHANGE UP (ref 150–400)
POTASSIUM SERPL-MCNC: 4.6 MMOL/L — SIGNIFICANT CHANGE UP (ref 3.5–5.3)
POTASSIUM SERPL-MCNC: 4.6 MMOL/L — SIGNIFICANT CHANGE UP (ref 3.5–5.3)
POTASSIUM SERPL-SCNC: 4.6 MMOL/L — SIGNIFICANT CHANGE UP (ref 3.5–5.3)
POTASSIUM SERPL-SCNC: 4.6 MMOL/L — SIGNIFICANT CHANGE UP (ref 3.5–5.3)
PROT SERPL-MCNC: 7.3 G/DL — SIGNIFICANT CHANGE UP (ref 6–8.3)
PROT SERPL-MCNC: 7.3 G/DL — SIGNIFICANT CHANGE UP (ref 6–8.3)
RBC # BLD: 4.1 M/UL — SIGNIFICANT CHANGE UP (ref 3.8–5.2)
RBC # BLD: 4.1 M/UL — SIGNIFICANT CHANGE UP (ref 3.8–5.2)
RBC # FLD: 13 % — SIGNIFICANT CHANGE UP (ref 10.3–14.5)
RBC # FLD: 13 % — SIGNIFICANT CHANGE UP (ref 10.3–14.5)
SODIUM SERPL-SCNC: 139 MMOL/L — SIGNIFICANT CHANGE UP (ref 135–145)
SODIUM SERPL-SCNC: 139 MMOL/L — SIGNIFICANT CHANGE UP (ref 135–145)
TSH SERPL-MCNC: 0.9 UIU/ML — SIGNIFICANT CHANGE UP (ref 0.36–3.74)
TSH SERPL-MCNC: 0.9 UIU/ML — SIGNIFICANT CHANGE UP (ref 0.36–3.74)
WBC # BLD: 13.93 K/UL — HIGH (ref 3.8–10.5)
WBC # BLD: 13.93 K/UL — HIGH (ref 3.8–10.5)
WBC # FLD AUTO: 13.93 K/UL — HIGH (ref 3.8–10.5)
WBC # FLD AUTO: 13.93 K/UL — HIGH (ref 3.8–10.5)

## 2023-12-08 PROCEDURE — 72125 CT NECK SPINE W/O DYE: CPT | Mod: 26,MA

## 2023-12-08 PROCEDURE — 70450 CT HEAD/BRAIN W/O DYE: CPT | Mod: 26,MA

## 2023-12-08 PROCEDURE — 73502 X-RAY EXAM HIP UNI 2-3 VIEWS: CPT | Mod: 26,RT

## 2023-12-08 PROCEDURE — 99285 EMERGENCY DEPT VISIT HI MDM: CPT

## 2023-12-08 RX ORDER — SODIUM CHLORIDE 9 MG/ML
1000 INJECTION, SOLUTION INTRAVENOUS ONCE
Refills: 0 | Status: COMPLETED | OUTPATIENT
Start: 2023-12-08 | End: 2023-12-08

## 2023-12-08 RX ADMIN — SODIUM CHLORIDE 1000 MILLILITER(S): 9 INJECTION, SOLUTION INTRAVENOUS at 16:19

## 2023-12-08 NOTE — ED PROVIDER NOTE - CLINICAL SUMMARY MEDICAL DECISION MAKING FREE TEXT BOX
Here after fall from bed.  Patient complained of dizziness.  Check labs, hydrate patient, EKG, get imaging for ICH/fracture. Ambulation trial after imaging is normal for dispo.

## 2023-12-08 NOTE — ED PROVIDER NOTE - PROGRESS NOTE DETAILS
spoke with Edu, social work, case discussed. already seen by PT, plan to keep in ER overnight, will see in the AM

## 2023-12-08 NOTE — ED ADULT NURSE NOTE - BREATHING, MLM
Encounter addended by: Tawnya Law RN on: 12/22/2021 11:12 AM   Actions taken: Charge Capture section accepted
Spontaneous, unlabored and symmetrical

## 2023-12-08 NOTE — ED PROVIDER NOTE - OBJECTIVE STATEMENT
83-year-old female history of depression, rheumatoid arthritis, hypothyroid here after fall from bed.  Patient reports she rolled off her bed and fell on ground hitting her head.  No loss of consciousness.  No headache now.  He reports some neck discomfort.  Patient vomited once.  No nausea now.  Patient reports she has been having some intermittent dizziness although she has been doing this for a while.  Patient also reports extreme thirst at this time.  Patient was only on the ground for short amount of time.

## 2023-12-08 NOTE — ED ADULT TRIAGE NOTE - CHIEF COMPLAINT QUOTE
Pt. states she rolled out of bed and fell on floor, does not remember anything after, presents w/ vomit in hair.

## 2023-12-08 NOTE — ED ADULT NURSE NOTE - CAS EDN DISCHARGE ASSESSMENT
Patient was visible on the unit, loud, boisterous but easily redirectable  In good spirits, singing and reading the bible  Had good phone call with his mother  No irritability  Med compliant  No SI/HI/hallucinations  Happy about pending discharge on Monday  Will continue to observe  Alert and oriented to person, place and time/Patient baseline mental status/Awake/Symptoms improved

## 2023-12-08 NOTE — ED ADULT NURSE NOTE - NSFALLHARMRISKINTERV_ED_ALL_ED
Assistance OOB with selected safe patient handling equipment if applicable/Assistance with ambulation/Communicate risk of Fall with Harm to all staff, patient, and family/Monitor gait and stability/Provide patient with walking aids/Provide visual cue: red socks, yellow wristband, yellow gown, etc/Reinforce activity limits and safety measures with patient and family/Bed in lowest position, wheels locked, appropriate side rails in place/Call bell, personal items and telephone in reach/Instruct patient to call for assistance before getting out of bed/chair/stretcher/Non-slip footwear applied when patient is off stretcher/[place] to call system/Physically safe environment - no spills, clutter or unnecessary equipment/Purposeful Proactive Rounding/Room/bathroom lighting operational, light cord in reach Assistance OOB with selected safe patient handling equipment if applicable/Assistance with ambulation/Communicate risk of Fall with Harm to all staff, patient, and family/Monitor gait and stability/Provide patient with walking aids/Provide visual cue: red socks, yellow wristband, yellow gown, etc/Reinforce activity limits and safety measures with patient and family/Bed in lowest position, wheels locked, appropriate side rails in place/Call bell, personal items and telephone in reach/Instruct patient to call for assistance before getting out of bed/chair/stretcher/Non-slip footwear applied when patient is off stretcher/[place] to call system/Physically safe environment - no spills, clutter or unnecessary equipment/Purposeful Proactive Rounding/Room/bathroom lighting operational, light cord in reach

## 2023-12-08 NOTE — ED PROVIDER NOTE - PHYSICAL EXAMINATION
Vital signs as available reviewed.  General:  Comfortable, no acute distress.  Head:  Normocephalic, atraumatic.  Eyes:  Conjunctiva pink, no icterus. Pupils equal, round, reactive to light, extra-occular eye movements intact.  Cardiovascular:  Regular rate, no obvious murmur.  Respiratory:  Clear to auscultation, good air entry bilaterally.  Abdomen:  Soft, non-tender.  Musculoskeletal:  No tenderness to palpation over c/t/l spine. No tenderness to palpation over chest wall, clavicles, shoulders, upper/lower arms, upper / lower legs. + right hip tenderness to palpation.  Neurologic: Alert and oriented, moving all extremities. Sensation intact.  Skin:  Warm and dry. No skin break.

## 2023-12-08 NOTE — ED PROVIDER NOTE - DIFFERENTIAL DIAGNOSIS
Differential Diagnosis Cranial hemorrhage, fracture, soft tissue injury, Dehydration, electrolyte abnormality. arrythmia

## 2023-12-09 RX ORDER — ACETAMINOPHEN 500 MG
650 TABLET ORAL EVERY 6 HOURS
Refills: 0 | Status: DISCONTINUED | OUTPATIENT
Start: 2023-12-09 | End: 2023-12-13

## 2023-12-09 NOTE — ED PROVIDER NOTE - CPE EDP GU NORM
For information on Fall & Injury Prevention, visit: [email or web address] OR  [email or web address] OR  https://www.cdc.gov/steadi/patient.html For information on Fall & Injury Prevention, visit: [email or web address] OR  [email or web address]-[email or web address]-avoid-injury OR  https://www.cdc.gov/steadi/patient.html - - -

## 2023-12-09 NOTE — ED ADULT NURSE REASSESSMENT NOTE - BEFAST LAST WELL KNOWN
LUMBAR SURGERY - CHANEL GARDNER M.D.  [address], [address]  [place] [address]  [phone]    Instructions & Care After Your Lumbar Surgery    1. No sitting except on the commode.  You may lie on a firm couch but not on a waterbed or recliner.  You may lie in any position that is comfortable, using only one pillow under your head. Either stand at a counter or lie on your side for meals. Three weeks after surgery you may begin sitting for 30 minutes 3 times per day.    2. No driving for three weeks.  You may ride in the car in a passenger seat that reclines or lying down in the back seat.      3. No bending. If you drop something allow someone else to pick it up.    4. Don’t lift anything heavier than a coffee cup or paperback book.    5. Gradually increase your activity each day.  You should get out of bed every hour during the day.  Walk outside as soon as you feel up to it.  Walk short distances frequently rather than making a long trip.  Frequency is more important than distance.  Your goal is to be walking 2 to 3 miles per day when you return for your post-operative visit. (Never do this in one trip.)    6. You may climb stairs.    7. Remove your bandage the second day after surgery and leave it off.  If you notice any redness, swelling or drainage, call the office.  There are steri-strips across the incision.  If these are still present ten days after surgery, remove them gently.      8. You may shower five days after surgery.  Keep the incision dry until then.  Don’t let the water beat directly on the incision and gently pat it dry.    9. Physical Therapy will be arranged at your post-operative visit if needed.    10. Your prescription for pain medication may be filled for half the original amount prior to your return office visit.  Due to changes in Federal Law in order to have this medication refilled you must contact the office four days prior to the date and make arrangements to pick the  prescription up in the office.  This prescription refill cannot be called in to the pharmacy. Your prescription will be ready for pick-up the day the refill is due.    Don’t be alarmed if you experience some of your pre-operative symptoms after going home.  This is not uncommon and normally goes away in a few days but may last longer.  If you have any questions or concerns, please call our office.         Unknown

## 2023-12-09 NOTE — ED ADULT NURSE REASSESSMENT NOTE - NSFALLRISKINTERV_ED_ALL_ED
Assistance OOB with selected safe patient handling equipment if applicable/Assistance with ambulation/Communicate fall risk and risk factors to all staff, patient, and family/Monitor gait and stability/Provide patient with walking aids/Provide visual cue: yellow wristband, yellow gown, etc/Reinforce activity limits and safety measures with patient and family/Call bell, personal items and telephone in reach/Instruct patient to call for assistance before getting out of bed/chair/stretcher/Non-slip footwear applied when patient is off stretcher/[place] to call system/Physically safe environment - no spills, clutter or unnecessary equipment/Purposeful Proactive Rounding/Room/bathroom lighting operational, light cord in reach Assistance OOB with selected safe patient handling equipment if applicable/Assistance with ambulation/Communicate fall risk and risk factors to all staff, patient, and family/Monitor gait and stability/Provide patient with walking aids/Provide visual cue: yellow wristband, yellow gown, etc/Reinforce activity limits and safety measures with patient and family/Call bell, personal items and telephone in reach/Instruct patient to call for assistance before getting out of bed/chair/stretcher/Non-slip footwear applied when patient is off stretcher/[place] to call system/Physically safe environment - no spills, clutter or unnecessary equipment/Purposeful Proactive Rounding/Room/bathroom lighting operational, light cord in reach

## 2023-12-09 NOTE — PHYSICAL THERAPY INITIAL EVALUATION ADULT - ADDITIONAL COMMENTS
Patient is questionable historian. Patient reports she lives in private home alone with no steps to negotiate. Patient reports she has HHA, however is unsure of how often or when they came, thinks they usual come everyday. Patient reports owning a rolling walker and single axis cane, only uses them occasionally. Patient states she used to go to [facility], but stopped going after one time because she did not like how she felt after.

## 2023-12-09 NOTE — PHYSICAL THERAPY INITIAL EVALUATION ADULT - GAIT TRAINING, PT EVAL
Patient will ambulate 100 feet within 3-5 sessions to allow patient to ambulate household distances.

## 2023-12-09 NOTE — PHYSICAL THERAPY INITIAL EVALUATION ADULT - BED MOBILITY TRAINING, PT EVAL
Patient will improve supine <-> sit with independence within 3-5 sessions in order to safely get in and out of bed.

## 2023-12-10 DIAGNOSIS — R30.0 DYSURIA: ICD-10-CM

## 2023-12-10 DIAGNOSIS — E78.5 HYPERLIPIDEMIA, UNSPECIFIED: ICD-10-CM

## 2023-12-10 DIAGNOSIS — E03.9 HYPOTHYROIDISM, UNSPECIFIED: ICD-10-CM

## 2023-12-10 DIAGNOSIS — R42 DIZZINESS AND GIDDINESS: ICD-10-CM
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
## 2023-12-10 DIAGNOSIS — F32.9 MAJOR DEPRESSIVE DISORDER, SINGLE EPISODE, UNSPECIFIED: ICD-10-CM

## 2023-12-10 DIAGNOSIS — Z86.39 PERSONAL HISTORY OF OTHER ENDOCRINE, NUTRITIONAL AND METABOLIC DISEASE: ICD-10-CM

## 2023-12-10 DIAGNOSIS — Z29.9 ENCOUNTER FOR PROPHYLACTIC MEASURES, UNSPECIFIED: ICD-10-CM

## 2023-12-10 LAB
APPEARANCE UR: CLEAR — SIGNIFICANT CHANGE UP
APPEARANCE UR: CLEAR — SIGNIFICANT CHANGE UP
BILIRUB UR-MCNC: NEGATIVE — SIGNIFICANT CHANGE UP
BILIRUB UR-MCNC: NEGATIVE — SIGNIFICANT CHANGE UP
COLOR SPEC: YELLOW — SIGNIFICANT CHANGE UP
COLOR SPEC: YELLOW — SIGNIFICANT CHANGE UP
DIFF PNL FLD: ABNORMAL
DIFF PNL FLD: ABNORMAL
GLUCOSE UR QL: NEGATIVE MG/DL — SIGNIFICANT CHANGE UP
GLUCOSE UR QL: NEGATIVE MG/DL — SIGNIFICANT CHANGE UP
KETONES UR-MCNC: NEGATIVE MG/DL — SIGNIFICANT CHANGE UP
KETONES UR-MCNC: NEGATIVE MG/DL — SIGNIFICANT CHANGE UP
LEUKOCYTE ESTERASE UR-ACNC: ABNORMAL
LEUKOCYTE ESTERASE UR-ACNC: ABNORMAL
NITRITE UR-MCNC: NEGATIVE — SIGNIFICANT CHANGE UP
NITRITE UR-MCNC: NEGATIVE — SIGNIFICANT CHANGE UP
PH UR: 6 — SIGNIFICANT CHANGE UP (ref 5–8)
PH UR: 6 — SIGNIFICANT CHANGE UP (ref 5–8)
PROT UR-MCNC: NEGATIVE MG/DL — SIGNIFICANT CHANGE UP
PROT UR-MCNC: NEGATIVE MG/DL — SIGNIFICANT CHANGE UP
SP GR SPEC: 1.01 — SIGNIFICANT CHANGE UP (ref 1–1.03)
SP GR SPEC: 1.01 — SIGNIFICANT CHANGE UP (ref 1–1.03)
UROBILINOGEN FLD QL: 0.2 MG/DL — SIGNIFICANT CHANGE UP (ref 0.2–1)
UROBILINOGEN FLD QL: 0.2 MG/DL — SIGNIFICANT CHANGE UP (ref 0.2–1)

## 2023-12-10 PROCEDURE — 99222 1ST HOSP IP/OBS MODERATE 55: CPT | Mod: GC,AI

## 2023-12-10 RX ORDER — ATORVASTATIN CALCIUM 80 MG/1
80 TABLET, FILM COATED ORAL AT BEDTIME
Refills: 0 | Status: DISCONTINUED | OUTPATIENT
Start: 2023-12-10 | End: 2023-12-10

## 2023-12-10 RX ORDER — OXYBUTYNIN CHLORIDE 5 MG
1 TABLET ORAL
Refills: 0 | DISCHARGE

## 2023-12-10 RX ORDER — LEVOTHYROXINE SODIUM 125 MCG
25 TABLET ORAL DAILY
Refills: 0 | Status: DISCONTINUED | OUTPATIENT
Start: 2023-12-10 | End: 2023-12-13

## 2023-12-10 RX ORDER — CITALOPRAM 10 MG/1
1 TABLET, FILM COATED ORAL
Refills: 0 | DISCHARGE

## 2023-12-10 RX ORDER — OXYBUTYNIN CHLORIDE 5 MG
5 TABLET ORAL DAILY
Refills: 0 | Status: DISCONTINUED | OUTPATIENT
Start: 2023-12-10 | End: 2023-12-13

## 2023-12-10 RX ORDER — LAMOTRIGINE 25 MG/1
100 TABLET, ORALLY DISINTEGRATING ORAL
Refills: 0 | Status: DISCONTINUED | OUTPATIENT
Start: 2023-12-10 | End: 2023-12-10

## 2023-12-10 RX ORDER — ERYTHROMYCIN BASE 5 MG/GRAM
1 OINTMENT (GRAM) OPHTHALMIC (EYE)
Refills: 0 | Status: DISCONTINUED | OUTPATIENT
Start: 2023-12-10 | End: 2023-12-13

## 2023-12-10 RX ORDER — CHOLECALCIFEROL (VITAMIN D3) 125 MCG
2000 CAPSULE ORAL DAILY
Refills: 0 | Status: DISCONTINUED | OUTPATIENT
Start: 2023-12-10 | End: 2023-12-13

## 2023-12-10 RX ORDER — LAMOTRIGINE 25 MG/1
1 TABLET, ORALLY DISINTEGRATING ORAL
Refills: 0 | DISCHARGE

## 2023-12-10 RX ORDER — ENOXAPARIN SODIUM 100 MG/ML
30 INJECTION SUBCUTANEOUS EVERY 24 HOURS
Refills: 0 | Status: DISCONTINUED | OUTPATIENT
Start: 2023-12-10 | End: 2023-12-13

## 2023-12-10 RX ORDER — ATORVASTATIN CALCIUM 80 MG/1
80 TABLET, FILM COATED ORAL AT BEDTIME
Refills: 0 | Status: DISCONTINUED | OUTPATIENT
Start: 2023-12-10 | End: 2023-12-13

## 2023-12-10 RX ORDER — LEVOTHYROXINE SODIUM 125 MCG
1 TABLET ORAL
Refills: 0 | DISCHARGE

## 2023-12-10 RX ORDER — LEVOTHYROXINE SODIUM 125 MCG
25 TABLET ORAL DAILY
Refills: 0 | Status: DISCONTINUED | OUTPATIENT
Start: 2023-12-10 | End: 2023-12-10

## 2023-12-10 RX ORDER — LAMOTRIGINE 25 MG/1
100 TABLET, ORALLY DISINTEGRATING ORAL EVERY 12 HOURS
Refills: 0 | Status: DISCONTINUED | OUTPATIENT
Start: 2023-12-10 | End: 2023-12-13

## 2023-12-10 RX ORDER — LAMOTRIGINE 25 MG/1
200 TABLET, ORALLY DISINTEGRATING ORAL DAILY
Refills: 0 | Status: DISCONTINUED | OUTPATIENT
Start: 2023-12-10 | End: 2023-12-10

## 2023-12-10 RX ORDER — CITALOPRAM 10 MG/1
20 TABLET, FILM COATED ORAL DAILY
Refills: 0 | Status: DISCONTINUED | OUTPATIENT
Start: 2023-12-10 | End: 2023-12-10

## 2023-12-10 RX ORDER — MECLIZINE HCL 12.5 MG
25 TABLET ORAL EVERY 8 HOURS
Refills: 0 | Status: DISCONTINUED | OUTPATIENT
Start: 2023-12-10 | End: 2023-12-13

## 2023-12-10 RX ORDER — ACETAMINOPHEN 500 MG
650 TABLET ORAL EVERY 6 HOURS
Refills: 0 | Status: DISCONTINUED | OUTPATIENT
Start: 2023-12-10 | End: 2023-12-13

## 2023-12-10 RX ORDER — OXYBUTYNIN CHLORIDE 5 MG
5 TABLET ORAL DAILY
Refills: 0 | Status: DISCONTINUED | OUTPATIENT
Start: 2023-12-10 | End: 2023-12-10

## 2023-12-10 RX ORDER — CITALOPRAM 10 MG/1
20 TABLET, FILM COATED ORAL DAILY
Refills: 0 | Status: DISCONTINUED | OUTPATIENT
Start: 2023-12-10 | End: 2023-12-12

## 2023-12-10 RX ADMIN — CITALOPRAM 20 MILLIGRAM(S): 10 TABLET, FILM COATED ORAL at 13:45

## 2023-12-10 RX ADMIN — Medication 2000 UNIT(S): at 13:44

## 2023-12-10 RX ADMIN — Medication 5 MILLIGRAM(S): at 13:45

## 2023-12-10 RX ADMIN — ATORVASTATIN CALCIUM 80 MILLIGRAM(S): 80 TABLET, FILM COATED ORAL at 22:17

## 2023-12-10 RX ADMIN — Medication 1 DROP(S): at 19:16

## 2023-12-10 RX ADMIN — LAMOTRIGINE 100 MILLIGRAM(S): 25 TABLET, ORALLY DISINTEGRATING ORAL at 13:44

## 2023-12-10 RX ADMIN — Medication 25 MILLIGRAM(S): at 13:45

## 2023-12-10 RX ADMIN — Medication 25 MICROGRAM(S): at 13:45

## 2023-12-10 RX ADMIN — Medication 1 APPLICATION(S): at 19:17

## 2023-12-10 RX ADMIN — Medication 1 DROP(S): at 13:43

## 2023-12-10 RX ADMIN — ENOXAPARIN SODIUM 30 MILLIGRAM(S): 100 INJECTION SUBCUTANEOUS at 13:44

## 2023-12-10 NOTE — CARE COORDINATION ASSESSMENT. - NSCAREPROVIDERS_GEN_ALL_CORE_FT
CARE PROVIDERS:  Accepting Physician: Foster Wooten  Administration: Jerry Layne  Admitting: Foster Wooten  Attending: Foster Wooten  Consultant: Elvira Valdez  Covering Team: Rip Hanna  ED Attending: Noemí Cunningham  ED Attending2: Maite Berkowitz  ED Nurse: Saman Wisdom  Nurse: Leno Jennings  Nurse: Rad Persaud  Nurse: Chely Doyle  Ordered: Doctor, Unknown  Outpatient Provider: Jude Delgado  Outpatient Provider: Nimesh Jade  Override: Leno Jennings  Override: Rad Persaud  PCA/Nursing Assistant: Donnie Gomez  Primary Team: Levar Rowell  Primary Team: Benjamin Payton  : Edu Ta  UR// Supp. Assoc.: Melissa Winkler  UR// Supp. Assoc.: Peri Conti  UR// Supp. Assoc.: Savanna Jackman

## 2023-12-10 NOTE — H&P ADULT - ASSESSMENT
82 yo F with PMHx of CVA, Depression, Hypothyroidism, HLD, Dizzness, Recurrent UTIs presents to the ED following a fall, admitted for placement     Pt was evaluated in the ED and was found to have a negative workup for any fracture or acute intracranial issue. Pt's family was unable to be contacted despite numerous attempts. Pt to be admitted for placement given unsafe discharge home from ED.     ED Course:   Vitals: BP: 122/70, HR: 64, Temp: 97.4, RR: 16, SpO2: 94% on  RA  Labs: WBC 13.93, Cr 1.20, eGFR 45, TSH 0.90  XR Hip: No acute finding or change   CT Brain: No acute intracranial hemorrhage, brain edema, or mass effect. No displaced calvarial fracture.  CT Cervical Spine: No acute fracture or traumatic subluxation. No prevertebral soft tissue swelling. Degenerative changes.  EK bpm, Diagnosis Line Sinus bradycardia, Left bundle branch block  Received in the ED: 1L NS Bolus    Leaving Home is Contraindicated... 84 yo F with PMHx of CVA, Depression, Hypothyroidism, HLD, Dizzness, Recurrent UTIs presents to the ED following a fall, admitted for placement    82 yo F with PMHx of CVA, Depression, Hypothyroidism, HLD, Dizzness, Recurrent UTIs presents to the ED following a fall, admitted for placement

## 2023-12-10 NOTE — H&P ADULT - PROBLEM SELECTOR PLAN 3
- Pt reporting hx of both hyper and hypoglycemia   - Serum Glucose on admission 119  - Pt requesting A1c at this time, follow up

## 2023-12-10 NOTE — H&P ADULT - PROBLEM SELECTOR PLAN 2
- Hx of recurrent UTIs, Pessary in place for 1.5 months (typically changed at 2 months)   - Now reporting dysuria   - UA ordered, follow up results  - Infectious Disease consulted, Dr. Rodriguez, follow up recommendations   - Urology/GYN for Pessary? - Hx of recurrent UTIs, Pessary in place for 1.5 months (typically changed at 2 months)   - Now reporting dysuria   - UA ordered, follow up results  - Infectious Disease consulted, Dr. Rodriguez, follow up recommendations   - Called placed for Gyn attending on call, Dr. Stephenie Cage, awaiting callback - Hx of recurrent UTIs, Pessary in place for 1.5 months (typically changed at 2 months)   - Now reporting dysuria   - UA ordered, follow up results  - Urine culture, follow up results   - Infectious Disease consulted, Dr. Rodriguez, follow up recommendations   - GYN Attending on call, Dr. Stephenie Cage, pessary removal and cleaning is not indicated at this time regardless of UTI symptoms. Recommending UA and treatment of UTI with pessary cleaning per routine

## 2023-12-10 NOTE — H&P ADULT - NSHPSOCIALHISTORY_GEN_ALL_CORE
Lives: Patient lives alone at home, home health aid   ADLs: Ambulates with walker, requires assistance from home health aid daily  Alcohol Use: Denies  Tobacco Use: Denies   Recreational Drug Use: Denies

## 2023-12-10 NOTE — PATIENT CHOICE NOTE. - NSPTCHOICESTATE_GEN_ALL_CORE
I have met with the patient and/or caregiver to discuss discharge goals and treatment plan. Patient and/or caregiver also provided with instructions on accessing the CMS Compare websites for additional information related to Post Acute Provider quality and resource use measures to assist them in evaluation of the providers and in selecting their post-acute provider of choice. Patient and caregiver were informed of the facilities that are owned and/or operated by [facility]. I have discussed with the patient the availability of in-network facilities and providers. Patient and caregiver provided with a list of post-acute providers whose services are appropriate to the discharge plans and patient needs.     For patient requiring durable medical equipment, patient and/or caregiver were informed that they have the right to request who provides the required equipment. I have met with the patient and/or caregiver to discuss discharge goals and treatment plan. Patient and/or caregiver also provided with instructions on accessing the CMS Compare websites for additional information related to Post Acute Provider quality and resource use measures to assist them in evaluation of the providers and in selecting their post-acute provider of choice. Patient and caregiver were informed of the facilities that are owned and/or operated by [facility]. I have discussed with the patient the availability of in-network facilities and providers. Patient and caregiver provided with a list of post-acute providers whose services are appropriate to the discharge plans and patient needs.     For patient requiring durable medical equipment, patient and/or caregiver were informed that they have the right to request who provides the required equipment.
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
## 2023-12-10 NOTE — CONSULT NOTE ADULT - SUBJECTIVE AND OBJECTIVE BOX
[facility], Division of Infectious Diseases  [facility], Y. [facility]el, [facility]  [phone]    LINN WHITE  83y, Female  [id number]    HPI--  HPI:  84 yo F with PMHx of CVA, Depression, Hypothyroidism, HLD, Dizzness, Recurrent UTIs presents to the ED following a fall. Pt states that she landed on her right side and hit the right side of her head. Pt denies LOC, HA but had nausea and vomited once. Pt since has mild pain on her right ribs, but her nausea has since subsided. Pt was able to get up from the ground briefly after without difficulty. Of note, the patient has been in the ED since  and has now developed discharge from B/L eyes and burning with urination. Pt has a vaginal pessary which normally is changed by a urologist every 2 months and it has been 1.5 months with this pessary. Denies cp, sob, palpitations, lower extremity edema.    ED Course:   Vitals: BP: 122/70, HR: 64, Temp: 97.4, RR: 16, SpO2: 94% on  RA  Labs: WBC 13.93, Cr 1.20, eGFR 45, TSH 0.90  XR Hip: No acute finding or change   CT Brain: No acute intracranial hemorrhage, brain edema, or mass effect. No displaced calvarial fracture.  CT Cervical Spine: No acute fracture or traumatic subluxation. No prevertebral soft tissue swelling. Degenerative changes.  EK bpm, Diagnosis Line Sinus bradycardia, Left bundle branch block  Received in the ED: 1L NS Bolus  (10 Dec 2023 12:49)    Pt seen at bedside  Reporting urinary sx, issues w/ pessary and R eye pain    Active Medications--  acetaminophen     Tablet .. 650 milliGRAM(s) Oral every 6 hours PRN  acetaminophen     Tablet .. 650 milliGRAM(s) Oral every 6 hours PRN  artificial  tears Solution 1 Drop(s) Both EYES every 6 hours  atorvastatin 80 milliGRAM(s) Oral at bedtime  cholecalciferol 2000 Unit(s) Oral daily  citalopram 20 milliGRAM(s) Oral daily  enoxaparin Injectable 30 milliGRAM(s) SubCutaneous every 24 hours  erythromycin   Ointment 1 Application(s) Right EYE two times a day  lamoTRIgine 100 milliGRAM(s) Oral every 12 hours  levothyroxine 25 MICROGram(s) Oral daily  meclizine 25 milliGRAM(s) Oral every 8 hours PRN  oxybutynin 5 milliGRAM(s) Oral daily    Antimicrobials:     Immunologic:     ROS:  CONSTITUTIONAL: No fevers or chills. No weakness or headache. No weight changes.  EYES/ENT: No visual or hearing changes. No sore throat or throat pain .  NECK: No pain or stiffness  RESPIRATORY: No cough, wheezing, or hemoptysis. No shortness of breath  CARDIOVASCULAR: No chest pain or palpitations  GASTROINTESTINAL: No abdominal pain. No nausea or vomiting. No diarrhea or constipation.  GENITOURINARY: No dysuria, frequency or hematuria  NEUROLOGICAL: No numbness or weakness  SKIN: No itching or rashes  PSYCHIATRIC: Pleasant. Appropriate affect    Allergies: cephalexin (Hives)  sulfa drugs (Unknown)  cephalexin (Unknown)    PMH -- UTI (urinary tract infection)    Depression    Hypothyroidism    Vertigo    Hypothyroidism    Depression    Vertigo      PSH -- No significant past surgical history    No significant past surgical history      FH -- No pertinent family history in first degree relatives      Social History --  EtOH: denies   Tobacco: denies   Drug Use: denies     Travel/Environmental/Occupational History:    Physical Exam--  Vital Signs Last 24 Hrs  T(F): 98.1 (10 Dec 2023 15:50), Max: 98.7 (10 Dec 2023 09:19)  HR: 63 (10 Dec 2023 15:50) (56 - 77)  BP: 114/76 (10 Dec 2023 15:50) (114/76 - 162/69)  RR: 17 (10 Dec 2023 15:50) (16 - 18)  SpO2: 96% (10 Dec 2023 15:50) (90% - 96%)  General: nontoxic-appearing, no acute distress  HEENT: NC/AT, EOMI, slight eye discharge  Lungs: Clear bilaterally without rales, wheezing or rhonchi  Heart: Regular rate and rhythm. No murmur, rub or gallop.  Abdomen: Soft. Nondistended. Nontender.  Extremities: No cyanosis or clubbing. No edema.   Skin: Warm. Dry. Good turgor.     Laboratory & Imaging Data:  CBC:                       12.9   13.93 )-----------( 239      ( 08 Dec 2023 16:15 )             39.0     CMP:     139  |  105  |  24<H>  ----------------------------<  119<H>  4.6   |  29  |  1.20    Ca    9.2      08 Dec 2023 16:15    TPro  7.3  /  Alb  3.9  /  TBili  0.4  /  DBili  x   /  AST  17  /  ALT  20  /  AlkPhos  62  1208    LIVER FUNCTIONS - ( 08 Dec 2023 16:15 )  Alb: 3.9 g/dL / Pro: 7.3 g/dL / ALK PHOS: 62 U/L / ALT: 20 U/L / AST: 17 U/L / GGT: x           Urinalysis Basic - ( 08 Dec 2023 16:15 )    Color: x / Appearance: x / SG: x / pH: x  Gluc: 119 mg/dL / Ketone: x  / Bili: x / Urobili: x   Blood: x / Protein: x / Nitrite: x   Leuk Esterase: x / RBC: x / WBC x   Sq Epi: x / Non Sq Epi: x / Bacteria: x        Microbiology: reviewed        Radiology: reviewed     [facility], Division of Infectious Diseases  [facility], Y. Patel, [facility]  [phone]    LINN WHITE  83y, Female  [id number]    HPI--  HPI:  82 yo F with PMHx of CVA, Depression, Hypothyroidism, HLD, Dizzness, Recurrent UTIs presents to the ED following a fall. Pt states that she landed on her right side and hit the right side of her head. Pt denies LOC, HA but had nausea and vomited once. Pt since has mild pain on her right ribs, but her nausea has since subsided. Pt was able to get up from the ground briefly after without difficulty. Of note, the patient has been in the ED since  and has now developed discharge from B/L eyes and burning with urination. Pt has a vaginal pessary which normally is changed by a urologist every 2 months and it has been 1.5 months with this pessary. Denies cp, sob, palpitations, lower extremity edema.    ED Course:   Vitals: BP: 122/70, HR: 64, Temp: 97.4, RR: 16, SpO2: 94% on  RA  Labs: WBC 13.93, Cr 1.20, eGFR 45, TSH 0.90  XR Hip: No acute finding or change   CT Brain: No acute intracranial hemorrhage, brain edema, or mass effect. No displaced calvarial fracture.  CT Cervical Spine: No acute fracture or traumatic subluxation. No prevertebral soft tissue swelling. Degenerative changes.  EK bpm, Diagnosis Line Sinus bradycardia, Left bundle branch block  Received in the ED: 1L NS Bolus  (10 Dec 2023 12:49)    Pt seen at bedside  Reporting urinary sx, issues w/ pessary and R eye pain    Active Medications--  acetaminophen     Tablet .. 650 milliGRAM(s) Oral every 6 hours PRN  acetaminophen     Tablet .. 650 milliGRAM(s) Oral every 6 hours PRN  artificial  tears Solution 1 Drop(s) Both EYES every 6 hours  atorvastatin 80 milliGRAM(s) Oral at bedtime  cholecalciferol 2000 Unit(s) Oral daily  citalopram 20 milliGRAM(s) Oral daily  enoxaparin Injectable 30 milliGRAM(s) SubCutaneous every 24 hours  erythromycin   Ointment 1 Application(s) Right EYE two times a day  lamoTRIgine 100 milliGRAM(s) Oral every 12 hours  levothyroxine 25 MICROGram(s) Oral daily  meclizine 25 milliGRAM(s) Oral every 8 hours PRN  oxybutynin 5 milliGRAM(s) Oral daily    Antimicrobials:     Immunologic:     ROS:  CONSTITUTIONAL: No fevers or chills. No weakness or headache. No weight changes.  EYES/ENT: No visual or hearing changes. No sore throat or throat pain .  NECK: No pain or stiffness  RESPIRATORY: No cough, wheezing, or hemoptysis. No shortness of breath  CARDIOVASCULAR: No chest pain or palpitations  GASTROINTESTINAL: No abdominal pain. No nausea or vomiting. No diarrhea or constipation.  GENITOURINARY: No dysuria, frequency or hematuria  NEUROLOGICAL: No numbness or weakness  SKIN: No itching or rashes  PSYCHIATRIC: Pleasant. Appropriate affect    Allergies: cephalexin (Hives)  sulfa drugs (Unknown)  cephalexin (Unknown)    PMH -- UTI (urinary tract infection)    Depression    Hypothyroidism    Vertigo    Hypothyroidism    Depression    Vertigo      PSH -- No significant past surgical history    No significant past surgical history      FH -- No pertinent family history in first degree relatives      Social History --  EtOH: denies   Tobacco: denies   Drug Use: denies     Travel/Environmental/Occupational History:    Physical Exam--  Vital Signs Last 24 Hrs  T(F): 98.1 (10 Dec 2023 15:50), Max: 98.7 (10 Dec 2023 09:19)  HR: 63 (10 Dec 2023 15:50) (56 - 77)  BP: 114/76 (10 Dec 2023 15:50) (114/76 - 162/69)  RR: 17 (10 Dec 2023 15:50) (16 - 18)  SpO2: 96% (10 Dec 2023 15:50) (90% - 96%)  General: nontoxic-appearing, no acute distress  HEENT: NC/AT, EOMI, slight eye discharge  Lungs: Clear bilaterally without rales, wheezing or rhonchi  Heart: Regular rate and rhythm. No murmur, rub or gallop.  Abdomen: Soft. Nondistended. Nontender.  Extremities: No cyanosis or clubbing. No edema.   Skin: Warm. Dry. Good turgor.     Laboratory & Imaging Data:  CBC:                       12.9   13.93 )-----------( 239      ( 08 Dec 2023 16:15 )             39.0     CMP:     139  |  105  |  24<H>  ----------------------------<  119<H>  4.6   |  29  |  1.20    Ca    9.2      08 Dec 2023 16:15    TPro  7.3  /  Alb  3.9  /  TBili  0.4  /  DBili  x   /  AST  17  /  ALT  20  /  AlkPhos  62  1208    LIVER FUNCTIONS - ( 08 Dec 2023 16:15 )  Alb: 3.9 g/dL / Pro: 7.3 g/dL / ALK PHOS: 62 U/L / ALT: 20 U/L / AST: 17 U/L / GGT: x           Urinalysis Basic - ( 08 Dec 2023 16:15 )    Color: x / Appearance: x / SG: x / pH: x  Gluc: 119 mg/dL / Ketone: x  / Bili: x / Urobili: x   Blood: x / Protein: x / Nitrite: x   Leuk Esterase: x / RBC: x / WBC x   Sq Epi: x / Non Sq Epi: x / Bacteria: x        Microbiology: reviewed        Radiology: reviewed

## 2023-12-10 NOTE — H&P ADULT - PROBLEM SELECTOR PLAN 1
- XR Hip: No acute finding or change   - CT Brain: No acute intracranial hemorrhage, brain edema, or mass effect. No displaced calvarial fracture.  - CT Cervical Spine: No acute fracture or traumatic subluxation. No prevertebral soft tissue swelling. Degenerative changes.  - EK bpm, Diagnosis Line Sinus bradycardia, Left bundle branch block  - Pt was not on ground for significant amount of time, low index of concern for Rhabdomyolysis   - Family unable to be contacted at this time, admitted for placement to [facility], social work

## 2023-12-10 NOTE — ED ADULT NURSE REASSESSMENT NOTE - NSFALLRISKINTERV_ED_ALL_ED
Assistance OOB with selected safe patient handling equipment if applicable/Assistance with ambulation/Communicate fall risk and risk factors to all staff, patient, and family/Provide visual cue: yellow wristband, yellow gown, etc/Reinforce activity limits and safety measures with patient and family/Toileting schedule using arm’s reach rule for commode and bathroom/Call bell, personal items and telephone in reach/Instruct patient to call for assistance before getting out of bed/chair/stretcher/Non-slip footwear applied when patient is off stretcher/[place] to call system/Physically safe environment - no spills, clutter or unnecessary equipment/Purposeful Proactive Rounding/Room/bathroom lighting operational, light cord in reach Assistance OOB with selected safe patient handling equipment if applicable/Assistance with ambulation/Communicate fall risk and risk factors to all staff, patient, and family/Provide visual cue: yellow wristband, yellow gown, etc/Reinforce activity limits and safety measures with patient and family/Toileting schedule using arm’s reach rule for commode and bathroom/Call bell, personal items and telephone in reach/Instruct patient to call for assistance before getting out of bed/chair/stretcher/Non-slip footwear applied when patient is off stretcher/[place] to call system/Physically safe environment - no spills, clutter or unnecessary equipment/Purposeful Proactive Rounding/Room/bathroom lighting operational, light cord in reach

## 2023-12-10 NOTE — H&P ADULT - HISTORY OF PRESENT ILLNESS
82 yo F with PMHx of CVA, Depression, Hypothyroidism, HLD, Dizzness, Recurrent UTIs presents to the ED following a fall.     Denies fever, chills, chest pain, palpitations, SOB, cough, abdominal pain, nausea, vomiting, diarrhea, constipation, urinary frequency, urgency, or dysuria, headaches, changes in vision, dizziness, numbness, tingling.  Denies recent travel, recent antibiotic use, or sick contacts.    Pt was evaluated in the ED and was found to have a negative workup for any fracture or acute intracranial issue. Pt's family was unable to be contacted despite numerous attempts. Pt to be admitted for placement given unsafe discharge home from ED.     ED Course:   Vitals: BP: 122/70, HR: 64, Temp: 97.4, RR: 16, SpO2: 94% on  RA  Labs: WBC 13.93, Cr 1.20, eGFR 45, TSH 0.90  XR Hip: No acute finding or change   CT Brain: No acute intracranial hemorrhage, brain edema, or mass effect. No displaced calvarial fracture.  CT Cervical Spine: No acute fracture or traumatic subluxation. No prevertebral soft tissue swelling. Degenerative changes.  EK bpm, Diagnosis Line Sinus bradycardia, Left bundle branch block  Received in the ED: 1L NS Bolus  84 yo F with PMHx of CVA, Depression, Hypothyroidism, HLD, Dizzness, Recurrent UTIs presents to the ED following a fall.     Denies fever, chills, chest pain, palpitations, SOB, cough, abdominal pain, nausea, vomiting, diarrhea, constipation, urinary frequency, urgency, or dysuria, headaches, changes in vision, dizziness, numbness, tingling.  Denies recent travel, recent antibiotic use, or sick contacts.    Pt was evaluated in the ED and was found to have a negative workup for any fracture or acute intracranial issue. Pt's family was unable to be contacted despite numerous attempts. Pt to be admitted for placement given unsafe discharge home from ED.     ED Course:   Vitals: BP: 122/70, HR: 64, Temp: 97.4, RR: 16, SpO2: 94% on  RA  Labs: WBC 13.93, Cr 1.20, eGFR 45, TSH 0.90  XR Hip: No acute finding or change   CT Brain: No acute intracranial hemorrhage, brain edema, or mass effect. No displaced calvarial fracture.  CT Cervical Spine: No acute fracture or traumatic subluxation. No prevertebral soft tissue swelling. Degenerative changes.  EK bpm, Diagnosis Line Sinus bradycardia, Left bundle branch block  Received in the ED: 1L NS Bolus  84 yo F with PMHx of CVA, Depression, Hypothyroidism, HLD, Dizzness, Recurrent UTIs presents to the ED following a fall. Pt states that she landed on her right side and hit the right side of her head. Pt denies LOC, HA but had nausea and vomited once. Pt since has mild pain on her right ribs, but her nausea has since subsided. Pt was able to get up from the ground briefly after without difficulty. Of note, the patient has been in the ED since  and has now developed discharge from B/L eyes and burning with urination. Pt has a vaginal pessary which normally is changed by a urologist every 2 months and it has been 1.5 months with this pessary. Denies cp, sob, palpitations, lower extremity edema.    Denies fever, chills, chest pain, palpitations, SOB, cough, abdominal pain, nausea, vomiting, diarrhea, constipation, headaches, changes in vision, dizziness, numbness, tingling.  Denies recent travel, recent antibiotic use, or sick contacts.    Pt was evaluated in the ED and was found to have a negative workup for any fracture or acute intracranial issue. Pt's family was unable to be contacted despite numerous attempts. Pt to be admitted for placement given unsafe discharge home from ED.     ED Course:   Vitals: BP: 122/70, HR: 64, Temp: 97.4, RR: 16, SpO2: 94% on  RA  Labs: WBC 13.93, Cr 1.20, eGFR 45, TSH 0.90  XR Hip: No acute finding or change   CT Brain: No acute intracranial hemorrhage, brain edema, or mass effect. No displaced calvarial fracture.  CT Cervical Spine: No acute fracture or traumatic subluxation. No prevertebral soft tissue swelling. Degenerative changes.  EK bpm, Diagnosis Line Sinus bradycardia, Left bundle branch block  Received in the ED: 1L NS Bolus  82 yo F with PMHx of CVA, Depression, Hypothyroidism, HLD, Dizzness, Recurrent UTIs presents to the ED following a fall. Pt states that she landed on her right side and hit the right side of her head. Pt denies LOC, HA but had nausea and vomited once. Pt since has mild pain on her right ribs, but her nausea has since subsided. Pt was able to get up from the ground briefly after without difficulty. Of note, the patient has been in the ED since  and has now developed discharge from B/L eyes and burning with urination. Pt has a vaginal pessary which normally is changed by a urologist every 2 months and it has been 1.5 months with this pessary. Denies cp, sob, palpitations, lower extremity edema.    ED Course:   Vitals: BP: 122/70, HR: 64, Temp: 97.4, RR: 16, SpO2: 94% on  RA  Labs: WBC 13.93, Cr 1.20, eGFR 45, TSH 0.90  XR Hip: No acute finding or change   CT Brain: No acute intracranial hemorrhage, brain edema, or mass effect. No displaced calvarial fracture.  CT Cervical Spine: No acute fracture or traumatic subluxation. No prevertebral soft tissue swelling. Degenerative changes.  EK bpm, Diagnosis Line Sinus bradycardia, Left bundle branch block  Received in the ED: 1L NS Bolus  84 yo F with PMHx of CVA, Depression, Hypothyroidism, HLD, Dizzness, Recurrent UTIs presents to the ED following a fall. Pt states that she landed on her right side and hit the right side of her head. Pt denies LOC, HA but had nausea and vomited once. Pt since has mild pain on her right ribs, but her nausea has since subsided. Pt was able to get up from the ground briefly after without difficulty. Of note, the patient has been in the ED since  and has now developed discharge from B/L eyes and burning with urination. Pt has a vaginal pessary which normally is changed by a urologist every 2 months and it has been 1.5 months with this pessary. Denies cp, sob, palpitations, lower extremity edema.    ED Course:   Vitals: BP: 122/70, HR: 64, Temp: 97.4, RR: 16, SpO2: 94% on  RA  Labs: WBC 13.93, Cr 1.20, eGFR 45, TSH 0.90  XR Hip: No acute finding or change   CT Brain: No acute intracranial hemorrhage, brain edema, or mass effect. No displaced calvarial fracture.  CT Cervical Spine: No acute fracture or traumatic subluxation. No prevertebral soft tissue swelling. Degenerative changes.  EK bpm, Diagnosis Line Sinus bradycardia, Left bundle branch block  Received in the ED: 1L NS Bolus

## 2023-12-10 NOTE — H&P ADULT - ATTENDING COMMENTS
84 yo F with PMHx of CVA, Depression, Hypothyroidism, HLD, Dizzness, Recurrent UTIs presents to the ED following a fall, admitted for placement     Social Work and Case management to arrange for placement to rehab, pt had fall, CT neg for fractures or bleed.  F/u UA given dysurea, some right eye redness, care d/w ID for now, f/u UA nand urine cx, and eye ointment and opthalmic drops ordered.    Foster Wooten, Attending Physician

## 2023-12-10 NOTE — PATIENT PROFILE ADULT - FALL HARM RISK - HARM RISK INTERVENTIONS
Assistance with ambulation/Assistance OOB with selected safe patient handling equipment/Communicate Risk of Fall with Harm to all staff/Reinforce activity limits and safety measures with patient and family/Tailored Fall Risk Interventions/Visual Cue: Yellow wristband and red socks/Bed in lowest position, wheels locked, appropriate side rails in place/Call bell, personal items and telephone in reach/Instruct patient to call for assistance before getting out of bed or chair/Non-slip footwear when patient is out of bed/[place] to call system/Physically safe environment - no spills, clutter or unnecessary equipment/Purposeful Proactive Rounding/Room/bathroom lighting operational, light cord in reach Assistance with ambulation/Assistance OOB with selected safe patient handling equipment/Communicate Risk of Fall with Harm to all staff/Reinforce activity limits and safety measures with patient and family/Tailored Fall Risk Interventions/Visual Cue: Yellow wristband and red socks/Bed in lowest position, wheels locked, appropriate side rails in place/Call bell, personal items and telephone in reach/Instruct patient to call for assistance before getting out of bed or chair/Non-slip footwear when patient is out of bed/[place] to call system/Physically safe environment - no spills, clutter or unnecessary equipment/Purposeful Proactive Rounding/Room/bathroom lighting operational, light cord in reach
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
## 2023-12-10 NOTE — CONSULT NOTE ADULT - ASSESSMENT
82 yo F with PMHx of CVA, Depression, Hypothyroidism, HLD, Dizzness, Recurrent UTIs presents to the ED following a fall, admitted for placement     Acute Cystitis  Hx of recurrent UTIs, Pessary in place for 1.5 months (typically changed at 2 months)     Recommendations:   F/u pending UA/Urine culture  No prior UCx available for reviewed  Will start Abx pending UA results  Erythromycin BID for eye    D/w Dr. Wooten    Infectious Diseases will continue to follow. Please call with any questions.   Elvira Valdez M.D.  [facility] Division of Infectious Diseases [phone]  For after 5 P.M. and weekends, please call [phone]   82 yo F with PMHx of CVA, Depression, Hypothyroidism, HLD, Dizzness, Recurrent UTIs presents to the ED following a fall, admitted for placement     Acute Cystitis  Hx of recurrent UTIs, Pessary in place for 1.5 months (typically changed at 2 months)     Recommendations:   F/u pending UA/Urine culture  No prior UCx available for reviewed  Will start Abx pending UA results  Erythromycin BID for eye    D/w Dr. Wooten    Infectious Diseases will continue to follow. Please call with any questions.   Elvira Valdez M.D.  [facility] Division of Infectious Diseases [phone]  For after 5 P.M. and weekends, please call [phone]

## 2023-12-10 NOTE — H&P ADULT - NSHPPHYSICALEXAM_GEN_ALL_CORE
T(C): 36.3 (12-10-23 @ 12:21), Max: 37.1 (12-10-23 @ 09:19)  HR: 64 (12-10-23 @ 12:21) (56 - 77)  BP: 122/70 (12-10-23 @ 12:21) (115/77 - 162/69)  RR: 16 (12-10-23 @ 12:21) (16 - 18)  SpO2: 94% (12-10-23 @ 12:21) (90% - 94%)    GENERAL: patient appears well, no acute distress, appropriately interactive  EYES: sclera clear, no exudates  ENMT: oropharynx clear without erythema, no exudates, moist mucous membranes  NECK: supple, soft  LUNGS: good air entry bilaterally, clear to auscultation, symmetric breath sounds, no wheezing or rhonchi appreciated  HEART: soft S1/S2, regular rate and rhythm, no murmurs noted, no lower extremity edema  GASTROINTESTINAL: abdomen is soft, nontender, nondistended, normoactive bowel sounds  INTEGUMENT: good skin turgor, warm skin, appears well perfused  MUSCULOSKELETAL: no clubbing or cyanosis, no obvious deformity  NEUROLOGIC: awake, alert, oriented x3, good muscle tone in all 4 extremities  HEME/LYMPH: no obvious ecchymosis or petechiae T(C): 36.3 (12-10-23 @ 12:21), Max: 37.1 (12-10-23 @ 09:19)  HR: 64 (12-10-23 @ 12:21) (56 - 77)  BP: 122/70 (12-10-23 @ 12:21) (115/77 - 162/69)  RR: 16 (12-10-23 @ 12:21) (16 - 18)  SpO2: 94% (12-10-23 @ 12:21) (90% - 94%)    GENERAL: patient appears well, no acute distress, appropriately interactive  EYES: Clear discharge noted from B/L eyes, sclera clear, no exudates  ENMT: oropharynx clear without erythema, no exudates, moist mucous membranes  NECK: supple, soft  LUNGS: good air entry bilaterally, clear to auscultation, symmetric breath sounds, no wheezing or rhonchi appreciated  HEART: soft S1/S2, regular rate and rhythm, no murmurs noted, no lower extremity edema  GASTROINTESTINAL: Mild suprapubic ttp, mild right CVA ttp, abdomen is soft,  nondistended, normoactive bowel sounds  INTEGUMENT: good skin turgor, warm skin, appears well perfused  MUSCULOSKELETAL: Mild ttp to right hip, no clubbing or cyanosis, no obvious deformity  NEUROLOGIC: awake, alert, oriented x3, good muscle tone in all 4 extremities

## 2023-12-10 NOTE — H&P ADULT - NSHPREVIEWOFSYSTEMS_GEN_ALL_CORE
CONSTITUTIONAL: No weakness, fevers or chills  HEENT:  No headache, no visual changes, no sore throat  RESPIRATORY: No cough, wheezing, hemoptysis; No shortness of breath  CARDIOVASCULAR: No chest pain or palpitations  GASTROINTESTINAL: +nausea -resolved No abdominal pain, vomiting, or hematemesis; No diarrhea or constipation. No melena or hematochezia.  GENITOURINARY: +dysuria, no frequency or hematuria  NEUROLOGICAL: No focal weakness or dizziness   MSK: +right flank pain, No myalgias  SKIN: No itching, burning, rashes, or lesions

## 2023-12-10 NOTE — PATIENT PROFILE ADULT - FUNCTIONAL ASSESSMENT - BASIC MOBILITY 6.
3-calculated by average/Not able to assess (calculate score using [facility] averaging method)  3-calculated by average/Not able to assess (calculate score using [facility] averaging method)

## 2023-12-10 NOTE — ED ADULT NURSE REASSESSMENT NOTE - NS ED NURSE REASSESS COMMENT FT1
Pt received in report alert and oriented and ambulatory with assistance. VS taken and charted. pt now awaiting social work consult for possibly placement or home care assistance. Pt stable and nursing care ongoing and safety maintained.
received Pt a/ox4, no signs of acute distress, no reports of pain.  Presents with LE weakness, needs assistance to ambulate to rest room.

## 2023-12-11 LAB
A1C WITH ESTIMATED AVERAGE GLUCOSE RESULT: 5.4 % — SIGNIFICANT CHANGE UP (ref 4–5.6)
A1C WITH ESTIMATED AVERAGE GLUCOSE RESULT: 5.4 % — SIGNIFICANT CHANGE UP (ref 4–5.6)
ALBUMIN SERPL ELPH-MCNC: 3.4 G/DL — SIGNIFICANT CHANGE UP (ref 3.3–5)
ALBUMIN SERPL ELPH-MCNC: 3.4 G/DL — SIGNIFICANT CHANGE UP (ref 3.3–5)
ALP SERPL-CCNC: 60 U/L — SIGNIFICANT CHANGE UP (ref 40–120)
ALP SERPL-CCNC: 60 U/L — SIGNIFICANT CHANGE UP (ref 40–120)
ALT FLD-CCNC: 19 U/L — SIGNIFICANT CHANGE UP (ref 12–78)
ALT FLD-CCNC: 19 U/L — SIGNIFICANT CHANGE UP (ref 12–78)
ANION GAP SERPL CALC-SCNC: 5 MMOL/L — SIGNIFICANT CHANGE UP (ref 5–17)
ANION GAP SERPL CALC-SCNC: 5 MMOL/L — SIGNIFICANT CHANGE UP (ref 5–17)
AST SERPL-CCNC: 17 U/L — SIGNIFICANT CHANGE UP (ref 15–37)
AST SERPL-CCNC: 17 U/L — SIGNIFICANT CHANGE UP (ref 15–37)
BASOPHILS # BLD AUTO: 0.03 K/UL — SIGNIFICANT CHANGE UP (ref 0–0.2)
BASOPHILS # BLD AUTO: 0.03 K/UL — SIGNIFICANT CHANGE UP (ref 0–0.2)
BASOPHILS NFR BLD AUTO: 0.4 % — SIGNIFICANT CHANGE UP (ref 0–2)
BASOPHILS NFR BLD AUTO: 0.4 % — SIGNIFICANT CHANGE UP (ref 0–2)
BILIRUB SERPL-MCNC: 0.5 MG/DL — SIGNIFICANT CHANGE UP (ref 0.2–1.2)
BILIRUB SERPL-MCNC: 0.5 MG/DL — SIGNIFICANT CHANGE UP (ref 0.2–1.2)
BUN SERPL-MCNC: 19 MG/DL — SIGNIFICANT CHANGE UP (ref 7–23)
BUN SERPL-MCNC: 19 MG/DL — SIGNIFICANT CHANGE UP (ref 7–23)
CALCIUM SERPL-MCNC: 8.7 MG/DL — SIGNIFICANT CHANGE UP (ref 8.5–10.1)
CALCIUM SERPL-MCNC: 8.7 MG/DL — SIGNIFICANT CHANGE UP (ref 8.5–10.1)
CHLORIDE SERPL-SCNC: 105 MMOL/L — SIGNIFICANT CHANGE UP (ref 96–108)
CHLORIDE SERPL-SCNC: 105 MMOL/L — SIGNIFICANT CHANGE UP (ref 96–108)
CO2 SERPL-SCNC: 27 MMOL/L — SIGNIFICANT CHANGE UP (ref 22–31)
CO2 SERPL-SCNC: 27 MMOL/L — SIGNIFICANT CHANGE UP (ref 22–31)
CREAT SERPL-MCNC: 1.2 MG/DL — SIGNIFICANT CHANGE UP (ref 0.5–1.3)
CREAT SERPL-MCNC: 1.2 MG/DL — SIGNIFICANT CHANGE UP (ref 0.5–1.3)
CULTURE RESULTS: SIGNIFICANT CHANGE UP
CULTURE RESULTS: SIGNIFICANT CHANGE UP
EGFR: 45 ML/MIN/1.73M2 — LOW
EGFR: 45 ML/MIN/1.73M2 — LOW
EOSINOPHIL # BLD AUTO: 0.3 K/UL — SIGNIFICANT CHANGE UP (ref 0–0.5)
EOSINOPHIL # BLD AUTO: 0.3 K/UL — SIGNIFICANT CHANGE UP (ref 0–0.5)
EOSINOPHIL NFR BLD AUTO: 4.1 % — SIGNIFICANT CHANGE UP (ref 0–6)
EOSINOPHIL NFR BLD AUTO: 4.1 % — SIGNIFICANT CHANGE UP (ref 0–6)
ESTIMATED AVERAGE GLUCOSE: 108 MG/DL — SIGNIFICANT CHANGE UP (ref 68–114)
ESTIMATED AVERAGE GLUCOSE: 108 MG/DL — SIGNIFICANT CHANGE UP (ref 68–114)
GLUCOSE SERPL-MCNC: 95 MG/DL — SIGNIFICANT CHANGE UP (ref 70–99)
GLUCOSE SERPL-MCNC: 95 MG/DL — SIGNIFICANT CHANGE UP (ref 70–99)
HCT VFR BLD CALC: 37.9 % — SIGNIFICANT CHANGE UP (ref 34.5–45)
HCT VFR BLD CALC: 37.9 % — SIGNIFICANT CHANGE UP (ref 34.5–45)
HGB BLD-MCNC: 12.5 G/DL — SIGNIFICANT CHANGE UP (ref 11.5–15.5)
HGB BLD-MCNC: 12.5 G/DL — SIGNIFICANT CHANGE UP (ref 11.5–15.5)
IMM GRANULOCYTES NFR BLD AUTO: 0.3 % — SIGNIFICANT CHANGE UP (ref 0–0.9)
IMM GRANULOCYTES NFR BLD AUTO: 0.3 % — SIGNIFICANT CHANGE UP (ref 0–0.9)
LYMPHOCYTES # BLD AUTO: 2.48 K/UL — SIGNIFICANT CHANGE UP (ref 1–3.3)
LYMPHOCYTES # BLD AUTO: 2.48 K/UL — SIGNIFICANT CHANGE UP (ref 1–3.3)
LYMPHOCYTES # BLD AUTO: 34.3 % — SIGNIFICANT CHANGE UP (ref 13–44)
LYMPHOCYTES # BLD AUTO: 34.3 % — SIGNIFICANT CHANGE UP (ref 13–44)
MCHC RBC-ENTMCNC: 31.4 PG — SIGNIFICANT CHANGE UP (ref 27–34)
MCHC RBC-ENTMCNC: 31.4 PG — SIGNIFICANT CHANGE UP (ref 27–34)
MCHC RBC-ENTMCNC: 33 GM/DL — SIGNIFICANT CHANGE UP (ref 32–36)
MCHC RBC-ENTMCNC: 33 GM/DL — SIGNIFICANT CHANGE UP (ref 32–36)
MCV RBC AUTO: 95.2 FL — SIGNIFICANT CHANGE UP (ref 80–100)
MCV RBC AUTO: 95.2 FL — SIGNIFICANT CHANGE UP (ref 80–100)
MONOCYTES # BLD AUTO: 0.76 K/UL — SIGNIFICANT CHANGE UP (ref 0–0.9)
MONOCYTES # BLD AUTO: 0.76 K/UL — SIGNIFICANT CHANGE UP (ref 0–0.9)
MONOCYTES NFR BLD AUTO: 10.5 % — SIGNIFICANT CHANGE UP (ref 2–14)
MONOCYTES NFR BLD AUTO: 10.5 % — SIGNIFICANT CHANGE UP (ref 2–14)
NEUTROPHILS # BLD AUTO: 3.64 K/UL — SIGNIFICANT CHANGE UP (ref 1.8–7.4)
NEUTROPHILS # BLD AUTO: 3.64 K/UL — SIGNIFICANT CHANGE UP (ref 1.8–7.4)
NEUTROPHILS NFR BLD AUTO: 50.4 % — SIGNIFICANT CHANGE UP (ref 43–77)
NEUTROPHILS NFR BLD AUTO: 50.4 % — SIGNIFICANT CHANGE UP (ref 43–77)
NRBC # BLD: 0 /100 WBCS — SIGNIFICANT CHANGE UP (ref 0–0)
NRBC # BLD: 0 /100 WBCS — SIGNIFICANT CHANGE UP (ref 0–0)
PLATELET # BLD AUTO: 231 K/UL — SIGNIFICANT CHANGE UP (ref 150–400)
PLATELET # BLD AUTO: 231 K/UL — SIGNIFICANT CHANGE UP (ref 150–400)
POTASSIUM SERPL-MCNC: 4.2 MMOL/L — SIGNIFICANT CHANGE UP (ref 3.5–5.3)
POTASSIUM SERPL-MCNC: 4.2 MMOL/L — SIGNIFICANT CHANGE UP (ref 3.5–5.3)
POTASSIUM SERPL-SCNC: 4.2 MMOL/L — SIGNIFICANT CHANGE UP (ref 3.5–5.3)
POTASSIUM SERPL-SCNC: 4.2 MMOL/L — SIGNIFICANT CHANGE UP (ref 3.5–5.3)
PROT SERPL-MCNC: 6.8 G/DL — SIGNIFICANT CHANGE UP (ref 6–8.3)
PROT SERPL-MCNC: 6.8 G/DL — SIGNIFICANT CHANGE UP (ref 6–8.3)
RBC # BLD: 3.98 M/UL — SIGNIFICANT CHANGE UP (ref 3.8–5.2)
RBC # BLD: 3.98 M/UL — SIGNIFICANT CHANGE UP (ref 3.8–5.2)
RBC # FLD: 12.9 % — SIGNIFICANT CHANGE UP (ref 10.3–14.5)
RBC # FLD: 12.9 % — SIGNIFICANT CHANGE UP (ref 10.3–14.5)
SODIUM SERPL-SCNC: 137 MMOL/L — SIGNIFICANT CHANGE UP (ref 135–145)
SODIUM SERPL-SCNC: 137 MMOL/L — SIGNIFICANT CHANGE UP (ref 135–145)
SPECIMEN SOURCE: SIGNIFICANT CHANGE UP
SPECIMEN SOURCE: SIGNIFICANT CHANGE UP
WBC # BLD: 7.23 K/UL — SIGNIFICANT CHANGE UP (ref 3.8–10.5)
WBC # BLD: 7.23 K/UL — SIGNIFICANT CHANGE UP (ref 3.8–10.5)
WBC # FLD AUTO: 7.23 K/UL — SIGNIFICANT CHANGE UP (ref 3.8–10.5)
WBC # FLD AUTO: 7.23 K/UL — SIGNIFICANT CHANGE UP (ref 3.8–10.5)

## 2023-12-11 RX ORDER — CIPROFLOXACIN LACTATE 400MG/40ML
200 VIAL (ML) INTRAVENOUS EVERY 24 HOURS
Refills: 0 | Status: DISCONTINUED | OUTPATIENT
Start: 2023-12-12 | End: 2023-12-13

## 2023-12-11 RX ORDER — CIPROFLOXACIN LACTATE 400MG/40ML
VIAL (ML) INTRAVENOUS
Refills: 0 | Status: DISCONTINUED | OUTPATIENT
Start: 2023-12-11 | End: 2023-12-13

## 2023-12-11 RX ORDER — CIPROFLOXACIN LACTATE 400MG/40ML
200 VIAL (ML) INTRAVENOUS ONCE
Refills: 0 | Status: DISCONTINUED | OUTPATIENT
Start: 2023-12-11 | End: 2023-12-13

## 2023-12-11 RX ORDER — INFLUENZA VIRUS VACCINE 15; 15; 15; 15 UG/.5ML; UG/.5ML; UG/.5ML; UG/.5ML
0.7 SUSPENSION INTRAMUSCULAR ONCE
Refills: 0 | Status: DISCONTINUED | OUTPATIENT
Start: 2023-12-11 | End: 2023-12-13

## 2023-12-11 RX ADMIN — ATORVASTATIN CALCIUM 80 MILLIGRAM(S): 80 TABLET, FILM COATED ORAL at 21:49

## 2023-12-11 RX ADMIN — Medication 2000 UNIT(S): at 11:26

## 2023-12-11 RX ADMIN — Medication 25 MICROGRAM(S): at 05:57

## 2023-12-11 RX ADMIN — Medication 1 APPLICATION(S): at 05:57

## 2023-12-11 RX ADMIN — ENOXAPARIN SODIUM 30 MILLIGRAM(S): 100 INJECTION SUBCUTANEOUS at 13:39

## 2023-12-11 RX ADMIN — Medication 1 DROP(S): at 18:07

## 2023-12-11 RX ADMIN — LAMOTRIGINE 100 MILLIGRAM(S): 25 TABLET, ORALLY DISINTEGRATING ORAL at 05:57

## 2023-12-11 RX ADMIN — CITALOPRAM 20 MILLIGRAM(S): 10 TABLET, FILM COATED ORAL at 11:26

## 2023-12-11 RX ADMIN — Medication 650 MILLIGRAM(S): at 00:32

## 2023-12-11 RX ADMIN — Medication 1 APPLICATION(S): at 18:08

## 2023-12-11 RX ADMIN — Medication 1 DROP(S): at 05:56

## 2023-12-11 RX ADMIN — Medication 1 DROP(S): at 01:10

## 2023-12-11 RX ADMIN — LAMOTRIGINE 100 MILLIGRAM(S): 25 TABLET, ORALLY DISINTEGRATING ORAL at 18:08

## 2023-12-11 NOTE — PROGRESS NOTE ADULT - SUBJECTIVE AND OBJECTIVE BOX
[facility], Division of Infectious Diseases  [facility] Y. Patel, S. Shah, G. [facility]  [phone]    Name: LINN WHITE  Age: 83y  Gender: Female  MRN: [id number]    Interval History:  Patient seen and examined at bedside this morning  No acute overnight events.   Notes reviewed    Antibiotics:      Medications:  acetaminophen     Tablet .. 650 milliGRAM(s) Oral every 6 hours PRN  acetaminophen     Tablet .. 650 milliGRAM(s) Oral every 6 hours PRN  artificial  tears Solution 1 Drop(s) Both EYES every 6 hours  atorvastatin 80 milliGRAM(s) Oral at bedtime  cholecalciferol 2000 Unit(s) Oral daily  citalopram 20 milliGRAM(s) Oral daily  enoxaparin Injectable 30 milliGRAM(s) SubCutaneous every 24 hours  erythromycin   Ointment 1 Application(s) Right EYE two times a day  influenza  Vaccine (HIGH DOSE) 0.7 milliLiter(s) IntraMuscular once  lamoTRIgine 100 milliGRAM(s) Oral every 12 hours  levothyroxine 25 MICROGram(s) Oral daily  meclizine 25 milliGRAM(s) Oral every 8 hours PRN  oxybutynin 5 milliGRAM(s) Oral daily      Review of Systems:  A 10-point review of systems was obtained.     Pertinent positives and negatives--  Constitutional: No fevers. No Chills. No Rigors.   Cardiovascular: No chest pain. No palpitations.  Respiratory: No shortness of breath. No cough.  Gastrointestinal: No nausea or vomiting. No diarrhea or constipation.   Psychiatric: Pleasant. Appropriate affect.    Review of systems otherwise negative except as previously noted.    Allergies: cephalexin (Hives)  sulfa drugs (Unknown)  cephalexin (Unknown)    For details regarding the patient's past medical history, social history, family history, and other miscellaneous elements, please refer the initial infectious diseases consultation and/or the admitting history and physical examination for this admission.    Objective:  Vitals:   T(C): 36.6 (12-11-23 @ 13:56), Max: 37 (12-10-23 @ 20:25)  HR: 59 (12-11-23 @ 13:56) (56 - 63)  BP: 116/64 (12-11-23 @ 13:56) (109/67 - 175/77)  RR: 18 (12-11-23 @ 13:56) (17 - 19)  SpO2: 93% (12-11-23 @ 13:56) (93% - 96%)    Physical Examination:  General: no acute distress  HEENT: NC/AT, EOMI, anicteric, no oral lesions  Neck: supple, no palpable LAD  Cardio: S1, S2 heard, RRR, no murmurs  Resp: breath sounds heard bilaterally, no rales, wheezes or rhonchi  Abd: soft, NT, ND, + bowel sounds  Neuro: no obvious focal deficits  Ext: no edema or cyanosis  Skin: warm, dry, no visible rash      Laboratory Studies:  CBC:                       12.5   7.23  )-----------( 231      ( 11 Dec 2023 08:20 )             37.9     CMP: 12-11    137  |  105  |  19  ----------------------------<  95  4.2   |  27  |  1.20    Ca    8.7      11 Dec 2023 08:20    TPro  6.8  /  Alb  3.4  /  TBili  0.5  /  DBili  x   /  AST  17  /  ALT  19  /  AlkPhos  60  12-11    LIVER FUNCTIONS - ( 11 Dec 2023 08:20 )  Alb: 3.4 g/dL / Pro: 6.8 g/dL / ALK PHOS: 60 U/L / ALT: 19 U/L / AST: 17 U/L / GGT: x           Urinalysis Basic - ( 11 Dec 2023 08:20 )    Color: x / Appearance: x / SG: x / pH: x  Gluc: 95 mg/dL / Ketone: x  / Bili: x / Urobili: x   Blood: x / Protein: x / Nitrite: x   Leuk Esterase: x / RBC: x / WBC x   Sq Epi: x / Non Sq Epi: x / Bacteria: x        Microbiology: reviewed        Radiology: reviewed       [facility], Division of Infectious Diseases  [facility] Y. Patel, S. Shah, G. [facility]  [phone]    Name: LINN WHITE  Age: 83y  Gender: Female  MRN: [id number]    Interval History:  Patient seen and examined at bedside this morning  No acute overnight events.   Notes reviewed    Antibiotics:      Medications:  acetaminophen     Tablet .. 650 milliGRAM(s) Oral every 6 hours PRN  acetaminophen     Tablet .. 650 milliGRAM(s) Oral every 6 hours PRN  artificial  tears Solution 1 Drop(s) Both EYES every 6 hours  atorvastatin 80 milliGRAM(s) Oral at bedtime  cholecalciferol 2000 Unit(s) Oral daily  citalopram 20 milliGRAM(s) Oral daily  enoxaparin Injectable 30 milliGRAM(s) SubCutaneous every 24 hours  erythromycin   Ointment 1 Application(s) Right EYE two times a day  influenza  Vaccine (HIGH DOSE) 0.7 milliLiter(s) IntraMuscular once  lamoTRIgine 100 milliGRAM(s) Oral every 12 hours  levothyroxine 25 MICROGram(s) Oral daily  meclizine 25 milliGRAM(s) Oral every 8 hours PRN  oxybutynin 5 milliGRAM(s) Oral daily      Review of Systems:  A 10-point review of systems was obtained.     Pertinent positives and negatives--  Constitutional: No fevers. No Chills. No Rigors.   Cardiovascular: No chest pain. No palpitations.  Respiratory: No shortness of breath. No cough.  Gastrointestinal: No nausea or vomiting. No diarrhea or constipation.   Psychiatric: Pleasant. Appropriate affect.    Review of systems otherwise negative except as previously noted.    Allergies: cephalexin (Hives)  sulfa drugs (Unknown)  cephalexin (Unknown)    For details regarding the patient's past medical history, social history, family history, and other miscellaneous elements, please refer the initial infectious diseases consultation and/or the admitting history and physical examination for this admission.    Objective:  Vitals:   T(C): 36.6 (12-11-23 @ 13:56), Max: 37 (12-10-23 @ 20:25)  HR: 59 (12-11-23 @ 13:56) (56 - 63)  BP: 116/64 (12-11-23 @ 13:56) (109/67 - 175/77)  RR: 18 (12-11-23 @ 13:56) (17 - 19)  SpO2: 93% (12-11-23 @ 13:56) (93% - 96%)    Physical Examination:  General: no acute distress  HEENT: NC/AT, EOMI, anicteric, no oral lesions  Neck: supple, no palpable LAD  Cardio: S1, S2 heard, RRR, no murmurs  Resp: breath sounds heard bilaterally, no rales, wheezes or rhonchi  Abd: soft, NT, ND, + bowel sounds  Neuro: no obvious focal deficits  Ext: no edema or cyanosis  Skin: warm, dry, no visible rash      Laboratory Studies:  CBC:                       12.5   7.23  )-----------( 231      ( 11 Dec 2023 08:20 )             37.9     CMP: 12-11    137  |  105  |  19  ----------------------------<  95  4.2   |  27  |  1.20    Ca    8.7      11 Dec 2023 08:20    TPro  6.8  /  Alb  3.4  /  TBili  0.5  /  DBili  x   /  AST  17  /  ALT  19  /  AlkPhos  60  12-11    LIVER FUNCTIONS - ( 11 Dec 2023 08:20 )  Alb: 3.4 g/dL / Pro: 6.8 g/dL / ALK PHOS: 60 U/L / ALT: 19 U/L / AST: 17 U/L / GGT: x           Urinalysis Basic - ( 11 Dec 2023 08:20 )    Color: x / Appearance: x / SG: x / pH: x  Gluc: 95 mg/dL / Ketone: x  / Bili: x / Urobili: x   Blood: x / Protein: x / Nitrite: x   Leuk Esterase: x / RBC: x / WBC x   Sq Epi: x / Non Sq Epi: x / Bacteria: x        Microbiology: reviewed        Radiology: reviewed

## 2023-12-11 NOTE — PROGRESS NOTE ADULT - ASSESSMENT
83F with thyroid, CVA presents with a fall    UTI  seen by ID  started on cipro  f/u cx    thyroid  continue synthroid

## 2023-12-11 NOTE — PROGRESS NOTE ADULT - SUBJECTIVE AND OBJECTIVE BOX
Patient is a 83y old  Female who presents with a chief complaint of Fall (11 Dec 2023 14:44)        INTERVAL HPI/OVERNIGHT EVENTS:   no complaints  pt seen and examined         Vital Signs Last 24 Hrs  T(C): 36.6 (11 Dec 2023 13:56), Max: 37 (10 Dec 2023 20:25)  T(F): 97.8 (11 Dec 2023 13:56), Max: 98.6 (10 Dec 2023 20:25)  HR: 59 (11 Dec 2023 13:56) (56 - 60)  BP: 116/64 (11 Dec 2023 13:56) (109/67 - 175/77)  BP(mean): --  RR: 18 (11 Dec 2023 13:56) (18 - 19)  SpO2: 93% (11 Dec 2023 13:56) (93% - 96%)    Parameters below as of 11 Dec 2023 13:56  Patient On (Oxygen Delivery Method): room air        acetaminophen     Tablet .. 650 milliGRAM(s) Oral every 6 hours PRN  acetaminophen     Tablet .. 650 milliGRAM(s) Oral every 6 hours PRN  artificial  tears Solution 1 Drop(s) Both EYES every 6 hours  atorvastatin 80 milliGRAM(s) Oral at bedtime  cholecalciferol 2000 Unit(s) Oral daily  ciprofloxacin   IVPB 200 milliGRAM(s) IV Intermittent once  ciprofloxacin   IVPB      citalopram 20 milliGRAM(s) Oral daily  enoxaparin Injectable 30 milliGRAM(s) SubCutaneous every 24 hours  erythromycin   Ointment 1 Application(s) Right EYE two times a day  influenza  Vaccine (HIGH DOSE) 0.7 milliLiter(s) IntraMuscular once  lamoTRIgine 100 milliGRAM(s) Oral every 12 hours  levothyroxine 25 MICROGram(s) Oral daily  meclizine 25 milliGRAM(s) Oral every 8 hours PRN  oxybutynin 5 milliGRAM(s) Oral daily      PHYSICAL EXAM:  GENERAL: NAD   EYES: conjunctiva and sclera clear  ENMT: Moist mucous membranes  NECK: Supple, No JVD, Normal thyroid  CHEST/LUNG: non labored, cta b/l  HEART: Regular rate and rhythm; No murmurs, rubs, or gallops  ABDOMEN: Soft, Nontender, Nondistended; Bowel sounds present  EXTREMITIES:  2+ Peripheral Pulses, No clubbing, no cyanosis, no edema  LYMPH: No lymphadenopathy noted  SKIN: No rashes or lesions  NEURO: no focal deficits    Consultant(s) Notes Reviewed:  [x ] YES  [ ] NO  Care Discussed with Consultants/Other Providers [ x] YES  [ ] NO    LABS:                        12.5   7.23  )-----------( 231      ( 11 Dec 2023 08:20 )             37.9     12-11    137  |  105  |  19  ----------------------------<  95  4.2   |  27  |  1.20    Ca    8.7      11 Dec 2023 08:20    TPro  6.8  /  Alb  3.4  /  TBili  0.5  /  DBili  x   /  AST  17  /  ALT  19  /  AlkPhos  60  12-11      Urinalysis Basic - ( 11 Dec 2023 08:20 )    Color: x / Appearance: x / SG: x / pH: x  Gluc: 95 mg/dL / Ketone: x  / Bili: x / Urobili: x   Blood: x / Protein: x / Nitrite: x   Leuk Esterase: x / RBC: x / WBC x   Sq Epi: x / Non Sq Epi: x / Bacteria: x      CAPILLARY BLOOD GLUCOSE            Urinalysis Basic - ( 11 Dec 2023 08:20 )    Color: x / Appearance: x / SG: x / pH: x  Gluc: 95 mg/dL / Ketone: x  / Bili: x / Urobili: x   Blood: x / Protein: x / Nitrite: x   Leuk Esterase: x / RBC: x / WBC x   Sq Epi: x / Non Sq Epi: x / Bacteria: x          RADIOLOGY & ADDITIONAL TESTS:    Imaging Personally Reviewed  Reviewed consultants input

## 2023-12-11 NOTE — PROGRESS NOTE ADULT - ASSESSMENT
84 yo F with PMHx of CVA, Depression, Hypothyroidism, HLD, Dizzness, Recurrent UTIs presents to the ED following a fall, admitted for placement     Acute Cystitis  Hx of recurrent UTIs, Pessary in place for 1.5 months (typically changed at 2 months)   UA noted    Recommendations:   Start ciprofloxacin 200mg q24h x3 day course  F/u UCx results  Erythromycin BID for eye  Additional care per primary team    Infectious Diseases will continue to follow. Please call with any questions.   Elvira Valdez M.D.  [facility] Division of Infectious Diseases [phone]  For after 5 P.M. and weekends, please call [phone]   82 yo F with PMHx of CVA, Depression, Hypothyroidism, HLD, Dizzness, Recurrent UTIs presents to the ED following a fall, admitted for placement     Acute Cystitis  Hx of recurrent UTIs, Pessary in place for 1.5 months (typically changed at 2 months)   UA noted    Recommendations:   Start ciprofloxacin 200mg q24h x3 day course  F/u UCx results  Erythromycin BID for eye  Additional care per primary team    Infectious Diseases will continue to follow. Please call with any questions.   Elvira Valdez M.D.  [facility] Division of Infectious Diseases [phone]  For after 5 P.M. and weekends, please call [phone]

## 2023-12-11 NOTE — SOCIAL WORK PROGRESS NOTE - NSSWPROGRESSNOTE_GEN_ALL_CORE
Son Brian reports he is unable to intervene with his mother as she she has rejected attempts prior by his brother. He does not know if she has any help at home, cant encourage her to pick a EDWIN in addition to Radha.

## 2023-12-12 RX ORDER — CITALOPRAM 10 MG/1
20 TABLET, FILM COATED ORAL AT BEDTIME
Refills: 0 | Status: DISCONTINUED | OUTPATIENT
Start: 2023-12-12 | End: 2023-12-13

## 2023-12-12 RX ADMIN — Medication 1 DROP(S): at 06:27

## 2023-12-12 RX ADMIN — Medication 1 DROP(S): at 23:51

## 2023-12-12 RX ADMIN — Medication 1 DROP(S): at 12:10

## 2023-12-12 RX ADMIN — Medication 25 MICROGRAM(S): at 06:26

## 2023-12-12 RX ADMIN — Medication 2000 UNIT(S): at 12:10

## 2023-12-12 RX ADMIN — Medication 1 APPLICATION(S): at 17:41

## 2023-12-12 RX ADMIN — Medication 1 APPLICATION(S): at 06:27

## 2023-12-12 RX ADMIN — Medication 1 DROP(S): at 17:40

## 2023-12-12 RX ADMIN — Medication 200 MILLIGRAM(S): at 14:22

## 2023-12-12 RX ADMIN — Medication 5 MILLIGRAM(S): at 12:10

## 2023-12-12 RX ADMIN — LAMOTRIGINE 100 MILLIGRAM(S): 25 TABLET, ORALLY DISINTEGRATING ORAL at 06:26

## 2023-12-12 RX ADMIN — ENOXAPARIN SODIUM 30 MILLIGRAM(S): 100 INJECTION SUBCUTANEOUS at 12:39

## 2023-12-12 RX ADMIN — CITALOPRAM 20 MILLIGRAM(S): 10 TABLET, FILM COATED ORAL at 22:17

## 2023-12-12 NOTE — PROGRESS NOTE ADULT - SUBJECTIVE AND OBJECTIVE BOX
[facility], Division of Infectious Diseases  ALPA Carroll Y. Patel, S. Shah, G. [facility]  [phone]    Name: LINN WHITE  Age: 83y  Gender: Female  MRN: [id number]    Interval History:  Patient seen and examined at bedside this morning  No acute overnight events. Afebrile  No complaints  Notes reviewed    Antibiotics:  ciprofloxacin   IVPB 200 milliGRAM(s) IV Intermittent once  ciprofloxacin   IVPB      ciprofloxacin   IVPB 200 milliGRAM(s) IV Intermittent every 24 hours      Medications:  acetaminophen     Tablet .. 650 milliGRAM(s) Oral every 6 hours PRN  acetaminophen     Tablet .. 650 milliGRAM(s) Oral every 6 hours PRN  artificial  tears Solution 1 Drop(s) Both EYES every 6 hours  atorvastatin 80 milliGRAM(s) Oral at bedtime  cholecalciferol 2000 Unit(s) Oral daily  ciprofloxacin   IVPB      ciprofloxacin   IVPB 200 milliGRAM(s) IV Intermittent every 24 hours  ciprofloxacin   IVPB 200 milliGRAM(s) IV Intermittent once  citalopram 20 milliGRAM(s) Oral daily  enoxaparin Injectable 30 milliGRAM(s) SubCutaneous every 24 hours  erythromycin   Ointment 1 Application(s) Right EYE two times a day  influenza  Vaccine (HIGH DOSE) 0.7 milliLiter(s) IntraMuscular once  lamoTRIgine 100 milliGRAM(s) Oral every 12 hours  levothyroxine 25 MICROGram(s) Oral daily  meclizine 25 milliGRAM(s) Oral every 8 hours PRN  oxybutynin 5 milliGRAM(s) Oral daily      Review of Systems:  A 10-point review of systems was obtained.    Review of systems otherwise negative except as previously noted.    Allergies: cephalexin (Hives)  sulfa drugs (Unknown)  cephalexin (Unknown)    For details regarding the patient's past medical history, social history, family history, and other miscellaneous elements, please refer the initial infectious diseases consultation and/or the admitting history and physical examination for this admission.    Objective:  Vitals:   T(C): 36.7 (12-12-23 @ 11:33), Max: 37.1 (12-11-23 @ 20:05)  HR: 60 (12-12-23 @ 11:33) (57 - 60)  BP: 147/72 (12-12-23 @ 11:33) (105/65 - 147/72)  RR: 18 (12-12-23 @ 11:33) (18 - 18)  SpO2: 92% (12-12-23 @ 11:33) (92% - 94%)    Physical Examination:  General: no acute distress  HEENT: NC/AT, EOMI, R eye improving  Cardio: S1, S2 heard, RRR, no murmurs  Resp: breath sounds heard bilaterally, no rales, wheezes or rhonchi  Abd: soft, NT, ND,  Ext: no edema or cyanosis  Skin: warm, dry, no visible rash      Laboratory Studies:  CBC:                       12.5   7.23  )-----------( 231      ( 11 Dec 2023 08:20 )             37.9     CMP: 12-11    137  |  105  |  19  ----------------------------<  95  4.2   |  27  |  1.20    Ca    8.7      11 Dec 2023 08:20    TPro  6.8  /  Alb  3.4  /  TBili  0.5  /  DBili  x   /  AST  17  /  ALT  19  /  AlkPhos  60  12-11    LIVER FUNCTIONS - ( 11 Dec 2023 08:20 )  Alb: 3.4 g/dL / Pro: 6.8 g/dL / ALK PHOS: 60 U/L / ALT: 19 U/L / AST: 17 U/L / GGT: x           Urinalysis Basic - ( 11 Dec 2023 08:20 )    Color: x / Appearance: x / SG: x / pH: x  Gluc: 95 mg/dL / Ketone: x  / Bili: x / Urobili: x   Blood: x / Protein: x / Nitrite: x   Leuk Esterase: x / RBC: x / WBC x   Sq Epi: x / Non Sq Epi: x / Bacteria: x        Microbiology: reviewed    Culture - Urine (collected 12-10-23 @ 17:45)  Source: Clean Catch Clean Catch (Midstream)  Final Report (12-11-23 @ 18:42):    Normal Urogenital heladio present          Radiology: reviewed       [facility], Division of Infectious Diseases  AL[facility] Carroll Y. Patel, S. Shah, G. [facility]  [phone]    Name: LINN WHITE  Age: 83y  Gender: Female  MRN: [id number]    Interval History:  Patient seen and examined at bedside this morning  No acute overnight events. Afebrile  No complaints  Notes reviewed    Antibiotics:  ciprofloxacin   IVPB 200 milliGRAM(s) IV Intermittent once  ciprofloxacin   IVPB      ciprofloxacin   IVPB 200 milliGRAM(s) IV Intermittent every 24 hours      Medications:  acetaminophen     Tablet .. 650 milliGRAM(s) Oral every 6 hours PRN  acetaminophen     Tablet .. 650 milliGRAM(s) Oral every 6 hours PRN  artificial  tears Solution 1 Drop(s) Both EYES every 6 hours  atorvastatin 80 milliGRAM(s) Oral at bedtime  cholecalciferol 2000 Unit(s) Oral daily  ciprofloxacin   IVPB      ciprofloxacin   IVPB 200 milliGRAM(s) IV Intermittent every 24 hours  ciprofloxacin   IVPB 200 milliGRAM(s) IV Intermittent once  citalopram 20 milliGRAM(s) Oral daily  enoxaparin Injectable 30 milliGRAM(s) SubCutaneous every 24 hours  erythromycin   Ointment 1 Application(s) Right EYE two times a day  influenza  Vaccine (HIGH DOSE) 0.7 milliLiter(s) IntraMuscular once  lamoTRIgine 100 milliGRAM(s) Oral every 12 hours  levothyroxine 25 MICROGram(s) Oral daily  meclizine 25 milliGRAM(s) Oral every 8 hours PRN  oxybutynin 5 milliGRAM(s) Oral daily      Review of Systems:  A 10-point review of systems was obtained.    Review of systems otherwise negative except as previously noted.    Allergies: cephalexin (Hives)  sulfa drugs (Unknown)  cephalexin (Unknown)    For details regarding the patient's past medical history, social history, family history, and other miscellaneous elements, please refer the initial infectious diseases consultation and/or the admitting history and physical examination for this admission.    Objective:  Vitals:   T(C): 36.7 (12-12-23 @ 11:33), Max: 37.1 (12-11-23 @ 20:05)  HR: 60 (12-12-23 @ 11:33) (57 - 60)  BP: 147/72 (12-12-23 @ 11:33) (105/65 - 147/72)  RR: 18 (12-12-23 @ 11:33) (18 - 18)  SpO2: 92% (12-12-23 @ 11:33) (92% - 94%)    Physical Examination:  General: no acute distress  HEENT: NC/AT, EOMI, R eye improving  Cardio: S1, S2 heard, RRR, no murmurs  Resp: breath sounds heard bilaterally, no rales, wheezes or rhonchi  Abd: soft, NT, ND,  Ext: no edema or cyanosis  Skin: warm, dry, no visible rash      Laboratory Studies:  CBC:                       12.5   7.23  )-----------( 231      ( 11 Dec 2023 08:20 )             37.9     CMP: 12-11    137  |  105  |  19  ----------------------------<  95  4.2   |  27  |  1.20    Ca    8.7      11 Dec 2023 08:20    TPro  6.8  /  Alb  3.4  /  TBili  0.5  /  DBili  x   /  AST  17  /  ALT  19  /  AlkPhos  60  12-11    LIVER FUNCTIONS - ( 11 Dec 2023 08:20 )  Alb: 3.4 g/dL / Pro: 6.8 g/dL / ALK PHOS: 60 U/L / ALT: 19 U/L / AST: 17 U/L / GGT: x           Urinalysis Basic - ( 11 Dec 2023 08:20 )    Color: x / Appearance: x / SG: x / pH: x  Gluc: 95 mg/dL / Ketone: x  / Bili: x / Urobili: x   Blood: x / Protein: x / Nitrite: x   Leuk Esterase: x / RBC: x / WBC x   Sq Epi: x / Non Sq Epi: x / Bacteria: x        Microbiology: reviewed    Culture - Urine (collected 12-10-23 @ 17:45)  Source: Clean Catch Clean Catch (Midstream)  Final Report (12-11-23 @ 18:42):    Normal Urogenital heladio present          Radiology: reviewed

## 2023-12-12 NOTE — PHARMACOTHERAPY INTERVENTION NOTE - COMMENTS
patient was appreciative   she mentioned that celexa giving  during morning cause more depression and insisted to give it at night patient was appreciative   she mentioned that celexa giving  during morning cause more depression and insisted to give it at night    Discussed medication list with the patient including indications, directions, and adverse effects to monitor. Patient verbalized understanding and had no further questions.

## 2023-12-12 NOTE — PROGRESS NOTE ADULT - SUBJECTIVE AND OBJECTIVE BOX
Patient is a 83y old  Female who presents with a chief complaint of Fall (12 Dec 2023 11:41)        INTERVAL HPI/OVERNIGHT EVENTS:   no complaints  pt seen and examined         Vital Signs Last 24 Hrs  T(C): 36.7 (12 Dec 2023 11:33), Max: 37.1 (11 Dec 2023 20:05)  T(F): 98 (12 Dec 2023 11:33), Max: 98.7 (11 Dec 2023 20:05)  HR: 60 (12 Dec 2023 11:33) (57 - 60)  BP: 147/72 (12 Dec 2023 11:33) (105/65 - 147/72)  BP(mean): --  RR: 18 (12 Dec 2023 11:33) (18 - 18)  SpO2: 92% (12 Dec 2023 11:33) (92% - 94%)    Parameters below as of 12 Dec 2023 11:33  Patient On (Oxygen Delivery Method): room air        acetaminophen     Tablet .. 650 milliGRAM(s) Oral every 6 hours PRN  acetaminophen     Tablet .. 650 milliGRAM(s) Oral every 6 hours PRN  artificial  tears Solution 1 Drop(s) Both EYES every 6 hours  atorvastatin 80 milliGRAM(s) Oral at bedtime  cholecalciferol 2000 Unit(s) Oral daily  ciprofloxacin   IVPB      ciprofloxacin   IVPB 200 milliGRAM(s) IV Intermittent every 24 hours  ciprofloxacin   IVPB 200 milliGRAM(s) IV Intermittent once  citalopram 20 milliGRAM(s) Oral daily  enoxaparin Injectable 30 milliGRAM(s) SubCutaneous every 24 hours  erythromycin   Ointment 1 Application(s) Right EYE two times a day  influenza  Vaccine (HIGH DOSE) 0.7 milliLiter(s) IntraMuscular once  lamoTRIgine 100 milliGRAM(s) Oral every 12 hours  levothyroxine 25 MICROGram(s) Oral daily  meclizine 25 milliGRAM(s) Oral every 8 hours PRN  oxybutynin 5 milliGRAM(s) Oral daily      PHYSICAL EXAM:  GENERAL: NAD   EYES: conjunctiva and sclera clear  ENMT: Moist mucous membranes  NECK: Supple, No JVD, Normal thyroid  CHEST/LUNG: non labored, cta b/l  HEART: Regular rate and rhythm; No murmurs, rubs, or gallops  ABDOMEN: Soft, Nontender, Nondistended; Bowel sounds present  EXTREMITIES:  2+ Peripheral Pulses, No clubbing, no cyanosis, no edema  LYMPH: No lymphadenopathy noted  SKIN: No rashes or lesions  NEURO: no focal deficits    Consultant(s) Notes Reviewed:  [x ] YES  [ ] NO  Care Discussed with Consultants/Other Providers [ x] YES  [ ] NO    LABS:                        12.5   7.23  )-----------( 231      ( 11 Dec 2023 08:20 )             37.9     12-11    137  |  105  |  19  ----------------------------<  95  4.2   |  27  |  1.20    Ca    8.7      11 Dec 2023 08:20    TPro  6.8  /  Alb  3.4  /  TBili  0.5  /  DBili  x   /  AST  17  /  ALT  19  /  AlkPhos  60  12-11      Urinalysis Basic - ( 11 Dec 2023 08:20 )    Color: x / Appearance: x / SG: x / pH: x  Gluc: 95 mg/dL / Ketone: x  / Bili: x / Urobili: x   Blood: x / Protein: x / Nitrite: x   Leuk Esterase: x / RBC: x / WBC x   Sq Epi: x / Non Sq Epi: x / Bacteria: x      CAPILLARY BLOOD GLUCOSE            Urinalysis Basic - ( 11 Dec 2023 08:20 )    Color: x / Appearance: x / SG: x / pH: x  Gluc: 95 mg/dL / Ketone: x  / Bili: x / Urobili: x   Blood: x / Protein: x / Nitrite: x   Leuk Esterase: x / RBC: x / WBC x   Sq Epi: x / Non Sq Epi: x / Bacteria: x        Culture - Urine (collected 10 Dec 2023 17:45)  Source: Clean Catch Clean Catch (Midstream)  Final Report (11 Dec 2023 18:42):    Normal Urogenital heladio present        RADIOLOGY & ADDITIONAL TESTS:    Imaging Personally Reviewed  Reviewed consultants input

## 2023-12-12 NOTE — PROGRESS NOTE ADULT - ASSESSMENT
82 yo F with PMHx of CVA, Depression, Hypothyroidism, HLD, Dizzness, Recurrent UTIs presents to the ED following a fall, admitted for placement     Acute Cystitis  Hx of recurrent UTIs, Pessary in place for 1.5 months (typically changed at 2 months)   UA noted, UCx NGTD    Recommendations:   C/w ciprofloxacin 200mg q24h x3 day course 12/13  Erythromycin BID for eye  Additional care per primary team    Infectious Diseases will continue to follow. Please call with any questions.   Elvira Valdez M.D.  [facility] Division of Infectious Diseases [phone]  For after 5 P.M. and weekends, please call [phone]   84 yo F with PMHx of CVA, Depression, Hypothyroidism, HLD, Dizzness, Recurrent UTIs presents to the ED following a fall, admitted for placement     Acute Cystitis  Hx of recurrent UTIs, Pessary in place for 1.5 months (typically changed at 2 months)   UA noted, UCx NGTD    Recommendations:   C/w ciprofloxacin 200mg q24h x3 day course 12/13  Erythromycin BID for eye  Additional care per primary team    Infectious Diseases will continue to follow. Please call with any questions.   Elvira Valdez M.D.  [facility] Division of Infectious Diseases [phone]  For after 5 P.M. and weekends, please call [phone]

## 2023-12-12 NOTE — PROGRESS NOTE ADULT - ASSESSMENT
83F with thyroid, CVA presents with a fall    UTI  seen by ID  started on cipro  f/u cx    thyroid  continue synthroid    conjunctivitis  erythromycin bid    dispo  rehab tomorrow Dupixent Counseling: I discussed with the patient the risks of dupilumab including but not limited to eye infection and irritation, cold sores, injection site reactions, worsening of asthma, allergic reactions and increased risk of parasitic infection.  Live vaccines should be avoided while taking dupilumab. Dupilumab will also interact with certain medications such as warfarin and cyclosporine. The patient understands that monitoring is required and they must alert us or the primary physician if symptoms of infection or other concerning signs are noted.

## 2023-12-13 ENCOUNTER — TRANSCRIPTION ENCOUNTER (OUTPATIENT)
Age: 83
End: 2023-12-13

## 2023-12-13 VITALS
RESPIRATION RATE: 17 BRPM | OXYGEN SATURATION: 94 % | DIASTOLIC BLOOD PRESSURE: 78 MMHG | SYSTOLIC BLOOD PRESSURE: 145 MMHG | HEART RATE: 58 BPM | TEMPERATURE: 98 F

## 2023-12-13 PROCEDURE — 80053 COMPREHEN METABOLIC PANEL: CPT

## 2023-12-13 PROCEDURE — 36415 COLL VENOUS BLD VENIPUNCTURE: CPT

## 2023-12-13 PROCEDURE — 97530 THERAPEUTIC ACTIVITIES: CPT

## 2023-12-13 PROCEDURE — 84443 ASSAY THYROID STIM HORMONE: CPT

## 2023-12-13 PROCEDURE — 85025 COMPLETE CBC W/AUTO DIFF WBC: CPT

## 2023-12-13 PROCEDURE — 73502 X-RAY EXAM HIP UNI 2-3 VIEWS: CPT

## 2023-12-13 PROCEDURE — 87086 URINE CULTURE/COLONY COUNT: CPT

## 2023-12-13 PROCEDURE — 72125 CT NECK SPINE W/O DYE: CPT | Mod: MA

## 2023-12-13 PROCEDURE — 70450 CT HEAD/BRAIN W/O DYE: CPT | Mod: MA

## 2023-12-13 PROCEDURE — 93005 ELECTROCARDIOGRAM TRACING: CPT

## 2023-12-13 PROCEDURE — 97162 PT EVAL MOD COMPLEX 30 MIN: CPT

## 2023-12-13 PROCEDURE — 83036 HEMOGLOBIN GLYCOSYLATED A1C: CPT

## 2023-12-13 PROCEDURE — 97116 GAIT TRAINING THERAPY: CPT

## 2023-12-13 PROCEDURE — 81001 URINALYSIS AUTO W/SCOPE: CPT

## 2023-12-13 PROCEDURE — 85027 COMPLETE CBC AUTOMATED: CPT

## 2023-12-13 PROCEDURE — 99285 EMERGENCY DEPT VISIT HI MDM: CPT | Mod: 25

## 2023-12-13 RX ORDER — ERYTHROMYCIN BASE 5 MG/GRAM
1 OINTMENT (GRAM) OPHTHALMIC (EYE)
Qty: 0 | Refills: 0 | DISCHARGE
Start: 2023-12-13

## 2023-12-13 RX ORDER — CIPROFLOXACIN LACTATE 400MG/40ML
250 VIAL (ML) INTRAVENOUS DAILY
Refills: 0 | Status: DISCONTINUED | OUTPATIENT
Start: 2023-12-14 | End: 2023-12-13

## 2023-12-13 RX ADMIN — Medication 1 APPLICATION(S): at 06:06

## 2023-12-13 RX ADMIN — Medication 25 MICROGRAM(S): at 06:07

## 2023-12-13 RX ADMIN — Medication 2000 UNIT(S): at 11:42

## 2023-12-13 RX ADMIN — Medication 1 DROP(S): at 11:42

## 2023-12-13 RX ADMIN — Medication 1 DROP(S): at 06:06

## 2023-12-13 RX ADMIN — LAMOTRIGINE 100 MILLIGRAM(S): 25 TABLET, ORALLY DISINTEGRATING ORAL at 06:06

## 2023-12-13 NOTE — DISCHARGE NOTE NURSING/CASE MANAGEMENT/SOCIAL WORK - NSDCFUADDAPPT_GEN_ALL_CORE_FT
Discharge to [facility] subacute rehab Discharge to [facility] subacute rehab

## 2023-12-13 NOTE — DISCHARGE NOTE NURSING/CASE MANAGEMENT/SOCIAL WORK - PATIENT PORTAL LINK FT
You can access the FollowMyHealth Patient Portal offered by [facility] by registering at the following website: [email or web address]. By joining [employer]’s FollowMyHealth portal, you will also be able to view your health information using other applications (apps) compatible with our system. You can access the FollowMyHealth Patient Portal offered by [facility] by registering at the following website: [email or web address]. By joining [employer]’s FollowMyHealth portal, you will also be able to view your health information using other applications (apps) compatible with our system.

## 2023-12-13 NOTE — DISCHARGE NOTE PROVIDER - NSDCMRMEDTOKEN_GEN_ALL_CORE_FT
atorvastatin 80 mg oral tablet: 1 tab(s) orally once a day (at bedtime)  CeleXA 20 mg oral tablet: 1 tab(s) orally once a day  cholecalciferol 25 mcg (1000 intl units) oral tablet: 2 tab(s) orally once a day  ciprofloxacin 250 mg oral tablet: 1 tab(s) orally once a day  erythromycin 0.5% ophthalmic ointment: 1 application to each affected eye 2 times a day  LaMICtal  mg oral tablet, extended release: 1 tab(s) orally once a day  levothyroxine 25 mcg (0.025 mg) oral tablet: 1 tab(s) orally once a day  meclizine 25 mg oral tablet: 1 tab(s) orally every 8 hours as needed for Dizziness MDD: 3  ocular lubricant ophthalmic solution: 1 drop(s) to each affected eye every 6 hours  oxyBUTYnin 5 mg oral tablet: 1 tab(s) orally once a day

## 2023-12-13 NOTE — DISCHARGE NOTE PROVIDER - CARE PROVIDER_API CALL
Raffaele Salcedo  Internal Medicine  [address], [address]  [place] [address]  Phone: [phone]  Fax: [phone]  Follow Up Time: 2 weeks   Raffaele Salcedo  Internal Medicine  [address], [address]  [place] [address]  Phone: [phone]  Fax: [phone]  Follow Up Time: 2 weeks

## 2023-12-13 NOTE — SOCIAL WORK PROGRESS NOTE - NSSWPROGRESSNOTE_GEN_ALL_CORE
The patient is discharged to [facility] subacute rehab [facility] [place]. ( [facility] ) .She is in agreement. Copy of chart to follow The patient is discharged to [facility] subacute rehab [facility] [place]. ( [facility] ) .She is in agreement. Copy of chart to follow

## 2023-12-13 NOTE — DISCHARGE NOTE PROVIDER - HOSPITAL COURSE
83F with hx of hypothyroid, CVA, anxiety presents with a fall  xray hip neg,  CTH and Ct c-spine unremarkable    UTI: complete cipro course till 12/15  dce dto rehab    Vitals last 24 hrs  T(C): 36.8 (12-13-23 @ 11:23), Max: 36.8 (12-13-23 @ 11:23)  HR: 58 (12-13-23 @ 11:23) (55 - 63)  BP: 145/78 (12-13-23 @ 11:23) (122/70 - 155/77)  RR: 17 (12-13-23 @ 11:23) (17 - 18)  SpO2: 94% (12-13-23 @ 11:23) (94% - 96%)    PHYSICAL EXAM:  GENERAL: NAD, well-groomed, well-developed  HEAD:  Atraumatic, Normocephalic  EYES: EOMI, PERRLA, conjunctiva and sclera clear  ENMT: No tonsillar erythema, exudates, or enlargement; Moist mucous membranes  NECK: Supple, No JVD, Normal thyroid  HEART: Regular rate and rhythm; No murmurs, rubs, or gallops  RESPIRATORY: CTA B/L, No W/R/R  ABDOMEN: Soft, Nontender, Nondistended; Bowel sounds present  NEUROLOGY: A&Ox3, nonfocal, moving all extremities  EXTREMITIES:  2+ Peripheral Pulses, No clubbing, cyanosis, or edema  SKIN: warm, dry, normal color, no rash or abnormal lesions

## 2023-12-13 NOTE — DISCHARGE NOTE PROVIDER - NSDCCPCAREPLAN_GEN_ALL_CORE_FT
PRINCIPAL DISCHARGE DIAGNOSIS  Diagnosis: Head injury  Assessment and Plan of Treatment: sp fall , CTH, CT spine, xar hip neg      SECONDARY DISCHARGE DIAGNOSES  Diagnosis: Fall  Assessment and Plan of Treatment:     Diagnosis: Acute UTI  Assessment and Plan of Treatment: cipro till 12/15    Diagnosis: Conjunctivitis  Assessment and Plan of Treatment:

## 2023-12-13 NOTE — PROGRESS NOTE ADULT - SUBJECTIVE AND OBJECTIVE BOX
[facility], Division of Infectious Diseases  ALPA Carroll Y. Patel, S. Shah, G. [facility]  [phone]    Name: LINN WHITE  Age: 83y  Gender: Female  MRN: [id number]    Interval History:  Patient seen and examined at bedside  No acute overnight events. Afebrile  No complaints  Notes reviewed    Antibiotics:  ciprofloxacin   IVPB 200 milliGRAM(s) IV Intermittent once  ciprofloxacin   IVPB      ciprofloxacin   IVPB 200 milliGRAM(s) IV Intermittent every 24 hours      Medications:  acetaminophen     Tablet .. 650 milliGRAM(s) Oral every 6 hours PRN  acetaminophen     Tablet .. 650 milliGRAM(s) Oral every 6 hours PRN  artificial  tears Solution 1 Drop(s) Both EYES every 6 hours  atorvastatin 80 milliGRAM(s) Oral at bedtime  cholecalciferol 2000 Unit(s) Oral daily  ciprofloxacin   IVPB 200 milliGRAM(s) IV Intermittent once  ciprofloxacin   IVPB      ciprofloxacin   IVPB 200 milliGRAM(s) IV Intermittent every 24 hours  citalopram 20 milliGRAM(s) Oral at bedtime  enoxaparin Injectable 30 milliGRAM(s) SubCutaneous every 24 hours  erythromycin   Ointment 1 Application(s) Right EYE two times a day  influenza  Vaccine (HIGH DOSE) 0.7 milliLiter(s) IntraMuscular once  lamoTRIgine 100 milliGRAM(s) Oral every 12 hours  levothyroxine 25 MICROGram(s) Oral daily  meclizine 25 milliGRAM(s) Oral every 8 hours PRN  oxybutynin 5 milliGRAM(s) Oral daily      Review of Systems:  Review of systems otherwise negative except as previously noted.    Allergies: cephalexin (Hives)  sulfa drugs (Unknown)  cephalexin (Unknown)    For details regarding the patient's past medical history, social history, family history, and other miscellaneous elements, please refer the initial infectious diseases consultation and/or the admitting history and physical examination for this admission.    Objective:  Vitals:   T(C): 36.3 (12-13-23 @ 04:04), Max: 36.7 (12-12-23 @ 11:33)  HR: 63 (12-13-23 @ 08:32) (55 - 63)  BP: 122/70 (12-13-23 @ 08:32) (122/70 - 155/77)  RR: 18 (12-13-23 @ 04:04) (18 - 18)  SpO2: 94% (12-13-23 @ 04:04) (92% - 96%)    Physical Examination:  General: no acute distress  HEENT: NC/AT, EOMI,   Cardio: RRR  Resp: decreased b/l breath sounds  Abd: soft, NT, ND,  Ext: no edema or cyanosis  Skin: warm, dry, no visible rash      Laboratory Studies:  CBC:   CMP:             Microbiology: reviewed    Culture - Urine (collected 12-10-23 @ 17:45)  Source: Clean Catch Clean Catch (Midstream)  Final Report (12-11-23 @ 18:42):    Normal Urogenital heladio present          Radiology: reviewed       [facility], Division of Infectious Diseases  [facility] Carroll Y. Patel, S. Shah, G. [facility]  [phone]    Name: LINN WHITE  Age: 83y  Gender: Female  MRN: [id number]    Interval History:  Patient seen and examined at bedside  No acute overnight events. Afebrile  No complaints  Notes reviewed    Antibiotics:  ciprofloxacin   IVPB 200 milliGRAM(s) IV Intermittent once  ciprofloxacin   IVPB      ciprofloxacin   IVPB 200 milliGRAM(s) IV Intermittent every 24 hours      Medications:  acetaminophen     Tablet .. 650 milliGRAM(s) Oral every 6 hours PRN  acetaminophen     Tablet .. 650 milliGRAM(s) Oral every 6 hours PRN  artificial  tears Solution 1 Drop(s) Both EYES every 6 hours  atorvastatin 80 milliGRAM(s) Oral at bedtime  cholecalciferol 2000 Unit(s) Oral daily  ciprofloxacin   IVPB 200 milliGRAM(s) IV Intermittent once  ciprofloxacin   IVPB      ciprofloxacin   IVPB 200 milliGRAM(s) IV Intermittent every 24 hours  citalopram 20 milliGRAM(s) Oral at bedtime  enoxaparin Injectable 30 milliGRAM(s) SubCutaneous every 24 hours  erythromycin   Ointment 1 Application(s) Right EYE two times a day  influenza  Vaccine (HIGH DOSE) 0.7 milliLiter(s) IntraMuscular once  lamoTRIgine 100 milliGRAM(s) Oral every 12 hours  levothyroxine 25 MICROGram(s) Oral daily  meclizine 25 milliGRAM(s) Oral every 8 hours PRN  oxybutynin 5 milliGRAM(s) Oral daily      Review of Systems:  Review of systems otherwise negative except as previously noted.    Allergies: cephalexin (Hives)  sulfa drugs (Unknown)  cephalexin (Unknown)    For details regarding the patient's past medical history, social history, family history, and other miscellaneous elements, please refer the initial infectious diseases consultation and/or the admitting history and physical examination for this admission.    Objective:  Vitals:   T(C): 36.3 (12-13-23 @ 04:04), Max: 36.7 (12-12-23 @ 11:33)  HR: 63 (12-13-23 @ 08:32) (55 - 63)  BP: 122/70 (12-13-23 @ 08:32) (122/70 - 155/77)  RR: 18 (12-13-23 @ 04:04) (18 - 18)  SpO2: 94% (12-13-23 @ 04:04) (92% - 96%)    Physical Examination:  General: no acute distress  HEENT: NC/AT, EOMI,   Cardio: RRR  Resp: decreased b/l breath sounds  Abd: soft, NT, ND,  Ext: no edema or cyanosis  Skin: warm, dry, no visible rash      Laboratory Studies:  CBC:   CMP:             Microbiology: reviewed    Culture - Urine (collected 12-10-23 @ 17:45)  Source: Clean Catch Clean Catch (Midstream)  Final Report (12-11-23 @ 18:42):    Normal Urogenital heladio present          Radiology: reviewed

## 2023-12-13 NOTE — PROGRESS NOTE ADULT - ASSESSMENT
84 yo F with PMHx of CVA, Depression, Hypothyroidism, HLD, Dizzness, Recurrent UTIs presents to the ED following a fall, admitted for placement     Acute Cystitis  Hx of recurrent UTIs, Pessary in place for 1.5 months (typically changed at 2 months)   UA noted, UCx NGTD    Recommendations:   C/w ciprofloxacin 200mg q24h x5 day course 12/15, extending as pt still symptomatic  --can switch to ciprofloxacin 250mg daily if pending d/c  Erythromycin BID for eye  Additional care per primary team    Infectious Diseases will continue to follow. Please call with any questions.   Elvira Valdez M.D.  [facility] Division of Infectious Diseases [phone]  For after 5 P.M. and weekends, please call [phone]   84 yo F with PMHx of CVA, Depression, Hypothyroidism, HLD, Dizzness, Recurrent UTIs presents to the ED following a fall, admitted for placement     Acute Cystitis  Hx of recurrent UTIs, Pessary in place for 1.5 months (typically changed at 2 months)   UA noted, UCx NGTD    Recommendations:   C/w ciprofloxacin 200mg q24h x5 day course 12/15, extending as pt still symptomatic  --can switch to ciprofloxacin 250mg daily if pending d/c  Erythromycin BID for eye  Additional care per primary team    Infectious Diseases will continue to follow. Please call with any questions.   Elvira Valdez M.D.  [facility] Division of Infectious Diseases [phone]  For after 5 P.M. and weekends, please call [phone]

## 2023-12-13 NOTE — DISCHARGE NOTE NURSING/CASE MANAGEMENT/SOCIAL WORK - NSDCPEFALRISK_GEN_ALL_CORE
For information on Fall & Injury Prevention, visit: [email or web address] OR  [email or web address] OR  https://www.cdc.gov/steadi/patient.html For information on Fall & Injury Prevention, visit: [email or web address] OR  [email or web address] OR  https://www.cdc.gov/steadi/patient.html

## 2023-12-13 NOTE — DISCHARGE NOTE PROVIDER - NSDCFUADDAPPT_GEN_ALL_CORE_FT
Discharge to [facility] subacute rehab Discharge to [facility] subacute rehab

## 2023-12-14 RX ORDER — CIPROFLOXACIN LACTATE 400MG/40ML
1 VIAL (ML) INTRAVENOUS
Qty: 0 | Refills: 0 | DISCHARGE
Start: 2023-12-14 | End: 2023-12-15

## 2024-01-16 NOTE — ED PROVIDER NOTE - GENITOURINARY [-], MLM
Pharmacy faxed request for medication refill.    Preferred pharmacy set up and verified       no hematuria

## 2024-01-24 ENCOUNTER — NON-APPOINTMENT (OUTPATIENT)
Age: 84
End: 2024-01-24

## 2024-01-24 RX ORDER — SOLIFENACIN SUCCINATE 10 MG/1
10 TABLET ORAL
Qty: 30 | Refills: 0 | Status: ACTIVE | COMMUNITY
Start: 2021-10-25 | End: 1900-01-01

## 2024-01-25 ENCOUNTER — EMERGENCY (EMERGENCY)
Facility: HOSPITAL | Age: 84
LOS: 1 days | Discharge: HOME | End: 2024-01-25
Attending: STUDENT IN AN ORGANIZED HEALTH CARE EDUCATION/TRAINING PROGRAM | Admitting: STUDENT IN AN ORGANIZED HEALTH CARE EDUCATION/TRAINING PROGRAM
Payer: MEDICARE

## 2024-01-25 VITALS
WEIGHT: 115.08 LBS | OXYGEN SATURATION: 95 % | SYSTOLIC BLOOD PRESSURE: 124 MMHG | RESPIRATION RATE: 18 BRPM | TEMPERATURE: 97 F | HEART RATE: 64 BPM | DIASTOLIC BLOOD PRESSURE: 68 MMHG | HEIGHT: 62 IN

## 2024-01-25 VITALS
SYSTOLIC BLOOD PRESSURE: 120 MMHG | HEART RATE: 67 BPM | OXYGEN SATURATION: 95 % | RESPIRATION RATE: 18 BRPM | TEMPERATURE: 98 F | DIASTOLIC BLOOD PRESSURE: 65 MMHG

## 2024-01-25 LAB
ALBUMIN SERPL ELPH-MCNC: 3.7 G/DL — SIGNIFICANT CHANGE UP (ref 3.3–5)
ALP SERPL-CCNC: 75 U/L — SIGNIFICANT CHANGE UP (ref 40–120)
ALT FLD-CCNC: 18 U/L — SIGNIFICANT CHANGE UP (ref 12–78)
ANION GAP SERPL CALC-SCNC: 7 MMOL/L — SIGNIFICANT CHANGE UP (ref 5–17)
AST SERPL-CCNC: 21 U/L — SIGNIFICANT CHANGE UP (ref 15–37)
BASOPHILS # BLD AUTO: 0.01 K/UL — SIGNIFICANT CHANGE UP (ref 0–0.2)
BASOPHILS NFR BLD AUTO: 0.1 % — SIGNIFICANT CHANGE UP (ref 0–2)
BILIRUB SERPL-MCNC: 0.5 MG/DL — SIGNIFICANT CHANGE UP (ref 0.2–1.2)
BUN SERPL-MCNC: 29 MG/DL — HIGH (ref 7–23)
CALCIUM SERPL-MCNC: 9.5 MG/DL — SIGNIFICANT CHANGE UP (ref 8.5–10.1)
CHLORIDE SERPL-SCNC: 101 MMOL/L — SIGNIFICANT CHANGE UP (ref 96–108)
CO2 SERPL-SCNC: 27 MMOL/L — SIGNIFICANT CHANGE UP (ref 22–31)
CREAT SERPL-MCNC: 1.3 MG/DL — SIGNIFICANT CHANGE UP (ref 0.5–1.3)
EGFR: 41 ML/MIN/1.73M2 — LOW
EOSINOPHIL # BLD AUTO: 0.2 K/UL — SIGNIFICANT CHANGE UP (ref 0–0.5)
EOSINOPHIL NFR BLD AUTO: 2.7 % — SIGNIFICANT CHANGE UP (ref 0–6)
GLUCOSE SERPL-MCNC: 94 MG/DL — SIGNIFICANT CHANGE UP (ref 70–99)
HCT VFR BLD CALC: 37.9 % — SIGNIFICANT CHANGE UP (ref 34.5–45)
HGB BLD-MCNC: 12.3 G/DL — SIGNIFICANT CHANGE UP (ref 11.5–15.5)
IMM GRANULOCYTES NFR BLD AUTO: 0.3 % — SIGNIFICANT CHANGE UP (ref 0–0.9)
LYMPHOCYTES # BLD AUTO: 1.87 K/UL — SIGNIFICANT CHANGE UP (ref 1–3.3)
LYMPHOCYTES # BLD AUTO: 25 % — SIGNIFICANT CHANGE UP (ref 13–44)
MCHC RBC-ENTMCNC: 30.7 PG — SIGNIFICANT CHANGE UP (ref 27–34)
MCHC RBC-ENTMCNC: 32.5 GM/DL — SIGNIFICANT CHANGE UP (ref 32–36)
MCV RBC AUTO: 94.5 FL — SIGNIFICANT CHANGE UP (ref 80–100)
MONOCYTES # BLD AUTO: 0.9 K/UL — SIGNIFICANT CHANGE UP (ref 0–0.9)
MONOCYTES NFR BLD AUTO: 12 % — SIGNIFICANT CHANGE UP (ref 2–14)
NEUTROPHILS # BLD AUTO: 4.48 K/UL — SIGNIFICANT CHANGE UP (ref 1.8–7.4)
NEUTROPHILS NFR BLD AUTO: 59.9 % — SIGNIFICANT CHANGE UP (ref 43–77)
NRBC # BLD: 0 /100 WBCS — SIGNIFICANT CHANGE UP (ref 0–0)
PLATELET # BLD AUTO: 259 K/UL — SIGNIFICANT CHANGE UP (ref 150–400)
POTASSIUM SERPL-MCNC: 4.5 MMOL/L — SIGNIFICANT CHANGE UP (ref 3.5–5.3)
POTASSIUM SERPL-SCNC: 4.5 MMOL/L — SIGNIFICANT CHANGE UP (ref 3.5–5.3)
PROT SERPL-MCNC: 7.7 G/DL — SIGNIFICANT CHANGE UP (ref 6–8.3)
RBC # BLD: 4.01 M/UL — SIGNIFICANT CHANGE UP (ref 3.8–5.2)
RBC # FLD: 13.2 % — SIGNIFICANT CHANGE UP (ref 10.3–14.5)
SODIUM SERPL-SCNC: 135 MMOL/L — SIGNIFICANT CHANGE UP (ref 135–145)
WBC # BLD: 7.48 K/UL — SIGNIFICANT CHANGE UP (ref 3.8–10.5)
WBC # FLD AUTO: 7.48 K/UL — SIGNIFICANT CHANGE UP (ref 3.8–10.5)

## 2024-01-25 PROCEDURE — 99283 EMERGENCY DEPT VISIT LOW MDM: CPT | Mod: 25

## 2024-01-25 PROCEDURE — 71045 X-RAY EXAM CHEST 1 VIEW: CPT | Mod: 26

## 2024-01-25 PROCEDURE — 36415 COLL VENOUS BLD VENIPUNCTURE: CPT

## 2024-01-25 PROCEDURE — 80053 COMPREHEN METABOLIC PANEL: CPT

## 2024-01-25 PROCEDURE — 99284 EMERGENCY DEPT VISIT MOD MDM: CPT | Mod: FS

## 2024-01-25 PROCEDURE — 85025 COMPLETE CBC W/AUTO DIFF WBC: CPT

## 2024-01-25 PROCEDURE — 71045 X-RAY EXAM CHEST 1 VIEW: CPT

## 2024-01-25 RX ORDER — SODIUM CHLORIDE 9 MG/ML
1000 INJECTION INTRAMUSCULAR; INTRAVENOUS; SUBCUTANEOUS ONCE
Refills: 0 | Status: COMPLETED | OUTPATIENT
Start: 2024-01-25 | End: 2024-01-25

## 2024-01-25 RX ADMIN — SODIUM CHLORIDE 1000 MILLILITER(S): 9 INJECTION INTRAMUSCULAR; INTRAVENOUS; SUBCUTANEOUS at 12:44

## 2024-01-25 NOTE — ED ADULT NURSE NOTE - NSFALLHARMRISKINTERV_ED_ALL_ED

## 2024-01-25 NOTE — ED PROVIDER NOTE - OBJECTIVE STATEMENT
82yo F PMHx hypothyroid, CVA, anxiety, depression presents to ED complaining of generalized weakness.  Patient states she was diagnosed with COVID at urgent care yesterday after having URI symptoms for the past 1 week. States she was having throat pain, cough which is largely improved but feels generally weak as she has not eaten or drank much over the past week. Not taking any medication for symptoms other than Tylenol, last dose yesterday evening.  No measured fever at home.  Denies fever, chills, CP, SOB, N/V/D, abdominal pain, headache, dizziness, urinary symptoms. Non-smoker.

## 2024-01-25 NOTE — ED PROVIDER NOTE - PHYSICAL EXAMINATION
Gen: No acute distress, non toxic  HENT: NCAT, Mucous membranes moist, Oropharynx without exudates, uvula midline  Eyes: pink conjunctivae, EOMI, PERRL  CV: RRR, nl s1/s2.  Resp: CTAB, normal rate and effort  GI: Abdomen soft, NT, ND. No rebound, no guarding  : No CVAT  Neuro: A&O x 3, sensorimotor intact without deficits   Skin: No rashes. intact and perfused.

## 2024-01-25 NOTE — ED ADULT NURSE NOTE - OBJECTIVE STATEMENT
sent from  for a positive covid result. denies chest pain, sob, distress. denies fever, nausea,v/d. reports dyspnea on exertion at baseline.

## 2024-01-25 NOTE — ED PROVIDER NOTE - CLINICAL SUMMARY MEDICAL DECISION MAKING FREE TEXT BOX
84yo F PMHx hypothyroid, CVA, anxiety, depression presents to ED complaining of generalized weakness.  Patient states she was diagnosed with COVID at urgent care yesterday after having URI symptoms for the past 1 week. States she was having throat pain, cough which is largely improved but feels generally weak as she has not eaten or drank much over the past week. Not taking any medication for symptoms other than Tylenol, last dose yesterday evening.  No measured fever at home.  Denies fever, chills, CP, SOB, N/V/D, abdominal pain, headache, dizziness, urinary symptoms. Non-smoker.  agree with above: symptomatic from covid, seen by urgent care and recommended she get xray chest to eval for pna. pt deneis any fevers, hasdecreased appetite but starting to feel better  well apeparing, lungs clear, sats wnl, abd nontender, will cehck basic labs, xr, supportive care, reassess

## 2024-01-25 NOTE — ED PROVIDER NOTE - NSFOLLOWUPINSTRUCTIONS_ED_ALL_ED_FT
- Return to ED for any new or worsening symptoms including but not limited to chest pain, difficulty breathing, persistent vomiting, intractable dizziness, etc  - May take ibuprofen 600mg every 6 hours and/or tylenol 650mg every 6 hours as needed for pain and/or fevers  - Drink plenty of fluids to remain hydrated, i.e water, pedialyte, gatorade  - Wash hands frequently with warm soapy water    Viral Respiratory Infection    A viral respiratory infection is an illness that affects parts of the body used for breathing, like the lungs, nose, and throat. It is caused by a germ called a virus. Symptoms can include runny nose, coughing, sneezing, fatigue, body aches, sore throat, fever, or headache. Over the counter medicine can be used to manage the symptoms but the infection typically goes away on its own in 5 to 10 days.     SEEK IMMEDIATE MEDICAL CARE IF YOU HAVE ANY OF THE FOLLOWING SYMPTOMS: shortness of breath, chest pain, fever over 10 days, or lightheadedness/dizziness.

## 2024-01-25 NOTE — ED PROVIDER NOTE - PROGRESS NOTE DETAILS
Labs without emergent findings, CXR reviewed, no acute findings, no change compared to previous on ED interpretation. patient informed they will be contacted if there is any discrepancy present on final radiology report. educated on supportive care for symptoms, return precautions discussed

## 2024-02-13 NOTE — DISCHARGE NOTE PROVIDER - CARE PROVIDERS DIRECT ADDRESSES
This is a 13y Female   [ ] History per:   [ ]  utilized, number:     INTERVAL/OVERNIGHT EVENTS:     MEDICATIONS  (STANDING):  cannabidiol Oral Liquid - Peds 300 milliGRAM(s) Oral two times a day  cloBAZam Oral Liquid - Peds 10 milliGRAM(s) Oral two times a day  lamoTRIgine Oral Chewable/Dispersible Tablet - Peds 150 milliGRAM(s) Oral two times a day  valproic acid  Oral Liquid - Peds 300 milliGRAM(s) Oral two times a day    MEDICATIONS  (PRN):  chlorproMAZINE IntraMuscular Injection - Peds 50 milliGRAM(s) IntraMuscular once PRN agitation  LORazepam IntraMuscular Injection - Peds 2 milliGRAM(s) IntraMuscular once PRN Agitation  LORazepam IntraMuscular Injection - Peds 4 milliGRAM(s) IntraMuscular once PRN seizure    Allergies    No Known Allergies    Intolerances        DIET:    [ ] There are no updates to the medical, surgical, social or family history unless described:    PATIENT CARE ACCESS DEVICES:  [ ] Peripheral IV  [ ] Central Venous Line, Date Placed:		Site/Device:  [ ] Urinary Catheter, Date Placed:  [ ] Necessity of urinary, arterial, and venous catheters discussed    REVIEW OF SYSTEMS: If not negative (Neg) please elaborate. History Per:   General: [ ] Neg  Pulmonary: [ ] Neg  Cardiac: [ ] Neg  Gastrointestinal: [ ] Neg  Ears, Nose, Throat: [ ] Neg  Renal/Urologic: [ ] Neg  Musculoskeletal: [ ] Neg  Endocrine: [ ] Neg  Hematologic: [ ] Neg  Neurologic: [ ] Neg  Allergy/Immunologic: [ ] Neg  All other systems reviewed and negative [ ]     VITAL SIGNS AND PHYSICAL EXAM:  Vital Signs Last 24 Hrs  T(C): 36.6 (13 Feb 2024 06:00), Max: 36.6 (12 Feb 2024 11:05)  T(F): 97.8 (13 Feb 2024 06:00), Max: 97.8 (12 Feb 2024 11:05)  HR: 83 (13 Feb 2024 06:00) (83 - 113)  BP: 98/61 (13 Feb 2024 06:00) (98/61 - 103/72)  BP(mean): --  RR: 18 (13 Feb 2024 06:00) (18 - 20)  SpO2: 98% (13 Feb 2024 06:00) (98% - 100%)    Parameters below as of 13 Feb 2024 06:00  Patient On (Oxygen Delivery Method): room air      I&O's Summary    12 Feb 2024 07:01  -  13 Feb 2024 07:00  --------------------------------------------------------  IN: 1080 mL / OUT: 0 mL / NET: 1080 mL      Pain Score:  Daily Weight Gm: 85068 (12 Feb 2024 11:05)      Gen: no acute distress; smiling, interactive, well appearing  HEENT: NC/AT; AFOSF; pupils equal, responsive, reactive to light; no conjunctivitis or scleral icterus; no nasal discharge; no nasal congestion; oropharynx without exudates/erythema; mucus membranes moist  Neck: FROM, supple, no cervical lymphadenopathy  Chest: clear to auscultation bilaterally, no crackles/wheezes, good air entry, no tachypnea or retractions  CV: regular rate and rhythm, no murmurs   Abd: soft, nontender, nondistended, no HSM appreciated, NABS  : normal external genitalia  Back: no vertebral or paraspinal tenderness along entire spine; no CVAT  Extrem: no joint effusion or tenderness; FROM of all joints; no deformities or erythema noted. 2+ peripheral pulses, WWP  Neuro: grossly nonfocal, strength and tone grossly normal    INTERVAL LAB RESULTS:            INTERVAL IMAGING STUDIES:   This is a 13y Female   [x ] History per: mother via telephone  [ ]  utilized, number:     INTERVAL/OVERNIGHT EVENTS: No acute events. Did not require any PRNs for agitation.     MEDICATIONS  (STANDING):  cannabidiol Oral Liquid - Peds 300 milliGRAM(s) Oral two times a day  cloBAZam Oral Liquid - Peds 10 milliGRAM(s) Oral two times a day  lamoTRIgine Oral Chewable/Dispersible Tablet - Peds 150 milliGRAM(s) Oral two times a day  valproic acid  Oral Liquid - Peds 300 milliGRAM(s) Oral two times a day    MEDICATIONS  (PRN):  chlorproMAZINE IntraMuscular Injection - Peds 50 milliGRAM(s) IntraMuscular once PRN agitation  LORazepam IntraMuscular Injection - Peds 2 milliGRAM(s) IntraMuscular once PRN Agitation  LORazepam IntraMuscular Injection - Peds 4 milliGRAM(s) IntraMuscular once PRN seizure    Allergies    No Known Allergies    Intolerances        DIET:    [ ] There are no updates to the medical, surgical, social or family history unless described:    PATIENT CARE ACCESS DEVICES:  [ ] Peripheral IV  [ ] Central Venous Line, Date Placed:		Site/Device:  [ ] Urinary Catheter, Date Placed:  [ ] Necessity of urinary, arterial, and venous catheters discussed    REVIEW OF SYSTEMS: If not negative (Neg) please elaborate. History Per:   General: [ ] Neg  Pulmonary: [ ] Neg  Cardiac: [ ] Neg  Gastrointestinal: [ ] Neg  Ears, Nose, Throat: [ ] Neg  Renal/Urologic: [ ] Neg  Musculoskeletal: [ ] Neg  Endocrine: [ ] Neg  Hematologic: [ ] Neg  Neurologic: [ ] Neg  Allergy/Immunologic: [ ] Neg  All other systems reviewed and negative [ ]     VITAL SIGNS AND PHYSICAL EXAM:  Vital Signs Last 24 Hrs  T(C): 36.6 (13 Feb 2024 06:00), Max: 36.6 (12 Feb 2024 11:05)  T(F): 97.8 (13 Feb 2024 06:00), Max: 97.8 (12 Feb 2024 11:05)  HR: 83 (13 Feb 2024 06:00) (83 - 113)  BP: 98/61 (13 Feb 2024 06:00) (98/61 - 103/72)  BP(mean): --  RR: 18 (13 Feb 2024 06:00) (18 - 20)  SpO2: 98% (13 Feb 2024 06:00) (98% - 100%)    Parameters below as of 13 Feb 2024 06:00  Patient On (Oxygen Delivery Method): room air      I&O's Summary    12 Feb 2024 07:01  -  13 Feb 2024 07:00  --------------------------------------------------------  IN: 1080 mL / OUT: 0 mL / NET: 1080 mL      Pain Score:  Daily Weight Gm: 96518 (12 Feb 2024 11:05)      Gen: no acute distress; smiling, interactive, well appearing  HEENT: NC/AT; AFOSF; pupils equal, responsive, reactive to light; no conjunctivitis or scleral icterus; no nasal discharge; no nasal congestion; oropharynx without exudates/erythema; mucus membranes moist  Neck: FROM, supple, no cervical lymphadenopathy  Chest: clear to auscultation bilaterally, no crackles/wheezes, good air entry, no tachypnea or retractions  CV: regular rate and rhythm, no murmurs   Abd: soft, nontender, nondistended, no HSM appreciated, NABS  Neuro: grossly nonfocal, strength and tone grossly normal    INTERVAL LAB RESULTS:            INTERVAL IMAGING STUDIES:   ,DirectAddress_Unknown

## 2024-05-25 NOTE — ED ADULT NURSE NOTE - NS ED PATIENT SAFETY CONCERN
Goal Outcome Evaluation:      Plan of Care Reviewed With: patient    Overall Patient Progress: no changeOverall Patient Progress: no change    Alert and oriented. Up to bathroom with assist of 1. Right chest port with IV fluids running. Patient suctions self prn using yankeur, thick white tan sputum. G tube in place for meds and water flushes. No c/o pain. Plan to discharge home today with hospice.             No

## 2024-05-28 NOTE — PHYSICAL THERAPY INITIAL EVALUATION ADULT - PHYSICAL ASSIST/NONPHYSICAL ASSIST: SCOOT/BRIDGE, REHAB EVAL
"  Physical Therapy Treatment    Patient Name: Christine Bustos  MRN: [id number]  Today's Date: 5/28/2024  Visit: 16/30      Diagnosis:   1. Left hip pain  Follow Up In Physical Therapy      2. Femoroacetabular impingement of left hip  Follow Up In Physical Therapy      3. Loose body in left hip  Follow Up In Physical Therapy      4. Tear of left acetabular labrum, initial encounter  Follow Up In Physical Therapy      PRECAUTIONS:  Left hip arthroscopy 3/26/24    SUBJECTIVE:  Pt reports she did get in gym over the weekend, started general LE strengthening with difficulty, reports some right sided Achilles soreness today.  Pain: currently 0/10 left hip     OBJECTIVE:  Left hip abd 3+/5    TREATMENT:  - Therex:   Upright bike (seat 2) x 8 min L3  Kneeling hip flexor stretch 5x30  Standing quad/HF stretch  Prone glute ext 2x10  Rock backs x 10  Bird dogs 2x15 ea  Prone hip ER iso 10x10 sec  Prone hip IR iso 10x10 sec  Clam shell 2x15  SL hip abd 2x15  Seated butterfly stretch 3x30 sec  Fall out x 10  Figure 4 stretch 10x10 sec  SL bridge 2x15 - NT  SL TG squat 2x10  L7  Total hip abd 2x10 L3  Totla hip flexion 2x10 L2  Step ups 2x15 8\"  Step down 2x10 4\"  - Manual Therapy:  Circumduction, hip PROM in all directions, STM x 20 min    ASSESSMENT:  Less guarding noted with PROM today, challenged by strength exercises, will continue with gym program, DC ice as she is tolerating all well.    PLAN:   Continue with HEP, progress with protocol as tolerated.        " 1 person assist

## 2024-07-25 NOTE — ED PROVIDER NOTE - NIH STROKE SCALE: 5B. MOTOR ARM, RIGHT, QM
"Patient transferred to room 2107 on PCU. Report given to Caroline. Spoke with patient and asked if she needed this nurse to call and update anyone for her, she said \"No, they know where I am if they need me.\" Patient does not have a cell phone with her.   " (0) No drift; limb holds 90 (or 45) degrees for full 10 secs

## 2024-08-30 ENCOUNTER — APPOINTMENT (OUTPATIENT)
Dept: UROGYNECOLOGY | Facility: CLINIC | Age: 84
End: 2024-08-30

## 2024-08-30 ENCOUNTER — RESULT CHARGE (OUTPATIENT)
Age: 84
End: 2024-08-30

## 2024-08-30 DIAGNOSIS — N81.2 INCOMPLETE UTEROVAGINAL PROLAPSE: ICD-10-CM

## 2024-08-30 DIAGNOSIS — N95.2 POSTMENOPAUSAL ATROPHIC VAGINITIS: ICD-10-CM

## 2024-08-30 DIAGNOSIS — R30.0 DYSURIA: ICD-10-CM

## 2024-08-30 DIAGNOSIS — N81.11 CYSTOCELE, MIDLINE: ICD-10-CM

## 2024-08-30 DIAGNOSIS — R35.0 FREQUENCY OF MICTURITION: ICD-10-CM

## 2024-08-30 DIAGNOSIS — N81.6 RECTOCELE: ICD-10-CM

## 2024-08-30 LAB
BILIRUB UR QL STRIP: NORMAL
CLARITY UR: CLEAR
COLLECTION METHOD: NORMAL
GLUCOSE UR-MCNC: NEGATIVE
HCG UR QL: 0.2 EU/DL
HGB UR QL STRIP.AUTO: NORMAL
KETONES UR-MCNC: NORMAL
LEUKOCYTE ESTERASE UR QL STRIP: NEGATIVE
NITRITE UR QL STRIP: NEGATIVE
PROT UR STRIP-MCNC: 30
SP GR UR STRIP: >=1.03

## 2024-08-30 PROCEDURE — G2211 COMPLEX E/M VISIT ADD ON: CPT

## 2024-08-30 PROCEDURE — 99214 OFFICE O/P EST MOD 30 MIN: CPT | Mod: 25

## 2024-08-30 PROCEDURE — 51701 INSERT BLADDER CATHETER: CPT

## 2024-08-30 PROCEDURE — 81003 URINALYSIS AUTO W/O SCOPE: CPT | Mod: QW

## 2024-08-30 NOTE — DISCUSSION/SUMMARY
[FreeTextEntry1] : #POP: -Cube #5 left out per patient request -Voiding precautions reviewed.  She knows to manually reduce the prolapse if she has any issues voiding. -RTO in 2-3 weeks of sooner if needed; reinforced the importance of close follow-up.  #OAB: -Udip performed to r/o infection as cause of worsening urinary symptoms.  Udip with small bilirubin, trace ketones, trace blood, and 30 protein.  Will send off UA and UCX.  Low UTI suspicion.  -Discuss the proteinuria and ketonuria.  Advised she increase hydration and bring these results to the attention of her PMD.  Udip results handed to the patient. -Patient requested to restart the Solifenacin.  Rx sent to the patient's pharmacy on file.    All questions answered to the patient's satisfaction.  She expressed understanding. She knows to contact the office with any questions or concerns.

## 2024-08-30 NOTE — PROCEDURE
[Straight Catheterization] : insertion of a straight catheter [Urinary Tract Infection] : a urinary tract infection [Patient] : the patient [Allergies Reviewed] : Allergies reviewed [___ Fr Straight Tip] : a [unfilled] in [place] straight tip catheter [Clear] : clear [Culture] : culture [Urinalysis] : urinalysis [No Complications] : no complications [Tolerated Well] : the patient tolerated the procedure well [Good Fit] : fits well [Refit] : refit is not needed [Erosion] : no evidence of erosion [Erythema] : erythema noted [Discharge] : there is vaginal discharge [Pessary Out] : removed [Pessary Washed] : washed [None] : no bleeding [Medication Review] : Medicaiton Review: Patient verbalizes understanding of risks and benefits [FreeTextEntry1] : cube #5 [de-identified] : posterior and lateral walls [FreeTextEntry8] : Routine perineal care

## 2024-08-30 NOTE — HISTORY OF PRESENT ILLNESS
[FreeTextEntry1] : Yudelka is an 84-year-old with known OAB on Solifenacin 10mg PO daily and POP supported by a cube #5.  She has not been seen in this office since 8/17/2023.    For her OAB, she had been managing well on the Solifenacin 10mg PO daily until 2-3 months ago when she ran out of tablets.  Since stopping, she has noticed a regression in her urinary frequency and urgency.  Additionally, when asked, she admits to 1 month of dysuria.  She is requesting her urine be sampled.    For her POP, she currently has a cube #5. She is happy with the support provided by the pessary.  She has had this pessary in situ for over a year.  No vaginal bleeding.

## 2024-08-30 NOTE — PHYSICAL EXAM
[No Acute Distress] : in no acute distress [Well developed] : well developed [Well Nourished] : ~L well nourished [Good Hygeine] : demonstrates good hygeine [Oriented x3] : oriented to person, place, and time [Normal Memory] : ~T memory was ~L unimpaired [Normal Mood/Affect] : mood and affect are normal [Anxiety] : patient is not anxious [Warm and Dry] : was warm and dry to touch [Normal Gait] : gait was abnormal [No Lesions] : no lesions were seen on the external genitalia [Vulvar Atrophy] : vulvar atrophy [Labia Majora] : were normal [Labia Minora] : were normal [Normal] : was normal [Atrophy] : atrophy [Erythematous] : erythema [Discharge] : a  ~M vaginal discharge was present [Scant] : scant [Tess] : yellow [Thin] : thin [No Bleeding] : there was no active vaginal bleeding [de-identified] : steady with cane

## 2024-08-31 LAB
APPEARANCE: CLEAR
BACTERIA: NEGATIVE /HPF
BILIRUBIN URINE: NEGATIVE
BLOOD URINE: NEGATIVE
CAST: 13 /LPF
COARSE GRANULAR CASTS: PRESENT
COLOR: NORMAL
EPITHELIAL CELLS: 2 /HPF
GLUCOSE QUALITATIVE U: NEGATIVE MG/DL
HYALINE CASTS: PRESENT
KETONES URINE: ABNORMAL MG/DL
LEUKOCYTE ESTERASE URINE: ABNORMAL
MICROSCOPIC-UA: NORMAL
NITRITE URINE: NEGATIVE
PH URINE: 5.5
PROTEIN URINE: 30 MG/DL
RED BLOOD CELLS URINE: 5 /HPF
REVIEW: NORMAL
SPECIFIC GRAVITY URINE: 1.02
UROBILINOGEN URINE: 1 MG/DL
WHITE BLOOD CELLS URINE: 1 /HPF

## 2024-09-03 LAB — BACTERIA UR CULT: NORMAL

## 2024-09-09 ENCOUNTER — APPOINTMENT (OUTPATIENT)
Dept: UROGYNECOLOGY | Facility: CLINIC | Age: 84
End: 2024-09-09
Payer: MEDICARE

## 2024-09-09 ENCOUNTER — APPOINTMENT (OUTPATIENT)
Dept: ULTRASOUND IMAGING | Facility: CLINIC | Age: 84
End: 2024-09-09

## 2024-09-09 DIAGNOSIS — N95.2 POSTMENOPAUSAL ATROPHIC VAGINITIS: ICD-10-CM

## 2024-09-09 DIAGNOSIS — N81.11 CYSTOCELE, MIDLINE: ICD-10-CM

## 2024-09-09 DIAGNOSIS — N81.6 RECTOCELE: ICD-10-CM

## 2024-09-09 DIAGNOSIS — R31.29 OTHER MICROSCOPIC HEMATURIA: ICD-10-CM

## 2024-09-09 DIAGNOSIS — N81.9 FEMALE GENITAL PROLAPSE, UNSPECIFIED: ICD-10-CM

## 2024-09-09 DIAGNOSIS — N39.41 URGE INCONTINENCE: ICD-10-CM

## 2024-09-09 PROCEDURE — 99459 PELVIC EXAMINATION: CPT

## 2024-09-09 PROCEDURE — 99213 OFFICE O/P EST LOW 20 MIN: CPT

## 2024-09-09 PROCEDURE — G2211 COMPLEX E/M VISIT ADD ON: CPT

## 2024-09-09 NOTE — PHYSICAL EXAM
[No Acute Distress] : in no acute distress [Well developed] : well developed [Well Nourished] : ~L well nourished [Good Hygeine] : demonstrates good hygeine [Warm and Dry] : was warm and dry to touch [No Lesions] : no lesions were seen on the external genitalia [Vulvar Atrophy] : vulvar atrophy [Labia Majora] : were normal [Labia Minora] : were normal [Normal] : was normal [Atrophy] : atrophy [Erythematous] : erythema [Discharge] : a  ~M vaginal discharge was present [Tess] : yellow [Thin] : thin [Chaperone Present] : A chaperone was present in the examining room during all aspects of the physical examination [97969] : A chaperone was present during the pelvic exam. [Anxiety] : patient is anxious [Rectocele] : a rectocele [Cystocele] : a cystocele [Uterine Prolapse] : uterine prolapse [Scant] : there was scant vaginal bleeding [FreeTextEntry2] : SHWETHA Doty [Normal Gait] : gait was abnormal [de-identified] : steady with walker [FreeTextEntry4] : prolapse descending past introitus. area of irritation above the cervix bleeding

## 2024-09-09 NOTE — DISCUSSION/SUMMARY
[FreeTextEntry1] : #POP: -Blood visible on pad likely to area of irritation seen above the cervix, bleeding subsided with silver nitrate application.  -Cube #5 inserted -Advised regular use of Replens 2-3 times a week at bedtime  #OAB: -Patient will continue daily Solifenacin, will check PVR at the next visit.   #MH: -Patient is s/p renal US today, results pending -She will RTO on 9/30/24 for cystoscopy -She knows to follow-up with nephrology  #Dispo: -RTO in 2 months or sooner for continued pessary care  All questions answered to the patient's satisfaction.  She expressed understanding. She knows to contact the office with any questions or concerns.

## 2024-09-09 NOTE — HISTORY OF PRESENT ILLNESS
[FreeTextEntry1] : Yudelka is an 84-year-old with known OAB and POP supported by a cube #5.  She was last seen in this office on 8/30/24 at which point the cube #5 was left out for pelvic rest.  At that time, she had also explained that she stopped her prescribed Solifenacin because she ran out.  Yudelka returns to the office today with vaginal bleeding seen on her pad.  Since her last visit, she has only taken 1 tablet of Solifenacin.    Of note, when she was here last she had a UA obtained that showed 13 casts, 30 protein, trace ketones, trace leukocytes, 5 RBCs, +hyaline casts and + coarse granular casts.  Patient was instructed to consult nephrology and undergo MH work-up with renal US and cystoscopy.

## 2024-09-09 NOTE — PROCEDURE
[Straight Catheterization] : insertion of a straight catheter [Urinary Tract Infection] : a urinary tract infection [Patient] : the patient [Allergies Reviewed] : Allergies reviewed [___ Fr Straight Tip] : a [unfilled] in [place] straight tip catheter [None] : there were no complications with the catheter insertion [Clear] : clear [Culture] : culture [Urinalysis] : urinalysis [No Complications] : no complications [Tolerated Well] : the patient tolerated the procedure well [Good Fit] : fits well [Erythema] : erythema noted [Pessary Washed] : washed [Medication Review] : Medicaiton Review: Patient verbalizes understanding of risks and benefits [Pessary Inserted] : inserted [Mild] : mild bleeding [Hemostatic Agent used (Silver Nitrate)] : a hemostatic agent (Silver Nitrate) was used to resolve bleeding [Refit] : refit is not needed [Erosion] : no evidence of erosion [FreeTextEntry1] : cube #5 [de-identified] : area of irritation and bleeding above the cervix [FreeTextEntry8] : Routine perineal care

## 2024-09-10 ENCOUNTER — APPOINTMENT (OUTPATIENT)
Dept: ULTRASOUND IMAGING | Facility: CLINIC | Age: 84
End: 2024-09-10
Payer: MEDICARE

## 2024-09-10 PROCEDURE — 76775 US EXAM ABDO BACK WALL LIM: CPT

## 2024-09-27 NOTE — ED PROVIDER NOTE - PRINCIPAL DIAGNOSIS
[Chaperone Present] : A chaperone was present in the examining room during all aspects of the physical examination [Oriented x3] : oriented x3 [Examination Of The Breasts] : a normal appearance [No Discharge] : no discharge [No Masses] : no breast masses were palpable [Labia Majora] : normal [Labia Minora] : normal [No Bleeding] : There was no active vaginal bleeding [Normal] : normal [Normal Position] : in a normal position [Uterine Adnexae] : normal [FreeTextEntry3] : Thyroid is non-enlarged, nontender, with no palpable nodules or goiter. No lymphadenopathy. [FreeTextEntry7] : Soft. Nondistended. Nontender. No rebound or guarding. There are no palpable masses. [FreeTextEntry1] : External genitalia are within normal limits with no lesions visualized. [FreeTextEntry2] : Hyperpigmented macular lesions noted on the inner aspect of the right labia minora.  Hypopigmentation is also noted on the inner aspect of the right labia minora [FreeTextEntry4] : No vaginal discharge, blood, or any lesions noted within the vaginal vault. [FreeTextEntry5] : A speculum was inserted without any difficulty. The cervix was normal in appearance. No cervical motion tenderness. [FreeTextEntry6] : Bimanual examination was notable for a normal, nontender uterus. There were no palpable adnexal masses or adnexal tenderness. Head injury

## 2024-09-29 NOTE — PROGRESS NOTE BEHAVIORAL HEALTH - NSBHFUPSUICPASSIVE_PSY_A_CORE
[FreeTextEntry4] : This note was written by Anival Giron on 09/25/2024 actively solely Kd Alvarez M.D. I, Anival Giron, am scribing for and in the presence of dK Alvarez M.D. in the following sections HISTORY OF PRESENT ILLNESS, PAST MEDICAL/FAMILY/SOCIAL HISTORY; REVIEW OF SYSTEMS; VITAL SIGNS; PHYSICAL EXAM; ASSESSMENT/PLAN. All medical record entries made by this scribe at , Kd Alvarez M.D. direction and personally dictated by me on 09/25/2024. I personally performed the services described in the documentation, reviewed the documentation recorded by the scribe in my presence, and it accurately and completely records my words and actions. 
yes
yes

## 2024-09-30 ENCOUNTER — APPOINTMENT (OUTPATIENT)
Dept: UROGYNECOLOGY | Facility: CLINIC | Age: 84
End: 2024-09-30

## 2024-10-17 ENCOUNTER — APPOINTMENT (OUTPATIENT)
Dept: UROGYNECOLOGY | Facility: CLINIC | Age: 84
End: 2024-10-17
Payer: MEDICARE

## 2024-10-17 DIAGNOSIS — N94.89 OTHER SPECIFIED CONDITIONS ASSOCIATED WITH FEMALE GENITAL ORGANS AND MENSTRUAL CYCLE: ICD-10-CM

## 2024-10-17 LAB
BILIRUB UR QL STRIP: NEGATIVE
CLARITY UR: CLEAR
COLLECTION METHOD: NORMAL
GLUCOSE UR-MCNC: NEGATIVE
HCG UR QL: 0.2 EU/DL
HGB UR QL STRIP.AUTO: NEGATIVE
KETONES UR-MCNC: NEGATIVE
LEUKOCYTE ESTERASE UR QL STRIP: NEGATIVE
NITRITE UR QL STRIP: NEGATIVE
PH UR STRIP: 6
PROT UR STRIP-MCNC: NEGATIVE
SP GR UR STRIP: >=1.03

## 2024-10-17 PROCEDURE — 99214 OFFICE O/P EST MOD 30 MIN: CPT | Mod: 25

## 2024-10-17 PROCEDURE — G2211 COMPLEX E/M VISIT ADD ON: CPT

## 2024-10-17 PROCEDURE — 81003 URINALYSIS AUTO W/O SCOPE: CPT | Mod: QW

## 2024-10-17 PROCEDURE — 99459 PELVIC EXAMINATION: CPT

## 2024-10-17 PROCEDURE — 51701 INSERT BLADDER CATHETER: CPT

## 2024-10-21 ENCOUNTER — APPOINTMENT (OUTPATIENT)
Dept: UROGYNECOLOGY | Facility: CLINIC | Age: 84
End: 2024-10-21

## 2024-10-22 NOTE — ED ADULT NURSE NOTE - OBJECTIVE STATEMENT
No qualified resident/fellow available to assist 84yo female BIBA as per ems "she was nauseas and dizzy while in bed". pt denies pain, SOB, fever.

## 2024-11-07 ENCOUNTER — APPOINTMENT (OUTPATIENT)
Dept: UROGYNECOLOGY | Facility: CLINIC | Age: 84
End: 2024-11-07

## 2024-11-08 ENCOUNTER — APPOINTMENT (OUTPATIENT)
Dept: NEPHROLOGY | Facility: CLINIC | Age: 84
End: 2024-11-08

## 2024-11-24 ENCOUNTER — INPATIENT (INPATIENT)
Facility: HOSPITAL | Age: 84
LOS: 5 days | Discharge: HOME | DRG: 690 | End: 2024-11-30
Attending: INTERNAL MEDICINE | Admitting: STUDENT IN AN ORGANIZED HEALTH CARE EDUCATION/TRAINING PROGRAM
Payer: MEDICARE

## 2024-11-24 VITALS
SYSTOLIC BLOOD PRESSURE: 120 MMHG | HEART RATE: 86 BPM | DIASTOLIC BLOOD PRESSURE: 78 MMHG | HEIGHT: 62 IN | WEIGHT: 108.03 LBS | TEMPERATURE: 98 F | OXYGEN SATURATION: 95 % | RESPIRATION RATE: 16 BRPM

## 2024-11-24 DIAGNOSIS — E86.0 DEHYDRATION: ICD-10-CM

## 2024-11-24 LAB
ANION GAP SERPL CALC-SCNC: 13 MMOL/L — SIGNIFICANT CHANGE UP (ref 5–17)
BASOPHILS # BLD AUTO: 0.02 K/UL — SIGNIFICANT CHANGE UP (ref 0–0.2)
BASOPHILS NFR BLD AUTO: 0.2 % — SIGNIFICANT CHANGE UP (ref 0–2)
BUN SERPL-MCNC: 24 MG/DL — HIGH (ref 7–23)
CALCIUM SERPL-MCNC: 9.5 MG/DL — SIGNIFICANT CHANGE UP (ref 8.4–10.5)
CHLORIDE SERPL-SCNC: 102 MMOL/L — SIGNIFICANT CHANGE UP (ref 96–108)
CK MB BLD-MCNC: 3.1 % — SIGNIFICANT CHANGE UP (ref 0–3.5)
CK MB CFR SERPL CALC: 3.5 NG/ML — SIGNIFICANT CHANGE UP (ref 0–3.6)
CK SERPL-CCNC: 114 U/L — SIGNIFICANT CHANGE UP (ref 26–192)
CO2 SERPL-SCNC: 26 MMOL/L — SIGNIFICANT CHANGE UP (ref 22–31)
CREAT SERPL-MCNC: 1.41 MG/DL — HIGH (ref 0.5–1.3)
EGFR: 37 ML/MIN/1.73M2 — LOW
EGFR: 37 ML/MIN/1.73M2 — LOW
EOSINOPHIL # BLD AUTO: 0.06 K/UL — SIGNIFICANT CHANGE UP (ref 0–0.5)
EOSINOPHIL NFR BLD AUTO: 0.6 % — SIGNIFICANT CHANGE UP (ref 0–6)
GLUCOSE SERPL-MCNC: 100 MG/DL — HIGH (ref 70–99)
HCT VFR BLD CALC: 39.6 % — SIGNIFICANT CHANGE UP (ref 34.5–45)
HGB BLD-MCNC: 13.4 G/DL — SIGNIFICANT CHANGE UP (ref 11.5–15.5)
IMM GRANULOCYTES NFR BLD AUTO: 0.2 % — SIGNIFICANT CHANGE UP (ref 0–0.9)
LIDOCAIN IGE QN: 27 U/L — SIGNIFICANT CHANGE UP (ref 16–77)
LYMPHOCYTES # BLD AUTO: 2.53 K/UL — SIGNIFICANT CHANGE UP (ref 1–3.3)
LYMPHOCYTES # BLD AUTO: 25.5 % — SIGNIFICANT CHANGE UP (ref 13–44)
MAGNESIUM SERPL-MCNC: 1.7 MG/DL — SIGNIFICANT CHANGE UP (ref 1.6–2.6)
MCHC RBC-ENTMCNC: 31.1 PG — SIGNIFICANT CHANGE UP (ref 27–34)
MCHC RBC-ENTMCNC: 33.8 G/DL — SIGNIFICANT CHANGE UP (ref 32–36)
MCV RBC AUTO: 91.9 FL — SIGNIFICANT CHANGE UP (ref 80–100)
MONOCYTES # BLD AUTO: 0.86 K/UL — SIGNIFICANT CHANGE UP (ref 0–0.9)
MONOCYTES NFR BLD AUTO: 8.7 % — SIGNIFICANT CHANGE UP (ref 2–14)
NEUTROPHILS # BLD AUTO: 6.43 K/UL — SIGNIFICANT CHANGE UP (ref 1.8–7.4)
NEUTROPHILS NFR BLD AUTO: 64.8 % — SIGNIFICANT CHANGE UP (ref 43–77)
NRBC # BLD: 0 /100 WBCS — SIGNIFICANT CHANGE UP (ref 0–0)
NRBC BLD-RTO: 0 /100 WBCS — SIGNIFICANT CHANGE UP (ref 0–0)
PHOSPHATE SERPL-MCNC: 3.5 MG/DL — SIGNIFICANT CHANGE UP (ref 2.5–4.5)
PLATELET # BLD AUTO: 310 K/UL — SIGNIFICANT CHANGE UP (ref 150–400)
POTASSIUM SERPL-MCNC: 4.1 MMOL/L — SIGNIFICANT CHANGE UP (ref 3.5–5.3)
POTASSIUM SERPL-SCNC: 4.1 MMOL/L — SIGNIFICANT CHANGE UP (ref 3.5–5.3)
RBC # BLD: 4.31 M/UL — SIGNIFICANT CHANGE UP (ref 3.8–5.2)
RBC # FLD: 12.4 % — SIGNIFICANT CHANGE UP (ref 10.3–14.5)
SODIUM SERPL-SCNC: 141 MMOL/L — SIGNIFICANT CHANGE UP (ref 135–145)
TROPONIN I, HIGH SENSITIVITY RESULT: 31.3 NG/L — SIGNIFICANT CHANGE UP
WBC # BLD: 9.92 K/UL — SIGNIFICANT CHANGE UP (ref 3.8–10.5)
WBC # FLD AUTO: 9.92 K/UL — SIGNIFICANT CHANGE UP (ref 3.8–10.5)

## 2024-11-24 PROCEDURE — 93010 ELECTROCARDIOGRAM REPORT: CPT

## 2024-11-24 PROCEDURE — 99223 1ST HOSP IP/OBS HIGH 75: CPT | Mod: AI

## 2024-11-24 PROCEDURE — 99285 EMERGENCY DEPT VISIT HI MDM: CPT

## 2024-11-24 RX ORDER — LEVOTHYROXINE SODIUM 300 MCG
0 TABLET ORAL
Refills: 0 | DISCHARGE

## 2024-11-24 RX ORDER — ASPIRIN 325 MG
0 TABLET ORAL
Refills: 0 | DISCHARGE

## 2024-11-24 RX ORDER — LAMOTRIGINE 150 MG/1
0 TABLET ORAL
Refills: 0 | DISCHARGE

## 2024-11-24 RX ADMIN — Medication 250 MILLILITER(S): at 19:41

## 2024-11-24 RX ADMIN — Medication 500 MILLILITER(S): at 21:41

## 2024-11-25 DIAGNOSIS — N39.0 URINARY TRACT INFECTION, SITE NOT SPECIFIED: ICD-10-CM

## 2024-11-25 DIAGNOSIS — E03.9 HYPOTHYROIDISM, UNSPECIFIED: ICD-10-CM

## 2024-11-25 DIAGNOSIS — E86.0 DEHYDRATION: ICD-10-CM

## 2024-11-25 DIAGNOSIS — Z78.9 OTHER SPECIFIED HEALTH STATUS: ICD-10-CM

## 2024-11-25 DIAGNOSIS — Z29.9 ENCOUNTER FOR PROPHYLACTIC MEASURES, UNSPECIFIED: ICD-10-CM

## 2024-11-25 LAB
ANION GAP SERPL CALC-SCNC: 10 MMOL/L — SIGNIFICANT CHANGE UP (ref 5–17)
APPEARANCE UR: ABNORMAL
BILIRUB UR-MCNC: NEGATIVE — SIGNIFICANT CHANGE UP
BUN SERPL-MCNC: 22 MG/DL — SIGNIFICANT CHANGE UP (ref 7–23)
CALCIUM SERPL-MCNC: 8.8 MG/DL — SIGNIFICANT CHANGE UP (ref 8.4–10.5)
CHLORIDE SERPL-SCNC: 103 MMOL/L — SIGNIFICANT CHANGE UP (ref 96–108)
CO2 SERPL-SCNC: 27 MMOL/L — SIGNIFICANT CHANGE UP (ref 22–31)
COLOR SPEC: YELLOW — SIGNIFICANT CHANGE UP
CREAT SERPL-MCNC: 1.24 MG/DL — SIGNIFICANT CHANGE UP (ref 0.5–1.3)
DIFF PNL FLD: ABNORMAL
EGFR: 43 ML/MIN/1.73M2 — LOW
EGFR: 43 ML/MIN/1.73M2 — LOW
GLUCOSE SERPL-MCNC: 97 MG/DL — SIGNIFICANT CHANGE UP (ref 70–99)
GLUCOSE UR QL: NEGATIVE MG/DL — SIGNIFICANT CHANGE UP
KETONES UR-MCNC: ABNORMAL MG/DL
LEUKOCYTE ESTERASE UR-ACNC: ABNORMAL
NITRITE UR-MCNC: NEGATIVE — SIGNIFICANT CHANGE UP
PH UR: 5 — SIGNIFICANT CHANGE UP (ref 5–8)
POTASSIUM SERPL-MCNC: 3.8 MMOL/L — SIGNIFICANT CHANGE UP (ref 3.5–5.3)
POTASSIUM SERPL-SCNC: 3.8 MMOL/L — SIGNIFICANT CHANGE UP (ref 3.5–5.3)
PROT UR-MCNC: 30 MG/DL
SODIUM SERPL-SCNC: 140 MMOL/L — SIGNIFICANT CHANGE UP (ref 135–145)
SP GR SPEC: 1.02 — SIGNIFICANT CHANGE UP (ref 1–1.03)
TSH SERPL-MCNC: 1.42 UIU/ML — SIGNIFICANT CHANGE UP (ref 0.27–4.2)
UROBILINOGEN FLD QL: 1 MG/DL — SIGNIFICANT CHANGE UP (ref 0.2–1)

## 2024-11-25 PROCEDURE — 99221 1ST HOSP IP/OBS SF/LOW 40: CPT

## 2024-11-25 PROCEDURE — 99232 SBSQ HOSP IP/OBS MODERATE 35: CPT

## 2024-11-25 RX ORDER — HEPARIN SODIUM 1000 [USP'U]/ML
5000 INJECTION INTRAVENOUS; SUBCUTANEOUS EVERY 12 HOURS
Refills: 0 | Status: DISCONTINUED | OUTPATIENT
Start: 2024-11-25 | End: 2024-11-30

## 2024-11-25 RX ORDER — SODIUM CHLORIDE 9 G/1000ML
1000 INJECTION, SOLUTION INTRAVENOUS
Refills: 0 | Status: DISCONTINUED | OUTPATIENT
Start: 2024-11-25 | End: 2024-11-29

## 2024-11-25 RX ORDER — ALPRAZOLAM 0.5 MG
0.25 TABLET, EXTENDED RELEASE 24 HR ORAL
Refills: 0 | Status: DISCONTINUED | OUTPATIENT
Start: 2024-11-25 | End: 2024-11-30

## 2024-11-25 RX ORDER — ALPRAZOLAM 0.5 MG
0.5 TABLET, EXTENDED RELEASE 24 HR ORAL AT BEDTIME
Refills: 0 | Status: DISCONTINUED | OUTPATIENT
Start: 2024-11-25 | End: 2024-11-25

## 2024-11-25 RX ORDER — INFLUENZA A VIRUS A/IDAHO/07/2018 (H1N1) ANTIGEN (MDCK CELL DERIVED, PROPIOLACTONE INACTIVATED, INFLUENZA A VIRUS A/INDIANA/08/2018 (H3N2) ANTIGEN (MDCK CELL DERIVED, PROPIOLACTONE INACTIVATED), INFLUENZA B VIRUS B/SINGAPORE/INFTT-16-0610/2016 ANTIGEN (MDCK CELL DERIVED, PROPIOLACTONE INACTIVATED), INFLUENZA B VIRUS B/IOWA/06/2017 ANTIGEN (MDCK CELL DERIVED, PROPIOLACTONE INACTIVATED) 15; 15; 15; 15 UG/.5ML; UG/.5ML; UG/.5ML; UG/.5ML
0.5 INJECTION, SUSPENSION INTRAMUSCULAR ONCE
Refills: 0 | Status: DISCONTINUED | OUTPATIENT
Start: 2024-11-25 | End: 2024-11-30

## 2024-11-25 RX ORDER — OXYBUTYNIN CHLORIDE 5 MG/1
5 TABLET, FILM COATED, EXTENDED RELEASE ORAL DAILY
Refills: 0 | Status: DISCONTINUED | OUTPATIENT
Start: 2024-11-25 | End: 2024-11-26

## 2024-11-25 RX ORDER — ALPRAZOLAM 0.5 MG
0.25 TABLET, EXTENDED RELEASE 24 HR ORAL ONCE
Refills: 0 | Status: DISCONTINUED | OUTPATIENT
Start: 2024-11-25 | End: 2024-11-25

## 2024-11-25 RX ORDER — ASPIRIN 325 MG
81 TABLET ORAL DAILY
Refills: 0 | Status: DISCONTINUED | OUTPATIENT
Start: 2024-11-25 | End: 2024-11-30

## 2024-11-25 RX ORDER — OXYBUTYNIN CHLORIDE 5 MG/1
5 TABLET, FILM COATED, EXTENDED RELEASE ORAL DAILY
Refills: 0 | Status: DISCONTINUED | OUTPATIENT
Start: 2024-11-25 | End: 2024-11-25

## 2024-11-25 RX ADMIN — Medication 81 MILLIGRAM(S): at 11:50

## 2024-11-25 RX ADMIN — OXYBUTYNIN CHLORIDE 5 MILLIGRAM(S): 5 TABLET, FILM COATED, EXTENDED RELEASE ORAL at 11:50

## 2024-11-25 RX ADMIN — SODIUM CHLORIDE 100 MILLILITER(S): 9 INJECTION, SOLUTION INTRAVENOUS at 11:51

## 2024-11-25 RX ADMIN — Medication 0.25 MILLIGRAM(S): at 23:26

## 2024-11-25 RX ADMIN — SODIUM CHLORIDE 100 MILLILITER(S): 9 INJECTION, SOLUTION INTRAVENOUS at 01:49

## 2024-11-25 RX ADMIN — Medication 0.25 MILLIGRAM(S): at 09:25

## 2024-11-25 RX ADMIN — HEPARIN SODIUM 5000 UNIT(S): 1000 INJECTION INTRAVENOUS; SUBCUTANEOUS at 05:32

## 2024-11-25 RX ADMIN — Medication 0.25 MILLIGRAM(S): at 05:29

## 2024-11-25 RX ADMIN — HEPARIN SODIUM 5000 UNIT(S): 1000 INJECTION INTRAVENOUS; SUBCUTANEOUS at 17:25

## 2024-11-26 ENCOUNTER — APPOINTMENT (OUTPATIENT)
Dept: UROGYNECOLOGY | Facility: CLINIC | Age: 84
End: 2024-11-26

## 2024-11-26 LAB
ANION GAP SERPL CALC-SCNC: 9 MMOL/L — SIGNIFICANT CHANGE UP (ref 5–17)
BUN SERPL-MCNC: 22 MG/DL — SIGNIFICANT CHANGE UP (ref 7–23)
CALCIUM SERPL-MCNC: 8.9 MG/DL — SIGNIFICANT CHANGE UP (ref 8.4–10.5)
CHLORIDE SERPL-SCNC: 106 MMOL/L — SIGNIFICANT CHANGE UP (ref 96–108)
CO2 SERPL-SCNC: 28 MMOL/L — SIGNIFICANT CHANGE UP (ref 22–31)
CREAT SERPL-MCNC: 1.07 MG/DL — SIGNIFICANT CHANGE UP (ref 0.5–1.3)
EGFR: 51 ML/MIN/1.73M2 — LOW
EGFR: 51 ML/MIN/1.73M2 — LOW
GLUCOSE SERPL-MCNC: 107 MG/DL — HIGH (ref 70–99)
HCT VFR BLD CALC: 32.4 % — LOW (ref 34.5–45)
HGB BLD-MCNC: 10.8 G/DL — LOW (ref 11.5–15.5)
MCHC RBC-ENTMCNC: 31.6 PG — SIGNIFICANT CHANGE UP (ref 27–34)
MCHC RBC-ENTMCNC: 33.3 G/DL — SIGNIFICANT CHANGE UP (ref 32–36)
MCV RBC AUTO: 94.7 FL — SIGNIFICANT CHANGE UP (ref 80–100)
NRBC # BLD: 0 /100 WBCS — SIGNIFICANT CHANGE UP (ref 0–0)
NRBC BLD-RTO: 0 /100 WBCS — SIGNIFICANT CHANGE UP (ref 0–0)
PLATELET # BLD AUTO: 219 K/UL — SIGNIFICANT CHANGE UP (ref 150–400)
POTASSIUM SERPL-MCNC: 4.6 MMOL/L — SIGNIFICANT CHANGE UP (ref 3.5–5.3)
POTASSIUM SERPL-SCNC: 4.6 MMOL/L — SIGNIFICANT CHANGE UP (ref 3.5–5.3)
RBC # BLD: 3.42 M/UL — LOW (ref 3.8–5.2)
RBC # FLD: 12.7 % — SIGNIFICANT CHANGE UP (ref 10.3–14.5)
SODIUM SERPL-SCNC: 143 MMOL/L — SIGNIFICANT CHANGE UP (ref 135–145)
WBC # BLD: 5.83 K/UL — SIGNIFICANT CHANGE UP (ref 3.8–10.5)
WBC # FLD AUTO: 5.83 K/UL — SIGNIFICANT CHANGE UP (ref 3.8–10.5)

## 2024-11-26 PROCEDURE — 99233 SBSQ HOSP IP/OBS HIGH 50: CPT

## 2024-11-26 PROCEDURE — 99231 SBSQ HOSP IP/OBS SF/LOW 25: CPT

## 2024-11-26 RX ORDER — RISPERIDONE 4 MG
2 TABLET ORAL AT BEDTIME
Refills: 0 | Status: DISCONTINUED | OUTPATIENT
Start: 2024-11-26 | End: 2024-11-30

## 2024-11-26 RX ORDER — ATORVASTATIN CALCIUM 80 MG/1
80 TABLET, FILM COATED ORAL AT BEDTIME
Refills: 0 | Status: DISCONTINUED | OUTPATIENT
Start: 2024-11-26 | End: 2024-11-30

## 2024-11-26 RX ORDER — LAMOTRIGINE 150 MG/1
200 TABLET ORAL DAILY
Refills: 0 | Status: DISCONTINUED | OUTPATIENT
Start: 2024-11-26 | End: 2024-11-26

## 2024-11-26 RX ORDER — LAMOTRIGINE 150 MG/1
200 TABLET ORAL DAILY
Refills: 0 | Status: DISCONTINUED | OUTPATIENT
Start: 2024-11-26 | End: 2024-11-30

## 2024-11-26 RX ORDER — LEVOTHYROXINE SODIUM 300 MCG
25 TABLET ORAL DAILY
Refills: 0 | Status: DISCONTINUED | OUTPATIENT
Start: 2024-11-26 | End: 2024-11-30

## 2024-11-26 RX ORDER — MEMANTINE HYDROCHLORIDE 21 MG/1
5 CAPSULE, EXTENDED RELEASE ORAL
Refills: 0 | Status: DISCONTINUED | OUTPATIENT
Start: 2024-11-26 | End: 2024-11-30

## 2024-11-26 RX ORDER — OXYBUTYNIN CHLORIDE 5 MG/1
10 TABLET, FILM COATED, EXTENDED RELEASE ORAL DAILY
Refills: 0 | Status: DISCONTINUED | OUTPATIENT
Start: 2024-11-26 | End: 2024-11-30

## 2024-11-26 RX ORDER — CITALOPRAM 20 MG/1
20 TABLET ORAL DAILY
Refills: 0 | Status: DISCONTINUED | OUTPATIENT
Start: 2024-11-26 | End: 2024-11-26

## 2024-11-26 RX ORDER — BACITRACIN 500 UNIT/G
1 OINTMENT (GRAM) TOPICAL
Refills: 0 | Status: DISCONTINUED | OUTPATIENT
Start: 2024-11-26 | End: 2024-11-30

## 2024-11-26 RX ORDER — CIPROFLOXACIN HCL 250 MG
500 TABLET ORAL EVERY 24 HOURS
Refills: 0 | Status: DISCONTINUED | OUTPATIENT
Start: 2024-11-27 | End: 2024-11-30

## 2024-11-26 RX ORDER — CITALOPRAM 20 MG/1
20 TABLET ORAL DAILY
Refills: 0 | Status: DISCONTINUED | OUTPATIENT
Start: 2024-11-26 | End: 2024-11-30

## 2024-11-26 RX ADMIN — CITALOPRAM 20 MILLIGRAM(S): 20 TABLET ORAL at 21:17

## 2024-11-26 RX ADMIN — ATORVASTATIN CALCIUM 80 MILLIGRAM(S): 80 TABLET, FILM COATED ORAL at 21:18

## 2024-11-26 RX ADMIN — Medication 0.25 MILLIGRAM(S): at 21:17

## 2024-11-26 RX ADMIN — Medication 2 MILLIGRAM(S): at 21:18

## 2024-11-26 RX ADMIN — Medication 1 APPLICATION(S): at 18:09

## 2024-11-26 RX ADMIN — HEPARIN SODIUM 5000 UNIT(S): 1000 INJECTION INTRAVENOUS; SUBCUTANEOUS at 05:58

## 2024-11-26 RX ADMIN — MEMANTINE HYDROCHLORIDE 5 MILLIGRAM(S): 21 CAPSULE, EXTENDED RELEASE ORAL at 18:06

## 2024-11-26 RX ADMIN — Medication 81 MILLIGRAM(S): at 12:07

## 2024-11-26 RX ADMIN — HEPARIN SODIUM 5000 UNIT(S): 1000 INJECTION INTRAVENOUS; SUBCUTANEOUS at 18:04

## 2024-11-26 RX ADMIN — OXYBUTYNIN CHLORIDE 5 MILLIGRAM(S): 5 TABLET, FILM COATED, EXTENDED RELEASE ORAL at 12:07

## 2024-11-27 LAB
BASOPHILS # BLD AUTO: 0.02 K/UL — SIGNIFICANT CHANGE UP (ref 0–0.2)
BASOPHILS NFR BLD AUTO: 0.3 % — SIGNIFICANT CHANGE UP (ref 0–2)
EOSINOPHIL # BLD AUTO: 0.29 K/UL — SIGNIFICANT CHANGE UP (ref 0–0.5)
EOSINOPHIL NFR BLD AUTO: 4.4 % — SIGNIFICANT CHANGE UP (ref 0–6)
HCT VFR BLD CALC: 32.9 % — LOW (ref 34.5–45)
HGB BLD-MCNC: 10.8 G/DL — LOW (ref 11.5–15.5)
IMM GRANULOCYTES NFR BLD AUTO: 0.2 % — SIGNIFICANT CHANGE UP (ref 0–0.9)
LYMPHOCYTES # BLD AUTO: 2.71 K/UL — SIGNIFICANT CHANGE UP (ref 1–3.3)
LYMPHOCYTES # BLD AUTO: 40.9 % — SIGNIFICANT CHANGE UP (ref 13–44)
MCHC RBC-ENTMCNC: 31.1 PG — SIGNIFICANT CHANGE UP (ref 27–34)
MCHC RBC-ENTMCNC: 32.8 G/DL — SIGNIFICANT CHANGE UP (ref 32–36)
MCV RBC AUTO: 94.8 FL — SIGNIFICANT CHANGE UP (ref 80–100)
MONOCYTES # BLD AUTO: 0.6 K/UL — SIGNIFICANT CHANGE UP (ref 0–0.9)
MONOCYTES NFR BLD AUTO: 9.1 % — SIGNIFICANT CHANGE UP (ref 2–14)
NEUTROPHILS # BLD AUTO: 2.99 K/UL — SIGNIFICANT CHANGE UP (ref 1.8–7.4)
NEUTROPHILS NFR BLD AUTO: 45.1 % — SIGNIFICANT CHANGE UP (ref 43–77)
NRBC # BLD: 0 /100 WBCS — SIGNIFICANT CHANGE UP (ref 0–0)
NRBC BLD-RTO: 0 /100 WBCS — SIGNIFICANT CHANGE UP (ref 0–0)
PLATELET # BLD AUTO: 216 K/UL — SIGNIFICANT CHANGE UP (ref 150–400)
RBC # BLD: 3.47 M/UL — LOW (ref 3.8–5.2)
RBC # FLD: 13.1 % — SIGNIFICANT CHANGE UP (ref 10.3–14.5)
WBC # BLD: 6.62 K/UL — SIGNIFICANT CHANGE UP (ref 3.8–10.5)
WBC # FLD AUTO: 6.62 K/UL — SIGNIFICANT CHANGE UP (ref 3.8–10.5)

## 2024-11-27 PROCEDURE — 99232 SBSQ HOSP IP/OBS MODERATE 35: CPT

## 2024-11-27 RX ADMIN — MEMANTINE HYDROCHLORIDE 5 MILLIGRAM(S): 21 CAPSULE, EXTENDED RELEASE ORAL at 17:51

## 2024-11-27 RX ADMIN — Medication 500 MILLIGRAM(S): at 15:40

## 2024-11-27 RX ADMIN — Medication 1 APPLICATION(S): at 17:51

## 2024-11-27 RX ADMIN — Medication 25 MICROGRAM(S): at 06:39

## 2024-11-27 RX ADMIN — Medication 0.25 MILLIGRAM(S): at 21:22

## 2024-11-27 RX ADMIN — Medication 81 MILLIGRAM(S): at 15:39

## 2024-11-27 RX ADMIN — HEPARIN SODIUM 5000 UNIT(S): 1000 INJECTION INTRAVENOUS; SUBCUTANEOUS at 17:50

## 2024-11-27 RX ADMIN — Medication 2 MILLIGRAM(S): at 21:22

## 2024-11-27 RX ADMIN — ATORVASTATIN CALCIUM 80 MILLIGRAM(S): 80 TABLET, FILM COATED ORAL at 21:22

## 2024-11-27 RX ADMIN — CITALOPRAM 20 MILLIGRAM(S): 20 TABLET ORAL at 15:40

## 2024-11-27 RX ADMIN — LAMOTRIGINE 200 MILLIGRAM(S): 150 TABLET ORAL at 15:39

## 2024-11-27 RX ADMIN — OXYBUTYNIN CHLORIDE 10 MILLIGRAM(S): 5 TABLET, FILM COATED, EXTENDED RELEASE ORAL at 15:39

## 2024-11-27 RX ADMIN — HEPARIN SODIUM 5000 UNIT(S): 1000 INJECTION INTRAVENOUS; SUBCUTANEOUS at 06:39

## 2024-11-27 RX ADMIN — MEMANTINE HYDROCHLORIDE 5 MILLIGRAM(S): 21 CAPSULE, EXTENDED RELEASE ORAL at 06:39

## 2024-11-27 RX ADMIN — Medication 1 APPLICATION(S): at 06:39

## 2024-11-28 PROCEDURE — 99232 SBSQ HOSP IP/OBS MODERATE 35: CPT

## 2024-11-28 RX ADMIN — OXYBUTYNIN CHLORIDE 10 MILLIGRAM(S): 5 TABLET, FILM COATED, EXTENDED RELEASE ORAL at 13:50

## 2024-11-28 RX ADMIN — MEMANTINE HYDROCHLORIDE 5 MILLIGRAM(S): 21 CAPSULE, EXTENDED RELEASE ORAL at 06:22

## 2024-11-28 RX ADMIN — Medication 0.25 MILLIGRAM(S): at 21:06

## 2024-11-28 RX ADMIN — Medication 25 MICROGRAM(S): at 06:22

## 2024-11-28 RX ADMIN — Medication 500 MILLIGRAM(S): at 09:21

## 2024-11-28 RX ADMIN — LAMOTRIGINE 200 MILLIGRAM(S): 150 TABLET ORAL at 13:49

## 2024-11-28 RX ADMIN — MEMANTINE HYDROCHLORIDE 5 MILLIGRAM(S): 21 CAPSULE, EXTENDED RELEASE ORAL at 18:47

## 2024-11-28 RX ADMIN — HEPARIN SODIUM 5000 UNIT(S): 1000 INJECTION INTRAVENOUS; SUBCUTANEOUS at 06:22

## 2024-11-28 RX ADMIN — Medication 81 MILLIGRAM(S): at 13:49

## 2024-11-28 RX ADMIN — SODIUM CHLORIDE 100 MILLILITER(S): 9 INJECTION, SOLUTION INTRAVENOUS at 06:23

## 2024-11-28 RX ADMIN — Medication 2 MILLIGRAM(S): at 21:06

## 2024-11-28 RX ADMIN — Medication 1 APPLICATION(S): at 06:22

## 2024-11-28 RX ADMIN — Medication 1 APPLICATION(S): at 18:47

## 2024-11-28 RX ADMIN — CITALOPRAM 20 MILLIGRAM(S): 20 TABLET ORAL at 13:50

## 2024-11-28 RX ADMIN — HEPARIN SODIUM 5000 UNIT(S): 1000 INJECTION INTRAVENOUS; SUBCUTANEOUS at 18:47

## 2024-11-28 RX ADMIN — ATORVASTATIN CALCIUM 80 MILLIGRAM(S): 80 TABLET, FILM COATED ORAL at 21:06

## 2024-11-29 ENCOUNTER — TRANSCRIPTION ENCOUNTER (OUTPATIENT)
Age: 84
End: 2024-11-29

## 2024-11-29 LAB
ALBUMIN SERPL ELPH-MCNC: 2.9 G/DL — LOW (ref 3.3–5)
ALP SERPL-CCNC: 81 U/L — SIGNIFICANT CHANGE UP (ref 30–120)
ALT FLD-CCNC: 13 U/L — SIGNIFICANT CHANGE UP (ref 10–60)
ANION GAP SERPL CALC-SCNC: 9 MMOL/L — SIGNIFICANT CHANGE UP (ref 5–17)
AST SERPL-CCNC: 21 U/L — SIGNIFICANT CHANGE UP (ref 10–40)
BILIRUB SERPL-MCNC: 0.2 MG/DL — SIGNIFICANT CHANGE UP (ref 0.2–1.2)
BUN SERPL-MCNC: 26 MG/DL — HIGH (ref 7–23)
CALCIUM SERPL-MCNC: 9.2 MG/DL — SIGNIFICANT CHANGE UP (ref 8.4–10.5)
CHLORIDE SERPL-SCNC: 105 MMOL/L — SIGNIFICANT CHANGE UP (ref 96–108)
CO2 SERPL-SCNC: 29 MMOL/L — SIGNIFICANT CHANGE UP (ref 22–31)
CREAT SERPL-MCNC: 1.27 MG/DL — SIGNIFICANT CHANGE UP (ref 0.5–1.3)
EGFR: 41 ML/MIN/1.73M2 — LOW
EGFR: 41 ML/MIN/1.73M2 — LOW
GLUCOSE SERPL-MCNC: 96 MG/DL — SIGNIFICANT CHANGE UP (ref 70–99)
HCT VFR BLD CALC: 30.5 % — LOW (ref 34.5–45)
HGB BLD-MCNC: 9.9 G/DL — LOW (ref 11.5–15.5)
MAGNESIUM SERPL-MCNC: 1.7 MG/DL — SIGNIFICANT CHANGE UP (ref 1.6–2.6)
MCHC RBC-ENTMCNC: 30.7 PG — SIGNIFICANT CHANGE UP (ref 27–34)
MCHC RBC-ENTMCNC: 32.5 G/DL — SIGNIFICANT CHANGE UP (ref 32–36)
MCV RBC AUTO: 94.7 FL — SIGNIFICANT CHANGE UP (ref 80–100)
NRBC # BLD: 0 /100 WBCS — SIGNIFICANT CHANGE UP (ref 0–0)
NRBC BLD-RTO: 0 /100 WBCS — SIGNIFICANT CHANGE UP (ref 0–0)
PHOSPHATE SERPL-MCNC: 4.2 MG/DL — SIGNIFICANT CHANGE UP (ref 2.5–4.5)
PLATELET # BLD AUTO: 212 K/UL — SIGNIFICANT CHANGE UP (ref 150–400)
POTASSIUM SERPL-MCNC: 4 MMOL/L — SIGNIFICANT CHANGE UP (ref 3.5–5.3)
POTASSIUM SERPL-SCNC: 4 MMOL/L — SIGNIFICANT CHANGE UP (ref 3.5–5.3)
PROT SERPL-MCNC: 5.5 G/DL — LOW (ref 6–8.3)
RBC # BLD: 3.22 M/UL — LOW (ref 3.8–5.2)
RBC # FLD: 13.4 % — SIGNIFICANT CHANGE UP (ref 10.3–14.5)
SODIUM SERPL-SCNC: 143 MMOL/L — SIGNIFICANT CHANGE UP (ref 135–145)
WBC # BLD: 6.8 K/UL — SIGNIFICANT CHANGE UP (ref 3.8–10.5)
WBC # FLD AUTO: 6.8 K/UL — SIGNIFICANT CHANGE UP (ref 3.8–10.5)

## 2024-11-29 PROCEDURE — 99232 SBSQ HOSP IP/OBS MODERATE 35: CPT

## 2024-11-29 PROCEDURE — 99231 SBSQ HOSP IP/OBS SF/LOW 25: CPT

## 2024-11-29 RX ORDER — RISPERIDONE 4 MG
1 TABLET ORAL
Qty: 30 | Refills: 0
Start: 2024-11-29 | End: 2024-12-28

## 2024-11-29 RX ORDER — MEMANTINE HYDROCHLORIDE 21 MG/1
1 CAPSULE, EXTENDED RELEASE ORAL
Qty: 0 | Refills: 0 | DISCHARGE
Start: 2024-11-29

## 2024-11-29 RX ORDER — RISPERIDONE 4 MG
1 TABLET ORAL
Qty: 0 | Refills: 0 | DISCHARGE
Start: 2024-11-29

## 2024-11-29 RX ORDER — MAGNESIUM, ALUMINUM HYDROXIDE 200-200 MG
30 TABLET,CHEWABLE ORAL ONCE
Refills: 0 | Status: COMPLETED | OUTPATIENT
Start: 2024-11-29 | End: 2024-11-29

## 2024-11-29 RX ORDER — BACITRACIN 500 UNIT/G
1 OINTMENT (GRAM) TOPICAL
Qty: 1 | Refills: 0
Start: 2024-11-29 | End: 2024-12-08

## 2024-11-29 RX ORDER — CIPROFLOXACIN HCL 250 MG
1 TABLET ORAL
Qty: 3 | Refills: 0
Start: 2024-11-29 | End: 2024-12-01

## 2024-11-29 RX ORDER — ATORVASTATIN CALCIUM 80 MG/1
1 TABLET, FILM COATED ORAL
Qty: 0 | Refills: 0 | DISCHARGE
Start: 2024-11-29

## 2024-11-29 RX ORDER — ALPRAZOLAM 0.5 MG
1 TABLET, EXTENDED RELEASE 24 HR ORAL
Qty: 0 | Refills: 0 | DISCHARGE
Start: 2024-11-29

## 2024-11-29 RX ORDER — CITALOPRAM 20 MG/1
20 TABLET ORAL
Qty: 0 | Refills: 0 | DISCHARGE

## 2024-11-29 RX ADMIN — Medication 1 APPLICATION(S): at 05:02

## 2024-11-29 RX ADMIN — OXYBUTYNIN CHLORIDE 10 MILLIGRAM(S): 5 TABLET, FILM COATED, EXTENDED RELEASE ORAL at 11:32

## 2024-11-29 RX ADMIN — Medication 25 MICROGRAM(S): at 05:02

## 2024-11-29 RX ADMIN — MEMANTINE HYDROCHLORIDE 5 MILLIGRAM(S): 21 CAPSULE, EXTENDED RELEASE ORAL at 17:43

## 2024-11-29 RX ADMIN — MEMANTINE HYDROCHLORIDE 5 MILLIGRAM(S): 21 CAPSULE, EXTENDED RELEASE ORAL at 05:02

## 2024-11-29 RX ADMIN — Medication 30 MILLILITER(S): at 23:34

## 2024-11-29 RX ADMIN — HEPARIN SODIUM 5000 UNIT(S): 1000 INJECTION INTRAVENOUS; SUBCUTANEOUS at 17:43

## 2024-11-29 RX ADMIN — Medication 500 MILLIGRAM(S): at 08:05

## 2024-11-29 RX ADMIN — CITALOPRAM 20 MILLIGRAM(S): 20 TABLET ORAL at 21:11

## 2024-11-29 RX ADMIN — LAMOTRIGINE 200 MILLIGRAM(S): 150 TABLET ORAL at 11:32

## 2024-11-29 RX ADMIN — Medication 2 MILLIGRAM(S): at 21:11

## 2024-11-29 RX ADMIN — SODIUM CHLORIDE 100 MILLILITER(S): 9 INJECTION, SOLUTION INTRAVENOUS at 05:01

## 2024-11-29 RX ADMIN — ATORVASTATIN CALCIUM 80 MILLIGRAM(S): 80 TABLET, FILM COATED ORAL at 21:11

## 2024-11-29 RX ADMIN — Medication 81 MILLIGRAM(S): at 11:32

## 2024-11-29 RX ADMIN — HEPARIN SODIUM 5000 UNIT(S): 1000 INJECTION INTRAVENOUS; SUBCUTANEOUS at 05:02

## 2024-11-29 RX ADMIN — Medication 1 APPLICATION(S): at 17:43

## 2024-11-30 ENCOUNTER — TRANSCRIPTION ENCOUNTER (OUTPATIENT)
Age: 84
End: 2024-11-30

## 2024-11-30 VITALS — HEART RATE: 68 BPM | SYSTOLIC BLOOD PRESSURE: 130 MMHG | DIASTOLIC BLOOD PRESSURE: 63 MMHG | OXYGEN SATURATION: 95 %

## 2024-11-30 PROCEDURE — 36415 COLL VENOUS BLD VENIPUNCTURE: CPT

## 2024-11-30 PROCEDURE — 99285 EMERGENCY DEPT VISIT HI MDM: CPT

## 2024-11-30 PROCEDURE — 82553 CREATINE MB FRACTION: CPT

## 2024-11-30 PROCEDURE — 81001 URINALYSIS AUTO W/SCOPE: CPT

## 2024-11-30 PROCEDURE — 83735 ASSAY OF MAGNESIUM: CPT

## 2024-11-30 PROCEDURE — 80053 COMPREHEN METABOLIC PANEL: CPT

## 2024-11-30 PROCEDURE — 96360 HYDRATION IV INFUSION INIT: CPT

## 2024-11-30 PROCEDURE — 85027 COMPLETE CBC AUTOMATED: CPT

## 2024-11-30 PROCEDURE — 93005 ELECTROCARDIOGRAM TRACING: CPT

## 2024-11-30 PROCEDURE — 87086 URINE CULTURE/COLONY COUNT: CPT

## 2024-11-30 PROCEDURE — 84484 ASSAY OF TROPONIN QUANT: CPT

## 2024-11-30 PROCEDURE — 99232 SBSQ HOSP IP/OBS MODERATE 35: CPT

## 2024-11-30 PROCEDURE — 85025 COMPLETE CBC W/AUTO DIFF WBC: CPT

## 2024-11-30 PROCEDURE — 87077 CULTURE AEROBIC IDENTIFY: CPT

## 2024-11-30 PROCEDURE — 87186 SC STD MICRODIL/AGAR DIL: CPT

## 2024-11-30 PROCEDURE — 97161 PT EVAL LOW COMPLEX 20 MIN: CPT

## 2024-11-30 PROCEDURE — 96361 HYDRATE IV INFUSION ADD-ON: CPT

## 2024-11-30 PROCEDURE — 82550 ASSAY OF CK (CPK): CPT

## 2024-11-30 PROCEDURE — 83690 ASSAY OF LIPASE: CPT

## 2024-11-30 PROCEDURE — 84443 ASSAY THYROID STIM HORMONE: CPT

## 2024-11-30 PROCEDURE — 84100 ASSAY OF PHOSPHORUS: CPT

## 2024-11-30 PROCEDURE — 80048 BASIC METABOLIC PNL TOTAL CA: CPT

## 2024-11-30 RX ORDER — ACETAMINOPHEN 500 MG/5ML
650 LIQUID (ML) ORAL EVERY 6 HOURS
Refills: 0 | Status: DISCONTINUED | OUTPATIENT
Start: 2024-11-30 | End: 2024-11-30

## 2024-11-30 RX ORDER — OXYBUTYNIN CHLORIDE 5 MG/1
10 TABLET, FILM COATED, EXTENDED RELEASE ORAL
Qty: 0 | Refills: 0 | DISCHARGE

## 2024-11-30 RX ORDER — SOLIFENACIN SUCCIATE 5 MG/1
1 TABLET, FILM COATED ORAL
Qty: 0 | Refills: 0 | DISCHARGE

## 2024-11-30 RX ADMIN — Medication 500 MILLIGRAM(S): at 09:11

## 2024-11-30 RX ADMIN — Medication 25 MICROGRAM(S): at 06:25

## 2024-11-30 RX ADMIN — MEMANTINE HYDROCHLORIDE 5 MILLIGRAM(S): 21 CAPSULE, EXTENDED RELEASE ORAL at 06:25

## 2024-11-30 RX ADMIN — Medication 650 MILLIGRAM(S): at 04:00

## 2024-11-30 RX ADMIN — HEPARIN SODIUM 5000 UNIT(S): 1000 INJECTION INTRAVENOUS; SUBCUTANEOUS at 06:24

## 2024-11-30 RX ADMIN — OXYBUTYNIN CHLORIDE 10 MILLIGRAM(S): 5 TABLET, FILM COATED, EXTENDED RELEASE ORAL at 12:30

## 2024-11-30 RX ADMIN — LAMOTRIGINE 200 MILLIGRAM(S): 150 TABLET ORAL at 12:30

## 2024-11-30 RX ADMIN — Medication 650 MILLIGRAM(S): at 03:01

## 2024-11-30 RX ADMIN — Medication 81 MILLIGRAM(S): at 12:31

## 2024-11-30 RX ADMIN — Medication 1 APPLICATION(S): at 06:25
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
## 2024-12-10 ENCOUNTER — APPOINTMENT (OUTPATIENT)
Dept: NEPHROLOGY | Facility: CLINIC | Age: 84
End: 2024-12-10

## 2024-12-11 PROBLEM — Z87.39 PERSONAL HISTORY OF OTHER DISEASES OF THE MUSCULOSKELETAL SYSTEM AND CONNECTIVE TISSUE: Chronic | Status: ACTIVE | Noted: 2024-11-24

## 2024-12-13 ENCOUNTER — APPOINTMENT (OUTPATIENT)
Dept: UROGYNECOLOGY | Facility: CLINIC | Age: 84
End: 2024-12-13

## 2024-12-13 ENCOUNTER — NON-APPOINTMENT (OUTPATIENT)
Age: 84
End: 2024-12-13

## 2024-12-13 VITALS
SYSTOLIC BLOOD PRESSURE: 116 MMHG | DIASTOLIC BLOOD PRESSURE: 73 MMHG | WEIGHT: 112 LBS | BODY MASS INDEX: 20.61 KG/M2 | HEART RATE: 82 BPM | HEIGHT: 62 IN

## 2024-12-13 DIAGNOSIS — N81.6 RECTOCELE: ICD-10-CM

## 2024-12-13 DIAGNOSIS — N81.11 CYSTOCELE, MIDLINE: ICD-10-CM

## 2024-12-13 DIAGNOSIS — R30.0 DYSURIA: ICD-10-CM

## 2024-12-13 DIAGNOSIS — N81.2 INCOMPLETE UTEROVAGINAL PROLAPSE: ICD-10-CM

## 2024-12-13 DIAGNOSIS — N95.2 POSTMENOPAUSAL ATROPHIC VAGINITIS: ICD-10-CM

## 2024-12-13 PROCEDURE — 51701 INSERT BLADDER CATHETER: CPT

## 2024-12-13 PROCEDURE — 99214 OFFICE O/P EST MOD 30 MIN: CPT | Mod: 25

## 2024-12-13 PROCEDURE — G2211 COMPLEX E/M VISIT ADD ON: CPT

## 2024-12-13 PROCEDURE — 99459 PELVIC EXAMINATION: CPT

## 2024-12-13 PROCEDURE — 81003 URINALYSIS AUTO W/O SCOPE: CPT | Mod: QW

## 2024-12-28 NOTE — PROGRESS NOTE ADULT - ASSESSMENT
Problem: Chronic Conditions and Co-morbidities  Goal: Patient's chronic conditions and co-morbidity symptoms are monitored and maintained or improved  12/28/2024 1109 by Maria Fernanda Jacobsen RN  Outcome: Not Progressing  12/27/2024 2134 by Digna Ying RN  Outcome: Progressing  Flowsheets (Taken 12/27/2024 2030)  Care Plan - Patient's Chronic Conditions and Co-Morbidity Symptoms are Monitored and Maintained or Improved: Monitor and assess patient's chronic conditions and comorbid symptoms for stability, deterioration, or improvement     Problem: Discharge Planning  Goal: Discharge to home or other facility with appropriate resources  12/28/2024 1109 by Maria Fernanda Jacobsen RN  Outcome: Not Progressing  12/27/2024 2134 by Digna Ying RN  Outcome: Progressing  Flowsheets (Taken 12/27/2024 2030)  Discharge to home or other facility with appropriate resources: Identify barriers to discharge with patient and caregiver     Problem: Pain  Goal: Verbalizes/displays adequate comfort level or baseline comfort level  12/28/2024 1109 by Maria Fernanda Jacobsen RN  Outcome: Not Progressing  Flowsheets (Taken 12/28/2024 0000 by Digna Ying RN)  Verbalizes/displays adequate comfort level or baseline comfort level:   Encourage patient to monitor pain and request assistance   Assess pain using appropriate pain scale   Administer analgesics based on type and severity of pain and evaluate response  12/27/2024 2134 by Digna Ying RN  Outcome: Progressing  Flowsheets (Taken 12/27/2024 2000)  Verbalizes/displays adequate comfort level or baseline comfort level:   Encourage patient to monitor pain and request assistance   Assess pain using appropriate pain scale   Administer analgesics based on type and severity of pain and evaluate response     Problem: Safety - Adult  Goal: Free from fall injury  12/28/2024 1109 by Maria Fernanda Jacobsen RN  Outcome: Not Progressing  12/27/2024 2134 by Digna Ying  LISANDRA LEUNG  Outcome: Progressing     Problem: Skin/Tissue Integrity  Goal: Absence of new skin breakdown  Description: 1.  Monitor for areas of redness and/or skin breakdown  2.  Assess vascular access sites hourly  3.  Every 4-6 hours minimum:  Change oxygen saturation probe site  4.  Every 4-6 hours:  If on nasal continuous positive airway pressure, respiratory therapy assess nares and determine need for appliance change or resting period.  12/28/2024 1109 by Maria Fernanda Jacobsen RN  Outcome: Not Progressing  12/27/2024 2134 by Digna Ying RN  Outcome: Progressing     Problem: Neurosensory - Adult  Goal: Achieves stable or improved neurological status  12/28/2024 1109 by Maria Fernanda Jacobsen RN  Outcome: Not Progressing  12/27/2024 2134 by Digna Ying RN  Outcome: Progressing  Flowsheets (Taken 12/27/2024 2030)  Achieves stable or improved neurological status: Assess for and report changes in neurological status     Problem: Respiratory - Adult  Goal: Achieves optimal ventilation and oxygenation  12/28/2024 1109 by Maria Fernanda Jacobsen RN  Outcome: Not Progressing  12/27/2024 2134 by Digna Ying RN  Outcome: Progressing  Flowsheets (Taken 12/27/2024 2030)  Achieves optimal ventilation and oxygenation: Assess for changes in respiratory status     Problem: Cardiovascular - Adult  Goal: Maintains optimal cardiac output and hemodynamic stability  12/28/2024 1109 by Maria Fernanda Jacobsen RN  Outcome: Not Progressing  12/27/2024 2134 by Digna Ying RN  Outcome: Progressing  Flowsheets (Taken 12/27/2024 2030)  Maintains optimal cardiac output and hemodynamic stability: Monitor blood pressure and heart rate  Goal: Absence of cardiac dysrhythmias or at baseline  12/28/2024 1109 by Maria Fernanda Jacobsen RN  Outcome: Not Progressing  12/27/2024 2134 by Digna Ying RN  Outcome: Progressing  Flowsheets (Taken 12/27/2024 2030)  Absence of cardiac dysrhythmias or at baseline: Monitor  Assessment and Plan:  -Acute hypoxic respiratory failure 2/2 COVID 19 pneumonia:  completed Remdesivir, and Decadron 6mg IV daily.  Albuterol inh Q6h PRN and Guaifenesin ER 600mg PO Q12h.  Titrate down O2 support- now on RA.  Maintain SPO2>88%.  ID and Pulmonary f/u . COVID PCR + 12/14  -Hx of subdural hematoma:  continue Lamictal 25mg PO BID  -HTN:  continue Norvasc 5mg PO daily  -Hypothyroidism:  continue Synthroid 25mcg PO daily  -Depression, anxiety, PTSD:  continue Celexa 10mg PO daily, psych consulted, no objection to d/c, started on low dose benzo. Patient with continuous episodes of catatonia, psych called to evaluate patient again- as per psych- had pleasant conversation, but when I went to examine patient, patient with head down on table, refusing to speak with me.  12/15- patient expressing thoughts of dying, wanting to jump out window- placed on 1:1, Psych called- will again re-evaluate patient in AM. Discussed events with son, he is aware, states he has been calling patient over phone, but she is not speaking much to him as well.  -VTE ppx:  Lovenox 40mg SQ daily  -PT eval output, weight and lab values     Problem: Genitourinary - Adult  Goal: Absence of urinary retention  12/28/2024 1109 by Maria Fernanda Jacobsen RN  Outcome: Not Progressing  12/27/2024 2134 by Digna Ying RN  Outcome: Progressing  Flowsheets (Taken 12/27/2024 2030)  Absence of urinary retention: Monitor intake/output and perform bladder scan as needed     Problem: Infection - Adult  Goal: Absence of infection at discharge  12/28/2024 1109 by Maria Fernanda Jacobsen RN  Outcome: Not Progressing  12/27/2024 2134 by Digna Ying RN  Outcome: Progressing  Flowsheets (Taken 12/27/2024 2030)  Absence of infection at discharge: Assess and monitor for signs and symptoms of infection     Problem: Metabolic/Fluid and Electrolytes - Adult  Goal: Electrolytes maintained within normal limits  12/28/2024 1109 by Maria Fernanda Jacobsen RN  Outcome: Not Progressing  12/27/2024 2134 by Digna Ying RN  Outcome: Progressing  Flowsheets (Taken 12/27/2024 2030)  Electrolytes maintained within normal limits: Monitor labs and assess patient for signs and symptoms of electrolyte imbalances  Goal: Hemodynamic stability and optimal renal function maintained  12/28/2024 1109 by Maria Fernanda Jacobsen RN  Outcome: Not Progressing  12/27/2024 2134 by Digna Ying RN  Outcome: Progressing  Flowsheets (Taken 12/27/2024 2030)  Hemodynamic stability and optimal renal function maintained: Monitor labs and assess for signs and symptoms of volume excess or deficit  Goal: Glucose maintained within prescribed range  12/28/2024 1109 by Maria Fernanda Jacobsen RN  Outcome: Not Progressing  12/27/2024 2134 by Digna Ying RN  Outcome: Progressing  Flowsheets (Taken 12/27/2024 2030)  Glucose maintained within prescribed range: Monitor blood glucose as ordered     Problem: Hematologic - Adult  Goal: Maintains hematologic stability  12/28/2024 1109 by Maria Fernanda Jacobsen RN  Outcome: Not Progressing  12/27/2024 2134 by

## 2025-01-22 NOTE — BH CONSULTATION LIAISON ASSESSMENT NOTE - NSBHATTESTTYPEVISIT_PSY_A_CORE
FAMILY HISTORY:  Father  Still living? Unknown  Family history of CVA, Age at diagnosis: Age Unknown    Mother  Still living? No  FH: lung cancer, Age at diagnosis: Age Unknown     Attending Only

## 2025-01-30 ENCOUNTER — APPOINTMENT (OUTPATIENT)
Dept: NEPHROLOGY | Facility: CLINIC | Age: 85
End: 2025-01-30

## 2025-02-12 ENCOUNTER — APPOINTMENT (OUTPATIENT)
Dept: UROGYNECOLOGY | Facility: CLINIC | Age: 85
End: 2025-02-12

## 2025-03-10 NOTE — BEHAVIORAL HEALTH ASSESSMENT NOTE - NSBHCONSFOLLOWNEEDS_PSY_A_CORE
Addended by: CLARISSA REEVES on: 3/10/2025 01:29 PM     Modules accepted: Orders     no psychiatric contraindications to discharge

## 2025-03-11 ENCOUNTER — NON-APPOINTMENT (OUTPATIENT)
Age: 85
End: 2025-03-11

## 2025-03-11 RX ORDER — PHENAZOPYRIDINE 200 MG/1
200 TABLET, FILM COATED ORAL 3 TIMES DAILY
Qty: 45 | Refills: 0 | Status: ACTIVE | COMMUNITY
Start: 2025-03-11 | End: 2025-03-26

## 2025-03-12 ENCOUNTER — NON-APPOINTMENT (OUTPATIENT)
Age: 85
End: 2025-03-12

## 2025-03-12 ENCOUNTER — APPOINTMENT (OUTPATIENT)
Dept: UROGYNECOLOGY | Facility: CLINIC | Age: 85
End: 2025-03-12
Payer: MEDICARE

## 2025-03-12 VITALS
DIASTOLIC BLOOD PRESSURE: 68 MMHG | HEIGHT: 62 IN | HEART RATE: 89 BPM | BODY MASS INDEX: 20.61 KG/M2 | WEIGHT: 112 LBS | TEMPERATURE: 206.96 F | SYSTOLIC BLOOD PRESSURE: 102 MMHG

## 2025-03-12 DIAGNOSIS — R30.0 DYSURIA: ICD-10-CM

## 2025-03-12 DIAGNOSIS — N81.11 CYSTOCELE, MIDLINE: ICD-10-CM

## 2025-03-12 DIAGNOSIS — N81.9 FEMALE GENITAL PROLAPSE, UNSPECIFIED: ICD-10-CM

## 2025-03-12 DIAGNOSIS — N30.00 ACUTE CYSTITIS W/OUT HEMATURIA: ICD-10-CM

## 2025-03-12 DIAGNOSIS — N95.2 POSTMENOPAUSAL ATROPHIC VAGINITIS: ICD-10-CM

## 2025-03-12 DIAGNOSIS — N81.6 RECTOCELE: ICD-10-CM

## 2025-03-12 PROCEDURE — 81003 URINALYSIS AUTO W/O SCOPE: CPT | Mod: QW

## 2025-03-12 PROCEDURE — 99459 PELVIC EXAMINATION: CPT

## 2025-03-12 PROCEDURE — 99214 OFFICE O/P EST MOD 30 MIN: CPT | Mod: 25

## 2025-03-12 PROCEDURE — 51701 INSERT BLADDER CATHETER: CPT

## 2025-03-12 RX ORDER — AMOXICILLIN AND CLAVULANATE POTASSIUM 875; 125 MG/1; MG/1
875-125 TABLET, COATED ORAL
Qty: 14 | Refills: 0 | Status: ACTIVE | COMMUNITY
Start: 2025-03-12 | End: 1900-01-01

## 2025-03-12 RX ORDER — SULFAMETHOXAZOLE AND TRIMETHOPRIM 800; 160 MG/1; MG/1
800-160 TABLET ORAL TWICE DAILY
Qty: 6 | Refills: 0 | Status: DISCONTINUED | COMMUNITY
Start: 2025-03-12 | End: 2025-03-12

## 2025-03-13 ENCOUNTER — APPOINTMENT (OUTPATIENT)
Dept: UROGYNECOLOGY | Facility: CLINIC | Age: 85
End: 2025-03-13

## 2025-03-14 ENCOUNTER — EMERGENCY (EMERGENCY)
Facility: HOSPITAL | Age: 85
LOS: 1 days | Discharge: HOME | End: 2025-03-14
Attending: STUDENT IN AN ORGANIZED HEALTH CARE EDUCATION/TRAINING PROGRAM
Payer: MEDICARE

## 2025-03-14 VITALS
TEMPERATURE: 98 F | DIASTOLIC BLOOD PRESSURE: 76 MMHG | HEART RATE: 71 BPM | WEIGHT: 110.01 LBS | SYSTOLIC BLOOD PRESSURE: 131 MMHG | OXYGEN SATURATION: 94 % | RESPIRATION RATE: 18 BRPM

## 2025-03-14 LAB
ALBUMIN SERPL ELPH-MCNC: 4.3 G/DL — SIGNIFICANT CHANGE UP (ref 3.3–5)
ALP SERPL-CCNC: 79 U/L — SIGNIFICANT CHANGE UP (ref 40–120)
ALT FLD-CCNC: 11 U/L — SIGNIFICANT CHANGE UP (ref 10–45)
ANION GAP SERPL CALC-SCNC: 16 MMOL/L — SIGNIFICANT CHANGE UP (ref 5–17)
APPEARANCE UR: CLEAR — SIGNIFICANT CHANGE UP
AST SERPL-CCNC: 19 U/L — SIGNIFICANT CHANGE UP (ref 10–40)
BACTERIA # UR AUTO: NEGATIVE /HPF — SIGNIFICANT CHANGE UP
BASOPHILS # BLD AUTO: 0.04 K/UL — SIGNIFICANT CHANGE UP (ref 0–0.2)
BASOPHILS NFR BLD AUTO: 0.4 % — SIGNIFICANT CHANGE UP (ref 0–2)
BILIRUB SERPL-MCNC: 0.2 MG/DL — SIGNIFICANT CHANGE UP (ref 0.2–1.2)
BILIRUB UR-MCNC: ABNORMAL
BUN SERPL-MCNC: 23 MG/DL — SIGNIFICANT CHANGE UP (ref 7–23)
CALCIUM SERPL-MCNC: 9.5 MG/DL — SIGNIFICANT CHANGE UP (ref 8.4–10.5)
CAST: 1 /LPF — SIGNIFICANT CHANGE UP (ref 0–4)
CHLORIDE SERPL-SCNC: 101 MMOL/L — SIGNIFICANT CHANGE UP (ref 96–108)
CO2 SERPL-SCNC: 22 MMOL/L — SIGNIFICANT CHANGE UP (ref 22–31)
COLOR SPEC: ABNORMAL
CREAT SERPL-MCNC: 1.37 MG/DL — HIGH (ref 0.5–1.3)
DIFF PNL FLD: NEGATIVE — SIGNIFICANT CHANGE UP
EGFR: 38 ML/MIN/1.73M2 — LOW
EGFR: 38 ML/MIN/1.73M2 — LOW
EOSINOPHIL # BLD AUTO: 0.14 K/UL — SIGNIFICANT CHANGE UP (ref 0–0.5)
EOSINOPHIL NFR BLD AUTO: 1.3 % — SIGNIFICANT CHANGE UP (ref 0–6)
GLUCOSE SERPL-MCNC: 84 MG/DL — SIGNIFICANT CHANGE UP (ref 70–99)
GLUCOSE UR QL: NEGATIVE MG/DL — SIGNIFICANT CHANGE UP
HCT VFR BLD CALC: 40.6 % — SIGNIFICANT CHANGE UP (ref 34.5–45)
HGB BLD-MCNC: 13.1 G/DL — SIGNIFICANT CHANGE UP (ref 11.5–15.5)
IMM GRANULOCYTES NFR BLD AUTO: 0.3 % — SIGNIFICANT CHANGE UP (ref 0–0.9)
KETONES UR-MCNC: ABNORMAL MG/DL
LEUKOCYTE ESTERASE UR-ACNC: ABNORMAL
LYMPHOCYTES # BLD AUTO: 2.71 K/UL — SIGNIFICANT CHANGE UP (ref 1–3.3)
LYMPHOCYTES # BLD AUTO: 25.5 % — SIGNIFICANT CHANGE UP (ref 13–44)
MCHC RBC-ENTMCNC: 31.3 PG — SIGNIFICANT CHANGE UP (ref 27–34)
MCHC RBC-ENTMCNC: 32.3 G/DL — SIGNIFICANT CHANGE UP (ref 32–36)
MCV RBC AUTO: 96.9 FL — SIGNIFICANT CHANGE UP (ref 80–100)
MONOCYTES # BLD AUTO: 1.13 K/UL — HIGH (ref 0–0.9)
MONOCYTES NFR BLD AUTO: 10.7 % — SIGNIFICANT CHANGE UP (ref 2–14)
NEUTROPHILS # BLD AUTO: 6.56 K/UL — SIGNIFICANT CHANGE UP (ref 1.8–7.4)
NEUTROPHILS NFR BLD AUTO: 61.8 % — SIGNIFICANT CHANGE UP (ref 43–77)
NITRITE UR-MCNC: POSITIVE
NRBC BLD AUTO-RTO: 0 /100 WBCS — SIGNIFICANT CHANGE UP (ref 0–0)
PH UR: 5 — SIGNIFICANT CHANGE UP (ref 5–8)
PLATELET # BLD AUTO: 246 K/UL — SIGNIFICANT CHANGE UP (ref 150–400)
POTASSIUM SERPL-MCNC: 3.9 MMOL/L — SIGNIFICANT CHANGE UP (ref 3.5–5.3)
POTASSIUM SERPL-SCNC: 3.9 MMOL/L — SIGNIFICANT CHANGE UP (ref 3.5–5.3)
PROT SERPL-MCNC: 7.2 G/DL — SIGNIFICANT CHANGE UP (ref 6–8.3)
PROT UR-MCNC: 30 MG/DL
RBC # BLD: 4.19 M/UL — SIGNIFICANT CHANGE UP (ref 3.8–5.2)
RBC # FLD: 12.8 % — SIGNIFICANT CHANGE UP (ref 10.3–14.5)
RBC CASTS # UR COMP ASSIST: 9 /HPF — HIGH (ref 0–4)
REVIEW: SIGNIFICANT CHANGE UP
SODIUM SERPL-SCNC: 139 MMOL/L — SIGNIFICANT CHANGE UP (ref 135–145)
SP GR SPEC: 1.02 — SIGNIFICANT CHANGE UP (ref 1–1.03)
SQUAMOUS # UR AUTO: 1 /HPF — SIGNIFICANT CHANGE UP (ref 0–5)
UROBILINOGEN FLD QL: 1 MG/DL — SIGNIFICANT CHANGE UP (ref 0.2–1)
WBC # BLD: 10.61 K/UL — HIGH (ref 3.8–10.5)
WBC # FLD AUTO: 10.61 K/UL — HIGH (ref 3.8–10.5)
WBC UR QL: 0 /HPF — SIGNIFICANT CHANGE UP (ref 0–5)

## 2025-03-14 PROCEDURE — 74177 CT ABD & PELVIS W/CONTRAST: CPT | Mod: 26

## 2025-03-14 PROCEDURE — 99285 EMERGENCY DEPT VISIT HI MDM: CPT

## 2025-03-14 RX ORDER — CIPROFLOXACIN HCL 250 MG
250 TABLET ORAL ONCE
Refills: 0 | Status: DISCONTINUED | OUTPATIENT
Start: 2025-03-14 | End: 2025-03-14

## 2025-03-14 RX ORDER — CIPROFLOXACIN HCL 250 MG
500 TABLET ORAL ONCE
Refills: 0 | Status: COMPLETED | OUTPATIENT
Start: 2025-03-14 | End: 2025-03-14

## 2025-03-14 RX ORDER — ACETAMINOPHEN 500 MG/5ML
750 LIQUID (ML) ORAL ONCE
Refills: 0 | Status: COMPLETED | OUTPATIENT
Start: 2025-03-14 | End: 2025-03-14

## 2025-03-14 RX ORDER — CIPROFLOXACIN HCL 250 MG
1 TABLET ORAL
Qty: 7 | Refills: 0
Start: 2025-03-14 | End: 2025-03-20

## 2025-03-14 RX ADMIN — Medication 300 MILLIGRAM(S): at 21:08

## 2025-03-14 RX ADMIN — Medication 1000 MILLILITER(S): at 22:55

## 2025-03-14 NOTE — ED PROVIDER NOTE - PHYSICAL EXAMINATION
PHYSICAL EXAM:  GENERAL: NAD, well-developed  HEAD:  Atraumatic, Normocephalic  CHEST/LUNG: Clear to auscultation bilaterally; No wheeze  HEART: Regular rate and rhythm; No murmurs, rubs, or gallops  ABDOMEN: Soft, Nontender, Nondistended; Bowel sounds present  EXTREMITIES:  2+ Peripheral Pulses, No clubbing, cyanosis, or edema

## 2025-03-14 NOTE — ED PROVIDER NOTE - CLINICAL SUMMARY MEDICAL DECISION MAKING FREE TEXT BOX
83yo F with PMHx of likely bladder pain syndrome with right sided flank pain. Concern for possible kidney stone vs. less likely infection. Plan for CT scan and labwork; if normal; likely d/c.

## 2025-03-14 NOTE — ED PROVIDER NOTE - OBJECTIVE STATEMENT
83yo F with recurrent urinary symptoms and followed by urogynecology p/w right flank pain. She endorses that the pain has been ongoing for roughly a week and her urinary symptoms have been ongoing for months. She denies any fevers, chills, nausea, vomiting. Last CT was 6 months ago. Denies any blood in the urine.

## 2025-03-14 NOTE — ED PROVIDER NOTE - NSFOLLOWUPINSTRUCTIONS_ED_ALL_ED_FT
You were seen in the Emergency Department for flank pain.  Your urine was consistent was a urinary tract infection.  Based on your prior urine cultures, you were prescribed ciprofloxacin to be taken once a day every 7 day.    1) Advance activity as tolerated.   2) Continue all previously prescribed medications as directed.    3) Follow up with your urogynecologist within 3-5 days - take copies of your results.    4) Return to the Emergency Department for worsening or persistent symptoms, fever > 100.4 F, nausea/vomiting, inability to tolerate and/or ANY NEW OR CONCERNING SYMPTOMS.

## 2025-03-14 NOTE — ED PROVIDER NOTE - PATIENT PORTAL LINK FT
You can access the FollowMyHealth Patient Portal offered by [facility] by registering at the following website: [email or web address]. By joining [facility]’s FollowMyHealth portal, you will also be able to view your health information using other applications (apps) compatible with our system.

## 2025-03-14 NOTE — ED ADULT NURSE NOTE - OBJECTIVE STATEMENT
84yF, A&Ox4, ambulates with a cane at baseline, presents to the ED c/o uri 84yF, A&Ox4, ambulates with a cane at baseline, presents to the ED c/o 84yF, A&Ox4, ambulates with a cane at baseline, presents to the ED c/o R sided flank pain. Pt has been having persistent urinary symptoms and has been taking medication. Pt now with R sided flank pain and dark colored urine. Sx have been for roughly 2 weeks. Gross neuro intact, no difficulty speaking in complete sentences, pulses x 4, moving all extremities, abdomen soft nontender nondistended, skin intact. Pt denies fevers/chills, numbness/tingling, weakness, headache, dizziness, vision changes, cp, sob, cough, abd pain, n/v/d.

## 2025-03-15 VITALS
DIASTOLIC BLOOD PRESSURE: 91 MMHG | SYSTOLIC BLOOD PRESSURE: 150 MMHG | TEMPERATURE: 98 F | RESPIRATION RATE: 16 BRPM | HEART RATE: 60 BPM | OXYGEN SATURATION: 94 %

## 2025-03-15 LAB
CULTURE RESULTS: NO GROWTH — SIGNIFICANT CHANGE UP
SPECIMEN SOURCE: SIGNIFICANT CHANGE UP

## 2025-03-15 PROCEDURE — 99284 EMERGENCY DEPT VISIT MOD MDM: CPT | Mod: 25

## 2025-03-15 PROCEDURE — 81001 URINALYSIS AUTO W/SCOPE: CPT

## 2025-03-15 PROCEDURE — 87086 URINE CULTURE/COLONY COUNT: CPT

## 2025-03-15 PROCEDURE — 74177 CT ABD & PELVIS W/CONTRAST: CPT | Mod: MC

## 2025-03-15 PROCEDURE — 80053 COMPREHEN METABOLIC PANEL: CPT

## 2025-03-15 PROCEDURE — 85025 COMPLETE CBC W/AUTO DIFF WBC: CPT

## 2025-03-15 PROCEDURE — 96374 THER/PROPH/DIAG INJ IV PUSH: CPT | Mod: XU

## 2025-03-15 RX ADMIN — Medication 500 MILLIGRAM(S): at 00:42

## 2025-03-17 RX ORDER — ESTRADIOL 0.1 MG/G
0.1 CREAM VAGINAL
Qty: 1 | Refills: 0 | Status: ACTIVE | COMMUNITY
Start: 2025-03-17 | End: 1900-01-01

## 2025-04-07 NOTE — DISCHARGE NOTE PROVIDER - DISCHARGE SERVICE FOR PATIENT
patient with continued shortness of breath. May have anxiety component as per family. Will try ativan. as per med list patient taking Lasix 20mg every other day. Given shortness of breath and XR finding will give lasix. patient has been on BiPAP before, will try now. Will get ICU evaluation.
on the discharge service for the patient. I have reviewed and made amendments to the documentation where necessary.

## 2025-04-28 ENCOUNTER — APPOINTMENT (OUTPATIENT)
Dept: UROGYNECOLOGY | Facility: CLINIC | Age: 85
End: 2025-04-28

## 2025-06-16 ENCOUNTER — APPOINTMENT (OUTPATIENT)
Dept: UROGYNECOLOGY | Facility: CLINIC | Age: 85
End: 2025-06-16
Payer: MEDICARE

## 2025-06-16 VITALS
SYSTOLIC BLOOD PRESSURE: 130 MMHG | BODY MASS INDEX: 18.78 KG/M2 | DIASTOLIC BLOOD PRESSURE: 70 MMHG | WEIGHT: 110 LBS | HEART RATE: 76 BPM | HEIGHT: 64 IN

## 2025-06-16 PROCEDURE — 51701 INSERT BLADDER CATHETER: CPT

## 2025-06-16 PROCEDURE — 99214 OFFICE O/P EST MOD 30 MIN: CPT | Mod: 25

## 2025-06-16 PROCEDURE — 81003 URINALYSIS AUTO W/O SCOPE: CPT | Mod: QW

## 2025-06-16 PROCEDURE — 99459 PELVIC EXAMINATION: CPT

## 2025-06-16 NOTE — ED ADULT NURSE NOTE - PRIMARY CARE PROVIDER
06/16/25 1518   Call Details   Call Attempt # 1   SDOH Domain(s) with needs Food Insecurity     Full Mailbox.   
Denisse

## 2025-06-18 LAB — BACTERIA UR CULT: NORMAL

## 2025-08-18 ENCOUNTER — APPOINTMENT (OUTPATIENT)
Dept: UROGYNECOLOGY | Facility: CLINIC | Age: 85
End: 2025-08-18

## 2025-08-18 DIAGNOSIS — R35.0 FREQUENCY OF MICTURITION: ICD-10-CM

## 2025-08-18 DIAGNOSIS — R30.0 DYSURIA: ICD-10-CM

## 2025-08-18 DIAGNOSIS — N81.2 INCOMPLETE UTEROVAGINAL PROLAPSE: ICD-10-CM

## 2025-08-18 DIAGNOSIS — N95.2 POSTMENOPAUSAL ATROPHIC VAGINITIS: ICD-10-CM

## 2025-08-18 DIAGNOSIS — N81.6 RECTOCELE: ICD-10-CM

## 2025-08-18 DIAGNOSIS — N81.11 CYSTOCELE, MIDLINE: ICD-10-CM

## 2025-08-18 PROCEDURE — 81003 URINALYSIS AUTO W/O SCOPE: CPT | Mod: QW

## 2025-08-18 PROCEDURE — 99459 PELVIC EXAMINATION: CPT

## 2025-08-18 PROCEDURE — 99214 OFFICE O/P EST MOD 30 MIN: CPT | Mod: 25

## 2025-08-18 PROCEDURE — 51701 INSERT BLADDER CATHETER: CPT

## 2025-09-08 ENCOUNTER — APPOINTMENT (OUTPATIENT)
Dept: UROGYNECOLOGY | Facility: CLINIC | Age: 85
End: 2025-09-08

## 2025-09-12 ENCOUNTER — RX RENEWAL (OUTPATIENT)
Age: 85
End: 2025-09-12